# Patient Record
Sex: MALE | Race: BLACK OR AFRICAN AMERICAN | Employment: OTHER | URBAN - METROPOLITAN AREA
[De-identification: names, ages, dates, MRNs, and addresses within clinical notes are randomized per-mention and may not be internally consistent; named-entity substitution may affect disease eponyms.]

---

## 2017-03-15 ENCOUNTER — OFFICE VISIT (OUTPATIENT)
Dept: INTERNAL MEDICINE CLINIC | Age: 72
End: 2017-03-15

## 2017-03-15 VITALS
DIASTOLIC BLOOD PRESSURE: 86 MMHG | SYSTOLIC BLOOD PRESSURE: 128 MMHG | HEART RATE: 82 BPM | OXYGEN SATURATION: 96 % | BODY MASS INDEX: 33.96 KG/M2 | WEIGHT: 250.4 LBS

## 2017-03-15 DIAGNOSIS — I10 ESSENTIAL HYPERTENSION: ICD-10-CM

## 2017-03-15 DIAGNOSIS — F17.208 NICOTINE DEPENDENCE WITH OTHER NICOTINE-INDUCED DISORDER: ICD-10-CM

## 2017-03-15 DIAGNOSIS — E04.1 THYROID NODULE: ICD-10-CM

## 2017-03-15 DIAGNOSIS — J43.8 OTHER EMPHYSEMA (HCC): ICD-10-CM

## 2017-03-15 DIAGNOSIS — E78.00 PURE HYPERCHOLESTEROLEMIA: ICD-10-CM

## 2017-03-15 DIAGNOSIS — E11.8 CONTROLLED TYPE 2 DIABETES MELLITUS WITH COMPLICATION, WITHOUT LONG-TERM CURRENT USE OF INSULIN (HCC): Primary | ICD-10-CM

## 2017-03-15 LAB
GLUCOSE BLD-MCNC: 127 MG/DL
HBA1C MFR BLD: 6.8 %

## 2017-03-15 PROCEDURE — 82962 GLUCOSE BLOOD TEST: CPT | Performed by: INTERNAL MEDICINE

## 2017-03-15 PROCEDURE — 83036 HEMOGLOBIN GLYCOSYLATED A1C: CPT | Performed by: INTERNAL MEDICINE

## 2017-03-15 PROCEDURE — 99214 OFFICE O/P EST MOD 30 MIN: CPT | Performed by: INTERNAL MEDICINE

## 2017-03-15 RX ORDER — FLUTICASONE FUROATE AND VILANTEROL 100; 25 UG/1; UG/1
1 POWDER RESPIRATORY (INHALATION) DAILY
Qty: 1 EACH | Refills: 3 | Status: SHIPPED | OUTPATIENT
Start: 2017-03-15 | End: 2017-07-25 | Stop reason: SDUPTHER

## 2017-03-15 ASSESSMENT — PATIENT HEALTH QUESTIONNAIRE - PHQ9
SUM OF ALL RESPONSES TO PHQ9 QUESTIONS 1 & 2: 0
1. LITTLE INTEREST OR PLEASURE IN DOING THINGS: 0
SUM OF ALL RESPONSES TO PHQ QUESTIONS 1-9: 0
2. FEELING DOWN, DEPRESSED OR HOPELESS: 0

## 2017-03-18 ASSESSMENT — ENCOUNTER SYMPTOMS
EYES NEGATIVE: 1
GASTROINTESTINAL NEGATIVE: 1

## 2017-03-29 ENCOUNTER — OFFICE VISIT (OUTPATIENT)
Dept: ENT CLINIC | Age: 72
End: 2017-03-29

## 2017-03-29 VITALS
DIASTOLIC BLOOD PRESSURE: 64 MMHG | TEMPERATURE: 98.2 F | BODY MASS INDEX: 33.59 KG/M2 | WEIGHT: 248 LBS | SYSTOLIC BLOOD PRESSURE: 108 MMHG | HEIGHT: 72 IN

## 2017-03-29 DIAGNOSIS — D49.7 THYROID NEOPLASM: Primary | ICD-10-CM

## 2017-03-29 PROCEDURE — 99203 OFFICE O/P NEW LOW 30 MIN: CPT | Performed by: OTOLARYNGOLOGY

## 2017-04-04 ENCOUNTER — HOSPITAL ENCOUNTER (OUTPATIENT)
Dept: ULTRASOUND IMAGING | Age: 72
Discharge: HOME | End: 2017-04-04
Attending: OTOLARYNGOLOGY | Admitting: OTOLARYNGOLOGY

## 2017-04-04 DIAGNOSIS — D49.7 THYROID NEOPLASM: ICD-10-CM

## 2017-04-04 DIAGNOSIS — D49.7 NEOPLASM OF UNSPECIFIED BEHAVIOR OF ENDOCRINE GLANDS AND OTHER PARTS OF NERVOUS SYSTEM: ICD-10-CM

## 2017-04-11 ENCOUNTER — TELEPHONE (OUTPATIENT)
Dept: ENT CLINIC | Age: 72
End: 2017-04-11

## 2017-04-27 ENCOUNTER — OFFICE VISIT (OUTPATIENT)
Dept: INTERNAL MEDICINE CLINIC | Age: 72
End: 2017-04-27

## 2017-04-27 VITALS
BODY MASS INDEX: 33.61 KG/M2 | OXYGEN SATURATION: 95 % | HEART RATE: 84 BPM | DIASTOLIC BLOOD PRESSURE: 78 MMHG | SYSTOLIC BLOOD PRESSURE: 130 MMHG | WEIGHT: 247.8 LBS

## 2017-04-27 DIAGNOSIS — E04.1 THYROID NODULE: ICD-10-CM

## 2017-04-27 DIAGNOSIS — E11.8 CONTROLLED TYPE 2 DIABETES MELLITUS WITH COMPLICATION, WITHOUT LONG-TERM CURRENT USE OF INSULIN (HCC): Primary | ICD-10-CM

## 2017-04-27 DIAGNOSIS — I10 ESSENTIAL HYPERTENSION: ICD-10-CM

## 2017-04-27 DIAGNOSIS — J43.8 OTHER EMPHYSEMA (HCC): ICD-10-CM

## 2017-04-27 LAB — GLUCOSE BLD-MCNC: 132 MG/DL

## 2017-04-27 PROCEDURE — 82962 GLUCOSE BLOOD TEST: CPT | Performed by: INTERNAL MEDICINE

## 2017-04-27 PROCEDURE — 99214 OFFICE O/P EST MOD 30 MIN: CPT | Performed by: INTERNAL MEDICINE

## 2017-04-30 ASSESSMENT — ENCOUNTER SYMPTOMS: GASTROINTESTINAL NEGATIVE: 1

## 2017-05-16 ENCOUNTER — OFFICE VISIT (OUTPATIENT)
Dept: INTERNAL MEDICINE CLINIC | Age: 72
End: 2017-05-16

## 2017-05-16 VITALS
DIASTOLIC BLOOD PRESSURE: 80 MMHG | OXYGEN SATURATION: 94 % | HEIGHT: 72 IN | WEIGHT: 247 LBS | HEART RATE: 91 BPM | BODY MASS INDEX: 33.46 KG/M2 | SYSTOLIC BLOOD PRESSURE: 136 MMHG | TEMPERATURE: 97.9 F | RESPIRATION RATE: 16 BRPM

## 2017-05-16 DIAGNOSIS — I10 ESSENTIAL HYPERTENSION: Primary | ICD-10-CM

## 2017-05-16 DIAGNOSIS — Z01.818 PRE-OP EXAM: ICD-10-CM

## 2017-05-16 DIAGNOSIS — E11.9 TYPE 2 DIABETES MELLITUS WITHOUT COMPLICATION, WITHOUT LONG-TERM CURRENT USE OF INSULIN (HCC): ICD-10-CM

## 2017-05-16 DIAGNOSIS — I10 ESSENTIAL HYPERTENSION: ICD-10-CM

## 2017-05-16 LAB — GLUCOSE BLD-MCNC: 125 MG/DL

## 2017-05-16 PROCEDURE — 93000 ELECTROCARDIOGRAM COMPLETE: CPT | Performed by: NURSE PRACTITIONER

## 2017-05-16 PROCEDURE — 82962 GLUCOSE BLOOD TEST: CPT | Performed by: NURSE PRACTITIONER

## 2017-05-16 PROCEDURE — 99214 OFFICE O/P EST MOD 30 MIN: CPT | Performed by: NURSE PRACTITIONER

## 2017-05-16 ASSESSMENT — ENCOUNTER SYMPTOMS
GASTROINTESTINAL NEGATIVE: 1
RESPIRATORY NEGATIVE: 1
ALLERGIC/IMMUNOLOGIC NEGATIVE: 1

## 2017-05-17 LAB
A/G RATIO: 1.4 (ref 1.1–2.2)
ALBUMIN SERPL-MCNC: 4.1 G/DL (ref 3.4–5)
ALP BLD-CCNC: 64 U/L (ref 40–129)
ALT SERPL-CCNC: 11 U/L (ref 10–40)
ANION GAP SERPL CALCULATED.3IONS-SCNC: 13 MMOL/L (ref 3–16)
AST SERPL-CCNC: 19 U/L (ref 15–37)
BILIRUB SERPL-MCNC: 0.3 MG/DL (ref 0–1)
BUN BLDV-MCNC: 12 MG/DL (ref 7–20)
CALCIUM SERPL-MCNC: 9.3 MG/DL (ref 8.3–10.6)
CHLORIDE BLD-SCNC: 104 MMOL/L (ref 99–110)
CO2: 25 MMOL/L (ref 21–32)
CREAT SERPL-MCNC: 1 MG/DL (ref 0.8–1.3)
GFR AFRICAN AMERICAN: >60
GFR NON-AFRICAN AMERICAN: >60
GLOBULIN: 3 G/DL
GLUCOSE BLD-MCNC: 107 MG/DL (ref 70–99)
HCT VFR BLD CALC: 47.3 % (ref 40.5–52.5)
HEMOGLOBIN: 15.4 G/DL (ref 13.5–17.5)
MCH RBC QN AUTO: 31.9 PG (ref 26–34)
MCHC RBC AUTO-ENTMCNC: 32.6 G/DL (ref 31–36)
MCV RBC AUTO: 97.9 FL (ref 80–100)
PDW BLD-RTO: 14.1 % (ref 12.4–15.4)
PLATELET # BLD: 182 K/UL (ref 135–450)
PMV BLD AUTO: 8.6 FL (ref 5–10.5)
POTASSIUM SERPL-SCNC: 4.6 MMOL/L (ref 3.5–5.1)
RBC # BLD: 4.83 M/UL (ref 4.2–5.9)
SODIUM BLD-SCNC: 142 MMOL/L (ref 136–145)
TOTAL PROTEIN: 7.1 G/DL (ref 6.4–8.2)
WBC # BLD: 8.6 K/UL (ref 4–11)

## 2017-07-25 DIAGNOSIS — J43.8 OTHER EMPHYSEMA (HCC): ICD-10-CM

## 2017-07-25 RX ORDER — FLUTICASONE FUROATE AND VILANTEROL 100; 25 UG/1; UG/1
1 POWDER RESPIRATORY (INHALATION) DAILY
Qty: 1 EACH | Refills: 3 | Status: SHIPPED | OUTPATIENT
Start: 2017-07-25 | End: 2018-02-24 | Stop reason: SDUPTHER

## 2017-07-31 RX ORDER — AMMONIUM LACTATE 12 G/100G
LOTION TOPICAL
Qty: 225 ML | Refills: 2 | Status: SHIPPED | OUTPATIENT
Start: 2017-07-31 | End: 2018-01-04 | Stop reason: SDUPTHER

## 2017-07-31 RX ORDER — NAPROXEN 500 MG/1
TABLET ORAL
Qty: 20 TABLET | Refills: 1 | Status: SHIPPED | OUTPATIENT
Start: 2017-07-31 | End: 2022-10-17 | Stop reason: ALTCHOICE

## 2017-08-29 ENCOUNTER — OFFICE VISIT (OUTPATIENT)
Dept: INTERNAL MEDICINE CLINIC | Age: 72
End: 2017-08-29

## 2017-08-29 VITALS
BODY MASS INDEX: 34.08 KG/M2 | WEIGHT: 251.6 LBS | SYSTOLIC BLOOD PRESSURE: 104 MMHG | OXYGEN SATURATION: 97 % | HEART RATE: 96 BPM | HEIGHT: 72 IN | DIASTOLIC BLOOD PRESSURE: 64 MMHG | TEMPERATURE: 97.9 F

## 2017-08-29 DIAGNOSIS — L30.9 ECZEMA, UNSPECIFIED TYPE: ICD-10-CM

## 2017-08-29 DIAGNOSIS — Z23 NEED FOR PNEUMOCOCCAL VACCINE: ICD-10-CM

## 2017-08-29 DIAGNOSIS — I10 ESSENTIAL HYPERTENSION: ICD-10-CM

## 2017-08-29 DIAGNOSIS — F17.219 CIGARETTE NICOTINE DEPENDENCE WITH NICOTINE-INDUCED DISORDER: ICD-10-CM

## 2017-08-29 DIAGNOSIS — E78.2 MIXED HYPERLIPIDEMIA: ICD-10-CM

## 2017-08-29 DIAGNOSIS — E11.8 TYPE 2 DIABETES MELLITUS WITH COMPLICATION, WITHOUT LONG-TERM CURRENT USE OF INSULIN (HCC): Primary | ICD-10-CM

## 2017-08-29 DIAGNOSIS — Z23 FLU VACCINE NEED: ICD-10-CM

## 2017-08-29 LAB
GLUCOSE BLD-MCNC: 118 MG/DL
HBA1C MFR BLD: 6.7 %

## 2017-08-29 PROCEDURE — 99214 OFFICE O/P EST MOD 30 MIN: CPT | Performed by: INTERNAL MEDICINE

## 2017-08-29 PROCEDURE — 83036 HEMOGLOBIN GLYCOSYLATED A1C: CPT | Performed by: INTERNAL MEDICINE

## 2017-08-29 PROCEDURE — G0008 ADMIN INFLUENZA VIRUS VAC: HCPCS | Performed by: INTERNAL MEDICINE

## 2017-08-29 PROCEDURE — 90670 PCV13 VACCINE IM: CPT | Performed by: INTERNAL MEDICINE

## 2017-08-29 PROCEDURE — 90662 IIV NO PRSV INCREASED AG IM: CPT | Performed by: INTERNAL MEDICINE

## 2017-08-29 PROCEDURE — G0009 ADMIN PNEUMOCOCCAL VACCINE: HCPCS | Performed by: INTERNAL MEDICINE

## 2017-08-29 PROCEDURE — 82962 GLUCOSE BLOOD TEST: CPT | Performed by: INTERNAL MEDICINE

## 2017-08-30 ASSESSMENT — ENCOUNTER SYMPTOMS
EYES NEGATIVE: 1
RESPIRATORY NEGATIVE: 1
GASTROINTESTINAL NEGATIVE: 1

## 2017-11-21 ENCOUNTER — OFFICE VISIT (OUTPATIENT)
Dept: INTERNAL MEDICINE CLINIC | Age: 72
End: 2017-11-21

## 2017-11-21 VITALS
TEMPERATURE: 97.9 F | WEIGHT: 248.6 LBS | SYSTOLIC BLOOD PRESSURE: 116 MMHG | BODY MASS INDEX: 35.59 KG/M2 | HEIGHT: 70 IN | HEART RATE: 72 BPM | DIASTOLIC BLOOD PRESSURE: 80 MMHG

## 2017-11-21 DIAGNOSIS — I10 ESSENTIAL HYPERTENSION: ICD-10-CM

## 2017-11-21 DIAGNOSIS — E78.2 MIXED HYPERLIPIDEMIA: ICD-10-CM

## 2017-11-21 DIAGNOSIS — L98.9 SKIN LESION: ICD-10-CM

## 2017-11-21 DIAGNOSIS — F17.219 CIGARETTE NICOTINE DEPENDENCE WITH NICOTINE-INDUCED DISORDER: ICD-10-CM

## 2017-11-21 DIAGNOSIS — E11.8 TYPE 2 DIABETES MELLITUS WITH COMPLICATION, WITHOUT LONG-TERM CURRENT USE OF INSULIN (HCC): Primary | ICD-10-CM

## 2017-11-21 LAB
CHOLESTEROL, TOTAL: 182 MG/DL (ref 0–199)
CREATININE URINE: 288.9 MG/DL (ref 39–259)
GLUCOSE BLD-MCNC: 125 MG/DL
HDLC SERPL-MCNC: 46 MG/DL (ref 40–60)
LDL CHOLESTEROL CALCULATED: 106 MG/DL
MICROALBUMIN UR-MCNC: <1.2 MG/DL
MICROALBUMIN/CREAT UR-RTO: ABNORMAL MG/G (ref 0–30)
TRIGL SERPL-MCNC: 152 MG/DL (ref 0–150)
VLDLC SERPL CALC-MCNC: 30 MG/DL

## 2017-11-21 PROCEDURE — G8427 DOCREV CUR MEDS BY ELIG CLIN: HCPCS | Performed by: INTERNAL MEDICINE

## 2017-11-21 PROCEDURE — 3044F HG A1C LEVEL LT 7.0%: CPT | Performed by: INTERNAL MEDICINE

## 2017-11-21 PROCEDURE — 3017F COLORECTAL CA SCREEN DOC REV: CPT | Performed by: INTERNAL MEDICINE

## 2017-11-21 PROCEDURE — 4004F PT TOBACCO SCREEN RCVD TLK: CPT | Performed by: INTERNAL MEDICINE

## 2017-11-21 PROCEDURE — 4040F PNEUMOC VAC/ADMIN/RCVD: CPT | Performed by: INTERNAL MEDICINE

## 2017-11-21 PROCEDURE — G8484 FLU IMMUNIZE NO ADMIN: HCPCS | Performed by: INTERNAL MEDICINE

## 2017-11-21 PROCEDURE — 99214 OFFICE O/P EST MOD 30 MIN: CPT | Performed by: INTERNAL MEDICINE

## 2017-11-21 PROCEDURE — 1123F ACP DISCUSS/DSCN MKR DOCD: CPT | Performed by: INTERNAL MEDICINE

## 2017-11-21 PROCEDURE — G8417 CALC BMI ABV UP PARAM F/U: HCPCS | Performed by: INTERNAL MEDICINE

## 2017-11-21 PROCEDURE — 82962 GLUCOSE BLOOD TEST: CPT | Performed by: INTERNAL MEDICINE

## 2017-11-21 NOTE — PROGRESS NOTES
Subjective:      Patient ID: Daryn Chacon is a 71 y.o. male. HPI He is here for a check up and f/u on hypertension, hyperlipidemia, and diabetes, type 2. He is taking medication as prescribed, continues to work on a more balanced meal plan and more physical activity recognizing more work needs to be put forth in these areas. He also continues to smoke despite repeated counseling session. Review of Systems   Constitutional: Negative. HENT: Negative. Eyes:        Current with eye exam.  Dr. Codi Byrd. Respiratory: Negative. Cardiovascular:        Hypertension, on stable medication regimen. An ARB and CA blker. Gastrointestinal: Negative. Endocrine:        Diabetes, type 2 on DPP4 with a1c of 6.7. Genitourinary: Negative. Musculoskeletal: Negative. Skin:        Skin lesion. Wants derm evaluation. Neurological: Negative. Psychiatric/Behavioral: Negative. Objective:   Physical Exam   Constitutional: He is oriented to person, place, and time. He appears well-developed and well-nourished. No distress. HENT:   Head: Normocephalic and atraumatic. Right Ear: External ear normal.   Left Ear: External ear normal.   Nose: Nose normal.   Mouth/Throat: Oropharynx is clear and moist.   Eyes: Conjunctivae and EOM are normal. Pupils are equal, round, and reactive to light. No scleral icterus. Neck: Normal range of motion. Neck supple. No thyromegaly present. Cardiovascular: Normal rate, regular rhythm, normal heart sounds and intact distal pulses. Pulmonary/Chest: Effort normal and breath sounds normal.   Abdominal: Soft. Bowel sounds are normal. He exhibits no mass. Musculoskeletal:   Feet, no ulcers and microfilament exam is normal.    Lymphadenopathy:     He has no cervical adenopathy. Neurological: He is alert and oriented to person, place, and time. He has normal reflexes. Skin: Skin is warm and dry.    Right dorsal hand, skin between 2nd and 3rd metacarpals,

## 2017-11-27 RX ORDER — AMLODIPINE BESYLATE 5 MG/1
TABLET ORAL
Qty: 90 TABLET | Refills: 3 | Status: SHIPPED | OUTPATIENT
Start: 2017-11-27 | End: 2018-11-09 | Stop reason: SDUPTHER

## 2017-11-27 RX ORDER — LOSARTAN POTASSIUM 50 MG/1
TABLET ORAL
Qty: 90 TABLET | Refills: 3 | Status: SHIPPED | OUTPATIENT
Start: 2017-11-27 | End: 2018-11-09 | Stop reason: SDUPTHER

## 2017-11-27 RX ORDER — PRAVASTATIN SODIUM 40 MG
TABLET ORAL
Qty: 90 TABLET | Refills: 3 | Status: SHIPPED | OUTPATIENT
Start: 2017-11-27 | End: 2018-11-09 | Stop reason: SDUPTHER

## 2017-11-29 ASSESSMENT — ENCOUNTER SYMPTOMS
GASTROINTESTINAL NEGATIVE: 1
RESPIRATORY NEGATIVE: 1

## 2018-01-04 DIAGNOSIS — L30.9 ECZEMA, UNSPECIFIED TYPE: ICD-10-CM

## 2018-01-04 RX ORDER — AMMONIUM LACTATE 12 G/100G
LOTION TOPICAL
Qty: 225 ML | Refills: 2 | Status: SHIPPED | OUTPATIENT
Start: 2018-01-04 | End: 2018-11-09 | Stop reason: SDUPTHER

## 2018-02-14 ENCOUNTER — OFFICE VISIT (OUTPATIENT)
Dept: DERMATOLOGY | Age: 73
End: 2018-02-14

## 2018-02-14 DIAGNOSIS — L82.1 SK (SEBORRHEIC KERATOSIS): Primary | ICD-10-CM

## 2018-02-14 PROCEDURE — 3017F COLORECTAL CA SCREEN DOC REV: CPT | Performed by: DERMATOLOGY

## 2018-02-14 PROCEDURE — G8427 DOCREV CUR MEDS BY ELIG CLIN: HCPCS | Performed by: DERMATOLOGY

## 2018-02-14 PROCEDURE — G8484 FLU IMMUNIZE NO ADMIN: HCPCS | Performed by: DERMATOLOGY

## 2018-02-14 PROCEDURE — 1123F ACP DISCUSS/DSCN MKR DOCD: CPT | Performed by: DERMATOLOGY

## 2018-02-14 PROCEDURE — 99212 OFFICE O/P EST SF 10 MIN: CPT | Performed by: DERMATOLOGY

## 2018-02-14 PROCEDURE — 4040F PNEUMOC VAC/ADMIN/RCVD: CPT | Performed by: DERMATOLOGY

## 2018-02-14 PROCEDURE — 4004F PT TOBACCO SCREEN RCVD TLK: CPT | Performed by: DERMATOLOGY

## 2018-02-14 PROCEDURE — G8417 CALC BMI ABV UP PARAM F/U: HCPCS | Performed by: DERMATOLOGY

## 2018-02-21 ENCOUNTER — OFFICE VISIT (OUTPATIENT)
Dept: INTERNAL MEDICINE CLINIC | Age: 73
End: 2018-02-21

## 2018-02-21 VITALS
BODY MASS INDEX: 33.64 KG/M2 | WEIGHT: 248.4 LBS | TEMPERATURE: 98 F | DIASTOLIC BLOOD PRESSURE: 66 MMHG | HEART RATE: 85 BPM | HEIGHT: 72 IN | SYSTOLIC BLOOD PRESSURE: 118 MMHG | OXYGEN SATURATION: 95 %

## 2018-02-21 DIAGNOSIS — E11.8 TYPE 2 DIABETES MELLITUS WITH COMPLICATION, WITHOUT LONG-TERM CURRENT USE OF INSULIN (HCC): Primary | ICD-10-CM

## 2018-02-21 DIAGNOSIS — I10 ESSENTIAL HYPERTENSION: ICD-10-CM

## 2018-02-21 DIAGNOSIS — E78.2 MIXED HYPERLIPIDEMIA: ICD-10-CM

## 2018-02-21 DIAGNOSIS — F17.219 NICOTINE DEPENDENCE, CIGARETTES, WITH UNSPECIFIED NICOTINE-INDUCED DISORDERS: ICD-10-CM

## 2018-02-21 LAB
GLUCOSE BLD-MCNC: 108 MG/DL
HBA1C MFR BLD: 6.9 %

## 2018-02-21 PROCEDURE — 3044F HG A1C LEVEL LT 7.0%: CPT | Performed by: INTERNAL MEDICINE

## 2018-02-21 PROCEDURE — G0296 VISIT TO DETERM LDCT ELIG: HCPCS | Performed by: INTERNAL MEDICINE

## 2018-02-21 PROCEDURE — G8427 DOCREV CUR MEDS BY ELIG CLIN: HCPCS | Performed by: INTERNAL MEDICINE

## 2018-02-21 PROCEDURE — 83036 HEMOGLOBIN GLYCOSYLATED A1C: CPT | Performed by: INTERNAL MEDICINE

## 2018-02-21 PROCEDURE — 3017F COLORECTAL CA SCREEN DOC REV: CPT | Performed by: INTERNAL MEDICINE

## 2018-02-21 PROCEDURE — 4004F PT TOBACCO SCREEN RCVD TLK: CPT | Performed by: INTERNAL MEDICINE

## 2018-02-21 PROCEDURE — 82962 GLUCOSE BLOOD TEST: CPT | Performed by: INTERNAL MEDICINE

## 2018-02-21 PROCEDURE — 4040F PNEUMOC VAC/ADMIN/RCVD: CPT | Performed by: INTERNAL MEDICINE

## 2018-02-21 PROCEDURE — G8417 CALC BMI ABV UP PARAM F/U: HCPCS | Performed by: INTERNAL MEDICINE

## 2018-02-21 PROCEDURE — G8484 FLU IMMUNIZE NO ADMIN: HCPCS | Performed by: INTERNAL MEDICINE

## 2018-02-21 PROCEDURE — 1123F ACP DISCUSS/DSCN MKR DOCD: CPT | Performed by: INTERNAL MEDICINE

## 2018-02-21 PROCEDURE — 99214 OFFICE O/P EST MOD 30 MIN: CPT | Performed by: INTERNAL MEDICINE

## 2018-02-21 NOTE — PROGRESS NOTES
Subjective:      Patient ID: Dina Goldstein is a 71 y.o. male. HPIHe is here for a check up and f/u on hypertension, hyperlipidemia, and diabetes, type 2. He is taking medication as prescribed, continues to work on a more balanced meal plan and more physical activity recognizing more effort os needed in these areas. Review of Systems   Constitutional: Negative. HENT: Negative. Eyes: Negative. Respiratory: Negative. Cardiovascular:        Hypertension, on stable medication regimen. Gastrointestinal: Negative. Endocrine:        Diabetes, type 2, with a1c of 6.9. Treated with DPP4. Hyperlipidemia, treated with statin with LDL of 106. Genitourinary: Negative. Musculoskeletal: Negative. Skin:        Skin lesions, SK per derm. Benign. Neurological: Negative. Psychiatric/Behavioral: Negative. Objective:   Physical Exam   Constitutional: He is oriented to person, place, and time. He appears well-developed and well-nourished. No distress. HENT:   Head: Normocephalic and atraumatic. Right Ear: External ear normal.   Left Ear: External ear normal.   Nose: Nose normal.   Mouth/Throat: Oropharynx is clear and moist.   Eyes: Conjunctivae and EOM are normal. Pupils are equal, round, and reactive to light. No scleral icterus. Neck: Normal range of motion. Neck supple. No thyromegaly present. Cardiovascular: Normal rate, regular rhythm, normal heart sounds and intact distal pulses. Pulmonary/Chest: Effort normal and breath sounds normal.   Abdominal: Soft. Bowel sounds are normal. He exhibits no mass. Lymphadenopathy:     He has no cervical adenopathy. Neurological: He is alert and oriented to person, place, and time. He has normal reflexes. Skin: Skin is warm and dry. Psychiatric: He has a normal mood and affect. His behavior is normal. Judgment and thought content normal.       Assessment:       1.  Type 2 diabetes mellitus with complication, without long-term

## 2018-02-22 ENCOUNTER — TELEPHONE (OUTPATIENT)
Dept: INTERNAL MEDICINE CLINIC | Age: 73
End: 2018-02-22

## 2018-02-22 ASSESSMENT — ENCOUNTER SYMPTOMS
GASTROINTESTINAL NEGATIVE: 1
RESPIRATORY NEGATIVE: 1
EYES NEGATIVE: 1

## 2018-02-23 ENCOUNTER — TELEPHONE (OUTPATIENT)
Dept: INTERNAL MEDICINE CLINIC | Age: 73
End: 2018-02-23

## 2018-02-23 DIAGNOSIS — F17.219 CIGARETTE NICOTINE DEPENDENCE WITH NICOTINE-INDUCED DISORDER: Primary | ICD-10-CM

## 2018-02-24 DIAGNOSIS — J43.8 OTHER EMPHYSEMA (HCC): ICD-10-CM

## 2018-02-26 RX ORDER — FLUTICASONE FUROATE AND VILANTEROL TRIFENATATE 100; 25 UG/1; UG/1
1 POWDER RESPIRATORY (INHALATION) DAILY
Qty: 60 EACH | Refills: 3 | Status: SHIPPED | OUTPATIENT
Start: 2018-02-26 | End: 2018-07-02 | Stop reason: SDUPTHER

## 2018-03-15 ENCOUNTER — HOSPITAL ENCOUNTER (OUTPATIENT)
Dept: CT IMAGING | Age: 73
Discharge: HOME | End: 2018-03-15
Attending: INTERNAL MEDICINE | Admitting: INTERNAL MEDICINE

## 2018-03-15 DIAGNOSIS — F17.219 CIGARETTE NICOTINE DEPENDENCE WITH NICOTINE-INDUCED DISORDER: ICD-10-CM

## 2018-04-07 RX ORDER — SITAGLIPTIN 100 MG/1
TABLET, FILM COATED ORAL
Qty: 30 TABLET | Refills: 5 | Status: SHIPPED | OUTPATIENT
Start: 2018-04-07 | End: 2018-10-03 | Stop reason: SDUPTHER

## 2018-04-19 DIAGNOSIS — N39.0 URINARY TRACT INFECTION WITHOUT HEMATURIA, SITE UNSPECIFIED: Primary | ICD-10-CM

## 2018-04-19 LAB
BILIRUBIN URINE: NEGATIVE
BILIRUBIN, POC: NEGATIVE
BLOOD URINE, POC: ABNORMAL
BLOOD, URINE: ABNORMAL
CLARITY, POC: CLEAR
CLARITY: ABNORMAL
COLOR, POC: YELLOW
COLOR: ABNORMAL
EPITHELIAL CELLS, UA: 4 /HPF (ref 0–5)
GLUCOSE URINE, POC: NEGATIVE
GLUCOSE URINE: NEGATIVE MG/DL
HYALINE CASTS: 2 /LPF (ref 0–8)
KETONES, POC: NEGATIVE
KETONES, URINE: NEGATIVE MG/DL
LEUKOCYTE EST, POC: ABNORMAL
LEUKOCYTE ESTERASE, URINE: ABNORMAL
MICROSCOPIC EXAMINATION: YES
NITRITE, POC: NEGATIVE
NITRITE, URINE: NEGATIVE
PH UA: 5.5
PH, POC: 5.5
PROTEIN UA: NEGATIVE MG/DL
PROTEIN, POC: NEGATIVE
RBC UA: 11 /HPF (ref 0–4)
SPECIFIC GRAVITY UA: 1.03
SPECIFIC GRAVITY, POC: 1.03
URINE TYPE: ABNORMAL
UROBILINOGEN, POC: 0.2
UROBILINOGEN, URINE: 0.2 E.U./DL
WBC UA: 19 /HPF (ref 0–5)

## 2018-04-19 PROCEDURE — 81002 URINALYSIS NONAUTO W/O SCOPE: CPT | Performed by: INTERNAL MEDICINE

## 2018-04-20 LAB — URINE CULTURE, ROUTINE: NORMAL

## 2018-04-25 ENCOUNTER — TELEPHONE (OUTPATIENT)
Dept: INTERNAL MEDICINE CLINIC | Age: 73
End: 2018-04-25

## 2018-04-25 DIAGNOSIS — R31.9 HEMATURIA, UNSPECIFIED TYPE: Primary | ICD-10-CM

## 2018-05-21 ENCOUNTER — OFFICE VISIT (OUTPATIENT)
Dept: INTERNAL MEDICINE CLINIC | Age: 73
End: 2018-05-21

## 2018-05-21 VITALS
DIASTOLIC BLOOD PRESSURE: 72 MMHG | HEART RATE: 86 BPM | HEIGHT: 72 IN | OXYGEN SATURATION: 93 % | SYSTOLIC BLOOD PRESSURE: 128 MMHG | WEIGHT: 251 LBS | BODY MASS INDEX: 34 KG/M2

## 2018-05-21 DIAGNOSIS — E11.8 TYPE 2 DIABETES MELLITUS WITH COMPLICATION, WITHOUT LONG-TERM CURRENT USE OF INSULIN (HCC): Primary | ICD-10-CM

## 2018-05-21 DIAGNOSIS — E78.2 MIXED HYPERLIPIDEMIA: ICD-10-CM

## 2018-05-21 DIAGNOSIS — L30.9 ECZEMA, UNSPECIFIED TYPE: ICD-10-CM

## 2018-05-21 DIAGNOSIS — Z13.6 SCREENING FOR AAA (ABDOMINAL AORTIC ANEURYSM): ICD-10-CM

## 2018-05-21 DIAGNOSIS — I10 ESSENTIAL HYPERTENSION: ICD-10-CM

## 2018-05-21 DIAGNOSIS — Z23 NEED FOR PROPHYLACTIC VACCINATION AGAINST DIPHTHERIA-TETANUS-PERTUSSIS (DTP): ICD-10-CM

## 2018-05-21 LAB
ANION GAP SERPL CALCULATED.3IONS-SCNC: 14 MMOL/L (ref 3–16)
BUN BLDV-MCNC: 11 MG/DL (ref 7–20)
CALCIUM SERPL-MCNC: 9.2 MG/DL (ref 8.3–10.6)
CHLORIDE BLD-SCNC: 101 MMOL/L (ref 99–110)
CHOLESTEROL, TOTAL: 196 MG/DL (ref 0–199)
CO2: 27 MMOL/L (ref 21–32)
CREAT SERPL-MCNC: 0.9 MG/DL (ref 0.8–1.3)
GFR AFRICAN AMERICAN: >60
GFR NON-AFRICAN AMERICAN: >60
GLUCOSE BLD-MCNC: 143 MG/DL (ref 70–99)
GLUCOSE BLD-MCNC: 208 MG/DL
HDLC SERPL-MCNC: 44 MG/DL (ref 40–60)
LDL CHOLESTEROL CALCULATED: 120 MG/DL
POTASSIUM SERPL-SCNC: 4.5 MMOL/L (ref 3.5–5.1)
SODIUM BLD-SCNC: 142 MMOL/L (ref 136–145)
TRIGL SERPL-MCNC: 158 MG/DL (ref 0–150)
VLDLC SERPL CALC-MCNC: 32 MG/DL

## 2018-05-21 PROCEDURE — 4004F PT TOBACCO SCREEN RCVD TLK: CPT | Performed by: INTERNAL MEDICINE

## 2018-05-21 PROCEDURE — 1123F ACP DISCUSS/DSCN MKR DOCD: CPT | Performed by: INTERNAL MEDICINE

## 2018-05-21 PROCEDURE — 82962 GLUCOSE BLOOD TEST: CPT | Performed by: INTERNAL MEDICINE

## 2018-05-21 PROCEDURE — G8427 DOCREV CUR MEDS BY ELIG CLIN: HCPCS | Performed by: INTERNAL MEDICINE

## 2018-05-21 PROCEDURE — 83036 HEMOGLOBIN GLYCOSYLATED A1C: CPT | Performed by: INTERNAL MEDICINE

## 2018-05-21 PROCEDURE — 2022F DILAT RTA XM EVC RTNOPTHY: CPT | Performed by: INTERNAL MEDICINE

## 2018-05-21 PROCEDURE — 3044F HG A1C LEVEL LT 7.0%: CPT | Performed by: INTERNAL MEDICINE

## 2018-05-21 PROCEDURE — 99214 OFFICE O/P EST MOD 30 MIN: CPT | Performed by: INTERNAL MEDICINE

## 2018-05-21 PROCEDURE — 4040F PNEUMOC VAC/ADMIN/RCVD: CPT | Performed by: INTERNAL MEDICINE

## 2018-05-21 PROCEDURE — 3017F COLORECTAL CA SCREEN DOC REV: CPT | Performed by: INTERNAL MEDICINE

## 2018-05-21 PROCEDURE — G8417 CALC BMI ABV UP PARAM F/U: HCPCS | Performed by: INTERNAL MEDICINE

## 2018-05-22 ASSESSMENT — ENCOUNTER SYMPTOMS
RESPIRATORY NEGATIVE: 1
EYES NEGATIVE: 1
GASTROINTESTINAL NEGATIVE: 1

## 2018-05-23 ENCOUNTER — TELEPHONE (OUTPATIENT)
Dept: INTERNAL MEDICINE CLINIC | Age: 73
End: 2018-05-23

## 2018-05-23 ENCOUNTER — HOSPITAL ENCOUNTER (OUTPATIENT)
Dept: ULTRASOUND IMAGING | Age: 73
Discharge: HOME | End: 2018-05-23
Attending: INTERNAL MEDICINE | Admitting: INTERNAL MEDICINE

## 2018-05-23 DIAGNOSIS — Z13.6 ENCOUNTER FOR SCREENING FOR CARDIOVASCULAR DISORDERS: ICD-10-CM

## 2018-05-23 DIAGNOSIS — Z13.6 SCREENING FOR AAA (ABDOMINAL AORTIC ANEURYSM): ICD-10-CM

## 2018-05-30 ENCOUNTER — TELEPHONE (OUTPATIENT)
Dept: CASE MANAGEMENT | Age: 73
End: 2018-05-30

## 2018-06-06 ENCOUNTER — OFFICE VISIT (OUTPATIENT)
Dept: INTERNAL MEDICINE CLINIC | Age: 73
End: 2018-06-06

## 2018-06-06 VITALS — HEIGHT: 72 IN | WEIGHT: 249 LBS | OXYGEN SATURATION: 97 % | HEART RATE: 94 BPM | BODY MASS INDEX: 33.72 KG/M2

## 2018-06-06 DIAGNOSIS — E11.8 TYPE 2 DIABETES MELLITUS WITH COMPLICATION, WITHOUT LONG-TERM CURRENT USE OF INSULIN (HCC): ICD-10-CM

## 2018-06-06 DIAGNOSIS — Z01.818 PRE-OP EXAM: Primary | ICD-10-CM

## 2018-06-06 DIAGNOSIS — Z23 NEED FOR PROPHYLACTIC VACCINATION AND INOCULATION AGAINST VARICELLA: ICD-10-CM

## 2018-06-06 DIAGNOSIS — I10 ESSENTIAL HYPERTENSION: ICD-10-CM

## 2018-06-06 DIAGNOSIS — R31.29 HEMATURIA, MICROSCOPIC: ICD-10-CM

## 2018-06-06 DIAGNOSIS — R31.29 MICROSCOPIC HEMATURIA: ICD-10-CM

## 2018-06-06 LAB
GLUCOSE BLD-MCNC: 114 MG/DL
HBA1C MFR BLD: 7 %

## 2018-06-06 PROCEDURE — 93000 ELECTROCARDIOGRAM COMPLETE: CPT | Performed by: INTERNAL MEDICINE

## 2018-06-06 PROCEDURE — 2022F DILAT RTA XM EVC RTNOPTHY: CPT | Performed by: INTERNAL MEDICINE

## 2018-06-06 PROCEDURE — 3045F PR MOST RECENT HEMOGLOBIN A1C LEVEL 7.0-9.0%: CPT | Performed by: INTERNAL MEDICINE

## 2018-06-06 PROCEDURE — 99215 OFFICE O/P EST HI 40 MIN: CPT | Performed by: INTERNAL MEDICINE

## 2018-06-06 PROCEDURE — 1123F ACP DISCUSS/DSCN MKR DOCD: CPT | Performed by: INTERNAL MEDICINE

## 2018-06-06 PROCEDURE — 4040F PNEUMOC VAC/ADMIN/RCVD: CPT | Performed by: INTERNAL MEDICINE

## 2018-06-06 PROCEDURE — G8417 CALC BMI ABV UP PARAM F/U: HCPCS | Performed by: INTERNAL MEDICINE

## 2018-06-06 PROCEDURE — 3017F COLORECTAL CA SCREEN DOC REV: CPT | Performed by: INTERNAL MEDICINE

## 2018-06-06 PROCEDURE — 82962 GLUCOSE BLOOD TEST: CPT | Performed by: INTERNAL MEDICINE

## 2018-06-06 PROCEDURE — G8427 DOCREV CUR MEDS BY ELIG CLIN: HCPCS | Performed by: INTERNAL MEDICINE

## 2018-06-06 PROCEDURE — 4004F PT TOBACCO SCREEN RCVD TLK: CPT | Performed by: INTERNAL MEDICINE

## 2018-07-02 DIAGNOSIS — J43.8 OTHER EMPHYSEMA (HCC): ICD-10-CM

## 2018-07-02 RX ORDER — FLUTICASONE FUROATE AND VILANTEROL TRIFENATATE 100; 25 UG/1; UG/1
1 POWDER RESPIRATORY (INHALATION) DAILY
Qty: 1 EACH | Refills: 3 | Status: SHIPPED | OUTPATIENT
Start: 2018-07-02 | End: 2018-11-03 | Stop reason: SDUPTHER

## 2018-08-08 DIAGNOSIS — L30.9 ECZEMA, UNSPECIFIED TYPE: ICD-10-CM

## 2018-10-01 ENCOUNTER — TELEPHONE (OUTPATIENT)
Dept: CASE MANAGEMENT | Age: 73
End: 2018-10-01

## 2018-10-05 RX ORDER — SITAGLIPTIN 100 MG/1
TABLET, FILM COATED ORAL
Qty: 30 TABLET | Refills: 5 | Status: SHIPPED | OUTPATIENT
Start: 2018-10-05 | End: 2019-04-10 | Stop reason: SDUPTHER

## 2018-10-08 ENCOUNTER — TELEPHONE (OUTPATIENT)
Dept: INTERNAL MEDICINE CLINIC | Age: 73
End: 2018-10-08

## 2018-11-03 DIAGNOSIS — J43.8 OTHER EMPHYSEMA (HCC): ICD-10-CM

## 2018-11-03 RX ORDER — FLUTICASONE FUROATE AND VILANTEROL TRIFENATATE 100; 25 UG/1; UG/1
POWDER RESPIRATORY (INHALATION)
Qty: 1 EACH | Refills: 3 | Status: SHIPPED | OUTPATIENT
Start: 2018-11-03 | End: 2019-03-13 | Stop reason: SDUPTHER

## 2018-11-09 DIAGNOSIS — L30.9 ECZEMA, UNSPECIFIED TYPE: ICD-10-CM

## 2018-11-09 RX ORDER — PRAVASTATIN SODIUM 40 MG
TABLET ORAL
Qty: 90 TABLET | Refills: 3 | Status: SHIPPED | OUTPATIENT
Start: 2018-11-09 | End: 2019-10-10 | Stop reason: SDUPTHER

## 2018-11-09 RX ORDER — AMLODIPINE BESYLATE 5 MG/1
TABLET ORAL
Qty: 90 TABLET | Refills: 3 | Status: SHIPPED | OUTPATIENT
Start: 2018-11-09 | End: 2019-10-10 | Stop reason: SDUPTHER

## 2018-11-09 RX ORDER — LOSARTAN POTASSIUM 50 MG/1
TABLET ORAL
Qty: 90 TABLET | Refills: 3 | Status: SHIPPED | OUTPATIENT
Start: 2018-11-09 | End: 2019-10-10 | Stop reason: SDUPTHER

## 2018-11-09 RX ORDER — AMMONIUM LACTATE 12 G/100G
LOTION TOPICAL
Qty: 225 ML | Refills: 2 | Status: SHIPPED | OUTPATIENT
Start: 2018-11-09 | End: 2019-06-05 | Stop reason: SDUPTHER

## 2018-11-16 ENCOUNTER — OFFICE VISIT (OUTPATIENT)
Dept: INTERNAL MEDICINE CLINIC | Age: 73
End: 2018-11-16
Payer: COMMERCIAL

## 2018-11-16 VITALS
HEART RATE: 91 BPM | DIASTOLIC BLOOD PRESSURE: 82 MMHG | WEIGHT: 252.8 LBS | OXYGEN SATURATION: 92 % | HEIGHT: 72 IN | SYSTOLIC BLOOD PRESSURE: 120 MMHG | BODY MASS INDEX: 34.24 KG/M2

## 2018-11-16 DIAGNOSIS — E11.8 TYPE 2 DIABETES MELLITUS WITH COMPLICATION, WITHOUT LONG-TERM CURRENT USE OF INSULIN (HCC): ICD-10-CM

## 2018-11-16 DIAGNOSIS — E78.00 PURE HYPERCHOLESTEROLEMIA: ICD-10-CM

## 2018-11-16 DIAGNOSIS — J43.8 OTHER EMPHYSEMA (HCC): ICD-10-CM

## 2018-11-16 DIAGNOSIS — I10 ESSENTIAL HYPERTENSION: Primary | ICD-10-CM

## 2018-11-16 DIAGNOSIS — F17.219 CIGARETTE NICOTINE DEPENDENCE WITH NICOTINE-INDUCED DISORDER: ICD-10-CM

## 2018-11-16 DIAGNOSIS — Z23 FLU VACCINE NEED: ICD-10-CM

## 2018-11-16 LAB — GLUCOSE BLD-MCNC: 105 MG/DL

## 2018-11-16 PROCEDURE — G8926 SPIRO NO PERF OR DOC: HCPCS | Performed by: INTERNAL MEDICINE

## 2018-11-16 PROCEDURE — 3044F HG A1C LEVEL LT 7.0%: CPT | Performed by: INTERNAL MEDICINE

## 2018-11-16 PROCEDURE — 1101F PT FALLS ASSESS-DOCD LE1/YR: CPT | Performed by: INTERNAL MEDICINE

## 2018-11-16 PROCEDURE — 82962 GLUCOSE BLOOD TEST: CPT | Performed by: INTERNAL MEDICINE

## 2018-11-16 PROCEDURE — 99214 OFFICE O/P EST MOD 30 MIN: CPT | Performed by: INTERNAL MEDICINE

## 2018-11-16 PROCEDURE — 3017F COLORECTAL CA SCREEN DOC REV: CPT | Performed by: INTERNAL MEDICINE

## 2018-11-16 PROCEDURE — 3023F SPIROM DOC REV: CPT | Performed by: INTERNAL MEDICINE

## 2018-11-16 PROCEDURE — 4040F PNEUMOC VAC/ADMIN/RCVD: CPT | Performed by: INTERNAL MEDICINE

## 2018-11-16 PROCEDURE — 1123F ACP DISCUSS/DSCN MKR DOCD: CPT | Performed by: INTERNAL MEDICINE

## 2018-11-16 PROCEDURE — G0008 ADMIN INFLUENZA VIRUS VAC: HCPCS | Performed by: INTERNAL MEDICINE

## 2018-11-16 PROCEDURE — 2022F DILAT RTA XM EVC RTNOPTHY: CPT | Performed by: INTERNAL MEDICINE

## 2018-11-16 PROCEDURE — G8427 DOCREV CUR MEDS BY ELIG CLIN: HCPCS | Performed by: INTERNAL MEDICINE

## 2018-11-16 PROCEDURE — G8482 FLU IMMUNIZE ORDER/ADMIN: HCPCS | Performed by: INTERNAL MEDICINE

## 2018-11-16 PROCEDURE — 90662 IIV NO PRSV INCREASED AG IM: CPT | Performed by: INTERNAL MEDICINE

## 2018-11-16 PROCEDURE — 4004F PT TOBACCO SCREEN RCVD TLK: CPT | Performed by: INTERNAL MEDICINE

## 2018-11-16 PROCEDURE — G8417 CALC BMI ABV UP PARAM F/U: HCPCS | Performed by: INTERNAL MEDICINE

## 2018-11-17 ASSESSMENT — ENCOUNTER SYMPTOMS
EYES NEGATIVE: 1
GASTROINTESTINAL NEGATIVE: 1

## 2018-11-21 RX ORDER — NICOTINE 10 MG
CARTRIDGE (EA) INHALATION
Qty: 168 EACH | Refills: 2 | Status: SHIPPED | OUTPATIENT
Start: 2018-11-21 | End: 2019-12-16 | Stop reason: SDUPTHER

## 2019-02-04 DIAGNOSIS — L30.9 ECZEMA, UNSPECIFIED TYPE: ICD-10-CM

## 2019-03-12 ENCOUNTER — APPOINTMENT (OUTPATIENT)
Dept: GENERAL RADIOLOGY | Age: 74
End: 2019-03-12
Payer: COMMERCIAL

## 2019-03-12 ENCOUNTER — HOSPITAL ENCOUNTER (EMERGENCY)
Age: 74
Discharge: HOME | End: 2019-03-12
Attending: EMERGENCY MEDICINE
Payer: COMMERCIAL

## 2019-03-12 VITALS
SYSTOLIC BLOOD PRESSURE: 144 MMHG | BODY MASS INDEX: 34.24 KG/M2 | HEIGHT: 72 IN | HEART RATE: 101 BPM | DIASTOLIC BLOOD PRESSURE: 76 MMHG | OXYGEN SATURATION: 93 % | RESPIRATION RATE: 12 BRPM | TEMPERATURE: 98.2 F | WEIGHT: 252.8 LBS

## 2019-03-12 DIAGNOSIS — M79.642 HAND PAIN, LEFT: Primary | ICD-10-CM

## 2019-03-12 DIAGNOSIS — M19.042 ARTHRITIS OF LEFT HAND: ICD-10-CM

## 2019-03-12 PROCEDURE — 99283 EMERGENCY DEPT VISIT LOW MDM: CPT

## 2019-03-12 PROCEDURE — 73130 X-RAY EXAM OF HAND: CPT

## 2019-03-12 PROCEDURE — 6370000000 HC RX 637 (ALT 250 FOR IP): Performed by: EMERGENCY MEDICINE

## 2019-03-12 RX ORDER — ACETAMINOPHEN 500 MG
1000 TABLET ORAL ONCE
Status: COMPLETED | OUTPATIENT
Start: 2019-03-12 | End: 2019-03-12

## 2019-03-12 RX ADMIN — ACETAMINOPHEN 1000 MG: 500 TABLET, FILM COATED ORAL at 11:21

## 2019-03-12 ASSESSMENT — PAIN SCALES - GENERAL
PAINLEVEL_OUTOF10: 8
PAINLEVEL_OUTOF10: 8

## 2019-03-12 ASSESSMENT — PAIN DESCRIPTION - LOCATION: LOCATION: HAND

## 2019-03-12 ASSESSMENT — PAIN DESCRIPTION - ORIENTATION: ORIENTATION: LEFT

## 2019-03-13 DIAGNOSIS — J43.8 OTHER EMPHYSEMA (HCC): ICD-10-CM

## 2019-03-13 RX ORDER — FLUTICASONE FUROATE AND VILANTEROL TRIFENATATE 100; 25 UG/1; UG/1
POWDER RESPIRATORY (INHALATION)
Qty: 60 EACH | Refills: 3 | Status: SHIPPED | OUTPATIENT
Start: 2019-03-13 | End: 2019-07-09 | Stop reason: SDUPTHER

## 2019-03-19 ENCOUNTER — TELEPHONE (OUTPATIENT)
Dept: CASE MANAGEMENT | Age: 74
End: 2019-03-19

## 2019-04-10 RX ORDER — SITAGLIPTIN 100 MG/1
TABLET, FILM COATED ORAL
Qty: 30 TABLET | Refills: 5 | Status: SHIPPED | OUTPATIENT
Start: 2019-04-10 | End: 2019-09-30 | Stop reason: SDUPTHER

## 2019-05-15 ENCOUNTER — OFFICE VISIT (OUTPATIENT)
Dept: INTERNAL MEDICINE CLINIC | Age: 74
End: 2019-05-15
Payer: COMMERCIAL

## 2019-05-15 VITALS
BODY MASS INDEX: 35 KG/M2 | WEIGHT: 250 LBS | DIASTOLIC BLOOD PRESSURE: 82 MMHG | OXYGEN SATURATION: 95 % | HEART RATE: 81 BPM | SYSTOLIC BLOOD PRESSURE: 132 MMHG | HEIGHT: 71 IN

## 2019-05-15 DIAGNOSIS — E11.8 TYPE 2 DIABETES MELLITUS WITH COMPLICATION, WITHOUT LONG-TERM CURRENT USE OF INSULIN (HCC): ICD-10-CM

## 2019-05-15 DIAGNOSIS — I10 ESSENTIAL HYPERTENSION: ICD-10-CM

## 2019-05-15 DIAGNOSIS — M65.332 TRIGGER MIDDLE FINGER OF LEFT HAND: ICD-10-CM

## 2019-05-15 DIAGNOSIS — E11.8 TYPE 2 DIABETES MELLITUS WITH COMPLICATION, WITHOUT LONG-TERM CURRENT USE OF INSULIN (HCC): Primary | ICD-10-CM

## 2019-05-15 DIAGNOSIS — E78.2 MIXED HYPERLIPIDEMIA: ICD-10-CM

## 2019-05-15 LAB
CHP ED QC CHECK: NORMAL
GLUCOSE BLD-MCNC: 107 MG/DL
HCT VFR BLD CALC: 48.9 % (ref 40.5–52.5)
HEMOGLOBIN: 16.3 G/DL (ref 13.5–17.5)
MCH RBC QN AUTO: 33.1 PG (ref 26–34)
MCHC RBC AUTO-ENTMCNC: 33.2 G/DL (ref 31–36)
MCV RBC AUTO: 99.7 FL (ref 80–100)
PDW BLD-RTO: 13.5 % (ref 12.4–15.4)
PLATELET # BLD: 199 K/UL (ref 135–450)
PMV BLD AUTO: 8.4 FL (ref 5–10.5)
RBC # BLD: 4.91 M/UL (ref 4.2–5.9)
WBC # BLD: 9.8 K/UL (ref 4–11)

## 2019-05-15 PROCEDURE — 4004F PT TOBACCO SCREEN RCVD TLK: CPT | Performed by: INTERNAL MEDICINE

## 2019-05-15 PROCEDURE — 99214 OFFICE O/P EST MOD 30 MIN: CPT | Performed by: INTERNAL MEDICINE

## 2019-05-15 PROCEDURE — 2022F DILAT RTA XM EVC RTNOPTHY: CPT | Performed by: INTERNAL MEDICINE

## 2019-05-15 PROCEDURE — 1123F ACP DISCUSS/DSCN MKR DOCD: CPT | Performed by: INTERNAL MEDICINE

## 2019-05-15 PROCEDURE — 3017F COLORECTAL CA SCREEN DOC REV: CPT | Performed by: INTERNAL MEDICINE

## 2019-05-15 PROCEDURE — G8417 CALC BMI ABV UP PARAM F/U: HCPCS | Performed by: INTERNAL MEDICINE

## 2019-05-15 PROCEDURE — G8427 DOCREV CUR MEDS BY ELIG CLIN: HCPCS | Performed by: INTERNAL MEDICINE

## 2019-05-15 PROCEDURE — 82962 GLUCOSE BLOOD TEST: CPT | Performed by: INTERNAL MEDICINE

## 2019-05-15 PROCEDURE — 4040F PNEUMOC VAC/ADMIN/RCVD: CPT | Performed by: INTERNAL MEDICINE

## 2019-05-15 PROCEDURE — 3045F PR MOST RECENT HEMOGLOBIN A1C LEVEL 7.0-9.0%: CPT | Performed by: INTERNAL MEDICINE

## 2019-05-15 RX ORDER — NAPROXEN 500 MG/1
500 TABLET ORAL 2 TIMES DAILY PRN
Qty: 14 TABLET | Refills: 0 | Status: SHIPPED | OUTPATIENT
Start: 2019-05-15 | End: 2019-12-16 | Stop reason: SDUPTHER

## 2019-05-15 ASSESSMENT — PATIENT HEALTH QUESTIONNAIRE - PHQ9
SUM OF ALL RESPONSES TO PHQ QUESTIONS 1-9: 0
2. FEELING DOWN, DEPRESSED OR HOPELESS: 0
SUM OF ALL RESPONSES TO PHQ9 QUESTIONS 1 & 2: 0
SUM OF ALL RESPONSES TO PHQ QUESTIONS 1-9: 0
1. LITTLE INTEREST OR PLEASURE IN DOING THINGS: 0

## 2019-05-15 NOTE — PROGRESS NOTES
Orders   1. Type 2 diabetes mellitus with complication, without long-term current use of insulin (HCC)  Diet, physical activity and weight reduction discussed. May need med adjustment. A1c goal 6.5 to 7. POCT Glucose    Hemoglobin A1C   2. Essential hypertension  Stable. Continue same medication regimen. DASH diet discussed. Basic Metabolic Panel    TSH with Reflex    CBC    MICROALBUMIN / CREATININE URINE RATIO   3. Mixed hyperlipidemia  Heart healthy diet and statin compliance discussed. LIPID PANEL   4. Trigger middle finger of left hand  Exercises given. Delgado Baeza MD, Hand Surgery (Hand, Wrist, Upper Extremity), [facility]    External Referral To Orthopedic Surgery   5. Nicotine addiction: Continue to work on complete smoking cessation. Read the literature, take medication if prescribed, contact me if there are further questions. CT lung screen is due. Plan:    See plans above.         Casandra Hummel MD

## 2019-05-16 LAB
ANION GAP SERPL CALCULATED.3IONS-SCNC: 11 MMOL/L (ref 3–16)
BUN BLDV-MCNC: 13 MG/DL (ref 7–20)
CALCIUM SERPL-MCNC: 9.8 MG/DL (ref 8.3–10.6)
CHLORIDE BLD-SCNC: 102 MMOL/L (ref 99–110)
CHOLESTEROL, TOTAL: 179 MG/DL (ref 0–199)
CO2: 27 MMOL/L (ref 21–32)
CREAT SERPL-MCNC: 1.1 MG/DL (ref 0.8–1.3)
CREATININE URINE: 240.4 MG/DL (ref 39–259)
ESTIMATED AVERAGE GLUCOSE: 182.9 MG/DL
GFR AFRICAN AMERICAN: >60
GFR NON-AFRICAN AMERICAN: >60
GLUCOSE BLD-MCNC: 119 MG/DL (ref 70–99)
HBA1C MFR BLD: 8 %
HDLC SERPL-MCNC: 44 MG/DL (ref 40–60)
LDL CHOLESTEROL CALCULATED: 111 MG/DL
MICROALBUMIN UR-MCNC: <1.2 MG/DL
MICROALBUMIN/CREAT UR-RTO: NORMAL MG/G (ref 0–30)
POTASSIUM SERPL-SCNC: 4.7 MMOL/L (ref 3.5–5.1)
SODIUM BLD-SCNC: 140 MMOL/L (ref 136–145)
TRIGL SERPL-MCNC: 121 MG/DL (ref 0–150)
TSH REFLEX: 1.19 UIU/ML (ref 0.27–4.2)
VLDLC SERPL CALC-MCNC: 24 MG/DL

## 2019-05-20 ASSESSMENT — ENCOUNTER SYMPTOMS
GASTROINTESTINAL NEGATIVE: 1
EYES NEGATIVE: 1
RESPIRATORY NEGATIVE: 1

## 2019-06-05 ENCOUNTER — TELEPHONE (OUTPATIENT)
Dept: INTERNAL MEDICINE CLINIC | Age: 74
End: 2019-06-05

## 2019-06-05 RX ORDER — ALBUTEROL SULFATE 90 UG/1
2 AEROSOL, METERED RESPIRATORY (INHALATION) EVERY 6 HOURS PRN
Qty: 1 INHALER | Refills: 3 | Status: SHIPPED | OUTPATIENT
Start: 2019-06-05

## 2019-07-09 DIAGNOSIS — J43.8 OTHER EMPHYSEMA (HCC): ICD-10-CM

## 2019-07-10 RX ORDER — FLUTICASONE FUROATE AND VILANTEROL TRIFENATATE 100; 25 UG/1; UG/1
POWDER RESPIRATORY (INHALATION)
Qty: 1 EACH | Refills: 3 | Status: SHIPPED | OUTPATIENT
Start: 2019-07-10 | End: 2019-11-03 | Stop reason: SDUPTHER

## 2019-08-15 ENCOUNTER — OFFICE VISIT (OUTPATIENT)
Dept: INTERNAL MEDICINE CLINIC | Age: 74
End: 2019-08-15
Payer: COMMERCIAL

## 2019-08-15 VITALS
HEIGHT: 71 IN | OXYGEN SATURATION: 96 % | DIASTOLIC BLOOD PRESSURE: 80 MMHG | WEIGHT: 250 LBS | HEART RATE: 92 BPM | SYSTOLIC BLOOD PRESSURE: 126 MMHG | BODY MASS INDEX: 35 KG/M2

## 2019-08-15 DIAGNOSIS — I10 ESSENTIAL HYPERTENSION: ICD-10-CM

## 2019-08-15 DIAGNOSIS — E78.00 PURE HYPERCHOLESTEROLEMIA: ICD-10-CM

## 2019-08-15 DIAGNOSIS — Z87.891 PERSONAL HISTORY OF TOBACCO USE: ICD-10-CM

## 2019-08-15 DIAGNOSIS — E11.8 TYPE 2 DIABETES MELLITUS WITH COMPLICATION, WITHOUT LONG-TERM CURRENT USE OF INSULIN (HCC): Primary | ICD-10-CM

## 2019-08-15 DIAGNOSIS — Z00.00 ROUTINE GENERAL MEDICAL EXAMINATION AT A HEALTH CARE FACILITY: ICD-10-CM

## 2019-08-15 DIAGNOSIS — F17.219 CIGARETTE NICOTINE DEPENDENCE WITH NICOTINE-INDUCED DISORDER: ICD-10-CM

## 2019-08-15 LAB
CHP ED QC CHECK: NORMAL
GLUCOSE BLD-MCNC: 181 MG/DL
HBA1C MFR BLD: 7 %

## 2019-08-15 PROCEDURE — 82962 GLUCOSE BLOOD TEST: CPT | Performed by: INTERNAL MEDICINE

## 2019-08-15 PROCEDURE — 83036 HEMOGLOBIN GLYCOSYLATED A1C: CPT | Performed by: INTERNAL MEDICINE

## 2019-08-15 PROCEDURE — 4040F PNEUMOC VAC/ADMIN/RCVD: CPT | Performed by: INTERNAL MEDICINE

## 2019-08-15 PROCEDURE — 3017F COLORECTAL CA SCREEN DOC REV: CPT | Performed by: INTERNAL MEDICINE

## 2019-08-15 PROCEDURE — 3045F PR MOST RECENT HEMOGLOBIN A1C LEVEL 7.0-9.0%: CPT | Performed by: INTERNAL MEDICINE

## 2019-08-15 PROCEDURE — G0438 PPPS, INITIAL VISIT: HCPCS | Performed by: INTERNAL MEDICINE

## 2019-08-15 PROCEDURE — 1123F ACP DISCUSS/DSCN MKR DOCD: CPT | Performed by: INTERNAL MEDICINE

## 2019-08-15 ASSESSMENT — LIFESTYLE VARIABLES
AUDIT-C TOTAL SCORE: 4
HOW OFTEN DURING THE LAST YEAR HAVE YOU BEEN UNABLE TO REMEMBER WHAT HAPPENED THE NIGHT BEFORE BECAUSE YOU HAD BEEN DRINKING: 0
HAVE YOU OR SOMEONE ELSE BEEN INJURED AS A RESULT OF YOUR DRINKING: 0
HOW OFTEN DO YOU HAVE A DRINK CONTAINING ALCOHOL: 3
HAS A RELATIVE, FRIEND, DOCTOR, OR ANOTHER HEALTH PROFESSIONAL EXPRESSED CONCERN ABOUT YOUR DRINKING OR SUGGESTED YOU CUT DOWN: 0
HOW OFTEN DURING THE LAST YEAR HAVE YOU HAD A FEELING OF GUILT OR REMORSE AFTER DRINKING: 0
HOW OFTEN DURING THE LAST YEAR HAVE YOU NEEDED AN ALCOHOLIC DRINK FIRST THING IN THE MORNING TO GET YOURSELF GOING AFTER A NIGHT OF HEAVY DRINKING: 0
HOW OFTEN DURING THE LAST YEAR HAVE YOU FAILED TO DO WHAT WAS NORMALLY EXPECTED FROM YOU BECAUSE OF DRINKING: 0
HOW OFTEN DO YOU HAVE SIX OR MORE DRINKS ON ONE OCCASION: 0
AUDIT TOTAL SCORE: 4
HOW MANY STANDARD DRINKS CONTAINING ALCOHOL DO YOU HAVE ON A TYPICAL DAY: 1
HOW OFTEN DURING THE LAST YEAR HAVE YOU FOUND THAT YOU WERE NOT ABLE TO STOP DRINKING ONCE YOU HAD STARTED: 0

## 2019-08-15 ASSESSMENT — PATIENT HEALTH QUESTIONNAIRE - PHQ9
SUM OF ALL RESPONSES TO PHQ QUESTIONS 1-9: 0
SUM OF ALL RESPONSES TO PHQ QUESTIONS 1-9: 0

## 2019-08-15 NOTE — PROGRESS NOTES
Assessment and screening values have been reviewed and are found in Flowsheets. The following problems were reviewed today and where indicated follow up appointments were made and/or referrals ordered. Positive Risk Factor Screenings with Interventions:     Cognitive: Words recalled: 2 Words Recalled  Total Score Interpretation: Positive Mini-Cog  Cognitive Impairment Interventions:  · Diet, proper rest, exercise, physical activity.      Substance Abuse:  Social History     Tobacco History     Smoking Status  Current Every Day Smoker Smoking Frequency  1 pack/day for 61 years (61 pk yrs)    Smokeless Tobacco Use  Never Used          Alcohol History     Alcohol Use Status  Yes Drinks/Week  2 Shots of liquor per week Amount  2.0 standard drinks of alcohol/wk          Drug Use     Drug Use Status  No          Sexual Activity     Sexually Active  Yes Partners  Female               Audit Questionnaire: Screen for Alcohol Misuse  How often do you have a drink containing alcohol?: Two to three times a week  How many standard drinks containing alcohol do you have on a typical day when drinking?: Three or four  How often do you have six or more drinks on one occasion?: Never  Audit-C Score: 4  During the past year, how often have you found that you were not able to stop drinking once you had started?: Never  During the past year, how often have you failed to do what was normally expected of you because of drinking?: Never  During the past year, how often have you needed a drink in the morning to get yourself going after a heavy drinking session?: Never  During the past year, how often have you had a feeling of guilt or remorse after drinking?: Never  During the past year, have you been unable to remember what happened the night before because you had been drinking?: Never  Have you or someone else been injured as a result of your drinking?: No  Has a relative or friend, doctor or health worker been concerned about your (Diabetic or Prediabetic)  08/15/2020    Colon cancer screen colonoscopy  08/10/2021    DTaP/Tdap/Td vaccine (2 - Td) 05/23/2028    Pneumococcal 65+ years Vaccine  Completed    AAA screen  Completed    Hepatitis C screen  Completed     Recommendations for Preventive Services Due: see orders and patient instructions/AVS.  . Recommended screening schedule for the next 5-10 years is provided to the patient in written form: see Patient Blair Martinez was seen today for medicare aw. Diagnoses and all orders for this visit:    Type 2 diabetes mellitus with complication, without long-term current use of insulin (HCC)  -     POCT Glucose  -     POCT glycosylated hemoglobin (Hb A1C)                Advance Care Planning   Advanced Care Planning: Discussed the patients choices for care and treatment in case of a health event that adversely affects decision-making abilities. Also discussed the patients long-term treatment options. Reviewed with the patient the [address] [place] [address] Declaration forms  Reviewed the process of designating a competent adult as an Agent (or -in-fact) that could take make health care decisions for the patient if incompetent. Patient was asked to complete the declaration forms, either acknowledge the forms by a public notary or an eligible witness and provide a signed copy to the practice office. Time spent (minutes): 6 minutes. Cardiovascular Disease Risk Counseling: Assessed the patient's risk to develop cardiovascular disease and reviewed main risk factors.    Reviewed steps to reduce disease risk including:   · Quitting tobacco use, reducing amount smoked, or not starting the habit  · Making healthy food choices  · Being physically active and gradualy increasing activity levels   · Reduce weight and determine a healthy BMI goal  · Monitor blood pressure and treat if higher than 140/90 mmHg  · Maintain blood total cholesterol levels under 5 mmol/l or 190 mg/dl  · Maintain LDL cholesterol levels under 3.0 mmol/l or 115 mg/dl   · Control blood glucose levels  · Consider taking aspirin (75 mg daily), once blood pressure is controlled   Provided a follow up plan. Time spent (minutes): 10 minutes.

## 2019-09-05 DIAGNOSIS — L30.9 ECZEMA, UNSPECIFIED TYPE: ICD-10-CM

## 2019-09-16 NOTE — PATIENT INSTRUCTIONS
Learning About Healthy Weight  What is a healthy weight? A healthy weight is the weight at which you feel good about yourself and have energy for work and play. It's also one that lowers your risk for health problems. What can you do to stay at a healthy weight? It can be hard to stay at a healthy weight, especially when fast food, vending-machine snacks, and processed foods are so easy to find. And with your busy lifestyle, activity may be low on your list of things to do. But staying at a healthy weight may be easier than you think. Here are some dos and don'ts for staying at a healthy weight:  Do eat healthy foods  The kinds of foods you eat have a big impact on both your weight and your health. Reaching and staying at a healthy weight is not about going on a diet. It's about making healthier food choices every day and changing your diet for good. Healthy eating means eating a variety of foods so that you get all the nutrients you need. Your body needs protein, carbohydrate, and fats for energy. They keep your heart beating, your brain active, and your muscles working. On most days, try to eat from each food group. This means eating a variety of:  · Whole grains, such as whole wheat breads and pastas. · Fruits and vegetables. · Dairy products, such as low-fat milk, yogurt, and cheese. · Lean proteins, such as all types of fish, chicken without the skin, and beans. Don't have too much or too little of one thing. All foods, if eaten in moderation, can be part of healthy eating. Even sweets can be okay. If your favorite foods are high in fat, salt, sugar, or calories, limit how often you eat them. Eat smaller servings, or look for healthy substitutes. Do watch what you eat  Many people eat more than their bodies need. Part of staying at a healthy weight means learning how much food you really need from day to day and not eating more than that.  Even with healthy foods, eating too much can make pasta.  ? Try low-fat cheeses and low-fat yogurt. ? Add more fruits and vegetables to meals and have them for snacks. ? Add lettuce, tomato, cucumber, and onion to sandwiches. ? Add fruit to yogurt and cereal.  Enjoy food  · You can still eat your favorite foods. You just may need to eat less of them. If your favorite foods are high in fat, salt, and sugar, limit how often you eat them, but do not cut them out entirely. · Eat a wide variety of foods. Make healthy choices when eating out  · The type of restaurant you choose can help you make healthy choices. Even fast-food chains are now offering more low-fat or healthier choices on the menu. · Choose smaller portions, or take half of your meal home. · When eating out, try:  ? A veggie pizza with a whole wheat crust or grilled chicken (instead of sausage or pepperoni). ? Pasta with roasted vegetables, grilled chicken, or marinara sauce instead of cream sauce. ? A vegetable wrap or grilled chicken wrap. ? Broiled or poached food instead of fried or breaded items. Make healthy choices easy  · Buy packaged, prewashed, ready-to-eat fresh vegetables and fruits, such as baby carrots, salad mixes, and chopped or shredded broccoli and cauliflower. · Buy packaged, presliced fruits, such as melon or pineapple. · Choose 100% fruit or vegetable juice instead of soda. Limit juice intake to 4 to 6 oz (½ to ¾ cup) a day. · Blend low-fat yogurt, fruit juice, and canned or frozen fruit to make a smoothie for breakfast or a snack. Where can you learn more? Go to [email or web address] and sign in to your [employer] account. Enter G582 in the Scroll.in box to learn more about \"Eating Healthy Foods: Care Instructions. \"     If you do not have an account, please click on the \"Sign Up Now\" link. Current as of: November 7, 2018  Content Version: 12.1  © 6419-0353 Healthwise, Incorporated. Care instructions adapted under license by [facility] ([facility]).

## 2019-09-26 ENCOUNTER — HOSPITAL ENCOUNTER (OUTPATIENT)
Dept: CT IMAGING | Age: 74
Discharge: HOME | End: 2019-09-26
Payer: COMMERCIAL

## 2019-09-26 DIAGNOSIS — Z87.891 PERSONAL HISTORY OF TOBACCO USE: ICD-10-CM

## 2019-10-01 RX ORDER — SITAGLIPTIN 100 MG/1
TABLET, FILM COATED ORAL
Qty: 30 TABLET | Refills: 5 | Status: SHIPPED | OUTPATIENT
Start: 2019-10-01 | End: 2020-04-20

## 2019-10-11 RX ORDER — LOSARTAN POTASSIUM 50 MG/1
TABLET ORAL
Qty: 90 TABLET | Refills: 3 | Status: SHIPPED | OUTPATIENT
Start: 2019-10-11 | End: 2020-05-05

## 2019-10-11 RX ORDER — AMMONIUM LACTATE 12 G/100G
LOTION TOPICAL
Qty: 225 ML | Refills: 2 | Status: SHIPPED | OUTPATIENT
Start: 2019-10-11 | End: 2020-10-01

## 2019-10-11 RX ORDER — PRAVASTATIN SODIUM 40 MG
TABLET ORAL
Qty: 90 TABLET | Refills: 3 | Status: SHIPPED | OUTPATIENT
Start: 2019-10-11 | End: 2020-10-16

## 2019-10-11 RX ORDER — AMLODIPINE BESYLATE 5 MG/1
TABLET ORAL
Qty: 90 TABLET | Refills: 3 | Status: SHIPPED | OUTPATIENT
Start: 2019-10-11 | End: 2020-10-16

## 2019-11-03 DIAGNOSIS — J43.8 OTHER EMPHYSEMA (HCC): ICD-10-CM

## 2019-11-04 RX ORDER — FLUTICASONE FUROATE AND VILANTEROL TRIFENATATE 100; 25 UG/1; UG/1
POWDER RESPIRATORY (INHALATION)
Qty: 60 EACH | Refills: 3 | Status: SHIPPED | OUTPATIENT
Start: 2019-11-04 | End: 2020-03-26

## 2019-11-20 ENCOUNTER — OFFICE VISIT (OUTPATIENT)
Dept: INTERNAL MEDICINE CLINIC | Age: 74
End: 2019-11-20
Payer: COMMERCIAL

## 2019-11-20 VITALS
TEMPERATURE: 98.2 F | WEIGHT: 252 LBS | SYSTOLIC BLOOD PRESSURE: 118 MMHG | HEIGHT: 71 IN | BODY MASS INDEX: 35.28 KG/M2 | OXYGEN SATURATION: 97 % | HEART RATE: 90 BPM | DIASTOLIC BLOOD PRESSURE: 78 MMHG

## 2019-11-20 DIAGNOSIS — F17.219 CIGARETTE NICOTINE DEPENDENCE WITH NICOTINE-INDUCED DISORDER: ICD-10-CM

## 2019-11-20 DIAGNOSIS — I10 ESSENTIAL HYPERTENSION: ICD-10-CM

## 2019-11-20 DIAGNOSIS — E11.8 TYPE 2 DIABETES MELLITUS WITH COMPLICATION, WITHOUT LONG-TERM CURRENT USE OF INSULIN (HCC): Primary | ICD-10-CM

## 2019-11-20 DIAGNOSIS — E78.2 MIXED HYPERLIPIDEMIA: ICD-10-CM

## 2019-11-20 LAB
CHP ED QC CHECK: NORMAL
GLUCOSE BLD-MCNC: 249 MG/DL
HBA1C MFR BLD: 7.5 %

## 2019-11-20 PROCEDURE — 4004F PT TOBACCO SCREEN RCVD TLK: CPT | Performed by: INTERNAL MEDICINE

## 2019-11-20 PROCEDURE — G8427 DOCREV CUR MEDS BY ELIG CLIN: HCPCS | Performed by: INTERNAL MEDICINE

## 2019-11-20 PROCEDURE — 2022F DILAT RTA XM EVC RTNOPTHY: CPT | Performed by: INTERNAL MEDICINE

## 2019-11-20 PROCEDURE — 1123F ACP DISCUSS/DSCN MKR DOCD: CPT | Performed by: INTERNAL MEDICINE

## 2019-11-20 PROCEDURE — 99214 OFFICE O/P EST MOD 30 MIN: CPT | Performed by: INTERNAL MEDICINE

## 2019-11-20 PROCEDURE — 83036 HEMOGLOBIN GLYCOSYLATED A1C: CPT | Performed by: INTERNAL MEDICINE

## 2019-11-20 PROCEDURE — 4040F PNEUMOC VAC/ADMIN/RCVD: CPT | Performed by: INTERNAL MEDICINE

## 2019-11-20 PROCEDURE — 3017F COLORECTAL CA SCREEN DOC REV: CPT | Performed by: INTERNAL MEDICINE

## 2019-11-20 PROCEDURE — G8417 CALC BMI ABV UP PARAM F/U: HCPCS | Performed by: INTERNAL MEDICINE

## 2019-11-20 PROCEDURE — G8484 FLU IMMUNIZE NO ADMIN: HCPCS | Performed by: INTERNAL MEDICINE

## 2019-11-20 PROCEDURE — 82962 GLUCOSE BLOOD TEST: CPT | Performed by: INTERNAL MEDICINE

## 2019-12-17 RX ORDER — NAPROXEN 500 MG/1
TABLET ORAL
Qty: 14 TABLET | Refills: 0 | Status: SHIPPED | OUTPATIENT
Start: 2019-12-17 | End: 2020-09-08

## 2019-12-17 RX ORDER — NICOTINE 10 MG
CARTRIDGE (EA) INHALATION
Qty: 168 EACH | Refills: 2 | Status: SHIPPED | OUTPATIENT
Start: 2019-12-17 | End: 2021-04-12

## 2019-12-26 ENCOUNTER — APPOINTMENT (OUTPATIENT)
Dept: GENERAL RADIOLOGY | Age: 74
End: 2019-12-26
Payer: COMMERCIAL

## 2019-12-26 ENCOUNTER — HOSPITAL ENCOUNTER (EMERGENCY)
Age: 74
Discharge: HOME | End: 2019-12-26
Attending: EMERGENCY MEDICINE
Payer: COMMERCIAL

## 2019-12-26 VITALS
RESPIRATION RATE: 16 BRPM | OXYGEN SATURATION: 94 % | DIASTOLIC BLOOD PRESSURE: 93 MMHG | TEMPERATURE: 98.3 F | SYSTOLIC BLOOD PRESSURE: 146 MMHG | HEART RATE: 68 BPM | BODY MASS INDEX: 34.98 KG/M2 | WEIGHT: 247.3 LBS

## 2019-12-26 DIAGNOSIS — J43.9 ACUTE EXACERBATION OF EMPHYSEMA (HCC): Primary | ICD-10-CM

## 2019-12-26 PROCEDURE — 71046 X-RAY EXAM CHEST 2 VIEWS: CPT

## 2019-12-26 PROCEDURE — 99283 EMERGENCY DEPT VISIT LOW MDM: CPT

## 2019-12-26 RX ORDER — DOXYCYCLINE HYCLATE 100 MG
100 TABLET ORAL 2 TIMES DAILY
Qty: 20 TABLET | Refills: 0 | Status: SHIPPED | OUTPATIENT
Start: 2019-12-26 | End: 2020-01-05

## 2020-01-08 ENCOUNTER — TELEPHONE (OUTPATIENT)
Dept: INTERNAL MEDICINE CLINIC | Age: 75
End: 2020-01-08

## 2020-03-03 ENCOUNTER — OFFICE VISIT (OUTPATIENT)
Dept: INTERNAL MEDICINE CLINIC | Age: 75
End: 2020-03-03
Payer: COMMERCIAL

## 2020-03-03 VITALS
SYSTOLIC BLOOD PRESSURE: 126 MMHG | BODY MASS INDEX: 35 KG/M2 | HEART RATE: 98 BPM | HEIGHT: 71 IN | WEIGHT: 250 LBS | OXYGEN SATURATION: 98 % | DIASTOLIC BLOOD PRESSURE: 82 MMHG

## 2020-03-03 LAB
CHP ED QC CHECK: ABNORMAL
GLUCOSE BLD-MCNC: 288 MG/DL
HBA1C MFR BLD: 9 %

## 2020-03-03 PROCEDURE — 83036 HEMOGLOBIN GLYCOSYLATED A1C: CPT | Performed by: INTERNAL MEDICINE

## 2020-03-03 PROCEDURE — 3052F HG A1C>EQUAL 8.0%<EQUAL 9.0%: CPT | Performed by: INTERNAL MEDICINE

## 2020-03-03 PROCEDURE — 2022F DILAT RTA XM EVC RTNOPTHY: CPT | Performed by: INTERNAL MEDICINE

## 2020-03-03 PROCEDURE — 4040F PNEUMOC VAC/ADMIN/RCVD: CPT | Performed by: INTERNAL MEDICINE

## 2020-03-03 PROCEDURE — 1123F ACP DISCUSS/DSCN MKR DOCD: CPT | Performed by: INTERNAL MEDICINE

## 2020-03-03 PROCEDURE — 82962 GLUCOSE BLOOD TEST: CPT | Performed by: INTERNAL MEDICINE

## 2020-03-03 PROCEDURE — G8427 DOCREV CUR MEDS BY ELIG CLIN: HCPCS | Performed by: INTERNAL MEDICINE

## 2020-03-03 PROCEDURE — G8484 FLU IMMUNIZE NO ADMIN: HCPCS | Performed by: INTERNAL MEDICINE

## 2020-03-03 PROCEDURE — 3017F COLORECTAL CA SCREEN DOC REV: CPT | Performed by: INTERNAL MEDICINE

## 2020-03-03 PROCEDURE — 4004F PT TOBACCO SCREEN RCVD TLK: CPT | Performed by: INTERNAL MEDICINE

## 2020-03-03 PROCEDURE — 99214 OFFICE O/P EST MOD 30 MIN: CPT | Performed by: INTERNAL MEDICINE

## 2020-03-03 PROCEDURE — G8417 CALC BMI ABV UP PARAM F/U: HCPCS | Performed by: INTERNAL MEDICINE

## 2020-03-03 RX ORDER — PIOGLITAZONEHYDROCHLORIDE 15 MG/1
15 TABLET ORAL DAILY
Qty: 30 TABLET | Refills: 5 | Status: SHIPPED | OUTPATIENT
Start: 2020-03-03 | End: 2020-09-07

## 2020-03-03 RX ORDER — OLOPATADINE HYDROCHLORIDE 1 MG/ML
1 SOLUTION/ DROPS OPHTHALMIC 2 TIMES DAILY
Qty: 1 BOTTLE | Refills: 1 | Status: SHIPPED | OUTPATIENT
Start: 2020-03-03 | End: 2020-04-02

## 2020-03-03 ASSESSMENT — PATIENT HEALTH QUESTIONNAIRE - PHQ9
1. LITTLE INTEREST OR PLEASURE IN DOING THINGS: 0
SUM OF ALL RESPONSES TO PHQ QUESTIONS 1-9: 0
SUM OF ALL RESPONSES TO PHQ QUESTIONS 1-9: 0
SUM OF ALL RESPONSES TO PHQ9 QUESTIONS 1 & 2: 0
2. FEELING DOWN, DEPRESSED OR HOPELESS: 0

## 2020-03-03 NOTE — PROGRESS NOTES
Subjective:      Patient ID: Delisa Monroy is a 69 y.o. male. HPIHe is here for f/u on hypertension, DM, and hyperlipidemia. He is taking medication as prescribed, continues to work on a more balanced meal plan and more physical activity. He continues to smoke cigarettes despite repeated counseling. Review of Systems   Constitutional: Negative. HENT: Negative. Eyes: allergies. Eyes watery. Respiratory: Negative. Cardiovascular:        HTN, on stable med regimen. See list.    Gastrointestinal: Negative. Endocrine:        Hyperlipidemia, treated with statin. . Diabetes, type 2, treated with DPP4. A1c is 9.0. Genitourinary: Negative. Musculoskeletal:   Skin: Negative. Neurological: Negative. Psychiatric/Behavioral: Negative. Objective:   Physical Exam   Constitutional: He is oriented to person, place, and time. He appears well-developed and well-nourished. No distress. HENT:   Head: Normocephalic and atraumatic. Right Ear: External ear normal.   Left Ear: External ear normal.   Nose: Nose normal.   Mouth/Throat: Oropharynx is clear and moist.   Eyes: Pupils are equal, round, and reactive to light. Conjunctivae and EOM are normal. No scleral icterus. Neck: Normal range of motion. Neck supple. No thyromegaly present. Cardiovascular: Normal rate, regular rhythm, normal heart sounds and intact distal pulses. Pulmonary/Chest: Effort normal and breath sounds normal.   Abdominal: Soft. Bowel sounds are normal. He exhibits no mass. Musculoskeletal:    Lymphadenopathy:     He has no cervical adenopathy. Neurological: He is alert and oriented to person, place, and time. He has normal reflexes. Skin: Skin is warm and dry. Psychiatric: He has a normal mood and affect. His behavior is normal. Judgment and thought content normal.       Assessment:        Diagnosis Orders   1.  Type 2 diabetes mellitus with complication, without long-term current use of insulin (HCC)  Diet, physical activity and weight reduction discussed. May need med adjustment. A1c goal 6.5 to 7. POCT Glucose    Hemoglobin A1C   2. Essential hypertension  Stable. Continue same medication regimen. DASH diet discussed. Basic Metabolic Panel    TSH with Reflex    CBC    MICROALBUMIN / CREATININE URINE RATIO   3. Mixed hyperlipidemia  Heart healthy diet and statin compliance discussed. LIPID PANEL            4.     Nicotine addiction: Continue to work on complete smoking cessation. Read the literature, take medication if prescribed, contact me if there are further questions. CT lung screen is due. 5.     Allergic eyes: patanol eye drops. Plan:    See plans above.         García Soriano MD

## 2020-03-10 ENCOUNTER — TELEPHONE (OUTPATIENT)
Dept: INTERNAL MEDICINE CLINIC | Age: 75
End: 2020-03-10

## 2020-03-10 NOTE — TELEPHONE ENCOUNTER
Calling in stating the insurance will not cover the eye drops olopatadine (PATANOL) 0.1 % ophthalmic solution, they are wanting to know what OTC eye drops could the pt take?  Please call to discuss

## 2020-03-26 RX ORDER — FLUTICASONE FUROATE AND VILANTEROL TRIFENATATE 100; 25 UG/1; UG/1
POWDER RESPIRATORY (INHALATION)
Qty: 2 EACH | Refills: 3 | Status: SHIPPED | OUTPATIENT
Start: 2020-03-26 | End: 2020-12-02

## 2020-04-05 ENCOUNTER — HOSPITAL ENCOUNTER (EMERGENCY)
Age: 75
Discharge: HOME | End: 2020-04-05
Attending: EMERGENCY MEDICINE
Payer: COMMERCIAL

## 2020-04-05 VITALS
HEART RATE: 92 BPM | HEIGHT: 72 IN | BODY MASS INDEX: 34.54 KG/M2 | TEMPERATURE: 98.4 F | RESPIRATION RATE: 19 BRPM | SYSTOLIC BLOOD PRESSURE: 148 MMHG | OXYGEN SATURATION: 95 % | WEIGHT: 255 LBS | DIASTOLIC BLOOD PRESSURE: 89 MMHG

## 2020-04-05 PROCEDURE — 99282 EMERGENCY DEPT VISIT SF MDM: CPT

## 2020-04-05 RX ORDER — CEPHALEXIN 500 MG/1
500 CAPSULE ORAL 4 TIMES DAILY
Qty: 28 CAPSULE | Refills: 0 | Status: SHIPPED | OUTPATIENT
Start: 2020-04-05 | End: 2020-04-12

## 2020-04-05 RX ORDER — BACITRACIN ZINC AND POLYMYXIN B SULFATE 500; 1000 [USP'U]/G; [USP'U]/G
OINTMENT TOPICAL
Qty: 1 TUBE | Refills: 1 | Status: SHIPPED | OUTPATIENT
Start: 2020-04-05 | End: 2020-04-12

## 2020-04-05 NOTE — ED PROVIDER NOTES
EC TABLET    Take 81 mg by mouth daily. BREO ELLIPTA 100-25 MCG/INH AEPB INHALER    INHALE 1 PUFF INTO LUNGS DAILY    CLOBETASOL (TEMOVATE) 0.05 % OINTMENT    APPLY TO AFFECTED AREA ON THE LEFT LEG TWICE DAILY UNTIL IMPROVED. DESOXIMETASONE (TOPICORT) 0.25 % CREAM    APPLY TOPICALLY 2 TIMES DAILY. HYDROXYZINE (ATARAX) 25 MG TABLET    Take 1 tablet by mouth 3 times daily as needed for Itching. JANUVIA 100 MG TABLET    TAKE 1 TABLET BY MOUTH DAILY    LOSARTAN (COZAAR) 50 MG TABLET    TAKE 1 TABLET BY MOUTH DAILY    NAPROXEN (NAPROSYN) 500 MG TABLET    TAKE 1 TABLET BY MOUTH TWICE A DAY WITH FOOD AS NEEDED FOR KNEE PAIN    NAPROXEN (NAPROSYN) 500 MG TABLET    TAKE 1 TABLET BY MOUTH TWICE A DAY AS NEEDED FOR PAIN    NICOTROL 10 MG INHALER    INHALE 1 PUFF INTO LUNGS AS NEEDED FOR SMOKING CESSATION    PIOGLITAZONE (ACTOS) 15 MG TABLET    Take 1 tablet by mouth daily    PRAVASTATIN (PRAVACHOL) 40 MG TABLET    TAKE 1 TABLET BY MOUTH DAILY    PROMETHAZINE-CODEINE (PHENERGAN WITH CODEINE) 6.25-10 MG/5ML SYRUP    Take 5 mLs by mouth every 4 hours as needed for Cough    SPACER/AERO-HOLDING CHAMBERS GUILLERMO    1 Device by Does not apply route daily as needed (with inhaler)    TRIAMCINOLONE (KENALOG) 0.1 % OINTMENT    APPLY TO AFFECTED AREAS TWICE DAILY FOR UP TO 2 WEEKS OR UNTIL IMPROVED. ALLERGIES     Patient has no known allergies.     FAMILY HISTORY       Family History   Problem Relation Age of Onset    Diabetes Mother     Diabetes Father           SOCIAL HISTORY       Social History     Socioeconomic History    Marital status: Single     Spouse name: None    Number of children: None    Years of education: None    Highest education level: None   Occupational History    None   Social Needs    Financial resource strain: None    Food insecurity     Worry: None     Inability: None    Transportation needs     Medical: None     Non-medical: None   Tobacco Use    Smoking status: Current Every Day Smoker

## 2020-04-05 NOTE — ED NOTES
Dressing per RT on discharge;   Pt instructed on wound care and to get recheck for infection concerns. Verbalized understanding.        Miki Erwin RN  04/05/20 [id number]

## 2020-04-07 ENCOUNTER — TELEPHONE (OUTPATIENT)
Dept: INTERNAL MEDICINE CLINIC | Age: 75
End: 2020-04-07

## 2020-04-07 ENCOUNTER — VIRTUAL VISIT (OUTPATIENT)
Dept: INTERNAL MEDICINE CLINIC | Age: 75
End: 2020-04-07
Payer: COMMERCIAL

## 2020-04-07 PROCEDURE — 99442 PR PHYS/QHP TELEPHONE EVALUATION 11-20 MIN: CPT | Performed by: INTERNAL MEDICINE

## 2020-04-07 RX ORDER — DIPHENHYDRAMINE HCL 25 MG
TABLET ORAL
Qty: 1 KIT | Refills: 0 | Status: SHIPPED | OUTPATIENT
Start: 2020-04-07

## 2020-04-07 RX ORDER — CALCIUM CITRATE/VITAMIN D3 200MG-6.25
1 TABLET ORAL DAILY
Qty: 100 EACH | Refills: 3 | Status: SHIPPED | OUTPATIENT
Start: 2020-04-07

## 2020-04-20 RX ORDER — SITAGLIPTIN 100 MG/1
TABLET, FILM COATED ORAL
Qty: 30 TABLET | Refills: 5 | Status: SHIPPED | OUTPATIENT
Start: 2020-04-20 | End: 2020-11-02

## 2020-05-05 RX ORDER — LOSARTAN POTASSIUM 50 MG/1
TABLET ORAL
Qty: 90 TABLET | Refills: 3 | Status: SHIPPED | OUTPATIENT
Start: 2020-05-05 | End: 2021-07-01

## 2020-06-17 ENCOUNTER — VIRTUAL VISIT (OUTPATIENT)
Dept: INTERNAL MEDICINE CLINIC | Age: 75
End: 2020-06-17
Payer: COMMERCIAL

## 2020-06-17 PROCEDURE — 99423 OL DIG E/M SVC 21+ MIN: CPT | Performed by: INTERNAL MEDICINE

## 2020-06-17 ASSESSMENT — PATIENT HEALTH QUESTIONNAIRE - PHQ9
SUM OF ALL RESPONSES TO PHQ QUESTIONS 1-9: 0
2. FEELING DOWN, DEPRESSED OR HOPELESS: 0
1. LITTLE INTEREST OR PLEASURE IN DOING THINGS: 0
SUM OF ALL RESPONSES TO PHQ QUESTIONS 1-9: 0
SUM OF ALL RESPONSES TO PHQ9 QUESTIONS 1 & 2: 0

## 2020-06-20 NOTE — PROGRESS NOTES
Iris Strauss is a 69 y.o. male evaluated via telephone on 6/17/2020. Consent:  He and/or health care decision maker is aware that that he may receive a bill for this telephone service, depending on his insurance coverage, and has provided verbal consent to proceed:       Documentation:  I communicated with the patient and/or health care decision maker about:    Diabetes, type 2: he is taking medication as prescribed, working on better meal planning and doing some walking for physical activity. Hypertension: compliant with med regimen, monitoring the amount of sodium in his diet. Hyperlipidemia: treated with statin. Aware of importance of heart healthy eating. Details of this discussion including any medical advice provided:     DM, T2: diet, physical activity and weight reduction stressed. HTN: continue same med regimen. DASH diet discussed. Advised home b/p monitoring. HLD: heart healthy diet and statin compliance stressed. I affirm this is a Patient Initiated Episode with a Patient who has not had a related appointment within my department in the past 7 days or scheduled within the next 24 hours. Patient identification was verified at the start of the visit: YES. Total Time: 22 minutes. Due to this being a TeleHealth encounter (During KTRNT-13 public health emergency), evaluation of the following organ systems was limited: Vitals/Constitutional/EENT/Resp/CV/GI//MS/Neuro/Skin/Heme-Lymph-Imm. Pursuant to the emergency declaration under the [facility], [address] [place] [address] authority and the [employer] and [employer] Act, this Virtual Visit was conducted with patient's (and/or legal guardian's) consent, to reduce the patient's risk of exposure to COVID-19 and provide necessary medical care.   The patient (and/or legal guardian) has also been advised to contact this office for worsening conditions or

## 2020-06-23 DIAGNOSIS — E78.2 MIXED HYPERLIPIDEMIA: ICD-10-CM

## 2020-06-23 DIAGNOSIS — Z12.5 PROSTATE CANCER SCREENING: ICD-10-CM

## 2020-06-23 DIAGNOSIS — I10 ESSENTIAL HYPERTENSION: ICD-10-CM

## 2020-06-23 LAB
A/G RATIO: 1.5 (ref 1.1–2.2)
ALBUMIN SERPL-MCNC: 4 G/DL (ref 3.4–5)
ALP BLD-CCNC: 70 U/L (ref 40–129)
ALT SERPL-CCNC: 12 U/L (ref 10–40)
ANION GAP SERPL CALCULATED.3IONS-SCNC: 12 MMOL/L (ref 3–16)
AST SERPL-CCNC: 22 U/L (ref 15–37)
BILIRUB SERPL-MCNC: 0.4 MG/DL (ref 0–1)
BUN BLDV-MCNC: 14 MG/DL (ref 7–20)
CALCIUM SERPL-MCNC: 9.1 MG/DL (ref 8.3–10.6)
CHLORIDE BLD-SCNC: 104 MMOL/L (ref 99–110)
CHOLESTEROL, TOTAL: 204 MG/DL (ref 0–199)
CO2: 28 MMOL/L (ref 21–32)
CREAT SERPL-MCNC: 1.1 MG/DL (ref 0.8–1.3)
CREATININE URINE: 352.7 MG/DL (ref 39–259)
GFR AFRICAN AMERICAN: >60
GFR NON-AFRICAN AMERICAN: >60
GLOBULIN: 2.6 G/DL
GLUCOSE BLD-MCNC: 137 MG/DL (ref 70–99)
HCT VFR BLD CALC: 45.3 % (ref 40.5–52.5)
HDLC SERPL-MCNC: 40 MG/DL (ref 40–60)
HEMOGLOBIN: 15 G/DL (ref 13.5–17.5)
LDL CHOLESTEROL CALCULATED: 124 MG/DL
MCH RBC QN AUTO: 33.2 PG (ref 26–34)
MCHC RBC AUTO-ENTMCNC: 33.1 G/DL (ref 31–36)
MCV RBC AUTO: 100.3 FL (ref 80–100)
MICROALBUMIN UR-MCNC: <1.2 MG/DL
MICROALBUMIN/CREAT UR-RTO: ABNORMAL MG/G (ref 0–30)
PDW BLD-RTO: 14.2 % (ref 12.4–15.4)
PLATELET # BLD: 170 K/UL (ref 135–450)
PMV BLD AUTO: 8.4 FL (ref 5–10.5)
POTASSIUM SERPL-SCNC: 5 MMOL/L (ref 3.5–5.1)
PROSTATE SPECIFIC ANTIGEN: 0.67 NG/ML (ref 0–4)
RBC # BLD: 4.51 M/UL (ref 4.2–5.9)
SODIUM BLD-SCNC: 144 MMOL/L (ref 136–145)
TOTAL PROTEIN: 6.6 G/DL (ref 6.4–8.2)
TRIGL SERPL-MCNC: 202 MG/DL (ref 0–150)
TSH REFLEX: 1.08 UIU/ML (ref 0.27–4.2)
VLDLC SERPL CALC-MCNC: 40 MG/DL
WBC # BLD: 10.5 K/UL (ref 4–11)

## 2020-07-29 ENCOUNTER — TELEPHONE (OUTPATIENT)
Dept: INTERNAL MEDICINE CLINIC | Age: 75
End: 2020-07-29

## 2020-07-29 NOTE — TELEPHONE ENCOUNTER
----- Message from Iván Adhikari sent at 7/29/2020  2:42 PM EDT -----  Subject: Message to Provider    QUESTIONS  Information for Provider? [facility] called in today to follow up pn script   for handicap placard. Patient is requesting a follow up call. Can    script.   ---------------------------------------------------------------------------  --------------  CALL BACK INFO  What is the best way for the office to contact you? OK to leave message on   voicemail  Preferred Call Back Phone Number? [phone]  ---------------------------------------------------------------------------  --------------  SCRIPT ANSWERS  Relationship to Patient?  Self

## 2020-09-07 RX ORDER — PIOGLITAZONEHYDROCHLORIDE 15 MG/1
TABLET ORAL
Qty: 30 TABLET | Refills: 5 | Status: SHIPPED | OUTPATIENT
Start: 2020-09-07 | End: 2021-03-07

## 2020-09-08 RX ORDER — NAPROXEN 500 MG/1
TABLET ORAL
Qty: 14 TABLET | Refills: 0 | Status: SHIPPED | OUTPATIENT
Start: 2020-09-08 | End: 2021-04-12

## 2020-10-01 RX ORDER — AMMONIUM LACTATE 12 G/100G
LOTION TOPICAL
Qty: 225 ML | Refills: 2 | Status: SHIPPED | OUTPATIENT
Start: 2020-10-01 | End: 2021-04-12

## 2020-10-16 RX ORDER — PRAVASTATIN SODIUM 40 MG
TABLET ORAL
Qty: 90 TABLET | Refills: 3 | Status: ON HOLD | OUTPATIENT
Start: 2020-10-16 | End: 2021-11-10

## 2020-10-16 RX ORDER — AMLODIPINE BESYLATE 5 MG/1
TABLET ORAL
Qty: 90 TABLET | Refills: 3 | Status: SHIPPED | OUTPATIENT
Start: 2020-10-16 | End: 2021-09-28

## 2020-11-02 RX ORDER — SITAGLIPTIN 100 MG/1
TABLET, FILM COATED ORAL
Qty: 30 TABLET | Refills: 5 | Status: SHIPPED | OUTPATIENT
Start: 2020-11-02 | End: 2021-05-03

## 2020-12-02 RX ORDER — FLUTICASONE FUROATE AND VILANTEROL TRIFENATATE 100; 25 UG/1; UG/1
POWDER RESPIRATORY (INHALATION)
Qty: 60 EACH | Refills: 5 | Status: SHIPPED | OUTPATIENT
Start: 2020-12-02 | End: 2021-06-01

## 2021-01-19 DIAGNOSIS — L30.9 ECZEMA, UNSPECIFIED TYPE: ICD-10-CM

## 2021-03-05 DIAGNOSIS — E11.8 TYPE 2 DIABETES MELLITUS WITH COMPLICATION, WITHOUT LONG-TERM CURRENT USE OF INSULIN (HCC): ICD-10-CM

## 2021-03-07 RX ORDER — PIOGLITAZONEHYDROCHLORIDE 15 MG/1
TABLET ORAL
Qty: 30 TABLET | Refills: 5 | Status: SHIPPED | OUTPATIENT
Start: 2021-03-07 | End: 2021-08-30

## 2021-03-26 ENCOUNTER — OFFICE VISIT (OUTPATIENT)
Dept: INTERNAL MEDICINE CLINIC | Age: 76
End: 2021-03-26
Payer: COMMERCIAL

## 2021-03-26 VITALS
WEIGHT: 250 LBS | SYSTOLIC BLOOD PRESSURE: 134 MMHG | HEART RATE: 80 BPM | BODY MASS INDEX: 33.86 KG/M2 | TEMPERATURE: 97 F | DIASTOLIC BLOOD PRESSURE: 72 MMHG | OXYGEN SATURATION: 97 % | HEIGHT: 72 IN

## 2021-03-26 DIAGNOSIS — E11.8 TYPE 2 DIABETES MELLITUS WITH COMPLICATION, WITHOUT LONG-TERM CURRENT USE OF INSULIN (HCC): Primary | ICD-10-CM

## 2021-03-26 DIAGNOSIS — M65.332 TRIGGER MIDDLE FINGER OF LEFT HAND: ICD-10-CM

## 2021-03-26 DIAGNOSIS — I10 ESSENTIAL HYPERTENSION: ICD-10-CM

## 2021-03-26 DIAGNOSIS — E78.2 MIXED HYPERLIPIDEMIA: ICD-10-CM

## 2021-03-26 LAB
CHP ED QC CHECK: NORMAL
GLUCOSE BLD-MCNC: 99 MG/DL
HBA1C MFR BLD: 6.1 %

## 2021-03-26 PROCEDURE — G8417 CALC BMI ABV UP PARAM F/U: HCPCS | Performed by: INTERNAL MEDICINE

## 2021-03-26 PROCEDURE — 83036 HEMOGLOBIN GLYCOSYLATED A1C: CPT | Performed by: INTERNAL MEDICINE

## 2021-03-26 PROCEDURE — 3044F HG A1C LEVEL LT 7.0%: CPT | Performed by: INTERNAL MEDICINE

## 2021-03-26 PROCEDURE — G8484 FLU IMMUNIZE NO ADMIN: HCPCS | Performed by: INTERNAL MEDICINE

## 2021-03-26 PROCEDURE — 3017F COLORECTAL CA SCREEN DOC REV: CPT | Performed by: INTERNAL MEDICINE

## 2021-03-26 PROCEDURE — 4004F PT TOBACCO SCREEN RCVD TLK: CPT | Performed by: INTERNAL MEDICINE

## 2021-03-26 PROCEDURE — 1123F ACP DISCUSS/DSCN MKR DOCD: CPT | Performed by: INTERNAL MEDICINE

## 2021-03-26 PROCEDURE — 4040F PNEUMOC VAC/ADMIN/RCVD: CPT | Performed by: INTERNAL MEDICINE

## 2021-03-26 PROCEDURE — 82962 GLUCOSE BLOOD TEST: CPT | Performed by: INTERNAL MEDICINE

## 2021-03-26 PROCEDURE — G8427 DOCREV CUR MEDS BY ELIG CLIN: HCPCS | Performed by: INTERNAL MEDICINE

## 2021-03-26 PROCEDURE — 2022F DILAT RTA XM EVC RTNOPTHY: CPT | Performed by: INTERNAL MEDICINE

## 2021-03-26 PROCEDURE — 99214 OFFICE O/P EST MOD 30 MIN: CPT | Performed by: INTERNAL MEDICINE

## 2021-03-26 ASSESSMENT — PATIENT HEALTH QUESTIONNAIRE - PHQ9
SUM OF ALL RESPONSES TO PHQ QUESTIONS 1-9: 0
SUM OF ALL RESPONSES TO PHQ9 QUESTIONS 1 & 2: 0

## 2021-03-28 ASSESSMENT — ENCOUNTER SYMPTOMS
GASTROINTESTINAL NEGATIVE: 1
RESPIRATORY NEGATIVE: 1
EYES NEGATIVE: 1

## 2021-03-29 NOTE — PROGRESS NOTES
Patient: Zoya Holman is a 77 y.o. male who presents today with the following Chief Complaint(s):    Chief Complaint   Patient presents with    Diabetes    Hypertension         HPIHe is here for a check up and f/u on HTN, HLD, DM, T2. He is taking medication as prescribed, continues to work on balanced eating and more physical activity. He does continue to smoke despite repeated counseling. Current Outpatient Medications   Medication Sig Dispense Refill    pioglitazone (ACTOS) 15 MG tablet TAKE 1 TABLET BY MOUTH EVERY DAY 30 tablet 5    triamcinolone (KENALOG) 0.1 % ointment APPLY TO AFFECTED AREAS TWICE DAILY FOR UP TO 2 WEEKS OR UNTIL IMPROVED. 80 g 1    BREO ELLIPTA 100-25 MCG/INH AEPB inhaler INHALE 1 PUFF INTO LUNGS DAILY 60 each 5    JANUVIA 100 MG tablet TAKE 1 TABLET BY MOUTH EVERY DAY 30 tablet 5    amLODIPine (NORVASC) 5 MG tablet TAKE 1 TABLET EVERY DAY 90 tablet 3    pravastatin (PRAVACHOL) 40 MG tablet TAKE 1 TABLET BY MOUTH DAILY 90 tablet 3    ammonium lactate (LAC-HYDRIN) 12 % lotion APPLY TO AFFECTED AREA EVERY  mL 2    naproxen (NAPROSYN) 500 MG tablet TAKE 1 TABLET BY MOUTH TWICE A DAY AS NEEDED FOR PAIN 14 tablet 0    losartan (COZAAR) 50 MG tablet TAKE 1 TABLET BY MOUTH DAILY 90 tablet 3    Blood Glucose Monitoring Suppl (TRUE METRIX AIR GLUCOSE METER) w/Device KIT Test 3 times daily 1 kit 0    blood glucose test strips (TRUE METRIX BLOOD GLUCOSE TEST) strip 1 each by In Vitro route daily As needed.  100 each 3    Spacer/Aero-Holding Chambers GUILLERMO 1 Device by Does not apply route daily as needed (with inhaler) 1 Device 0    NICOTROL 10 MG inhaler INHALE 1 PUFF INTO LUNGS AS NEEDED FOR SMOKING CESSATION 168 each 2    albuterol sulfate HFA (PROVENTIL HFA) 108 (90 Base) MCG/ACT inhaler Inhale 2 puffs into the lungs every 6 hours as needed for Wheezing 1 Inhaler 3    naproxen (NAPROSYN) 500 MG tablet TAKE 1 TABLET BY MOUTH TWICE A DAY WITH FOOD AS NEEDED FOR KNEE PAIN 20 tablet 1    promethazine-codeine (PHENERGAN WITH CODEINE) 6.25-10 MG/5ML syrup Take 5 mLs by mouth every 4 hours as needed for Cough 120 mL 0    clobetasol (TEMOVATE) 0.05 % ointment APPLY TO AFFECTED AREA ON THE LEFT LEG TWICE DAILY UNTIL IMPROVED. 60 g 0    desoximetasone (TOPICORT) 0.25 % cream APPLY TOPICALLY 2 TIMES DAILY. 90 g 1    hydrOXYzine (ATARAX) 25 MG tablet Take 1 tablet by mouth 3 times daily as needed for Itching. 60 tablet 1    aspirin 81 MG EC tablet Take 81 mg by mouth daily. No current facility-administered medications for this visit. Patient's past medical history, surgical history, family history, medications,and allergies  were all reviewed and updated as appropriate today. Review of Systems   Constitutional: Negative. HENT: Negative. Eyes: Negative. Respiratory: Negative. Cardiovascular:        HTN, treated with amlodipine and ARB. Gastrointestinal: Negative. Endocrine:        Diabetes, treated with pioglitazone and DPP4. A1c 6.1. Hyperlipidemia, treated with statin. . Genitourinary: Negative. Musculoskeletal:        Left 3rd trigger finger at PIP joint. Skin: Negative. Neurological: Negative. Psychiatric/Behavioral: Negative. Physical Exam  Constitutional:       General: He is not in acute distress. Appearance: He is well-developed. HENT:      Head: Normocephalic and atraumatic. Right Ear: External ear normal.      Left Ear: External ear normal.      Nose: Nose normal.   Eyes:      General: No scleral icterus. Conjunctiva/sclera: Conjunctivae normal.      Pupils: Pupils are equal, round, and reactive to light. Neck:      Musculoskeletal: Normal range of motion and neck supple. Thyroid: No thyromegaly. Cardiovascular:      Rate and Rhythm: Normal rate and regular rhythm. Heart sounds: Normal heart sounds.    Pulmonary:      Effort: Pulmonary effort is normal.      Breath sounds: Normal breath sounds. Abdominal:      General: Bowel sounds are normal.      Palpations: Abdomen is soft. There is no mass. Musculoskeletal:      Comments: Left 3rd trigger finger as noted. Lymphadenopathy:      Cervical: No cervical adenopathy. Skin:     General: Skin is warm and dry. Neurological:      Mental Status: He is alert and oriented to person, place, and time. Deep Tendon Reflexes: Reflexes are normal and symmetric. Psychiatric:         Behavior: Behavior normal.         Thought Content: Thought content normal.         Judgment: Judgment normal.         Vitals:    03/26/21 1150   BP: 134/72   Pulse: 80   Temp: 97 °F (36.1 °C)   SpO2: 97%       Assessment:    Plan:   Diagnosis Orders   1. Type 2 diabetes mellitus with complication, without long-term current use of insulin (HCC)  Diet, physical activity, weight reduction and med compliance discussed. POCT Glucose    POCT glycosylated hemoglobin (Hb A1C)  Continue actos and DPP4.   2. Trigger middle finger of left hand  External Referral To Orthopedic Surgery  Exercises given. 3. Essential hypertension  Continue ARB and Ca blker. DASH and low sodium diet discussed. 4. Mixed hyperlipidemia  Heart healthy diet and statin compliance discussed.

## 2021-04-12 DIAGNOSIS — L30.9 ECZEMA, UNSPECIFIED TYPE: ICD-10-CM

## 2021-04-12 RX ORDER — NICOTINE 10 MG
CARTRIDGE (EA) INHALATION
Qty: 1 INHALER | Refills: 2 | Status: SHIPPED | OUTPATIENT
Start: 2021-04-12

## 2021-04-12 RX ORDER — AMMONIUM LACTATE 12 G/100G
LOTION TOPICAL
Qty: 225 ML | Refills: 2 | Status: SHIPPED | OUTPATIENT
Start: 2021-04-12 | End: 2021-09-28

## 2021-04-12 RX ORDER — NAPROXEN 500 MG/1
TABLET ORAL
Qty: 14 TABLET | Refills: 0 | Status: SHIPPED | OUTPATIENT
Start: 2021-04-12 | End: 2022-10-17 | Stop reason: ALTCHOICE

## 2021-04-19 ENCOUNTER — TELEPHONE (OUTPATIENT)
Dept: INTERNAL MEDICINE CLINIC | Age: 76
End: 2021-04-19

## 2021-05-03 RX ORDER — SITAGLIPTIN 100 MG/1
TABLET, FILM COATED ORAL
Qty: 30 TABLET | Refills: 5 | Status: SHIPPED | OUTPATIENT
Start: 2021-05-03 | End: 2021-10-25

## 2021-07-01 RX ORDER — LOSARTAN POTASSIUM 25 MG/1
TABLET ORAL
Qty: 180 TABLET | Refills: 3 | Status: SHIPPED | OUTPATIENT
Start: 2021-07-01 | End: 2022-06-29

## 2021-07-09 ENCOUNTER — OFFICE VISIT (OUTPATIENT)
Dept: INTERNAL MEDICINE CLINIC | Age: 76
End: 2021-07-09
Payer: COMMERCIAL

## 2021-07-09 VITALS
SYSTOLIC BLOOD PRESSURE: 128 MMHG | HEIGHT: 72 IN | BODY MASS INDEX: 35.11 KG/M2 | OXYGEN SATURATION: 95 % | DIASTOLIC BLOOD PRESSURE: 70 MMHG | HEART RATE: 99 BPM | WEIGHT: 259.2 LBS

## 2021-07-09 DIAGNOSIS — E11.8 TYPE 2 DIABETES MELLITUS WITH COMPLICATION, WITHOUT LONG-TERM CURRENT USE OF INSULIN (HCC): Primary | ICD-10-CM

## 2021-07-09 DIAGNOSIS — Z87.891 PERSONAL HISTORY OF TOBACCO USE: ICD-10-CM

## 2021-07-09 DIAGNOSIS — F17.218 CIGARETTE NICOTINE DEPENDENCE WITH OTHER NICOTINE-INDUCED DISORDER: ICD-10-CM

## 2021-07-09 DIAGNOSIS — E78.2 MIXED HYPERLIPIDEMIA: ICD-10-CM

## 2021-07-09 DIAGNOSIS — Z00.00 ROUTINE GENERAL MEDICAL EXAMINATION AT A HEALTH CARE FACILITY: ICD-10-CM

## 2021-07-09 LAB
CHP ED QC CHECK: NORMAL
GLUCOSE BLD-MCNC: 190 MG/DL
HBA1C MFR BLD: 6.4 %

## 2021-07-09 PROCEDURE — 3044F HG A1C LEVEL LT 7.0%: CPT | Performed by: INTERNAL MEDICINE

## 2021-07-09 PROCEDURE — 1123F ACP DISCUSS/DSCN MKR DOCD: CPT | Performed by: INTERNAL MEDICINE

## 2021-07-09 PROCEDURE — 3017F COLORECTAL CA SCREEN DOC REV: CPT | Performed by: INTERNAL MEDICINE

## 2021-07-09 PROCEDURE — 83036 HEMOGLOBIN GLYCOSYLATED A1C: CPT | Performed by: INTERNAL MEDICINE

## 2021-07-09 PROCEDURE — 4040F PNEUMOC VAC/ADMIN/RCVD: CPT | Performed by: INTERNAL MEDICINE

## 2021-07-09 PROCEDURE — G0296 VISIT TO DETERM LDCT ELIG: HCPCS | Performed by: INTERNAL MEDICINE

## 2021-07-09 PROCEDURE — 82962 GLUCOSE BLOOD TEST: CPT | Performed by: INTERNAL MEDICINE

## 2021-07-09 PROCEDURE — G0439 PPPS, SUBSEQ VISIT: HCPCS | Performed by: INTERNAL MEDICINE

## 2021-07-09 RX ORDER — PRAVASTATIN SODIUM 80 MG/1
80 TABLET ORAL DAILY
Qty: 30 TABLET | Refills: 5 | Status: SHIPPED | OUTPATIENT
Start: 2021-07-09 | End: 2022-04-06

## 2021-07-09 SDOH — ECONOMIC STABILITY: FOOD INSECURITY: WITHIN THE PAST 12 MONTHS, THE FOOD YOU BOUGHT JUST DIDN'T LAST AND YOU DIDN'T HAVE MONEY TO GET MORE.: NEVER TRUE

## 2021-07-09 SDOH — ECONOMIC STABILITY: INCOME INSECURITY: HOW HARD IS IT FOR YOU TO PAY FOR THE VERY BASICS LIKE FOOD, HOUSING, MEDICAL CARE, AND HEATING?: NOT HARD AT ALL

## 2021-07-09 SDOH — EDUCATIONAL SECURITY: EDUCATION ATTAINMENT: WHAT IS THE HIGHEST LEVEL OF SCHOOL YOU HAVE COMPLETED OR THE HIGHEST DEGREE YOU HAVE RECEIVED?: PATIENT REFUSED

## 2021-07-09 SDOH — ECONOMIC STABILITY: FOOD INSECURITY: WITHIN THE PAST 12 MONTHS, YOU WORRIED THAT YOUR FOOD WOULD RUN OUT BEFORE YOU GOT MONEY TO BUY MORE.: NEVER TRUE

## 2021-07-09 ASSESSMENT — PATIENT HEALTH QUESTIONNAIRE - PHQ9
1. LITTLE INTEREST OR PLEASURE IN DOING THINGS: 0
SUM OF ALL RESPONSES TO PHQ QUESTIONS 1-9: 0
2. FEELING DOWN, DEPRESSED OR HOPELESS: 0
SUM OF ALL RESPONSES TO PHQ9 QUESTIONS 1 & 2: 0
SUM OF ALL RESPONSES TO PHQ QUESTIONS 1-9: 0
SUM OF ALL RESPONSES TO PHQ QUESTIONS 1-9: 0

## 2021-07-09 ASSESSMENT — SOCIAL DETERMINANTS OF HEALTH (SDOH): HOW HARD IS IT FOR YOU TO PAY FOR THE VERY BASICS LIKE FOOD, HOUSING, MEDICAL CARE, AND HEATING?: NOT HARD AT ALL

## 2021-07-09 NOTE — PATIENT INSTRUCTIONS
Take 2 of the pravastatin at 40 mg until gone, then start the new prescription, 1 daily. Advance Directives: Care Instructions  Overview  An advance directive is a legal way to state your wishes at the end of your life. It tells your family and your doctor what to do if you can't say what you want. There are two main types of advance directives. You can change them any time your wishes change. Living will. This form tells your family and your doctor your wishes about life support and other treatment. The form is also called a declaration. Medical power of . This form lets you name a person to make treatment decisions for you when you can't speak for yourself. This person is called a health care agent (health care proxy, health care surrogate). The form is also called a durable power of  for health care. If you do not have an advance directive, decisions about your medical care may be made by a family member, or by a doctor or a  who doesn't know you. It may help to think of an advance directive as a gift to the people who care for you. If you have one, they won't have to make tough decisions by themselves. Follow-up care is a key part of your treatment and safety. Be sure to make and go to all appointments, and call your doctor if you are having problems. It's also a good idea to know your test results and keep a list of the medicines you take. What should you include in an advance directive? Many states have a unique advance directive form. (It may ask you to address specific issues.) Or you might use a universal form that's approved by many states. If your form doesn't tell you what to address, it may be hard to know what to include in your advance directive. Use the questions below to help you get started. · Who do you want to make decisions about your medical care if you are not able to?   · What life-support measures do you want if you have a serious illness that gets worse over time or can't be cured? · What are you most afraid of that might happen? (Maybe you're afraid of having pain, losing your independence, or being kept alive by machines.)  · Where would you prefer to die? (Your home? A hospital? A nursing home?)  · Do you want to donate your organs when you die? · Do you want certain Muslim practices performed before you die? When should you call for help? Be sure to contact your doctor if you have any questions. Where can you learn more? Go to [email or web address]://[email or web address] org and sign in to your [employer] account. Enter R264 in the [employer] box to learn more about \"Advance Directives: Care Instructions. \"     If you do not have an account, please click on the \"Sign Up Now\" link. Current as of: March 17, 2021               Content Version: 12.9  © 7750-7624 [employer]. Care instructions adapted under license by [facility] ([facility]). If you have questions about a medical condition or this instruction, always ask your healthcare professional. Yuri Carlos any warranty or liability for your use of this information. Learning About Medical Power of   What is a medical power of ? A medical power of , also called a durable power of  for health care, is one type of the legal forms called advance directives. It lets you name the person you want to make treatment decisions for you if you can't speak or decide for yourself. The person you choose is called your health care agent. This person is also called a health care proxy or health care surrogate. A medical power of  may be called something else in your state. How do you choose a health care agent? Choose your health care agent carefully. This person may or may not be a family member. Talk to the person before you make your final decision. Make sure he or she is comfortable with this responsibility.   It's a good idea to choose someone who:  · Is at least 25years old. · Knows you well and understands what makes life meaningful for you. · Understands your Mandaeism and moral values. · Will do what you want, not what he or she wants. · Will be able to make difficult choices at a stressful time. · Will be able to refuse or stop treatment, if that is what you would want, even if you could die. · Will be firm and confident with health professionals if needed. · Will ask questions to get needed information. · Lives near you or agrees to travel to you if needed. Your family may help you make medical decisions while you can still be part of that process. But it's important to choose one person to be your health care agent in case you aren't able to make decisions for yourself. If you don't fill out the legal form and name a health care agent, the decisions your family can make may be limited. A health care agent may be called something else in your state. Who will make decisions for you if you don't have a health care agent? If you don't have a health care agent or a living will, you may not get the care you want. Decisions may be made by family members who disagree about your medical care. Or decisions may be made by a medical professional who doesn't know you well. In some cases, a  makes the decisions. When you name a health care agent, it is very clear who has the power to make health decisions for you. How do you name a health care agent? You name your health care agent on a legal form. This form is usually called a medical power of . Ask your hospital, state bar association, or office on aging where to find these forms. You must sign the form to make it legal. Some states require you to get the form notarized. This means that a person called a  watches you sign the form and then he or she signs the form. Some states also require that two or more witnesses sign the form.   Be sure to tell your family members and doctors who your health care agent is. Where can you learn more? Go to https://[email or web address] org and sign in to your [employer] account. Enter [id number] in the [facility] box to learn more about \"Learning About [address]. \"     If you do not have an account, please click on the \"Sign Up Now\" link. Current as of: March 17, 2021               Content Version: 12.9  © 5082-4071 [employer]. Care instructions adapted under license by [address] If you have questions about a medical condition or this instruction, always ask your healthcare professional. [address] any warranty or liability for your use of this information. Learning About Living Perroy  What is a living will? A living will, also called a declaration, is a legal form. It tells your family and your doctor your wishes when you can't speak for yourself. It's used by the health professionals who will treat you as you near the end of your life or if you get seriously hurt or ill. If you put your wishes in writing, your loved ones and others will know what kind of care you want. They won't need to guess. This can ease your mind and be helpful to others. And you can change or cancel your living will at any time. A living will is not the same as an estate or property will. An estate will explains what you want to happen with your money and property after you die. How do you use it? A living will is used to describe the kinds of treatment or life support you want as you near the end of your life or if you get seriously hurt or ill. Keep these facts in mind about living augustin. · Your living will is used only if you can't speak or make decisions for yourself. Most often, one or more doctors must certify that you can't speak or decide for yourself before your living will takes effect.   · If you get better and can speak for yourself again, you be used? If not, talk to your doctor so you know what might be done if you can't breathe on your own, your heart stops, or you can't swallow. · What things would you still want to be able to do after you receive life-support methods? Would you want to be able to walk? To speak? To eat on your own? To live without the help of machines? · Do you want certain Yarsani practices performed if you become very ill? · If you have a choice, where do you want to be cared for? In your home? At a hospital or nursing home? · If you have a choice at the end of your life, where would you prefer to die? At home? In a hospital or nursing home? Somewhere else? · Would you prefer to be buried or cremated? · Do you want your organs to be donated after you die? What should you do with your living will? · Make sure that your family members and your health care agent have copies of your living will (also called a declaration). · Give your doctor a copy of your living will. Ask him or her to keep it as part of your medical record. If you have more than one doctor, make sure that each one has a copy. · Put a copy of your living will where it can be easily found. For example, some people may put a copy on their refrigerator door. If you are using a digital copy, be sure your doctor, family members, and health care agent know how to find and access it. Where can you learn more? Go to https://YuMinglepebaileyewdulgas.[email or web address]. org and sign in to your [employer] account. Enter O615 in the [facility] box to learn more about \"Learning About Living Perroamanda. \"     If you do not have an account, please click on the \"Sign Up Now\" link. Current as of: March 17, 2021               Content Version: 12.9  © 7851-3760 [employer]. Care instructions adapted under license by [facility] ([facility]).  If you have questions about a medical condition or this instruction, always ask your healthcare professional. Lea Pappas disclaims any warranty or liability for your use of this information. Personalized Preventive Plan for Janice Lindquist - 7/9/2021  Medicare offers a range of preventive health benefits. Some of the tests and screenings are paid in full while other may be subject to a deductible, co-insurance, and/or copay. Some of these benefits include a comprehensive review of your medical history including lifestyle, illnesses that may run in your family, and various assessments and screenings as appropriate. After reviewing your medical record and screening and assessments performed today your provider may have ordered immunizations, labs, imaging, and/or referrals for you. A list of these orders (if applicable) as well as your Preventive Care list are included within your After Visit Summary for your review. Other Preventive Recommendations:    · A preventive eye exam performed by an eye specialist is recommended every 1-2 years to screen for glaucoma; cataracts, macular degeneration, and other eye disorders. · A preventive dental visit is recommended every 6 months. · Try to get at least 150 minutes of exercise per week or 10,000 steps per day on a pedometer . · Order or download the FREE \"Exercise & Physical Activity: Your Everyday Guide\" from [employer] on "[employer]. Call 4-570.234.5511 or search [employer] on Aging online. · You need 0226-7045 mg of calcium and 7944-7795 IU of vitamin D per day. It is possible to meet your calcium requirement with diet alone, but a vitamin D supplement is usually necessary to meet this goal.  · When exposed to the sun, use a sunscreen that protects against both UVA and UVB radiation with an SPF of 30 or greater. Reapply every 2 to 3 hours or after sweating, drying off with a towel, or swimming. · Always wear a seat belt when traveling in a car. Always wear a helmet when riding a bicycle or motorcycle. What is lung cancer screening?   Lung cancer screening is a way in which doctors check the lungs for early signs of cancer in people who have no symptoms of lung cancer. A low-dose CT scan uses much less radiation than a normal CT scan and shows a more detailed image of the lungs than a standard X-ray. The goal of lung cancer screening is to find cancer early, before it has a chance to grow, spread, or cause problems. One large study found that smokers who were screened with low-dose CT scans were less likely to die of lung cancer than those who were screened with standard X-ray. Below is a summary of the things you need to know regarding screening for lung cancer with low-dose computed tomography (LDCT). This is a screening program that involves routine annual screening with LDCT studies of the lung. The LDCTs are done using low-dose radiation that is not thought to increase your cancer risk. If you have other serious medical conditions (other cancers, congestive heart failure) that limit your life expectancy to less than 10 years, you should not undergo lung cancer screening with LDCT. The chance is 20%-60% that the LDCT result will show abnormalities. This would require additional testing which could include repeat imaging or even invasive procedures. Most (about 95%) of \"abnormal\" LDCT results are false in the sense that no lung cancer is ultimately found. Additionally, some (about 10%) of the cancers found would not affect your life expectancy, even if undetected and untreated. If you are still smoking, the single most important thing that you can do to reduce your risk of dying of lung cancer is to quit. For this screening to be covered by Medicare and most other insurers, strict criteria must be met. If you do not meet these criteria, but still wish to undergo LDCT testing, you will be required to sign a waiver indicating your willingness to pay for the scan.

## 2021-07-09 NOTE — PROGRESS NOTES
Medicare Annual Wellness Visit  Name: Mary Levin Date: 2021   MRN: <[id number]> Sex: Male   Age: 77 y.o. Ethnicity: Non-/Non    : 1945 Race: Tammie Griffin is here for Medicare AWV and Diabetes    Screenings for behavioral, psychosocial and functional/safety risks, and cognitive dysfunction are all negative except as indicated below. These results, as well as other patient data from the [address] [facility] [address] form, are documented in Flowsheets linked to this Encounter. Still smoking. 1 pack every 2 days. Makes him nervous when stops. Advised weaning slowly. Eats good. Walks steps for exercise. Stands from sitting position without problem. No Known Allergies    Prior to Visit Medications    Medication Sig Taking? Authorizing Provider   losartan (COZAAR) 25 MG tablet TAKE 2 TABLETS BY MOUTH EVERY DAY Yes Meliza David MD   BREO ELLIPTA 100-25 MCG/INH AEPB inhaler INHALE 1 PUFF INTO LUNGS DAILY Yes Meliza David MD   JANUVIA 100 MG tablet TAKE 1 TABLET BY MOUTH EVERY DAY Yes Meliza David MD   triamcinolone (KENALOG) 0.1 % ointment APPLY TO AFFECTED AREAS TWICE DAILY FOR UP TO 2 WEEKS OR UNTIL IMPROVED.  Yes Meliza David MD   naproxen (NAPROSYN) 500 MG tablet TAKE 1 TABLET BY MOUTH TWICE A DAY AS NEEDED FOR PAIN Yes Meliza David MD   ammonium lactate (LAC-HYDRIN) 12 % lotion APPLY TO AFFECTED AREA EVERY DAY Yes Meliza David MD   NICOTROL 10 MG inhaler INHALE 1 PUFF INTO LUNGS AS NEEDED FOR SMOKING CESSATION Yes Meliza David MD   pioglitazone (ACTOS) 15 MG tablet TAKE 1 TABLET BY MOUTH EVERY DAY Yes Meliza David MD   amLODIPine (NORVASC) 5 MG tablet TAKE 1 TABLET EVERY DAY Yes Meliza David MD   pravastatin (PRAVACHOL) 40 MG tablet TAKE 1 TABLET BY MOUTH DAILY Yes Meliza David MD   Blood Glucose Monitoring Suppl (TRUE METRIX AIR GLUCOSE METER) w/Device KIT Test 3 times daily Yes Meliza David MD   blood glucose test strips (TRUE METRIX BLOOD GLUCOSE TEST) strip 1 each by In Vitro route daily As needed. Yes Lolis Ferrer MD   Spacer/Aero-Holding Torres Padilla GUILLERMO 1 Device by Does not apply route daily as needed (with inhaler) Yes DENA Singletary DO   albuterol sulfate HFA (PROVENTIL HFA) 108 (90 Base) MCG/ACT inhaler Inhale 2 puffs into the lungs every 6 hours as needed for Wheezing Yes Lolis Ferrer MD   naproxen (NAPROSYN) 500 MG tablet TAKE 1 TABLET BY MOUTH TWICE A DAY WITH FOOD AS NEEDED FOR KNEE PAIN Yes Lolis Ferrer MD   clobetasol (TEMOVATE) 0.05 % ointment APPLY TO AFFECTED AREA ON THE LEFT LEG TWICE DAILY UNTIL IMPROVED. Yes Naty Warren MD   desoximetasone (TOPICORT) 0.25 % cream APPLY TOPICALLY 2 TIMES DAILY. Yes Lolis Ferrer MD   aspirin 81 MG EC tablet Take 81 mg by mouth daily. Yes Historical Provider, MD   promethazine-codeine (PHENERGAN WITH CODEINE) 6.25-10 MG/5ML syrup Take 5 mLs by mouth every 4 hours as needed for Cough  Patient not taking: Reported on 7/9/2021  Lolis Ferrer MD   hydrOXYzine (ATARAX) 25 MG tablet Take 1 tablet by mouth 3 times daily as needed for Itching. Patient not taking: Reported on 7/9/2021  Lolis Ferrer MD       Past Medical History:   Diagnosis Date    Eczema     Hyperlipidemia     Hypertension     Type 2 diabetes mellitus without complication ([facility] [address]        No past surgical history on file.     Family History   Problem Relation Age of Onset    Diabetes Mother     Diabetes Father        CareTeam (Including outside providers/suppliers regularly involved in providing care):   Patient Care Team:  Lolis Ferrer MD as PCP - General (Internal Medicine)  Lolis Ferrer MD [facility] PCP - [facility]  Kiana Smith MD as Consulting Physician (Otolaryngology)  Avis Garcia, RN as Registered Nurse  Dewayne Sahni RN as Registered Nurse    Wt Readings from Last 3 Encounters:   07/09/21 259 lb 3.2 oz (117.6 kg)   03/26/21 250 lb (113.4 kg)   04/05/20 255 lb (115.7 kg)     Vitals: 07/09/21 1114   BP: 128/70   Site: Left Upper Arm   Position: Sitting   Cuff Size: Medium Adult   Pulse: 99   SpO2: 95%   Weight: 259 lb 3.2 oz (117.6 kg)   Height: 6' (1.829 m)     Body mass index is 35.15 kg/m². Based upon direct observation of the patient, evaluation of cognition reveals normal memory and cognition. Patient's complete Health Risk Assessment and screening values have been reviewed and are found in Flowsheets. The following problems were reviewed today and where indicated follow up appointments were made and/or referrals ordered. Positive Risk Factor Screenings with Interventions:         Substance History:  Social History     Tobacco History     Smoking Status  Current Every Day Smoker Smoking Frequency  0.5 packs/day for 59 years (29.5 pk yrs) Smoking Tobacco Type  Cigarettes    Smokeless Tobacco Use  Never Used          Alcohol History     Alcohol Use Status  Yes Drinks/Week  2 Shots of liquor per week Amount  2.0 standard drinks of alcohol/wk Comment  occass          Drug Use     Drug Use Status  No          Sexual Activity     Sexually Active  Yes Partners  Female               Alcohol Screening:       A score of 8 or more is associated with harmful or hazardous drinking. A score of 13 or more in women, and 15 or more in men, is likely to indicate alcohol dependence. Substance Abuse Interventions:  · Counseled. · Literature provided. Health Habits/Nutrition:  Health Habits/Nutrition  Do you exercise for at least 20 minutes 2-3 times per week?: Yes  Have you lost any weight without trying in the past 3 months?: (!) Yes  Do you eat only one meal per day?: No  Have you seen the dentist within the past year?: (!) No  Body mass index: (!) 35.15  Health Habits/Nutrition Interventions:  · Health and wellness literature provided. · Questions answered.     Hearing/Vision:  No exam data present  Hearing/Vision  Do you or your family notice any trouble with your hearing that hasn't been managed with hearing aids?: (!) Yes  Do you have difficulty driving, watching TV, or doing any of your daily activities because of your eyesight?: No  Have you had an eye exam within the past year?: (!) No  Hearing/Vision Interventions:  · Referral provided. Safety:  Safety  Do you have working smoke detectors?: Yes  Have all throw rugs been removed or fastened?: (!) No  Do you have non-slip mats or surfaces in all bathtubs/showers?: (!) No  Do all of your stairways have a railing or banister?: (!) No  Are your doorways, halls and stairs free of clutter?: (!) No  Do you always fasten your seatbelt when you are in a car?: (!) No  Safety Interventions:  · Safety literature provided. · Questions answered.       Personalized Preventive Plan   Current Health Maintenance Status  Immunization History   Administered Date(s) Administered    Influenza 09/19/2011, 09/12/2012    Influenza, High Dose (Fluzone 65 yrs and older) 11/19/2013, 11/20/2014, 11/24/2015, 09/15/2016, 08/29/2017, 11/16/2018    Pneumococcal Conjugate 13-valent ([id number]) 08/29/2017    Pneumococcal Polysaccharide ([id number]) 11/24/2015        Health Maintenance   Topic Date Due    Shingles Vaccine (1 of 2) Never done    Low dose CT lung screening  03/15/2019    Annual Wellness Visit (AWV)  Never done    Diabetic foot exam  11/20/2020    Diabetic microalbuminuria test  06/23/2021    Potassium monitoring  06/23/2021    Creatinine monitoring  06/23/2021    Lipid screen  06/23/2021    Colon cancer screen colonoscopy  08/10/2021    Flu vaccine (1) 09/01/2021    Diabetic retinal exam  05/22/2022    A1C test (Diabetic or Prediabetic)  07/09/2022    DTaP/Tdap/Td vaccine (2 - Td or Tdap) 05/23/2028    Pneumococcal 65+ years Vaccine  Completed    COVID-19 Vaccine  Completed    AAA screen  Completed    Hepatitis C screen  Completed    Hepatitis A vaccine  Aged Out    Hib vaccine  Aged Out    Meningococcal (ACWY) vaccine  Aged Out Recommendations for Preventive Services Due: see orders and patient instructions/AVS.  . Recommended screening schedule for the next 5-10 years is provided to the patient in written form: see Patient Instructions/AVS.             Advance Care Planning   Advanced Care Planning: Discussed the patients choices for care and treatment in case of a health event that adversely affects decision-making abilities. Also discussed the patients long-term treatment options. Reviewed with the patient the [address] [place] [address] of [address] [place] [address] Declaration forms  Reviewed the process of designating a competent adult as an Agent (or -in-fact) that could take make health care decisions for the patient if incompetent. Patient was asked to complete the declaration forms, either acknowledge the forms by a public notary or an eligible witness and provide a signed copy to the practice office. Time spent (minutes): 6 minutes. Obesity Counseling: Assessed behavioral health risks and factors affecting choice of behavior. Suggested weight control approaches, including dietary changes behavioral modification and follow up plan. Provided educational and support documentation. Time spent (minutes): 6 minutes. Cardiovascular Disease Risk Counseling: Assessed the patient's risk to develop cardiovascular disease and reviewed main risk factors.    Reviewed steps to reduce disease risk including:   · Quitting tobacco use, reducing amount smoked, or not starting the habit  · Making healthy food choices  · Being physically active and gradualy increasing activity levels   · Reduce weight and determine a healthy BMI goal  · Monitor blood pressure and treat if higher than 140/90 mmHg  · Maintain blood total cholesterol levels under 5 mmol/l or 190 mg/dl  · Maintain LDL cholesterol levels under 3.0 mmol/l or 115 mg/dl   · Control blood glucose levels  · Consider taking aspirin (75 mg daily), once blood pressure is controlled   Provided a follow up plan. Time spent (minutes): 6 minutes. Low Dose CT (LDCT) Lung Screening criteria met   Age 50-69   Pack year smoking >30   Still smoking or less than 15 year since quit   No sign or symptoms of lung cancer   > 11 months since last LDCT     Risks and benefits of lung cancer screening with LDCT scans discussed:    Significance of positive screen - False-positive LDCT results often occur. 95% of all positive results do not lead to a diagnosis of cancer. Usually further imaging can resolve most false-positive results; however, some patients may require invasive procedures. Over diagnosis risk - 10% to 12% of screen-detected lung cancer cases are over diagnosedthat is, the cancer would not have been detected in the patient's lifetime without the screening. Need for follow up screens annually to continue lung cancer screening effectiveness     Risks associated with radiation from annual LDCT- Radiation exposure is about the same as for a mammogram, which is about 1/3 of the annual background radiation exposure from everyday life. Starting screening at age 54 is not likely to increase cancer risk from radiation exposure. Patients with comorbidities resulting in life expectancy of < 10 years, or that would preclude treatment of an abnormality identified on CT, should not be screened due to lack of benefit.     To obtain maximal benefit from this screening, smoking cessation and long-term abstinence from smoking is critical

## 2021-07-20 ENCOUNTER — HOSPITAL ENCOUNTER (OUTPATIENT)
Dept: CT IMAGING | Age: 76
Discharge: HOME | End: 2021-07-20
Payer: COMMERCIAL

## 2021-07-20 DIAGNOSIS — Z87.891 PERSONAL HISTORY OF TOBACCO USE: ICD-10-CM

## 2021-07-20 DIAGNOSIS — F17.218 CIGARETTE NICOTINE DEPENDENCE WITH OTHER NICOTINE-INDUCED DISORDER: ICD-10-CM

## 2021-07-20 PROCEDURE — 71271 CT THORAX LUNG CANCER SCR C-: CPT

## 2021-07-21 ENCOUNTER — TELEPHONE (OUTPATIENT)
Dept: CT IMAGING | Age: 76
End: 2021-07-21

## 2021-08-28 DIAGNOSIS — E11.8 TYPE 2 DIABETES MELLITUS WITH COMPLICATION, WITHOUT LONG-TERM CURRENT USE OF INSULIN (HCC): ICD-10-CM

## 2021-08-30 RX ORDER — PIOGLITAZONEHYDROCHLORIDE 15 MG/1
TABLET ORAL
Qty: 30 TABLET | Refills: 5 | Status: SHIPPED | OUTPATIENT
Start: 2021-08-30 | End: 2022-03-10

## 2021-09-27 DIAGNOSIS — L30.9 ECZEMA, UNSPECIFIED TYPE: ICD-10-CM

## 2021-09-28 RX ORDER — AMLODIPINE BESYLATE 5 MG/1
TABLET ORAL
Qty: 90 TABLET | Refills: 3 | Status: SHIPPED | OUTPATIENT
Start: 2021-09-28 | End: 2022-10-04

## 2021-09-28 RX ORDER — AMMONIUM LACTATE 12 G/100G
LOTION TOPICAL
Qty: 225 ML | Refills: 2 | Status: SHIPPED | OUTPATIENT
Start: 2021-09-28 | End: 2022-05-09

## 2021-10-12 ENCOUNTER — OFFICE VISIT (OUTPATIENT)
Dept: INTERNAL MEDICINE CLINIC | Age: 76
End: 2021-10-12
Payer: COMMERCIAL

## 2021-10-12 VITALS
HEART RATE: 95 BPM | SYSTOLIC BLOOD PRESSURE: 130 MMHG | HEIGHT: 71 IN | WEIGHT: 262.8 LBS | DIASTOLIC BLOOD PRESSURE: 70 MMHG | TEMPERATURE: 98.1 F | OXYGEN SATURATION: 93 % | BODY MASS INDEX: 36.79 KG/M2

## 2021-10-12 DIAGNOSIS — E11.8 TYPE 2 DIABETES MELLITUS WITH COMPLICATION, WITHOUT LONG-TERM CURRENT USE OF INSULIN (HCC): Primary | ICD-10-CM

## 2021-10-12 DIAGNOSIS — I10 PRIMARY HYPERTENSION: ICD-10-CM

## 2021-10-12 DIAGNOSIS — E78.2 MIXED HYPERLIPIDEMIA: ICD-10-CM

## 2021-10-12 DIAGNOSIS — Z12.5 PROSTATE CANCER SCREENING: ICD-10-CM

## 2021-10-12 DIAGNOSIS — F17.219 CIGARETTE NICOTINE DEPENDENCE WITH NICOTINE-INDUCED DISORDER: ICD-10-CM

## 2021-10-12 DIAGNOSIS — Z12.11 COLON CANCER SCREENING: ICD-10-CM

## 2021-10-12 LAB
CHP ED QC CHECK: NORMAL
GLUCOSE BLD-MCNC: 106 MG/DL
HBA1C MFR BLD: 6.4 %

## 2021-10-12 PROCEDURE — G8417 CALC BMI ABV UP PARAM F/U: HCPCS | Performed by: INTERNAL MEDICINE

## 2021-10-12 PROCEDURE — G8482 FLU IMMUNIZE ORDER/ADMIN: HCPCS | Performed by: INTERNAL MEDICINE

## 2021-10-12 PROCEDURE — 82962 GLUCOSE BLOOD TEST: CPT | Performed by: INTERNAL MEDICINE

## 2021-10-12 PROCEDURE — 3044F HG A1C LEVEL LT 7.0%: CPT | Performed by: INTERNAL MEDICINE

## 2021-10-12 PROCEDURE — G8427 DOCREV CUR MEDS BY ELIG CLIN: HCPCS | Performed by: INTERNAL MEDICINE

## 2021-10-12 PROCEDURE — 4040F PNEUMOC VAC/ADMIN/RCVD: CPT | Performed by: INTERNAL MEDICINE

## 2021-10-12 PROCEDURE — 99214 OFFICE O/P EST MOD 30 MIN: CPT | Performed by: INTERNAL MEDICINE

## 2021-10-12 PROCEDURE — 3017F COLORECTAL CA SCREEN DOC REV: CPT | Performed by: INTERNAL MEDICINE

## 2021-10-12 PROCEDURE — 2022F DILAT RTA XM EVC RTNOPTHY: CPT | Performed by: INTERNAL MEDICINE

## 2021-10-12 PROCEDURE — 83036 HEMOGLOBIN GLYCOSYLATED A1C: CPT | Performed by: INTERNAL MEDICINE

## 2021-10-12 PROCEDURE — 1123F ACP DISCUSS/DSCN MKR DOCD: CPT | Performed by: INTERNAL MEDICINE

## 2021-10-12 PROCEDURE — 4004F PT TOBACCO SCREEN RCVD TLK: CPT | Performed by: INTERNAL MEDICINE

## 2021-10-12 RX ORDER — ROSUVASTATIN CALCIUM 40 MG/1
40 TABLET, COATED ORAL DAILY
Qty: 90 TABLET | Refills: 1 | Status: SHIPPED | OUTPATIENT
Start: 2021-10-12 | End: 2022-03-28

## 2021-10-12 SDOH — ECONOMIC STABILITY: INCOME INSECURITY: HOW HARD IS IT FOR YOU TO PAY FOR THE VERY BASICS LIKE FOOD, HOUSING, MEDICAL CARE, AND HEATING?: NOT HARD AT ALL

## 2021-10-12 SDOH — ECONOMIC STABILITY: FOOD INSECURITY: WITHIN THE PAST 12 MONTHS, THE FOOD YOU BOUGHT JUST DIDN'T LAST AND YOU DIDN'T HAVE MONEY TO GET MORE.: NEVER TRUE

## 2021-10-12 SDOH — EDUCATIONAL SECURITY: EDUCATION ATTAINMENT: WHAT IS THE HIGHEST LEVEL OF SCHOOL YOU HAVE COMPLETED OR THE HIGHEST DEGREE YOU HAVE RECEIVED?: PATIENT REFUSED

## 2021-10-12 NOTE — PROGRESS NOTES
Patient: Montez Padilla is a 77 y.o. male who presents today with the following Chief Complaint(s):    Chief Complaint   Patient presents with    Follow-up    Diabetes         HPIHe is here for a check up and f/u on hypertension, hyperlipidemia, diabetes, type 2. He is taking medication as prescribed, and continues to work on healthy eating and does steps at home for physical activity. He has received flu and covid vaccines. Current Outpatient Medications   Medication Sig Dispense Refill    amLODIPine (NORVASC) 5 MG tablet TAKE 1 TABLET EVERY DAY 90 tablet 3    ammonium lactate (LAC-HYDRIN) 12 % lotion APPLY TO AFFECTED AREA EVERY  mL 2    triamcinolone (KENALOG) 0.1 % ointment APPLY TO AFFECTED AREAS TWICE DAILY FOR UP TO 2 WEEKS OR UNTIL IMPROVED. 80 g 1    pioglitazone (ACTOS) 15 MG tablet TAKE 1 TABLET BY MOUTH EVERY DAY 30 tablet 5    losartan (COZAAR) 25 MG tablet TAKE 2 TABLETS BY MOUTH EVERY  tablet 3    BREO ELLIPTA 100-25 MCG/INH AEPB inhaler INHALE 1 PUFF INTO LUNGS DAILY 1 each 5    JANUVIA 100 MG tablet TAKE 1 TABLET BY MOUTH EVERY DAY 30 tablet 5    naproxen (NAPROSYN) 500 MG tablet TAKE 1 TABLET BY MOUTH TWICE A DAY AS NEEDED FOR PAIN 14 tablet 0    NICOTROL 10 MG inhaler INHALE 1 PUFF INTO LUNGS AS NEEDED FOR SMOKING CESSATION 1 Inhaler 2    pravastatin (PRAVACHOL) 40 MG tablet TAKE 1 TABLET BY MOUTH DAILY 90 tablet 3    Blood Glucose Monitoring Suppl (TRUE METRIX AIR GLUCOSE METER) w/Device KIT Test 3 times daily 1 kit 0    blood glucose test strips (TRUE METRIX BLOOD GLUCOSE TEST) strip 1 each by In Vitro route daily As needed.  100 each 3    Spacer/Aero-Holding Chambers GUILLERMO 1 Device by Does not apply route daily as needed (with inhaler) 1 Device 0    albuterol sulfate HFA (PROVENTIL HFA) 108 (90 Base) MCG/ACT inhaler Inhale 2 puffs into the lungs every 6 hours as needed for Wheezing 1 Inhaler 3    naproxen (NAPROSYN) 500 MG tablet TAKE 1 TABLET BY MOUTH TWICE A DAY WITH FOOD AS NEEDED FOR KNEE PAIN 20 tablet 1    promethazine-codeine (PHENERGAN WITH CODEINE) 6.25-10 MG/5ML syrup Take 5 mLs by mouth every 4 hours as needed for Cough 120 mL 0    clobetasol (TEMOVATE) 0.05 % ointment APPLY TO AFFECTED AREA ON THE LEFT LEG TWICE DAILY UNTIL IMPROVED. 60 g 0    desoximetasone (TOPICORT) 0.25 % cream APPLY TOPICALLY 2 TIMES DAILY. 90 g 1    aspirin 81 MG EC tablet Take 81 mg by mouth daily.  pravastatin (PRAVACHOL) 80 MG tablet Take 1 tablet by mouth daily 30 tablet 5    hydrOXYzine (ATARAX) 25 MG tablet Take 1 tablet by mouth 3 times daily as needed for Itching. (Patient not taking: Reported on 10/12/2021) 60 tablet 1     No current facility-administered medications for this visit. Patient's past medical history, surgical history, family history, medications,and allergies  were all reviewed and updated as appropriate today. Review of Systems   Constitutional: Negative. HENT: Negative. Eyes: Negative. Respiratory: Negative. Cardiovascular:        Hypertension, treated with ARB, and Ca blker. Gastrointestinal: Negative. Endocrine:        Diabetes, type 2, treated with pioglitazone. A1c 6.4. Hyperlipidemia, treated with statin. . Genitourinary: Negative. Musculoskeletal: Negative. Skin: Negative. Neurological: Negative. Psychiatric/Behavioral: Negative. Physical Exam  Constitutional:       General: He is not in acute distress. Appearance: He is well-developed. HENT:      Head: Normocephalic and atraumatic. Right Ear: External ear normal.      Left Ear: External ear normal.      Nose: Nose normal.   Eyes:      General: No scleral icterus. Conjunctiva/sclera: Conjunctivae normal.      Pupils: Pupils are equal, round, and reactive to light. Neck:      Thyroid: No thyromegaly. Cardiovascular:      Rate and Rhythm: Normal rate and regular rhythm.       Heart sounds: Normal heart

## 2021-10-23 ASSESSMENT — ENCOUNTER SYMPTOMS
GASTROINTESTINAL NEGATIVE: 1
EYES NEGATIVE: 1
RESPIRATORY NEGATIVE: 1

## 2021-10-25 RX ORDER — SITAGLIPTIN 100 MG/1
TABLET, FILM COATED ORAL
Qty: 30 TABLET | Refills: 5 | Status: SHIPPED | OUTPATIENT
Start: 2021-10-25 | End: 2022-05-09

## 2021-11-09 ENCOUNTER — ANESTHESIA EVENT (OUTPATIENT)
Dept: ENDOSCOPY | Age: 76
End: 2021-11-09
Payer: COMMERCIAL

## 2021-11-10 ENCOUNTER — ANESTHESIA (OUTPATIENT)
Dept: ENDOSCOPY | Age: 76
End: 2021-11-10
Payer: COMMERCIAL

## 2021-11-10 ENCOUNTER — HOSPITAL ENCOUNTER (OUTPATIENT)
Age: 76
Setting detail: OUTPATIENT SURGERY
Discharge: HOME | End: 2021-11-10
Attending: INTERNAL MEDICINE | Admitting: INTERNAL MEDICINE
Payer: COMMERCIAL

## 2021-11-10 VITALS
OXYGEN SATURATION: 95 % | DIASTOLIC BLOOD PRESSURE: 87 MMHG | RESPIRATION RATE: 8 BRPM | SYSTOLIC BLOOD PRESSURE: 154 MMHG

## 2021-11-10 VITALS
HEART RATE: 89 BPM | BODY MASS INDEX: 33.86 KG/M2 | HEIGHT: 72 IN | TEMPERATURE: 97.8 F | RESPIRATION RATE: 16 BRPM | SYSTOLIC BLOOD PRESSURE: 134 MMHG | DIASTOLIC BLOOD PRESSURE: 83 MMHG | OXYGEN SATURATION: 94 % | WEIGHT: 250 LBS

## 2021-11-10 DIAGNOSIS — Z12.11 SCREENING FOR COLON CANCER: ICD-10-CM

## 2021-11-10 LAB
GLUCOSE BLD-MCNC: 117 MG/DL (ref 70–99)
PERFORMED ON: ABNORMAL

## 2021-11-10 PROCEDURE — 2500000003 HC RX 250 WO HCPCS: Performed by: NURSE ANESTHETIST, CERTIFIED REGISTERED

## 2021-11-10 PROCEDURE — 6360000002 HC RX W HCPCS: Performed by: NURSE ANESTHETIST, CERTIFIED REGISTERED

## 2021-11-10 PROCEDURE — 7100000011 HC PHASE II RECOVERY - ADDTL 15 MIN: Performed by: INTERNAL MEDICINE

## 2021-11-10 PROCEDURE — 2709999900 HC NON-CHARGEABLE SUPPLY: Performed by: INTERNAL MEDICINE

## 2021-11-10 PROCEDURE — 88305 TISSUE EXAM BY PATHOLOGIST: CPT

## 2021-11-10 PROCEDURE — 3609010600 HC COLONOSCOPY POLYPECTOMY SNARE/COLD BIOPSY: Performed by: INTERNAL MEDICINE

## 2021-11-10 PROCEDURE — 2500000003 HC RX 250 WO HCPCS: Performed by: INTERNAL MEDICINE

## 2021-11-10 PROCEDURE — 2580000003 HC RX 258: Performed by: ANESTHESIOLOGY

## 2021-11-10 PROCEDURE — 3700000001 HC ADD 15 MINUTES (ANESTHESIA): Performed by: INTERNAL MEDICINE

## 2021-11-10 PROCEDURE — 7100000010 HC PHASE II RECOVERY - FIRST 15 MIN: Performed by: INTERNAL MEDICINE

## 2021-11-10 PROCEDURE — 2580000003 HC RX 258: Performed by: NURSE ANESTHETIST, CERTIFIED REGISTERED

## 2021-11-10 PROCEDURE — 3700000000 HC ANESTHESIA ATTENDED CARE: Performed by: INTERNAL MEDICINE

## 2021-11-10 RX ORDER — SODIUM CHLORIDE, SODIUM LACTATE, POTASSIUM CHLORIDE, CALCIUM CHLORIDE 600; 310; 30; 20 MG/100ML; MG/100ML; MG/100ML; MG/100ML
INJECTION, SOLUTION INTRAVENOUS CONTINUOUS PRN
Status: DISCONTINUED | OUTPATIENT
Start: 2021-11-10 | End: 2021-11-10 | Stop reason: SDUPTHER

## 2021-11-10 RX ORDER — SODIUM CHLORIDE, SODIUM LACTATE, POTASSIUM CHLORIDE, CALCIUM CHLORIDE 600; 310; 30; 20 MG/100ML; MG/100ML; MG/100ML; MG/100ML
INJECTION, SOLUTION INTRAVENOUS CONTINUOUS
Status: DISCONTINUED | OUTPATIENT
Start: 2021-11-10 | End: 2021-11-10 | Stop reason: HOSPADM

## 2021-11-10 RX ORDER — PROPOFOL 10 MG/ML
INJECTION, EMULSION INTRAVENOUS CONTINUOUS PRN
Status: DISCONTINUED | OUTPATIENT
Start: 2021-11-10 | End: 2021-11-10 | Stop reason: SDUPTHER

## 2021-11-10 RX ORDER — LIDOCAINE HYDROCHLORIDE 20 MG/ML
INJECTION, SOLUTION EPIDURAL; INFILTRATION; INTRACAUDAL; PERINEURAL PRN
Status: DISCONTINUED | OUTPATIENT
Start: 2021-11-10 | End: 2021-11-10 | Stop reason: SDUPTHER

## 2021-11-10 RX ADMIN — LIDOCAINE HYDROCHLORIDE 100 MG: 20 INJECTION, SOLUTION EPIDURAL; INFILTRATION; INTRACAUDAL; PERINEURAL at 07:54

## 2021-11-10 RX ADMIN — PROPOFOL 125 MCG/KG/MIN: 10 INJECTION, EMULSION INTRAVENOUS at 07:54

## 2021-11-10 RX ADMIN — SODIUM CHLORIDE, POTASSIUM CHLORIDE, SODIUM LACTATE AND CALCIUM CHLORIDE: 600; 310; 30; 20 INJECTION, SOLUTION INTRAVENOUS at 07:05

## 2021-11-10 RX ADMIN — SODIUM CHLORIDE, SODIUM LACTATE, POTASSIUM CHLORIDE, AND CALCIUM CHLORIDE: .6; .31; .03; .02 INJECTION, SOLUTION INTRAVENOUS at 07:47

## 2021-11-10 ASSESSMENT — PULMONARY FUNCTION TESTS
PIF_VALUE: 0
PIF_VALUE: 1
PIF_VALUE: 0
PIF_VALUE: 1

## 2021-11-10 ASSESSMENT — LIFESTYLE VARIABLES: SMOKING_STATUS: 1

## 2021-11-10 ASSESSMENT — PAIN - FUNCTIONAL ASSESSMENT
PAIN_FUNCTIONAL_ASSESSMENT: 0-10

## 2021-11-10 NOTE — PROGRESS NOTES
Ambulatory Surgery/Procedure Discharge Note    Vitals:    11/10/21 0842   BP: 134/83   Pulse: 89   Resp: 16   Temp:    SpO2: 94%       In: 720 [P.O.:120; I.V.:600]  Out: -     Restroom use offered before discharge. Yes    Pain assessment:  none  Pain Level: 0        Patient discharged to home/self care.  Patient discharged via wheel chair by transporter to waiting family/S.O.       11/10/2021 8:53 AM

## 2021-11-10 NOTE — OP NOTE
[address]                [address]                                OPERATIVE REPORT    PATIENT NAME: Krista Jeffries                   :        1945  MED REC NO:   [id number]                          ROOM:  ACCOUNT NO:   [de-identified]                           ADMIT DATE: 11/10/2021  PROVIDER:     Jelly Chapa MD    DATE OF PROCEDURE:  11/10/2021    SURGEON:  Jelly Chapa MD    INDICATION FOR PROCEDURE:  Colon cancer screening. DESCRIPTION OF PROCEDURE:  With the patient in the left lateral position  and after IV Diprivan, the Olympus video colonoscope was inserted into  the rectum and carefully advanced to cecum. There was 1.5-cm polyp  noted in the transverse colon. We removed it with the polypectomy snare  technique. Another polyp was noted in ascending colon, measured about 1  cm. We removed it completely with biopsy forceps. Two additional  polyps were noted in the sigmoid. We removed it with biopsy forceps. Careful inspection revealed no other abnormality. The procedure was  terminated without complications. IMPRESSION:  1. Ascending colon polyp. 2.  Transverse colon polyp. 3.  Two sigmoid polyps. ESTIMATED BLOOD LOSS:  None. Thank you Dr. Samia Sharpe. Shayla Lawler MD    D: 11/10/2021 8:28:01       T: 11/10/2021 9:11:25     [email or web address]  Job#: [id number]     Doc#: [id number]    CC:   MD Uri Gaxiola MD

## 2021-11-10 NOTE — ANESTHESIA PRE PROCEDURE
Department of Anesthesiology  Preprocedure Note       Name:  Ashu Castaneda   Age:  77 y.o.  :  1945                                          MRN:  [id number]         Date:  11/10/2021      Surgeon: Pooja Calero):  Ruben lBevins MD    Procedure: Procedure(s):  COLONOSCOPY    Medications prior to admission:   Prior to Admission medications    Medication Sig Start Date End Date Taking? Authorizing Provider   ammonium lactate (LAC-HYDRIN) 12 % lotion APPLY TO AFFECTED AREA EVERY DAY 21  Yes Felix Caldwell MD   triamcinolone (KENALOG) 0.1 % ointment APPLY TO AFFECTED AREAS TWICE DAILY FOR UP TO 2 WEEKS OR UNTIL IMPROVED. 21  Yes Felix Caldwell MD   clobetasol (TEMOVATE) 0.05 % ointment APPLY TO AFFECTED AREA ON THE LEFT LEG TWICE DAILY UNTIL IMPROVED. 16  Yes Cindy Watts MD   desoximetasone (TOPICORT) 0.25 % cream APPLY TOPICALLY 2 TIMES DAILY. 2/26/15  Yes Felix Caldwell MD   metFORMIN (GLUCOPHAGE) 500 MG tablet Take by mouth    Historical Provider, MD   JANUVIA 100 MG tablet TAKE 1 TABLET BY MOUTH EVERY DAY 10/25/21   Felix Caldwell MD   rosuvastatin (CRESTOR) 40 MG tablet Take 1 tablet by mouth daily for high cholesterol.  10/12/21   Felix Caldwell MD   amLODIPine (NORVASC) 5 MG tablet TAKE 1 TABLET EVERY DAY 21   Felix Caldwell MD   pioglitazone (ACTOS) 15 MG tablet TAKE 1 TABLET BY MOUTH EVERY DAY 21   Felix Caldwell MD   pravastatin (PRAVACHOL) 80 MG tablet Take 1 tablet by mouth daily  Patient taking differently: Take 40 mg by mouth daily  21  Felix Caldwell MD   losartan (COZAAR) 25 MG tablet TAKE 2 TABLETS BY MOUTH EVERY DAY 21   Felix Caldwell MD   BREO ELLIPTA 100-25 MCG/INH AEPB inhaler INHALE 1 PUFF INTO LUNGS DAILY 21   Felix Caldwell MD   naproxen (NAPROSYN) 500 MG tablet TAKE 1 TABLET BY MOUTH TWICE A DAY AS NEEDED FOR PAIN 21   Felix Caldwell MD   NICOTROL 10 MG inhaler INHALE 1 PUFF INTO LUNGS AS NEEDED FOR SMOKING CESSATION 21   [facility] Alison Coelho MD   Blood Glucose Monitoring Suppl (TRUE METRIX AIR GLUCOSE METER) w/Device KIT Test 3 times daily 4/7/20   Bc Sparrow MD   blood glucose test strips (TRUE METRIX BLOOD GLUCOSE TEST) strip 1 each by In Vitro route daily As needed. 4/7/20   Bc Sparrow MD   Spacer/Aero-Holding [employer] Bio GUILLERMO 1 Device by Does not apply route daily as needed (with inhaler) 12/26/19   DENA Ham DO   albuterol sulfate HFA (PROVENTIL HFA) 108 (90 Base) MCG/ACT inhaler Inhale 2 puffs into the lungs every 6 hours as needed for Wheezing 6/5/19   Bc Sparrow MD   naproxen (NAPROSYN) 500 MG tablet TAKE 1 TABLET BY MOUTH TWICE A DAY WITH FOOD AS NEEDED FOR KNEE PAIN 7/31/17   Bc Sparrow MD   [facility]) 6.25-10 MG/5ML syrup Take 5 mLs by mouth every 4 hours as needed for Cough 11/2/16   Bc Sparrow MD   hydrOXYzine (ATARAX) 25 MG tablet Take 1 tablet by mouth 3 times daily as needed for Itching. Patient not taking: Reported on 10/12/2021 2/24/15   Bc Sparrow MD   aspirin 81 MG EC tablet Take 81 mg by mouth daily.       Historical Provider, MD       Current medications:    Current Facility-Administered Medications   Medication Dose Route Frequency Provider Last Rate Last Admin    lactated ringers infusion   IntraVENous Continuous O'Brien Curd,  mL/hr at 11/10/21 0705 New Bag at 11/10/21 0705       Allergies:  No Known Allergies    Problem List:    Patient Active Problem List   Diagnosis Code    Hypertension I10    Hyperlipidemia E78.5    Eczema L30.9    Nicotine dependence F17.200    Diabetes mellitus ([facility] [address] E11.9       Past Medical History:        Diagnosis Date    Eczema     Hyperlipidemia     Hypertension     Type 2 diabetes mellitus without complication ([facility] [address]        Past Surgical History:        Procedure Laterality Date    COLONOSCOPY      SKIN GRAFT         Social History:    Social History     Tobacco Use    Smoking status: Current Every Day Smoker     Packs/day: 0.50     Years: 59.00     Pack years: 29.50     Types: Cigarettes    Smokeless tobacco: Never Used   Substance Use Topics    Alcohol use: Yes     Alcohol/week: 2.0 standard drinks     Types: 2 Shots of liquor per week     Comment: occass                                Ready to quit: Not Answered  Counseling given: Not Answered      Vital Signs (Current):   Vitals:    11/10/21 0638   BP: 131/85   Pulse: 90   Resp: 18   Temp: 98 °F (36.7 °C)   TempSrc: Temporal   SpO2: 96%   Weight: 250 lb (113.4 kg)   Height: 6' (1.829 m)                                              BP Readings from Last 3 Encounters:   11/10/21 131/85   10/12/21 130/70   07/09/21 128/70       NPO Status: Time of last liquid consumption: 0330 (Coffee )                        Time of last solid consumption: 1900                        Date of last liquid consumption: 11/10/21                        Date of last solid food consumption: 11/08/21    BMI:   Wt Readings from Last 3 Encounters:   11/10/21 250 lb (113.4 kg)   10/12/21 262 lb 12.8 oz (119.2 kg)   07/09/21 259 lb 3.2 oz (117.6 kg)     Body mass index is 33.91 kg/m². CBC:   Lab Results   Component Value Date    WBC 8.2 10/12/2021    RBC 4.61 10/12/2021    HGB 15.0 10/12/2021    HCT 46.8 10/12/2021    .7 10/12/2021    RDW 14.2 10/12/2021     10/12/2021       CMP:   Lab Results   Component Value Date     10/12/2021    K 4.6 10/12/2021     10/12/2021    CO2 23 10/12/2021    BUN 12 10/12/2021    CREATININE 1.0 10/12/2021    GFRAA >60 10/12/2021    GFRAA >60 11/14/2012    AGRATIO 1.2 10/12/2021    LABGLOM >60 10/12/2021    GLUCOSE 99 10/12/2021    PROT 7.6 10/12/2021    PROT 7.4 11/14/2012    CALCIUM 9.6 10/12/2021    BILITOT 0.4 10/12/2021    ALKPHOS 55 10/12/2021    AST 30 10/12/2021    ALT 12 10/12/2021       POC Tests: No results for input(s): POCGLU, POCNA, POCK, POCCL, POCBUN, POCHEMO, POCHCT in the last 72 hours.     Coags: No results found for: PROTIME, INR, APTT    HCG (If Applicable): No results found for: PREGTESTUR, PREGSERUM, HCG, HCGQUANT     ABGs: No results found for: PHART, PO2ART, PFO5JWJ, RBA0NUP, BEART, O4NAAJCG     Type & Screen (If Applicable):  No results found for: LABABO, LABRH    Drug/Infectious Status (If Applicable):  No results found for: HIV, HEPCAB    COVID-19 Screening (If Applicable): No results found for: COVID19        Anesthesia Evaluation  Patient summary reviewed and Nursing notes reviewed no history of anesthetic complications:   Airway: Mallampati: II  TM distance: >3 FB   Neck ROM: full  Mouth opening: > = 3 FB Dental:    (+) partials and upper dentures      Pulmonary: breath sounds clear to auscultation  (+) current smoker (1 PPD since age 10 !)          Patient smoked on day of surgery. Cardiovascular:  Exercise tolerance: good (>4 METS),   (+) hypertension: moderate,     (-) past MI    NYHA Classification: II    Rhythm: regular  Rate: normal           Beta Blocker:  Not on Beta Blocker         Neuro/Psych:   Negative Neuro/Psych ROS              GI/Hepatic/Renal:   (+) bowel prep,           Endo/Other:    (+) Diabetes (  )Type II DM, well controlled, , .                 Abdominal:             Vascular: negative vascular ROS. Other Findings:             Anesthesia Plan      MAC     ASA 3       Induction: intravenous. MIPS: Prophylactic antiemetics administered. Anesthetic plan and risks discussed with patient. Plan discussed with CRNA.     Attending anesthesiologist reviewed and agrees with Preprocedure content              Zoë Dias DO   11/10/2021

## 2021-11-10 NOTE — ANESTHESIA POSTPROCEDURE EVALUATION
Department of Anesthesiology  Postprocedure Note    Patient: Hollie Mcgovern  MRN: [id number]  YOB: 1945  Date of evaluation: 11/10/2021  Time:  9:26 AM     Procedure Summary     Date: 11/10/21 Room / Location: [address]    Anesthesia Start: 5637 Anesthesia Stop: 8131    Procedure: COLONOSCOPY POLYPECTOMY SNARE/COLD BIOPSY (N/A ) Diagnosis:       Screening for colon cancer      (Screening for colon cancer [Z12.11])    Surgeons: Alexandria Holly MD Responsible Provider: Sally Pozo DO    Anesthesia Type: MAC ASA Status: 3          Anesthesia Type: MAC    Lissette Phase I: Lissette Score: 10    Lissette Phase II: Lissette Score: 10    Last vitals: Reviewed and per EMR flowsheets.        Anesthesia Post Evaluation    Patient location during evaluation: PACU  Patient participation: complete - patient participated  Level of consciousness: awake and alert  Pain score: 0  Airway patency: patent  Nausea & Vomiting: no nausea and no vomiting  Complications: no  Cardiovascular status: hemodynamically stable  Respiratory status: acceptable  Hydration status: stable

## 2021-11-10 NOTE — H&P
History and Physical / Pre-Sedation Assessment    Patient:  Queta Darby   :   1945     Intended Procedure:  colonoscopy    HPI: colon cancer screening    Past Medical History:   has a past medical history of Eczema, Hyperlipidemia, Hypertension, and Type 2 diabetes mellitus without complication ([facility] [address] Past Surgical History:   has a past surgical history that includes Skin graft and Colonoscopy. Medications:  Prior to Admission medications    Medication Sig Start Date End Date Taking? Authorizing Provider   ammonium lactate (LAC-HYDRIN) 12 % lotion APPLY TO AFFECTED AREA EVERY DAY 21  Yes Ray Amezquita MD   triamcinolone (KENALOG) 0.1 % ointment APPLY TO AFFECTED AREAS TWICE DAILY FOR UP TO 2 WEEKS OR UNTIL IMPROVED. 21  Yes Ray Amezquita MD   clobetasol (TEMOVATE) 0.05 % ointment APPLY TO AFFECTED AREA ON THE LEFT LEG TWICE DAILY UNTIL IMPROVED. 16  Yes Alfonso Moreno MD   desoximetasone (TOPICORT) 0.25 % cream APPLY TOPICALLY 2 TIMES DAILY. 2/26/15  Yes Ray Amezquita MD   metFORMIN (GLUCOPHAGE) 500 MG tablet Take by mouth    Historical Provider, MD   JANUVIA 100 MG tablet TAKE 1 TABLET BY MOUTH EVERY DAY 10/25/21   Ray Amezquita MD   rosuvastatin (CRESTOR) 40 MG tablet Take 1 tablet by mouth daily for high cholesterol.  10/12/21   Ray Amezquita MD   amLODIPine (NORVASC) 5 MG tablet TAKE 1 TABLET EVERY DAY 21   Ray Amezquita MD   pioglitazone (ACTOS) 15 MG tablet TAKE 1 TABLET BY MOUTH EVERY DAY 21   Ray Amezquita MD   pravastatin (PRAVACHOL) 80 MG tablet Take 1 tablet by mouth daily  Patient taking differently: Take 40 mg by mouth daily  21  Ray Amezquita MD   losartan (COZAAR) 25 MG tablet TAKE 2 TABLETS BY MOUTH EVERY DAY 21   Ray Amezquita MD   BREO ELLIPTA 100-25 MCG/INH AEPB inhaler INHALE 1 PUFF INTO LUNGS DAILY 21   Ray Ameqzuita MD   naproxen (NAPROSYN) 500 MG tablet TAKE 1 TABLET BY MOUTH TWICE A DAY AS NEEDED FOR PAIN 21 Uri Durant MD   NICOTROL 10 MG inhaler INHALE 1 PUFF INTO LUNGS AS NEEDED FOR SMOKING CESSATION 4/12/21   Uri Durant MD   Blood Glucose Monitoring Suppl (TRUE METRIX AIR GLUCOSE METER) w/Device KIT Test 3 times daily 4/7/20   Uri Durant MD   blood glucose test strips (TRUE METRIX BLOOD GLUCOSE TEST) strip 1 each by In Vitro route daily As needed. 4/7/20   Uri Durant MD   Spacer/Aero-Holding Rafael Jin GUILLERMO 1 Device by Does not apply route daily as needed (with inhaler) 12/26/19   J Annette Severe,    albuterol sulfate HFA (PROVENTIL HFA) 108 (90 Base) MCG/ACT inhaler Inhale 2 puffs into the lungs every 6 hours as needed for Wheezing 6/5/19   Uri Durant MD   naproxen (NAPROSYN) 500 MG tablet TAKE 1 TABLET BY MOUTH TWICE A DAY WITH FOOD AS NEEDED FOR KNEE PAIN 7/31/17   Uri Durant MD   promethazine[facility] WITH CODEINE) 6.25-10 MG/5ML syrup Take 5 mLs by mouth every 4 hours as needed for Cough 11/2/16   Uri Durant MD   hydrOXYzine (ATARAX) 25 MG tablet Take 1 tablet by mouth 3 times daily as needed for Itching. Patient not taking: Reported on 10/12/2021 2/24/15   Uri Durant MD   aspirin 81 MG EC tablet Take 81 mg by mouth daily. Historical Provider, MD       Family History:  family history includes Diabetes in his father and mother. Social History:   reports that he has been smoking cigarettes. He has a 29.50 pack-year smoking history. He has never used smokeless tobacco. He reports current alcohol use of about 2.0 standard drinks of alcohol per week. He reports that he does not use drugs. Allergies:  Patient has no known allergies. ROS:  twelve point system review was unremarkable except for above noted history. Nurses notes reviewed and agreed.   Medications reviewed    Physical Exam:  Vital Signs: /85   Pulse 90   Temp 98 °F (36.7 °C) (Temporal)   Resp 18   Ht 6' (1.829 m)   Wt 250 lb (113.4 kg)   SpO2 96%   BMI 33.91 kg/m²    Skin: normal  HEENT: normal  Neck: supple. No adenopathy. No thyromegaly. No JVD. Pulmonary:Normal  Cardiac:Normal  Abdomen:Normal  MS: normal  Neuro: normal  Ext: no edema. Pulses normal    Pre-Procedure Assessment / Plan:  ASA 2 - Patient with mild systemic disease with no functional limitations  Mallampati Airway Assessment:  Mallampati Class II - (soft palate, fauces & uvula are visible)  Level of Sedation Plan: Moderate sedation  Post Procedure plan: Return to same level of care    I assessed the patient and find that the patient is in satisfactory condition to proceed with the planned procedure and sedation plan. I have explained the risk, benefits, and alternatives to the procedure. The patient understands and agrees to proceed.   Yes    Wali Toure MD  8:21 AM 11/10/2021

## 2021-11-26 DIAGNOSIS — J43.8 OTHER EMPHYSEMA (HCC): ICD-10-CM

## 2021-11-26 RX ORDER — FLUTICASONE FUROATE AND VILANTEROL TRIFENATATE 100; 25 UG/1; UG/1
POWDER RESPIRATORY (INHALATION)
Qty: 60 EACH | Refills: 5 | Status: SHIPPED | OUTPATIENT
Start: 2021-11-26 | End: 2022-06-07

## 2022-03-10 DIAGNOSIS — E11.8 TYPE 2 DIABETES MELLITUS WITH COMPLICATION, WITHOUT LONG-TERM CURRENT USE OF INSULIN (HCC): ICD-10-CM

## 2022-03-10 RX ORDER — PIOGLITAZONEHYDROCHLORIDE 15 MG/1
TABLET ORAL
Qty: 30 TABLET | Refills: 5 | Status: SHIPPED | OUTPATIENT
Start: 2022-03-10 | End: 2022-09-06

## 2022-03-27 DIAGNOSIS — E78.2 MIXED HYPERLIPIDEMIA: ICD-10-CM

## 2022-03-28 RX ORDER — ROSUVASTATIN CALCIUM 40 MG/1
40 TABLET, COATED ORAL DAILY
Qty: 90 TABLET | Refills: 3 | Status: SHIPPED | OUTPATIENT
Start: 2022-03-28

## 2022-04-06 ENCOUNTER — OFFICE VISIT (OUTPATIENT)
Dept: INTERNAL MEDICINE CLINIC | Age: 77
End: 2022-04-06
Payer: COMMERCIAL

## 2022-04-06 VITALS
BODY MASS INDEX: 35.4 KG/M2 | WEIGHT: 261 LBS | SYSTOLIC BLOOD PRESSURE: 130 MMHG | HEART RATE: 95 BPM | DIASTOLIC BLOOD PRESSURE: 70 MMHG | OXYGEN SATURATION: 96 %

## 2022-04-06 DIAGNOSIS — E11.8 TYPE 2 DIABETES MELLITUS WITH COMPLICATION, WITHOUT LONG-TERM CURRENT USE OF INSULIN (HCC): Primary | ICD-10-CM

## 2022-04-06 DIAGNOSIS — E78.2 MIXED HYPERLIPIDEMIA: ICD-10-CM

## 2022-04-06 DIAGNOSIS — I10 PRIMARY HYPERTENSION: ICD-10-CM

## 2022-04-06 DIAGNOSIS — F17.219 CIGARETTE NICOTINE DEPENDENCE WITH NICOTINE-INDUCED DISORDER: ICD-10-CM

## 2022-04-06 LAB
CHP ED QC CHECK: NORMAL
GLUCOSE BLD-MCNC: 130 MG/DL
HBA1C MFR BLD: 6.8 %

## 2022-04-06 PROCEDURE — 82962 GLUCOSE BLOOD TEST: CPT | Performed by: INTERNAL MEDICINE

## 2022-04-06 PROCEDURE — 2022F DILAT RTA XM EVC RTNOPTHY: CPT | Performed by: INTERNAL MEDICINE

## 2022-04-06 PROCEDURE — 4004F PT TOBACCO SCREEN RCVD TLK: CPT | Performed by: INTERNAL MEDICINE

## 2022-04-06 PROCEDURE — 3017F COLORECTAL CA SCREEN DOC REV: CPT | Performed by: INTERNAL MEDICINE

## 2022-04-06 PROCEDURE — G8427 DOCREV CUR MEDS BY ELIG CLIN: HCPCS | Performed by: INTERNAL MEDICINE

## 2022-04-06 PROCEDURE — G8417 CALC BMI ABV UP PARAM F/U: HCPCS | Performed by: INTERNAL MEDICINE

## 2022-04-06 PROCEDURE — 99214 OFFICE O/P EST MOD 30 MIN: CPT | Performed by: INTERNAL MEDICINE

## 2022-04-06 PROCEDURE — 83036 HEMOGLOBIN GLYCOSYLATED A1C: CPT | Performed by: INTERNAL MEDICINE

## 2022-04-06 PROCEDURE — 3044F HG A1C LEVEL LT 7.0%: CPT | Performed by: INTERNAL MEDICINE

## 2022-04-06 PROCEDURE — 1123F ACP DISCUSS/DSCN MKR DOCD: CPT | Performed by: INTERNAL MEDICINE

## 2022-04-06 PROCEDURE — 4040F PNEUMOC VAC/ADMIN/RCVD: CPT | Performed by: INTERNAL MEDICINE

## 2022-04-09 ASSESSMENT — ENCOUNTER SYMPTOMS
RESPIRATORY NEGATIVE: 1
EYES NEGATIVE: 1
GASTROINTESTINAL NEGATIVE: 1

## 2022-04-09 NOTE — PROGRESS NOTES
Patient: Jada Rose is a 77 y.o. male who presents today with the following Chief Complaint(s):    Chief Complaint   Patient presents with    Follow-up    Diabetes         HPIHe is here for a check up and f/u on hypertension, hyperlipidemia, diabetes, type 2. He is taking medication as prescribed, and continues to work on healthy eating and does steps at home for physical activity. He does continue to smoke despite repeated counseling. CT lung screen negative for suspicious pulmonary nodules. Current Outpatient Medications   Medication Sig Dispense Refill    rosuvastatin (CRESTOR) 40 MG tablet TAKE 1 TABLET BY MOUTH DAILY FOR HIGH CHOLESTEROL 90 tablet 3    pioglitazone (ACTOS) 15 MG tablet TAKE 1 TABLET BY MOUTH EVERY DAY 30 tablet 5    BREO ELLIPTA 100-25 MCG/INH AEPB inhaler INHALE 1 PUFF INTO LUNGS DAILY 60 each 5    metFORMIN (GLUCOPHAGE) 500 MG tablet Take by mouth      JANUVIA 100 MG tablet TAKE 1 TABLET BY MOUTH EVERY DAY 30 tablet 5    amLODIPine (NORVASC) 5 MG tablet TAKE 1 TABLET EVERY DAY 90 tablet 3    ammonium lactate (LAC-HYDRIN) 12 % lotion APPLY TO AFFECTED AREA EVERY  mL 2    triamcinolone (KENALOG) 0.1 % ointment APPLY TO AFFECTED AREAS TWICE DAILY FOR UP TO 2 WEEKS OR UNTIL IMPROVED. 80 g 1    losartan (COZAAR) 25 MG tablet TAKE 2 TABLETS BY MOUTH EVERY  tablet 3    naproxen (NAPROSYN) 500 MG tablet TAKE 1 TABLET BY MOUTH TWICE A DAY AS NEEDED FOR PAIN 14 tablet 0    NICOTROL 10 MG inhaler INHALE 1 PUFF INTO LUNGS AS NEEDED FOR SMOKING CESSATION 1 Inhaler 2    Blood Glucose Monitoring Suppl (TRUE METRIX AIR GLUCOSE METER) w/Device KIT Test 3 times daily 1 kit 0    blood glucose test strips (TRUE METRIX BLOOD GLUCOSE TEST) strip 1 each by In Vitro route daily As needed.  100 each 3    Spacer/Aero-Holding Chambers GUILLERMO 1 Device by Does not apply route daily as needed (with inhaler) 1 Device 0    albuterol sulfate HFA (PROVENTIL HFA) 108 (90 Base) MCG/ACT inhaler Inhale 2 puffs into the lungs every 6 hours as needed for Wheezing 1 Inhaler 3    naproxen (NAPROSYN) 500 MG tablet TAKE 1 TABLET BY MOUTH TWICE A DAY WITH FOOD AS NEEDED FOR KNEE PAIN 20 tablet 1    promethazine-codeine (PHENERGAN WITH CODEINE) 6.25-10 MG/5ML syrup Take 5 mLs by mouth every 4 hours as needed for Cough 120 mL 0    clobetasol (TEMOVATE) 0.05 % ointment APPLY TO AFFECTED AREA ON THE LEFT LEG TWICE DAILY UNTIL IMPROVED. 60 g 0    desoximetasone (TOPICORT) 0.25 % cream APPLY TOPICALLY 2 TIMES DAILY. 90 g 1    hydrOXYzine (ATARAX) 25 MG tablet Take 1 tablet by mouth 3 times daily as needed for Itching. 60 tablet 1    aspirin 81 MG EC tablet Take 81 mg by mouth daily. No current facility-administered medications for this visit. Patient's past medical history, surgical history, family history, medications,and allergies  were all reviewed and updated as appropriate today. Review of Systems   Constitutional: Negative. HENT: Negative. Eyes: Negative. Respiratory: Negative. Cardiovascular:        Hypertension, treated with ARB, and Ca blker. Gastrointestinal: Negative. Endocrine:        Diabetes, type 2, treated with pioglitazone. A1c 6.4. Hyperlipidemia, treated with statin. . Genitourinary: Negative. Musculoskeletal: Negative. Skin: Negative. Neurological: Negative. Psychiatric/Behavioral: Negative. Physical Exam  Constitutional:       General: He is not in acute distress. Appearance: He is well-developed. HENT:      Head: Normocephalic and atraumatic. Right Ear: External ear normal.      Left Ear: External ear normal.      Nose: Nose normal.   Eyes:      General: No scleral icterus. Conjunctiva/sclera: Conjunctivae normal.      Pupils: Pupils are equal, round, and reactive to light. Neck:      Thyroid: No thyromegaly. Cardiovascular:      Rate and Rhythm: Normal rate and regular rhythm. Heart sounds: Normal heart sounds. Pulmonary:      Effort: Pulmonary effort is normal.      Breath sounds: Normal breath sounds. Abdominal:      General: Bowel sounds are normal.      Palpations: Abdomen is soft. There is no mass. Musculoskeletal:      Cervical back: Normal range of motion and neck supple. Lymphadenopathy:      Cervical: No cervical adenopathy. Skin:     General: Skin is warm and dry. Neurological:      Mental Status: He is alert and oriented to person, place, and time. Deep Tendon Reflexes: Reflexes are normal and symmetric. Psychiatric:         Behavior: Behavior normal.         Thought Content: Thought content normal.         Judgment: Judgment normal.         Vitals:    04/06/22 1443   BP: 130/70   Pulse: 95   SpO2: 96%       Assessment:   Diagnosis Orders   1. Type 2 diabetes mellitus with complication, without long-term current use of insulin ([facility])  Diet, physical activity and med compliance discussed.  DIABETES FOOT EXAM    POCT Glucose    POCT glycosylated hemoglobin (Hb A1C)   2. Primary hypertension  DASH and low sodium diet discussed. COMPREHENSIVE METABOLIC PANEL    CBC WITH AUTO DIFFERENTIAL    TSH with Reflex    MICROALBUMIN / CREATININE URINE RATIO   3. Mixed hyperlipidemia  Heart healthy diet and statin compliance discussed. Change statin to reach LDL goal of < 70. Lipid Panel    rosuvastatin (CRESTOR) 40 MG tablet        4. Cigarette nicotine dependence with nicotine-induced disorder  Continue to work on complete smoking cessation. Read the literature, take medication if prescribed, contact me if there are further questions. Plan:  See plans above.

## 2022-05-09 RX ORDER — AMMONIUM LACTATE 12 G/100G
LOTION TOPICAL
Qty: 400 ML | Refills: 1 | Status: SHIPPED | OUTPATIENT
Start: 2022-05-09

## 2022-05-09 RX ORDER — SITAGLIPTIN 100 MG/1
TABLET, FILM COATED ORAL
Qty: 30 TABLET | Refills: 5 | Status: SHIPPED | OUTPATIENT
Start: 2022-05-09 | End: 2022-11-01

## 2022-06-07 DIAGNOSIS — J43.8 OTHER EMPHYSEMA (HCC): ICD-10-CM

## 2022-06-07 RX ORDER — FLUTICASONE FUROATE AND VILANTEROL TRIFENATATE 100; 25 UG/1; UG/1
POWDER RESPIRATORY (INHALATION)
Qty: 60 EACH | Refills: 5 | Status: SHIPPED | OUTPATIENT
Start: 2022-06-07

## 2022-06-28 DIAGNOSIS — L30.9 ECZEMA, UNSPECIFIED TYPE: ICD-10-CM

## 2022-06-29 RX ORDER — LOSARTAN POTASSIUM 25 MG/1
TABLET ORAL
Qty: 180 TABLET | Refills: 3 | Status: SHIPPED | OUTPATIENT
Start: 2022-06-29

## 2022-07-06 ENCOUNTER — TELEPHONE (OUTPATIENT)
Dept: CASE MANAGEMENT | Age: 77
End: 2022-07-06

## 2022-07-06 NOTE — TELEPHONE ENCOUNTER
Patient due for annual CT Lung Screening. Reminder letter mailed.         Cosme VITALEN, RN   Lung Nodule Navigator  [facility]  [phone]

## 2022-07-13 ENCOUNTER — OFFICE VISIT (OUTPATIENT)
Dept: INTERNAL MEDICINE CLINIC | Age: 77
End: 2022-07-13
Payer: COMMERCIAL

## 2022-07-13 VITALS
SYSTOLIC BLOOD PRESSURE: 132 MMHG | OXYGEN SATURATION: 93 % | DIASTOLIC BLOOD PRESSURE: 68 MMHG | HEIGHT: 72 IN | BODY MASS INDEX: 35.11 KG/M2 | HEART RATE: 95 BPM | WEIGHT: 259.2 LBS

## 2022-07-13 DIAGNOSIS — I10 PRIMARY HYPERTENSION: ICD-10-CM

## 2022-07-13 DIAGNOSIS — E78.2 MIXED HYPERLIPIDEMIA: ICD-10-CM

## 2022-07-13 DIAGNOSIS — F17.219 CIGARETTE NICOTINE DEPENDENCE WITH NICOTINE-INDUCED DISORDER: ICD-10-CM

## 2022-07-13 DIAGNOSIS — E11.8 TYPE 2 DIABETES MELLITUS WITH COMPLICATION, WITHOUT LONG-TERM CURRENT USE OF INSULIN (HCC): Primary | ICD-10-CM

## 2022-07-13 DIAGNOSIS — J43.8 OTHER EMPHYSEMA (HCC): ICD-10-CM

## 2022-07-13 LAB
CHP ED QC CHECK: NORMAL
GLUCOSE BLD-MCNC: 129 MG/DL
HBA1C MFR BLD: 6.6 %

## 2022-07-13 PROCEDURE — 3044F HG A1C LEVEL LT 7.0%: CPT | Performed by: INTERNAL MEDICINE

## 2022-07-13 PROCEDURE — 1123F ACP DISCUSS/DSCN MKR DOCD: CPT | Performed by: INTERNAL MEDICINE

## 2022-07-13 PROCEDURE — 83036 HEMOGLOBIN GLYCOSYLATED A1C: CPT | Performed by: INTERNAL MEDICINE

## 2022-07-13 PROCEDURE — 3017F COLORECTAL CA SCREEN DOC REV: CPT | Performed by: INTERNAL MEDICINE

## 2022-07-13 PROCEDURE — G8417 CALC BMI ABV UP PARAM F/U: HCPCS | Performed by: INTERNAL MEDICINE

## 2022-07-13 PROCEDURE — 4004F PT TOBACCO SCREEN RCVD TLK: CPT | Performed by: INTERNAL MEDICINE

## 2022-07-13 PROCEDURE — 82962 GLUCOSE BLOOD TEST: CPT | Performed by: INTERNAL MEDICINE

## 2022-07-13 PROCEDURE — G8427 DOCREV CUR MEDS BY ELIG CLIN: HCPCS | Performed by: INTERNAL MEDICINE

## 2022-07-13 PROCEDURE — 99214 OFFICE O/P EST MOD 30 MIN: CPT | Performed by: INTERNAL MEDICINE

## 2022-07-13 PROCEDURE — 3023F SPIROM DOC REV: CPT | Performed by: INTERNAL MEDICINE

## 2022-07-13 PROCEDURE — 2022F DILAT RTA XM EVC RTNOPTHY: CPT | Performed by: INTERNAL MEDICINE

## 2022-07-13 SDOH — ECONOMIC STABILITY: FOOD INSECURITY: WITHIN THE PAST 12 MONTHS, YOU WORRIED THAT YOUR FOOD WOULD RUN OUT BEFORE YOU GOT MONEY TO BUY MORE.: NEVER TRUE

## 2022-07-13 SDOH — ECONOMIC STABILITY: FOOD INSECURITY: WITHIN THE PAST 12 MONTHS, THE FOOD YOU BOUGHT JUST DIDN'T LAST AND YOU DIDN'T HAVE MONEY TO GET MORE.: NEVER TRUE

## 2022-07-13 ASSESSMENT — PATIENT HEALTH QUESTIONNAIRE - PHQ9
1. LITTLE INTEREST OR PLEASURE IN DOING THINGS: 0
2. FEELING DOWN, DEPRESSED OR HOPELESS: 0
SUM OF ALL RESPONSES TO PHQ QUESTIONS 1-9: 0
SUM OF ALL RESPONSES TO PHQ9 QUESTIONS 1 & 2: 0
SUM OF ALL RESPONSES TO PHQ QUESTIONS 1-9: 0

## 2022-07-13 ASSESSMENT — SOCIAL DETERMINANTS OF HEALTH (SDOH): HOW HARD IS IT FOR YOU TO PAY FOR THE VERY BASICS LIKE FOOD, HOUSING, MEDICAL CARE, AND HEATING?: NOT HARD AT ALL

## 2022-07-13 NOTE — PATIENT INSTRUCTIONS
[facility] Laboratory Locations - No appointment necessary. @ indicates the location is open Saturdays in addition to Monday through Friday. Call your preferred location for test preparation, business hours and other information you need. [employer] accepts [employer]. [facility]    @ [place] [facility] [address] [facility] [id number] [place] [address] Road. Cyn, [address]   Ph: [phone] [facility] Lab Svcs. [address]   Ph: [phone]  @ [facility] Svcs. [address] [facility]   Ph: [phone]    [facility] Svcs. [address]   Ph: [phone] @ [place] [facility] [address]  Ph: [phone] @ [facility] Lab Svcs. [address] [facility] [address]   Ph: [phone]     Leo Bender cs. [address]  Ph: [phone]    [place]   @ [place] [facility]. [address] [facility] [address] [facility] [address]   Ph: [phone] [facility] Med. Office Bldg. [address] [facility], [address] [facility]  Ph: [phone] [facility], [address]   [facility] [address] Lab Svcs[address] [facility]   Ph: [phone] [facility]. Lab Svcs.   [address] [facility] [facility]   Ph: [phone]

## 2022-07-19 ENCOUNTER — TELEPHONE (OUTPATIENT)
Dept: CASE MANAGEMENT | Age: 77
End: 2022-07-19

## 2022-07-19 DIAGNOSIS — F17.219 CIGARETTE NICOTINE DEPENDENCE WITH NICOTINE-INDUCED DISORDER: Primary | ICD-10-CM

## 2022-07-19 NOTE — TELEPHONE ENCOUNTER
Dr. Laura Elkins,    Patient due for annual CT Lung screening. If you would like patient to have screening, please place order for CT Lung screening ([id number]). I will contact patient to help schedule after order entered.     Thanks,  Nicholas VITALEN, RN   Lung Nodule Navigator  [facility]  [phone]

## 2022-07-22 ENCOUNTER — TELEPHONE (OUTPATIENT)
Dept: CASE MANAGEMENT | Age: 77
End: 2022-07-22

## 2022-07-22 NOTE — TELEPHONE ENCOUNTER
Pt due for annual Lung Screening CT. Order in place. Called pt to assist with scheduling. No answer- left vm.     Alban VITALEN, RN   Lung Nodule Navigator  [facility]  [phone]

## 2022-07-29 PROBLEM — J44.9 COPD (CHRONIC OBSTRUCTIVE PULMONARY DISEASE) (HCC): Status: ACTIVE | Noted: 2022-07-29

## 2022-07-29 ASSESSMENT — ENCOUNTER SYMPTOMS
GASTROINTESTINAL NEGATIVE: 1
EYES NEGATIVE: 1

## 2022-07-29 NOTE — PROGRESS NOTES
Patient: Courtney Monet is a 77 y.o. male who presents today with the following Chief Complaint(s):    Chief Complaint   Patient presents with    Follow-up     3 month follow up         Diabetes  He is here for a check up and f/u on hypertension, hyperlipidemia, diabetes, type 2. He is taking medication as prescribed, and continues to work on healthy eating and does steps at home for physical activity. He does continue to smoke despite repeated counseling. CT lung screen negative for suspicious pulmonary nodules. Current Outpatient Medications   Medication Sig Dispense Refill    losartan (COZAAR) 25 MG tablet TAKE 2 TABLETS BY MOUTH EVERY  tablet 3    triamcinolone (KENALOG) 0.1 % ointment APPLY TO AFFECTED AREAS TWICE DAILY FOR UP TO 2 WEEKS OR UNTIL IMPROVED. 80 g 1    BREO ELLIPTA 100-25 MCG/INH AEPB inhaler INHALE 1 PUFF INTO LUNGS DAILY 60 each 5    ammonium lactate (LAC-HYDRIN) 12 % lotion APPLY TO AFFECTED AREA EVERY  mL 1    JANUVIA 100 MG tablet TAKE 1 TABLET BY MOUTH EVERY DAY 30 tablet 5    rosuvastatin (CRESTOR) 40 MG tablet TAKE 1 TABLET BY MOUTH DAILY FOR HIGH CHOLESTEROL 90 tablet 3    pioglitazone (ACTOS) 15 MG tablet TAKE 1 TABLET BY MOUTH EVERY DAY 30 tablet 5    metFORMIN (GLUCOPHAGE) 500 MG tablet Take by mouth      amLODIPine (NORVASC) 5 MG tablet TAKE 1 TABLET EVERY DAY 90 tablet 3    naproxen (NAPROSYN) 500 MG tablet TAKE 1 TABLET BY MOUTH TWICE A DAY AS NEEDED FOR PAIN 14 tablet 0    NICOTROL 10 MG inhaler INHALE 1 PUFF INTO LUNGS AS NEEDED FOR SMOKING CESSATION 1 Inhaler 2    Blood Glucose Monitoring Suppl (TRUE METRIX AIR GLUCOSE METER) w/Device KIT Test 3 times daily 1 kit 0    blood glucose test strips (TRUE METRIX BLOOD GLUCOSE TEST) strip 1 each by In Vitro route daily As needed.  100 each 3    Spacer/Aero-Holding Chambers GUILLERMO 1 Device by Does not apply route daily as needed (with inhaler) 1 Device 0    albuterol sulfate HFA (PROVENTIL HFA) 108 (90 Base) MCG/ACT inhaler Inhale 2 puffs into the lungs every 6 hours as needed for Wheezing 1 Inhaler 3    naproxen (NAPROSYN) 500 MG tablet TAKE 1 TABLET BY MOUTH TWICE A DAY WITH FOOD AS NEEDED FOR KNEE PAIN 20 tablet 1    promethazine-codeine (PHENERGAN WITH CODEINE) 6.25-10 MG/5ML syrup Take 5 mLs by mouth every 4 hours as needed for Cough 120 mL 0    clobetasol (TEMOVATE) 0.05 % ointment APPLY TO AFFECTED AREA ON THE LEFT LEG TWICE DAILY UNTIL IMPROVED. 60 g 0    desoximetasone (TOPICORT) 0.25 % cream APPLY TOPICALLY 2 TIMES DAILY. 90 g 1    hydrOXYzine (ATARAX) 25 MG tablet Take 1 tablet by mouth 3 times daily as needed for Itching. 60 tablet 1    aspirin 81 MG EC tablet Take 81 mg by mouth daily. No current facility-administered medications for this visit. Patient's past medical history, surgical history, family history, medications,and allergies  were all reviewed and updated as appropriate today. Review of Systems   Constitutional: Negative. HENT: Negative. Eyes: Negative. Respiratory:          COPD, treated with Breo. Stable. Cardiovascular:         Hypertension, treated with ARB, and Ca blker. Gastrointestinal: Negative. Endocrine:        Diabetes, type 2, treated with pioglitazone, januvia. A1c 6.6. Hyperlipidemia, treated with statin. . Genitourinary: Negative. Musculoskeletal: Negative. Skin: Negative. Neurological: Negative. Psychiatric/Behavioral: Negative. Physical Exam  Constitutional:       General: He is not in acute distress. Appearance: He is well-developed. HENT:      Head: Normocephalic and atraumatic. Right Ear: External ear normal.      Left Ear: External ear normal.      Nose: Nose normal.   Eyes:      General: No scleral icterus. Conjunctiva/sclera: Conjunctivae normal.      Pupils: Pupils are equal, round, and reactive to light. Neck:      Thyroid: No thyromegaly.    Cardiovascular:      Rate and Rhythm:

## 2022-08-09 ENCOUNTER — TELEPHONE (OUTPATIENT)
Dept: CASE MANAGEMENT | Age: 77
End: 2022-08-09

## 2022-08-24 ENCOUNTER — TELEPHONE (OUTPATIENT)
Dept: CASE MANAGEMENT | Age: 77
End: 2022-08-24

## 2022-09-03 DIAGNOSIS — E11.8 TYPE 2 DIABETES MELLITUS WITH COMPLICATION, WITHOUT LONG-TERM CURRENT USE OF INSULIN (HCC): ICD-10-CM

## 2022-09-06 RX ORDER — PIOGLITAZONEHYDROCHLORIDE 15 MG/1
TABLET ORAL
Qty: 90 TABLET | Refills: 1 | Status: SHIPPED | OUTPATIENT
Start: 2022-09-06

## 2022-09-06 NOTE — TELEPHONE ENCOUNTER
Medication:   Requested Prescriptions     Pending Prescriptions Disp Refills    pioglitazone (ACTOS) 15 MG tablet [Pharmacy Med Name: PIOGLITAZONE HCL 15 MG TABLET] 90 tablet 1     Sig: TAKE 1 TABLET BY MOUTH EVERY DAY        Last Filled: 03/10/22     Patient Phone Number: [phone] (home)     Last appt: 7/13/2022   Next appt: 10/13/2022

## 2022-09-19 ENCOUNTER — TELEPHONE (OUTPATIENT)
Dept: CASE MANAGEMENT | Age: 77
End: 2022-09-19

## 2022-09-19 NOTE — TELEPHONE ENCOUNTER
Patient due for annual CT Lung Screening. Final reminder letter mailed.       Polo VITALEN, RN   Lung Nodule Navigator  [facility]  [phone]

## 2022-10-04 RX ORDER — AMLODIPINE BESYLATE 5 MG/1
TABLET ORAL
Qty: 90 TABLET | Refills: 3 | Status: SHIPPED | OUTPATIENT
Start: 2022-10-04

## 2022-10-13 ENCOUNTER — OFFICE VISIT (OUTPATIENT)
Dept: INTERNAL MEDICINE CLINIC | Age: 77
End: 2022-10-13
Payer: COMMERCIAL

## 2022-10-13 VITALS
WEIGHT: 261 LBS | SYSTOLIC BLOOD PRESSURE: 114 MMHG | HEIGHT: 72 IN | HEART RATE: 94 BPM | DIASTOLIC BLOOD PRESSURE: 68 MMHG | OXYGEN SATURATION: 94 % | BODY MASS INDEX: 35.35 KG/M2

## 2022-10-13 DIAGNOSIS — E78.2 MIXED HYPERLIPIDEMIA: ICD-10-CM

## 2022-10-13 DIAGNOSIS — E11.8 TYPE 2 DIABETES MELLITUS WITH COMPLICATION, WITHOUT LONG-TERM CURRENT USE OF INSULIN (HCC): ICD-10-CM

## 2022-10-13 DIAGNOSIS — H61.23 BILATERAL IMPACTED CERUMEN: ICD-10-CM

## 2022-10-13 DIAGNOSIS — Z23 FLU VACCINE NEED: ICD-10-CM

## 2022-10-13 DIAGNOSIS — I10 PRIMARY HYPERTENSION: ICD-10-CM

## 2022-10-13 DIAGNOSIS — Z00.00 MEDICARE ANNUAL WELLNESS VISIT, SUBSEQUENT: Primary | ICD-10-CM

## 2022-10-13 DIAGNOSIS — H91.93 DECREASED HEARING OF BOTH EARS: ICD-10-CM

## 2022-10-13 LAB
CHP ED QC CHECK: NORMAL
GLUCOSE BLD-MCNC: 158 MG/DL
HBA1C MFR BLD: 6.5 %

## 2022-10-13 PROCEDURE — 83036 HEMOGLOBIN GLYCOSYLATED A1C: CPT | Performed by: INTERNAL MEDICINE

## 2022-10-13 PROCEDURE — G8484 FLU IMMUNIZE NO ADMIN: HCPCS | Performed by: INTERNAL MEDICINE

## 2022-10-13 PROCEDURE — G0439 PPPS, SUBSEQ VISIT: HCPCS | Performed by: INTERNAL MEDICINE

## 2022-10-13 PROCEDURE — 1123F ACP DISCUSS/DSCN MKR DOCD: CPT | Performed by: INTERNAL MEDICINE

## 2022-10-13 PROCEDURE — G0008 ADMIN INFLUENZA VIRUS VAC: HCPCS | Performed by: INTERNAL MEDICINE

## 2022-10-13 PROCEDURE — 82962 GLUCOSE BLOOD TEST: CPT | Performed by: INTERNAL MEDICINE

## 2022-10-13 PROCEDURE — 3044F HG A1C LEVEL LT 7.0%: CPT | Performed by: INTERNAL MEDICINE

## 2022-10-13 PROCEDURE — 90694 VACC AIIV4 NO PRSRV 0.5ML IM: CPT | Performed by: INTERNAL MEDICINE

## 2022-10-13 RX ORDER — EZETIMIBE 10 MG/1
10 TABLET ORAL DAILY
Qty: 90 TABLET | Refills: 1 | Status: SHIPPED | OUTPATIENT
Start: 2022-10-13

## 2022-10-13 ASSESSMENT — PATIENT HEALTH QUESTIONNAIRE - PHQ9
SUM OF ALL RESPONSES TO PHQ9 QUESTIONS 1 & 2: 0
1. LITTLE INTEREST OR PLEASURE IN DOING THINGS: 0
SUM OF ALL RESPONSES TO PHQ QUESTIONS 1-9: 0
2. FEELING DOWN, DEPRESSED OR HOPELESS: 0
SUM OF ALL RESPONSES TO PHQ QUESTIONS 1-9: 0

## 2022-10-13 ASSESSMENT — LIFESTYLE VARIABLES: HOW OFTEN DO YOU HAVE A DRINK CONTAINING ALCOHOL: 2-3 TIMES A WEEK

## 2022-10-13 NOTE — PROGRESS NOTES
Medicare Annual Wellness Visit    Radha Cornell is here for Medicare AWV    Assessment & Plan    Diagnosis Orders   1. Medicare annual wellness visit, subsequent  See content. 2. Type 2 diabetes mellitus with complication, without long-term current use of insulin (HCC)  POCT Glucose  Diet, physical activity and weight reduction discussed. POCT glycosylated hemoglobin (Hb A1C)      3. Mixed hyperlipidemia  ezetimibe (ZETIA) 10 MG tablet  Add ezetimibe to statin to reach LDL goal.   Heart healthy diet discussed with literature provided. 4. Flu vaccine need  Influenza, FLUAD, (age 72 y+), IM, PF, 0.5 mL      5. Decreased hearing of both Ny Landers MD, Otolaryngology, [facility]      6. Bilateral impacted cerumen  Alvaro Iqbal MD, Otolaryngology, [facility]      7. Primary hypertension  DASH and low sodium diet discussed. Recommendations for Preventive Services Due: see orders and patient instructions/AVS.  Recommended screening schedule for the next 5-10 years is provided to the patient in written form: see Patient Instructions/AVS.    Insurance apparently did not cover CT lung will reorder and f/u. Wife and son notes decrease hearing. TV is up loud. TMs cerumen impaction. Return for Medicare Annual Wellness Visit in 1 year. Subjective       Patient's complete Health Risk Assessment and screening values have been reviewed and are found in Flowsheets. The following problems were reviewed today and where indicated follow up appointments were made and/or referrals ordered.     Positive Risk Factor Screenings with Interventions:       Tobacco Use:  Tobacco Use: High Risk    Smoking Tobacco Use: Every Day    Smokeless Tobacco Use: Never     E-cigarette/Vaping     Questions Responses    E-cigarette/Vaping Use Never User    Start Date     Passive Exposure     Quit Date     Counseling Given     Comments         Substance Use - Tobacco Interventions:  Continue to work on complete smoking cessation. Read the literature, take medication if prescribed, contact me if there are further questions. Alcohol Screening:  Alcohol Use: Heavy Drinker    Frequency of Alcohol Consumption: 2-3 times a week    Average Number of Drinks: Not on file    Frequency of Binge Drinking: Weekly           An AUDIT-C score of 3-7 indicates potential alcohol risk. An AUDIT Total score of 8 or more is associated with harmful or hazardous drinking. A score of 13 or more in women, and 15 or more in men, is likely to indicate alcohol dependence. General Health and ACP:  General  In general, how would you say your health is?: Very Good  In the past 7 days, have you experienced any of the following: New or Increased Pain, New or Increased Fatigue, Loneliness, Social Isolation, Stress or Anger?: No  Do you get the social and emotional support that you need?: Yes  Do you have a Living Will?: (!) No    Advance Directives     Power of  Living Will ACP-Advance Directive ACP-Power of     Not on File Filed on 10/25/16 Filed Not on File        General Health Risk Interventions:  Discussion. Suggestions. Questions answered. Literature provided. Health Habits/Nutrition:  Physical Activity: Inactive    Days of Exercise per Week: 0 days    Minutes of Exercise per Session: 10 min     Have you lost any weight without trying in the past 3 months?: No  Body mass index: (!) 35.39  Have you seen the dentist within the past year?: (!) No  Health Habits/Nutrition Interventions:  Health and wellness advice given. Literature provided. Questions answered.      Hearing/Vision:  Do you or your family notice any trouble with your hearing that hasn't been managed with hearing aids?: (!) Yes  Do you have difficulty driving, watching TV, or doing any of your daily activities because of your eyesight?: No  Have you had an eye exam within the past year?: Yes  No results found.  Hearing/Vision Interventions:  Referral provided. Safety:  Do you have working smoke detectors?: (!) No  Do you have any tripping hazards - loose or unsecured carpets or rugs?: No  Do you have any tripping hazards - clutter in doorways, halls, or stairs?: No  Do you have either shower bars, grab bars, non-slip mats or non-slip surfaces in your shower or bathtub?: Yes  Do all of your stairways have a railing or banister?: Yes  Do you always fasten your seatbelt when you are in a car?: Yes  Safety Interventions:  Discussion. Suggestions. Literature provided. Objective   Vitals:    10/13/22 0903   BP: 114/68   Site: Left Upper Arm   Position: Sitting   Cuff Size: Medium Adult   Pulse: 94   SpO2: 94%   Weight: 261 lb (118.4 kg)   Height: 6' (1.829 m)      Body mass index is 35.4 kg/m². No Known Allergies  Prior to Visit Medications    Medication Sig Taking? Authorizing Provider   ezetimibe (ZETIA) 10 MG tablet Take 1 tablet by mouth daily for high cholesterol Yes Qiana Scott MD   amLODIPine (NORVASC) 5 MG tablet TAKE 1 TABLET EVERY DAY Yes Qiana Scott MD   pioglitazone (ACTOS) 15 MG tablet TAKE 1 TABLET BY MOUTH EVERY DAY Yes Qiana Scott MD   losartan (COZAAR) 25 MG tablet TAKE 2 TABLETS BY MOUTH EVERY DAY Yes Qiana Scott MD   triamcinolone (KENALOG) 0.1 % ointment APPLY TO AFFECTED AREAS TWICE DAILY FOR UP TO 2 WEEKS OR UNTIL IMPROVED.  Yes Qiana Scott MD   BREO ELLIPTA 100-25 MCG/INH AEPB inhaler INHALE 1 PUFF INTO LUNGS DAILY Yes Qiana Scott MD   ammonium lactate (LAC-HYDRIN) 12 % lotion APPLY TO AFFECTED AREA EVERY DAY Yes Qiana Scott MD   JANUVIA 100 MG tablet TAKE 1 TABLET BY MOUTH EVERY DAY Yes Qiana Scott MD   rosuvastatin (CRESTOR) 40 MG tablet TAKE 1 TABLET BY MOUTH DAILY FOR HIGH CHOLESTEROL Yes Qiana Scott MD   metFORMIN (GLUCOPHAGE) 500 MG tablet Take by mouth Yes Historical Provider, MD   naproxen (NAPROSYN) 500 MG tablet TAKE 1 TABLET BY Evertt Padgett PAIN Yes Qiana Scott MD   NICOTROL 10 MG inhaler INHALE 1 PUFF INTO LUNGS AS NEEDED FOR SMOKING CESSATION Yes Qiana Scott MD   Blood Glucose Monitoring Suppl (TRUE METRIX AIR GLUCOSE METER) w/Device KIT Test 3 times daily Yes Qiana Scott MD   blood glucose test strips (TRUE METRIX BLOOD GLUCOSE TEST) strip 1 each by In Vitro route daily As needed. Yes Qiana Scott MD   Spacer/Aero-Holding Hector Cheek GUILLERMO 1 Device by Does not apply route daily as needed (with inhaler) Yes DENA Beatty DO   albuterol sulfate HFA (PROVENTIL HFA) 108 (90 Base) MCG/ACT inhaler Inhale 2 puffs into the lungs every 6 hours as needed for Wheezing Yes Qiana Scott MD   naproxen (NAPROSYN) 500 MG tablet TAKE 1 TABLET BY MOUTH TWICE A DAY WITH FOOD AS NEEDED FOR KNEE PAIN Yes Qiana Scott MD   clobetasol (TEMOVATE) 0.05 % ointment APPLY TO AFFECTED AREA ON THE LEFT LEG TWICE DAILY UNTIL IMPROVED. Yes Ginette Wiley MD   desoximetasone (TOPICORT) 0.25 % cream APPLY TOPICALLY 2 TIMES DAILY. Yes Qiana Scott MD   hydrOXYzine (ATARAX) 25 MG tablet Take 1 tablet by mouth 3 times daily as needed for Itching. Yes Qiana Scott MD   aspirin 81 MG EC tablet Take 81 mg by mouth daily.    Yes Historical Provider, MD   promethazine-codeine (PHENERGAN WITH CODEINE) 6.25-10 MG/5ML syrup Take 5 mLs by mouth every 4 hours as needed for Cough  Patient not taking: Reported on 10/13/2022  Qiana Scott MD       [facility] (Including outside providers/suppliers regularly involved in providing care):   Patient Care Team:  Qiana Scott MD as PCP - General (Internal Medicine)  Qiana Scott MD as PCP - [facility] Provider  Soniya Mancera MD as Consulting Physician (Otolaryngology)  Rashel Dos Santos RN as Registered Nurse  Pastor Shayy RN as Registered Nurse     Reviewed and updated this visit:  Tobacco  Allergies  Meds  Med Hx  Surg Hx  Soc Hx  Fam Hx             Advance Care Planning Advanced Care Planning: Discussed the patients choices for care and treatment in case of a health event that adversely affects decision-making abilities. Also discussed the patients long-term treatment options. Reviewed with the patient the appropriate state-specific advance directive documents. Reviewed the process of designating a competent adult as an Agent (or -in-fact) that could take make health care decisions for the patient if incompetent. Patient was asked to complete the declaration forms, either acknowledge the forms by a public notary or an eligible witness and provide a signed copy to the practice office. Time spent (minutes): 4 minutes. Cardiovascular Disease Risk Counseling: Assessed the patient's risk to develop cardiovascular disease and reviewed main risk factors. Reviewed steps to reduce disease risk including:   Quitting tobacco use, reducing amount smoked, or not starting the habit  Making healthy food choices  Being physically active and gradualy increasing activity levels   Reduce weight and determine a healthy BMI goal  Monitor blood pressure and treat if higher than 140/90 mmHg  Maintain blood total cholesterol levels under 5 mmol/l or 190 mg/dl  Maintain LDL cholesterol levels under 3.0 mmol/l or 115 mg/dl   Control blood glucose levels  Consider taking aspirin (75 mg daily), once blood pressure is controlled   Provided a follow up plan. Time spent (minutes): 4 minutes. Obesity Counseling: Assessed behavioral health risks and factors affecting choice of behavior. Suggested weight control approaches, including dietary changes behavioral modification and follow up plan. Provided educational and support documentation. Time spent (minutes): 3 minutes. Tobacco Cessation Counseling: Patient advised about behavior change, including information about personal health harms, usage of appropriate cessation measures and benefits of cessation. Time spent (minutes): 5 minutes. Medicare Annual Wellness Visit    Jhony Davila is here for Medicare AWV    Assessment & Plan   Medicare annual wellness visit, subsequent  Type 2 diabetes mellitus with complication, without long-term current use of insulin (HCC)  -     POCT Glucose  -     POCT glycosylated hemoglobin (Hb A1C)  Mixed hyperlipidemia  -     ezetimibe (ZETIA) 10 MG tablet; Take 1 tablet by mouth daily for high cholesterol, Disp-90 tablet, R-1Normal  Flu vaccine need  -     Influenza, FLUAD, (age 72 y+), IM, PF, 0.5 mL  Decreased hearing of both Ricky Severin, MD, Otolaryngology, [facility]  Bilateral impacted cerumen  -     Betty Lindquist MD, Otolaryngology, [facility]  Primary hypertension    Recommendations for Preventive Services Due: see orders and patient instructions/AVS.  Recommended screening schedule for the next 5-10 years is provided to the patient in written form: see Patient Instructions/AVS.     Return for Medicare Annual Wellness Visit in 1 year. Subjective       Patient's complete Health Risk Assessment and screening values have been reviewed and are found in Flowsheets. The following problems were reviewed today and where indicated follow up appointments were made and/or referrals ordered. Positive Risk Factor Screenings with Interventions:       Tobacco Use:  Tobacco Use: High Risk    Smoking Tobacco Use: Every Day    Smokeless Tobacco Use: Never     E-cigarette/Vaping     Questions Responses    E-cigarette/Vaping Use Never User    Start Date     Passive Exposure     Quit Date     Counseling Given     Comments         Substance Use - Tobacco Interventions:      Alcohol Screening:  Alcohol Use: Heavy Drinker    Frequency of Alcohol Consumption: 2-3 times a week    Average Number of Drinks: Not on file    Frequency of Binge Drinking: Weekly           An AUDIT-C score of 3-7 indicates potential alcohol risk.    An AUDIT Total score of 8 or more is associated with harmful or hazardous drinking. A score of 13 or more in women, and 15 or more in men, is likely to indicate alcohol dependence. Substance Use - Alcohol Interventions:          General Health and ACP:  General  In general, how would you say your health is?: Very Good  In the past 7 days, have you experienced any of the following: New or Increased Pain, New or Increased Fatigue, Loneliness, Social Isolation, Stress or Anger?: No  Do you get the social and emotional support that you need?: Yes  Do you have a Living Will?: (!) No    Advance Directives     Power of  Living Will ACP-Advance Directive ACP-Power of     Not on File Filed on 10/25/16 Filed Not on File        General Health Risk Interventions:      Health Habits/Nutrition:  Physical Activity: Inactive    Days of Exercise per Week: 0 days    Minutes of Exercise per Session: 10 min     Have you lost any weight without trying in the past 3 months?: No  Body mass index: (!) 35.39  Have you seen the dentist within the past year?: (!) No  Health Habits/Nutrition Interventions:      Hearing/Vision:  Do you or your family notice any trouble with your hearing that hasn't been managed with hearing aids?: (!) Yes  Do you have difficulty driving, watching TV, or doing any of your daily activities because of your eyesight?: No  Have you had an eye exam within the past year?: Yes  No results found.   Hearing/Vision Interventions:      Safety:  Do you have working smoke detectors?: (!) No  Do you have any tripping hazards - loose or unsecured carpets or rugs?: No  Do you have any tripping hazards - clutter in doorways, halls, or stairs?: No  Do you have either shower bars, grab bars, non-slip mats or non-slip surfaces in your shower or bathtub?: Yes  Do all of your stairways have a railing or banister?: Yes  Do you always fasten your seatbelt when you are in a car?: Yes  Safety Interventions:             Objective   Vitals:    10/13/22 0903   BP: 114/68   Site: Left Upper Arm   Position: Sitting   Cuff Size: Medium Adult   Pulse: 94   SpO2: 94%   Weight: 261 lb (118.4 kg)   Height: 6' (1.829 m)      Body mass index is 35.4 kg/m². No Known Allergies  Prior to Visit Medications    Medication Sig Taking? Authorizing Provider   ezetimibe (ZETIA) 10 MG tablet Take 1 tablet by mouth daily for high cholesterol Yes Charla Carrillo MD   amLODIPine (NORVASC) 5 MG tablet TAKE 1 TABLET EVERY DAY Yes Charla Carrillo MD   pioglitazone (ACTOS) 15 MG tablet TAKE 1 TABLET BY MOUTH EVERY DAY Yes Charla Carrillo MD   losartan (COZAAR) 25 MG tablet TAKE 2 TABLETS BY MOUTH EVERY DAY Yes Charla Carrillo MD   triamcinolone (KENALOG) 0.1 % ointment APPLY TO AFFECTED AREAS TWICE DAILY FOR UP TO 2 WEEKS OR UNTIL IMPROVED. Yes Charla Carrillo MD   BREO ELLIPTA 100-25 MCG/INH AEPB inhaler INHALE 1 PUFF INTO LUNGS DAILY Yes Charla Carrillo MD   ammonium lactate (LAC-HYDRIN) 12 % lotion APPLY TO AFFECTED AREA EVERY DAY Yes Charla Carrillo MD   JANUVIA 100 MG tablet TAKE 1 TABLET BY MOUTH EVERY DAY Yes Charla Carrillo MD   rosuvastatin (CRESTOR) 40 MG tablet TAKE 1 TABLET BY MOUTH DAILY FOR HIGH CHOLESTEROL Yes Charla Carrillo MD   metFORMIN (GLUCOPHAGE) 500 MG tablet Take by mouth Yes Historical Provider, MD   naproxen (NAPROSYN) 500 MG tablet TAKE 1 TABLET BY MOUTH TWICE A DAY AS NEEDED FOR PAIN Yes Charla Carrillo MD   NICOTROL 10 MG inhaler INHALE 1 PUFF INTO LUNGS AS NEEDED FOR SMOKING CESSATION Yes Charla Carrillo MD   Blood Glucose Monitoring Suppl (TRUE METRIX AIR GLUCOSE METER) w/Device KIT Test 3 times daily Yes Charla Carrillo MD   blood glucose test strips (TRUE METRIX BLOOD GLUCOSE TEST) strip 1 each by In Vitro route daily As needed.  Yes Charla Carrillo MD   Spacer/Aero-Holding Chambers GUILLERMO 1 Device by Does not apply route daily as needed (with inhaler) Yes DENA Cortez, DO   albuterol sulfate HFA (PROVENTIL HFA) 108 (90 Base) MCG/ACT inhaler Inhale 2 puffs into the lungs every 6 hours as needed for Wheezing Yes Gabi Toledo MD   naproxen (NAPROSYN) 500 MG tablet TAKE 1 TABLET BY MOUTH TWICE A DAY WITH FOOD AS NEEDED FOR KNEE PAIN Yes Gabi Toledo MD   clobetasol (TEMOVATE) 0.05 % ointment APPLY TO AFFECTED AREA ON THE LEFT LEG TWICE DAILY UNTIL IMPROVED. Yes Candido Cisneros MD   desoximetasone (TOPICORT) 0.25 % cream APPLY TOPICALLY 2 TIMES DAILY. Yes Gabi Toledo MD   hydrOXYzine (ATARAX) 25 MG tablet Take 1 tablet by mouth 3 times daily as needed for Itching. Yes Gabi Toledo MD   aspirin 81 MG EC tablet Take 81 mg by mouth daily.    Yes Historical Provider, MD   promethazine-codeine (PHENERGAN WITH CODEINE) 6.25-10 MG/5ML syrup Take 5 mLs by mouth every 4 hours as needed for Cough  Patient not taking: Reported on 10/13/2022  Gabi Toledo MD       [facility] (Including outside providers/suppliers regularly involved in providing care):   Patient Care Team:  Gabi Toledo MD as PCP - General (Internal Medicine)  Gabi Toledo MD as [facility]CP - [facility] Empaneled Provider  Tobi Telles MD as Consulting Physician (Otolaryngology)  Edwin Jhonson RN as Registered Nurse  Francisca Hernandez RN as Registered Nurse     Reviewed and updated this visit:  Tobacco  Allergies  Meds  Med Hx  Surg Hx  Soc Hx  Fam Hx

## 2022-10-13 NOTE — PATIENT INSTRUCTIONS
[facility] Laboratory Locations - No appointment necessary. @ indicates the location is open Saturdays in addition to Monday through Friday. Call your preferred location for test preparation, business hours and other information you need. [employer] accepts [employer]. [facility]    @ [place] [facility] Svcs. [address]. 989 [facility], [address]   Ph: [phone] [facility] Lab Svcs. [address]   Ph: [phone]  @ [facility] Svcs. [address] Jimmie Goldmann, [facility]   Ph: [phone]    [facility] Svcs. [address]   Ph: [phone] @ [place] [facility] Svcs. [address]  Ph: [phone] @ [facility] Svcs. [address] [facility]. [address] [place] [address] [facility] 429   Ph: [phone]     [facility]ir Svcs. [address], Perla Funes 19  Ph: [phone]    [place]   @ [place] [facility] Svcs. [address] [place], [place] [address]   Ph: [phone] [address] [facility] [place] [address], [address]  Ph: [phone]:  [address] Lab Svcs. 54 [facility]   Ph: [phone] [facility]. Lab Svcs. 211 [facility], [address] [facility]   Ph: [phone]    Bivalent covid vaccine. [phone]. For lung scan. Cooking for for heart and soul. [facility]. Go to web site to order. Advance Directives: Care Instructions  Overview  An advance directive is a legal way to state your wishes at the end of your life. It tells your family and your doctor what to do if you can't say what you want. There are two main types of advance directives. You can change them any time your wishes change. Living will. This form tells your family and your doctor your wishes about life support and other treatment.  The form is also called a declaration. Medical power of . This form lets you name a person to make treatment decisions for you when you can't speak for yourself. This person is called a health care agent (health care proxy, health care surrogate). The form is also called a durable power of  for health care. If you do not have an advance directive, decisions about your medical care may be made by a family member, or by a doctor or a  who doesn't know you. It may help to think of an advance directive as a gift to the people who care for you. If you have one, they won't have to make tough decisions by themselves. Follow-up care is a key part of your treatment and safety. Be sure to make and go to all appointments, and call your doctor if you are having problems. It's also a good idea to know your test results and keep a list of the medicines you take. What should you include in an advance directive? Many states have a unique advance directive form. (It may ask you to address specific issues.) Or you might use a universal form that's approved by many states. If your form doesn't tell you what to address, it may be hard to know what to include in your advance directive. Use the questions below to help you get started. Who do you want to make decisions about your medical care if you are not able to? What life-support measures do you want if you have a serious illness that gets worse over time or can't be cured? What are you most afraid of that might happen? (Maybe you're afraid of having pain, losing your independence, or being kept alive by machines.)  Where would you prefer to die? (Your home? A hospital? A nursing home?)  Do you want to donate your organs when you die? Do you want certain Shinto practices performed before you die? When should you call for help? Be sure to contact your doctor if you have any questions. Where can you learn more? Go to https://michael.healthFonix. org and sign in to your PriceArea account. Enter R264 in the [facility] box to learn more about \"Advance Directives: Care Instructions. \"     If you do not have an account, please click on the \"Sign Up Now\" link. Current as of: June 16, 2022               Content Version: 13.4  © 2006-2022 VIOlife. Care instructions adapted under license by [facility] ([facility]). If you have questions about a medical condition or this instruction, always ask your healthcare professional. [place] [address] any warranty or liability for your use of this information. Learning About Medical Power of   What is a medical power of ? A medical power of , also called a durable power of  for health care, is one type of the legal forms called advance directives. It lets you name the person you want to make treatment decisions for you if you can't speak or decide for yourself. The person you choose is called your health care agent. This person is also called a health care proxy or health care surrogate. A medical power of  may be called something else in your state. How do you choose a health care agent? Choose your health care agent carefully. This person may or may not be a family member. Talk to the person before you make your final decision. Make sure he or she is comfortable with this responsibility. It's a good idea to choose someone who:  Is at least 25years old. Knows you well and understands what makes life meaningful for you. Understands your Evangelical and moral values. Will do what you want, not what he or she wants. Will be able to make difficult choices at a stressful time. Will be able to refuse or stop treatment, if that is what you would want, even if you could die. Will be firm and confident with health professionals if needed. Will ask questions to get needed information. Lives near you or agrees to travel to you if needed.   Your family may help you make medical decisions while you can still be part of that process. But it's important to choose one person to be your health care agent in case you aren't able to make decisions for yourself. If you don't fill out the legal form and name a health care agent, the decisions your family can make may be limited. A health care agent may be called something else in your state. Who will make decisions for you if you don't have a health care agent? If you don't have a health care agent or a living will, you may not get the care you want. Decisions may be made by family members who disagree about your medical care. Or decisions may be made by a medical professional who doesn't know you well. In some cases, a  makes the decisions. When you name a health care agent, it is very clear who has the power to make health decisions for you. How do you name a health care agent? You name your health care agent on a legal form. This form is usually called a medical power of . Ask your hospital, state bar association, or office on aging where to find these forms. You must sign the form to make it legal. Some states require you to get the form notarized. This means that a person called a  watches you sign the form and then he or she signs the form. Some states also require that two or more witnesses sign the form. Be sure to tell your family members and doctors who your health care agent is. Where can you learn more? Go to https://chpebaileyewduglas.Renavance Pharma. org and sign in to your [facility] account. Enter [id number] in the [facility] box to learn more about \"Learning About [address]. \"     If you do not have an account, please click on the \"Sign Up Now\" link. Current as of: October 6, 2021               Content Version: 13.4  © 1551-9154 Healthwise, Incorporated. Care instructions adapted under license by [facility] ([facility]).  If you have questions about a medical condition or this instruction, always ask your healthcare professional. [address] any warranty or liability for your use of this information. Learning About Inocencio Gonzalez  What is a living will? A living will, also called a declaration, is a legal form. It tells your family and your doctor your wishes when you can't speak for yourself. It's used by the health professionals who will treat you as you near the end of your life or if you get seriously hurt or ill. If you put your wishes in writing, your loved ones and others will know what kind of care you want. They won't need to guess. This can ease your mind and be helpful to others. And you can change or cancel your living will at any time. A living will is not the same as an estate or property will. An estate will explains what you want to happen with your money and property after you die. How do you use it? Keep these facts in mind about how a living will is used. Your living will is used only if you can't speak or make decisions for yourself. Most often, one or more doctors must certify that you can't speak or decide for yourself before your living will takes effect. If you get better and can speak for yourself again, you can accept or refuse any treatment. It doesn't matter what you said in your living will. Some states may limit your right to refuse treatment in certain cases. For example, you may need to clearly state in your living will that you don't want artificial hydration and nutrition, such as being fed through a tube. Is a living will a legal document? A living will is a legal document. Each state has its own laws about living augustin. And a living will may be called something else in your state. Here are some things to know about living augustin. You don't need an  to complete a living will. But legal advice can be helpful if your state's laws are unclear.  It can also help if your health history is complicated or your family can't agree on what should be in your living will. You can change your living will at any time. Some people find that their wishes about end-of-life care change as their health changes. If you make big changes to your living will, complete a new form. If you move to another state, make sure that your living will is legal in the state where you now live. In most cases, doctors will respect your wishes even if you have a form from a different state. You might use a universal form that has been approved by many states. This kind of form can sometimes be filled out and stored online. Your digital copy will then be available wherever you have a connection to the internet. The doctors and nurses who need to treat you can find it right away. Your state may offer an online registry. This is another place where you can store your living will online. It's a good idea to get your living will notarized. This means using a person called a [employer] to watch two people sign, or witness, your living will. What should you know when you create a living will? Here are some questions to ask yourself as you make your living will. Do you know enough about life support methods that might be used? If not, talk to your doctor so you know what might be done if you can't breathe on your own, your heart stops, or you can't swallow. What things would you still want to be able to do after you receive life-support methods? Would you want to be able to walk? To speak? To eat on your own? To live without the help of machines? Do you want certain Taoist practices performed if you become very ill? If you have a choice, where do you want to be cared for? In your home? At a hospital or nursing home? If you have a choice at the end of your life, where would you prefer to die? At home? In a hospital or nursing home? Somewhere else? Would you prefer to be buried or cremated?   Do you want your organs to be donated after you die? What should you do with your living will? Make sure that your family members and your health care agent have copies of your living will (also called a declaration). Give your doctor a copy of your living will. Ask to have it kept as part of your medical record. If you have more than one doctor, make sure that each one has a copy. Put a copy of your living will where it can be easily found. For example, some people may put a copy on their refrigerator door. If you are using a digital copy, be sure your doctor, family members, and health care agent know how to find and access it. Where can you learn more? Go to https://PaletteApppepiceweb.Remotemedical. org and sign in to your Dang Le account. Enter S215 in the [employer] box to learn more about \"Learning About Living [place]. \"     If you do not have an account, please click on the \"Sign Up Now\" link. Current as of: June 16, 2022               Content Version: 13.4  © 2006-2022 Healthwise, Avison Young. Care instructions adapted under license by [facility] ([facility]). If you have questions about a medical condition or this instruction, always ask your healthcare professional. [place] [address] any warranty or liability for your use of this information. Learning About Healthy Weight  What is a healthy weight? A healthy weight is the weight at which you feel good about yourself and have energy for work and play. It's also one that lowers your risk for health problems. What can you do to stay at a healthy weight? It can be hard to stay at a healthy weight, especially when fast food, vending-machine snacks, and processed foods are so easy to find. And with your busy lifestyle, activity may be low on your list of things to do. But staying at a healthy weight may be easier than you think. Here are some dos and don'ts for staying at a healthy weight.   Do eat healthy foods  The kinds of foods you eat have a big impact on both your weight and your health. Reaching and staying at a healthy weight is not about going on a diet. It's about making healthier food choices every day and changing your diet for good. Healthy eating means eating a variety of foods so that you get all the nutrients you need. Your body needs protein, carbohydrate, and fats for energy. They keep your heart beating, your brain active, and your muscles working. On most days, try to eat from each food group. This means eating a variety of: Whole grains, such as whole wheat breads and pastas. Fruits and vegetables. Dairy products, such as low-fat milk, yogurt, and cheese. Lean proteins, such as all types of fish, chicken without the skin, and beans. Don't have too much or too little of one thing. All foods, if eaten in moderation, can be part of healthy eating. Even sweets can be okay. If your favorite foods are high in fat, salt, sugar, or calories, limit how often you eat them. Eat smaller servings, or look for healthy substitutes. Do watch what you eat  Many people eat more than their bodies need. Part of staying at a healthy weight means learning how much food you really need from day to day and not eating more than that. Even with healthy foods, eating too much can make you gain weight. Having a well-balanced diet means that you eat enough, but not too much, and that your food gives you the nutrients you need to stay healthy. So listen to your body. Eat when you're hungry. Stop when you feel satisfied. It's a good idea to have healthy snacks ready for when you get hungry. Keep healthy snacks with you at work, in your car, and at home. If you have a healthy snack easily available, you'll be less likely to pick a candy bar or bag of chips from a vending machine instead.   Some healthy snacks you might want to keep on hand are fruit, low-fat yogurt, string cheese, low-fat microwave popcorn, raisins and other dried fruit, nuts, whole wheat crackers, pretzels, carrots, celery sticks, and broccoli. Do some physical activity  A big part of reaching and staying at a healthy weight is being active. When you're active, you burn calories. This makes it easier to reach and stay at a healthy weight. When you're active on a regular basis, your body burns more calories, even when you're at rest. Being active helps you lose fat and build lean muscle. Try to be active for at least 1 hour every day. This may sound like a lot, but it's okay to be active in smaller blocks of time that add up to 1 hour a day. Any activity that makes your heart beat faster and keeps it there for a while counts. A brisk walk, run, or swim will get your heart beating faster. So will climbing stairs, shooting baskets, or cycling. Even some household chores like vacuuming and mowing the lawn will get your heart rate up. Pick activities that you enjoy--ones that make your heart beat faster, your muscles stronger, and your muscles and joints more flexible. If you find more than one thing you like doing, do them all. You don't have to do the same thing every day. Don't diet  Diets don't work. Diets are temporary. Because you give up so much when you diet, you may be hungry and think about food all the time. And after you stop dieting, you also may overeat to make up for what you missed. Most people who diet end up gaining back the pounds they lost--and more. Remember that healthy bodies come in lots of shapes and sizes. Everyone can get healthier by eating better and being more active. Where can you learn more? Go to https://Endoseebroderick.NetSpark. org and sign in to your Ufora account. Enter 105 [id number] in the [employer] box to learn more about \"Learning About Healthy Weight. \"     If you do not have an account, please click on the \"Sign Up Now\" link. Current as of: December 27, 2021               Content Version: 13.4  © 9057-6155 Healthwise, [facility].    Care instructions adapted under license by [facility] ([facility]). If you have questions about a medical condition or this instruction, always ask your healthcare professional. [address] any warranty or liability for your use of this information. Eating Healthy Foods: Care Instructions  Your Care Instructions     Eating healthy foods can help lower your risk for disease. Healthy food gives you energy and keeps your heart strong, your brain active, your muscles working, and your bones strong. A healthy diet includes a variety of foods from the basic food groups: grains, vegetables, fruits, milk and milk products, and meat and beans. Some people may eat more of their favorite foods from only one food group and, as a result, miss getting the nutrients they need. So, it is important to pay attention not only to what you eat but also to what you are missing from your diet. You can eat a healthy, balanced diet by making a few small changes. Follow-up care is a key part of your treatment and safety. Be sure to make and go to all appointments, and call your doctor if you are having problems. It's also a good idea to know your test results and keep a list of the medicines you take. How can you care for yourself at home? Look at what you eat  Keep a food diary for a week or two and record everything you eat or drink. Track the number of servings you eat from each food group. For a balanced diet every day, eat a variety of:  6 or more ounce-equivalents of grains, such as cereals, breads, crackers, rice, or pasta, every day. An ounce-equivalent is 1 slice of bread, 1 cup of ready-to-eat cereal, or ½ cup of cooked rice, cooked pasta, or cooked cereal.  2½ cups of vegetables, especially:  Dark-green vegetables such as broccoli and spinach. Orange vegetables such as carrots and sweet potatoes. Dry beans (such as mabry and kidney beans) and peas (such as lentils).   2 cups of fresh, frozen, or canned fruit. A small apple or 1 banana or orange equals 1 cup.  3 cups of nonfat or low-fat milk, yogurt, or other milk products. 5½ ounces of meat and beans, such as chicken, fish, lean meat, beans, nuts, and seeds. One egg, 1 tablespoon of peanut butter, ½ ounce nuts or seeds, or ¼ cup of cooked beans equals 1 ounce of meat. Learn how to read food labels for serving sizes and ingredients. Fast-food and convenience-food meals often contain few or no fruits or vegetables. Make sure you eat some fruits and vegetables to make the meal more nutritious. Look at your food diary. For each food group, add up what you have eaten and then divide the total by the number of days. This will give you an idea of how much you are eating from each food group. See if you can find some ways to change your diet to make it more healthy. Start small  Do not try to make dramatic changes to your diet all at once. You might feel that you are missing out on your favorite foods and then be more likely to fail. Start slowly, and gradually change your habits. Try some of the following:  Use whole wheat bread instead of white bread. Use nonfat or low-fat milk instead of whole milk. Eat brown rice instead of white rice, and eat whole wheat pasta instead of white-flour pasta. Try low-fat cheeses and low-fat yogurt. Add more fruits and vegetables to meals and have them for snacks. Add lettuce, tomato, cucumber, and onion to sandwiches. Add fruit to yogurt and cereal.  Enjoy food  You can still eat your favorite foods. You just may need to eat less of them. If your favorite foods are high in fat, salt, and sugar, limit how often you eat them, but do not cut them out entirely. Eat a wide variety of foods. Make healthy choices when eating out  The type of restaurant you choose can help you make healthy choices. Even fast-food chains are now offering more low-fat or healthier choices on the menu.   Choose smaller portions, or take half of your meal home. When eating out, try:  A veggie pizza with a whole wheat crust or grilled chicken (instead of sausage or pepperoni). Pasta with roasted vegetables, grilled chicken, or marinara sauce instead of cream sauce. A vegetable wrap or grilled chicken wrap. Broiled or poached food instead of fried or breaded items. Make healthy choices easy  Buy packaged, prewashed, ready-to-eat fresh vegetables and fruits, such as baby carrots, salad mixes, and chopped or shredded broccoli and cauliflower. Buy packaged, presliced fruits, such as melon or pineapple. Choose 100% fruit or vegetable juice instead of soda. Limit juice intake to 4 to 6 oz (½ to ¾ cup) a day. Blend low-fat yogurt, fruit juice, and canned or frozen fruit to make a smoothie for breakfast or a snack. Where can you learn more? Go to https://Async Technologies.LawBite. org and sign in to your Bee Cave Games account. Enter K618 in the [facility] box to learn more about \"Eating Healthy Foods: Care Instructions. \"     If you do not have an account, please click on the \"Sign Up Now\" link. Current as of: May 9, 2022               Content Version: 13.4  © 3898-2729 Healthwise, Incorporated. Care instructions adapted under license by [facility] ([facility]). If you have questions about a medical condition or this instruction, always ask your healthcare professional. [place] [address] any warranty or liability for your use of this information. Quitting Tobacco: Care Instructions  Quitting tobacco is much harder than simply changing a habit. Nicotine cravings make it hard to quit, but you can do it. Your doctor will help you set up the plan that best meets your needs. You will miss the nicotine at first. You may feel short-tempered and grumpy. You may have trouble sleeping or thinking clearly. The urge to use tobacco may continue for a time. Combining tools can raise your chances of success.  You can use medicine along with counseling. And you can join a quit-tobacco program, such as the American Lung Association's Freedom from Smoking program.    Get support. Reach out to family and friends, and find a counselor to help you quit. Join a support group, such as Nicotine Anonymous. Go to www.smokefree. gov to sign up for text messaging support. Talk to your doctor or pharmacist about medicines that can help you quit. Medicines can help with cravings and withdrawal symptoms. There are several over-the-counter choices, such as nicotine patches, gum, and lozenges. After you quit, do not use tobacco again, not even once. Get rid of all tobacco products and anything that reminds you of tobacco, such as ashtrays. Avoid things that make you reach for tobacco.  Change your daily routine. Take a different route to work, or eat a meal in a different place. Try to cut down on stress. Find ways to calm yourself, such as taking a hot bath or doing deep breathing exercises. Eat a healthy diet, and get regular exercise. Having healthy habits may help you quit using tobacco.     Don't give up on quitting if you use tobacco again. Most people quit and restart a few times before they quit for good. Follow-up care is a key part of your treatment and safety. Be sure to make and go to all appointments, and call your doctor if you are having problems. It's also a good idea to know your test results and keep a list of the medicines you take. Where can you learn more? Go to [email or web address] org and sign in to your [email or web address] account. Enter V706 in the [facility] box to learn more about \"Quitting Tobacco: Care Instructions. \"     If you do not have an account, please click on the \"Sign Up Now\" link. Current as of: November 8, 2021               Content Version: 13.4  © 9465-9513 Healthwise, Incorporated. Care instructions adapted under license by [facility] ([facility]).  If you have questions about a medical condition or this instruction, always ask your healthcare professional. [address] any warranty or liability for your use of this information. Learning About Benefits From Quitting Smoking  Why is it important to quit smoking? If you're thinking about quitting smoking, you may have a few reasons to be smoke-free. Your health may be one of them. When you quit smoking, you lower your risk for many serious health problems, such as cancer, lung disease, heart attack, stroke, blood vessel disease, and blindness from macular degeneration. When you're smoke-free, you get sick less often, and you heal faster. You are less likely to get colds, flu, bronchitis, and pneumonia. As a nonsmoker, you may find that your mood is better and you are less stressed. When and how will you feel healthier? Quitting has real health benefits that start from day 1 of being smoke-free. And the longer you stay smoke-free, the healthier you get and the better you feel. The first hours  After just 20 minutes, your blood pressure and heart rate go down. That means there's less stress on your heart and blood vessels. Within 12 hours, the level of carbon monoxide in your blood drops back to normal. That makes room for more oxygen. With more oxygen in your body, you may notice that you have more energy than when you smoked. After 2 weeks  Your lungs start to work better. Your risk of heart attack starts to drop. After 1 month  When your lungs are clear, you cough less and breathe deeper, so it's easier to be active. Your sense of taste and smell return. That means you can enjoy food more than you have since you started smoking. Over the years  Over the years, your risks of heart disease, heart attack, and stroke are lower. After 10 years, your risk of dying from lung cancer is cut by about half. And your risk for many other types of cancer is lower too.   How would quitting help others in your life?  When you quit smoking, you improve the health of everyone who now breathes in your smoke. Their heart, lung, and cancer risks drop, much like yours. They are sick less. For babies and small children, living smoke-free means they're less likely to have ear infections, pneumonia, and bronchitis. If you're a woman who is or will be pregnant someday, quitting smoking means a healthier . Children who are close to you are less likely to become adult smokers. Where can you learn more? Go to https://TauliapebaileyewEgoscue.[employer]. org and sign in to your [employer] account. Enter [id number] in the [facility] box to learn more about \"Learning About Benefits From Quitting Smoking. \"     If you do not have an account, please click on the \"Sign Up Now\" link. Current as of: 2021               Content Version: 13.4  © 9830-9264 [employer]. Care instructions adapted under license by [facility] ([facility]). If you have questions about a medical condition or this instruction, always ask your healthcare professional. [place] [address] any warranty or liability for your use of this information. Personalized Preventive Plan for Kelli [place]swick - 10/13/2022  Medicare offers a range of preventive health benefits. Some of the tests and screenings are paid in full while other may be subject to a deductible, co-insurance, and/or copay. Some of these benefits include a comprehensive review of your medical history including lifestyle, illnesses that may run in your family, and various assessments and screenings as appropriate. After reviewing your medical record and screening and assessments performed today your provider may have ordered immunizations, labs, imaging, and/or referrals for you. A list of these orders (if applicable) as well as your Preventive Care list are included within your After Visit Summary for your review.     Other Preventive Recommendations:    A preventive eye exam performed by an eye specialist is recommended every 1-2 years to screen for glaucoma; cataracts, macular degeneration, and other eye disorders. A preventive dental visit is recommended every 6 months. Try to get at least 150 minutes of exercise per week or 10,000 steps per day on a pedometer . Order or download the FREE \"Exercise & Physical Activity: Your Everyday Guide\" from [employer]. Call 3-120.184.9562 or search The [employer] online. You need 0298-2162 mg of calcium and 9764-1288 IU of vitamin D per day. It is possible to meet your calcium requirement with diet alone, but a vitamin D supplement is usually necessary to meet this goal.  When exposed to the sun, use a sunscreen that protects against both UVA and UVB radiation with an SPF of 30 or greater. Reapply every 2 to 3 hours or after sweating, drying off with a towel, or swimming. Always wear a seat belt when traveling in a car. Always wear a helmet when riding a bicycle or motorcycle. Personalized Preventive Plan for [facility] - 10/13/2022  Medicare offers a range of preventive health benefits. Some of the tests and screenings are paid in full while other may be subject to a deductible, co-insurance, and/or copay. Some of these benefits include a comprehensive review of your medical history including lifestyle, illnesses that may run in your family, and various assessments and screenings as appropriate. After reviewing your medical record and screening and assessments performed today your provider may have ordered immunizations, labs, imaging, and/or referrals for you. A list of these orders (if applicable) as well as your Preventive Care list are included within your After Visit Summary for your review.     Other Preventive Recommendations:    A preventive eye exam performed by an eye specialist is recommended every 1-2 years to screen for glaucoma; cataracts, macular degeneration, and other eye disorders. A preventive dental visit is recommended every 6 months. Try to get at least 150 minutes of exercise per week or 10,000 steps per day on a pedometer . Order or download the FREE \"Exercise & Physical Activity: Your Everyday Guide\" from [employer]. Call 9-204.279.3008 or search [employer] online. You need 4273-3053 mg of calcium and 7878-1486 IU of vitamin D per day. It is possible to meet your calcium requirement with diet alone, but a vitamin D supplement is usually necessary to meet this goal.  When exposed to the sun, use a sunscreen that protects against both UVA and UVB radiation with an SPF of 30 or greater. Reapply every 2 to 3 hours or after sweating, drying off with a towel, or swimming. Always wear a seat belt when traveling in a car. Always wear a helmet when riding a bicycle or motorcycle. Personalized Preventive Plan for Nicol Geronimo - 10/13/2022  Medicare offers a range of preventive health benefits. Some of the tests and screenings are paid in full while other may be subject to a deductible, co-insurance, and/or copay. Some of these benefits include a comprehensive review of your medical history including lifestyle, illnesses that may run in your family, and various assessments and screenings as appropriate. After reviewing your medical record and screening and assessments performed today your provider may have ordered immunizations, labs, imaging, and/or referrals for you. A list of these orders (if applicable) as well as your Preventive Care list are included within your After Visit Summary for your review. Other Preventive Recommendations:    A preventive eye exam performed by an eye specialist is recommended every 1-2 years to screen for glaucoma; cataracts, macular degeneration, and other eye disorders. A preventive dental visit is recommended every 6 months.   Try to get at least 150 minutes of exercise per week or 10,000 steps per day on a pedometer . Order or download the FREE \"Exercise & Physical Activity: Your Everyday Guide\" from The [employer]. Call 6-503.464.1846 or search The [employer] online. You need 4666-5593 mg of calcium and 5930-2170 IU of vitamin D per day. It is possible to meet your calcium requirement with diet alone, but a vitamin D supplement is usually necessary to meet this goal.  When exposed to the sun, use a sunscreen that protects against both UVA and UVB radiation with an SPF of 30 or greater. Reapply every 2 to 3 hours or after sweating, drying off with a towel, or swimming. Always wear a seat belt when traveling in a car. Always wear a helmet when riding a bicycle or motorcycle.

## 2022-10-17 ENCOUNTER — PROCEDURE VISIT (OUTPATIENT)
Dept: AUDIOLOGY | Age: 77
End: 2022-10-17
Payer: COMMERCIAL

## 2022-10-17 ENCOUNTER — OFFICE VISIT (OUTPATIENT)
Dept: ENT CLINIC | Age: 77
End: 2022-10-17
Payer: COMMERCIAL

## 2022-10-17 VITALS
SYSTOLIC BLOOD PRESSURE: 120 MMHG | BODY MASS INDEX: 35.54 KG/M2 | WEIGHT: 262.4 LBS | DIASTOLIC BLOOD PRESSURE: 69 MMHG | TEMPERATURE: 97.6 F | HEART RATE: 94 BPM | HEIGHT: 72 IN

## 2022-10-17 DIAGNOSIS — H90.3 SENSORINEURAL HEARING LOSS (SNHL) OF BOTH EARS: Primary | ICD-10-CM

## 2022-10-17 DIAGNOSIS — H90.3 SENSORINEURAL HEARING LOSS, BILATERAL: Primary | ICD-10-CM

## 2022-10-17 PROCEDURE — 99203 OFFICE O/P NEW LOW 30 MIN: CPT | Performed by: OTOLARYNGOLOGY

## 2022-10-17 PROCEDURE — G8484 FLU IMMUNIZE NO ADMIN: HCPCS | Performed by: OTOLARYNGOLOGY

## 2022-10-17 PROCEDURE — 92557 COMPREHENSIVE HEARING TEST: CPT | Performed by: AUDIOLOGIST

## 2022-10-17 PROCEDURE — G8417 CALC BMI ABV UP PARAM F/U: HCPCS | Performed by: OTOLARYNGOLOGY

## 2022-10-17 PROCEDURE — G8427 DOCREV CUR MEDS BY ELIG CLIN: HCPCS | Performed by: OTOLARYNGOLOGY

## 2022-10-17 PROCEDURE — 92567 TYMPANOMETRY: CPT | Performed by: AUDIOLOGIST

## 2022-10-17 NOTE — Clinical Note
Dr. Warden Espinoza,  Thank you for your referral for audiometric testing on this patient. Please see the scanned audiogram (under \"Media\" tab) and encounter note for details. If you have any questions, or if there is anything else you need, please let me know.    Sam Amezquita Audiologist --- [address] [place] [address] ENT - Audiology

## 2022-10-17 NOTE — PATIENT INSTRUCTIONS
Good Communication Strategies    Communication can be challenging for anyone, but can be especially difficult for those with some degree of hearing loss. While we may not be able to control every factor that may lead to difficulty with communication, there are Good Communication Strategies that we can all use in our day-to-day lives. Communication takes both parties working together for it to be successful. Tips as a Listener:   Control your environment. It is important to limit the amount of background noise in the room when possible. You should also consider having a good light source in the room to best see the other person. Ask for clarification. Instead of saying \"What?\", you can use parts of what you heard to make a new question. For example, if you heard the word \"Thursday\" but not the rest of the week, you may ask \"What was that about Thursday? \" or \"What did you want to do Thursday? \". This shows the person talking that you are listening and will help them better explain what they are saying. Be an advocate for yourself. If you are hearing but not understanding, tell the other person \"I can hear you, but I need you to slow down when you speak. \"  Or if someone is facing the other direction, say \"I cannot hear you when you are not looking at me when we talk. \"       Tips as a Talker:   - Sit or stand 3 to 6 feet away to maximize audibility         -- It is unrealistic to believe someone else will fully hear your message if you are speaking from across the room or in a different room in the house   - Stay at eye level to help with visual cues   - Make sure you have the persons attention before speaking   - Use facial expressions and gestures to accentuate your message   - Raise your voice slightly (do not scream)   - Speak slowly and distinctly   - Use short, simple sentences   - Rephrase your words if the person is having a hard time understanding you    - To avoid distortion, dont speak directly into a persons ear      Some additional items that may be helpful:   - Remain patient - this is important for both parties   - Write down items that still cannot be heard/understood. You may write with pen/paper or consider typing/texting on a cell phone or smart device. - If background noise is unavoidable, try to keep yourself in a good position in the room. By sitting at a solis on the side of the restaurant (preferably a corner), it will be easier to communicate than if you were sitting at a table in the middle with background noise surrounding you. Keep yourself positioned away from music speakers or heavy foot traffic. Hearing Loss: Care Instructions  Your Care Instructions      Hearing loss is a sudden or slow decrease in how well you hear. It can range from mild to profound. Permanent hearing loss can occur with aging, and it can happen when you are exposed long-term to loud noise. Examples include listening to loud music, riding motorcycles, or being around other loud machines. Hearing loss can affect your work and home life. It can make you feel lonely or depressed. You may feel that you have lost your independence. But hearing aids and other devices can help you hear better and feel connected to others. Follow-up care is a key part of your treatment and safety. Be sure to make and go to all appointments, and call your doctor if you are having problems. It's also a good idea to know your test results and keep a list of the medicines you take. How can you care for yourself at home? Avoid loud noises whenever possible. This helps keep your hearing from getting worse. Always wear hearing protection around loud noises. If appropriate, wear hearing aid(s) as directed. It is recommended that hearing aids are worn during all waking hours to keep your brain active and give it access to the sounds it is missing.       If you are beginning your process with hearing aid(s), schedule a \"Hearing Aid Evaluation\" with an audiologist to discuss your lifestyle, features of hearing aid technology, and styles of hearing aids available. It is recommended that you contact your insurance company to determine if you have a hearing aid benefit, as this may dictate who you can see for these services. Have hearing tests as your doctor suggests. They can show whether your hearing has changed. Your hearing aid may need to be adjusted. Use other assistive devices as needed. These may include:  Telephone amplifiers and hearing aids that can connect to a television, stereo, radio, or microphone. Devices that use lights or vibrations. These alert you to the doorbell, a ringing telephone, or a baby monitor. Television closed-captioning. This shows the words at the bottom of the screen. Most new TVs can do this. TTY (text telephone). This lets you type messages back and forth on the telephone instead of talking or listening. These devices are also called TDD. When messages are typed on the keyboard, they are sent over the phone line to a receiving TTY. The message is shown on a monitor. Use pagers, fax machines, text, and email if it is hard for you to communicate by telephone. Try to learn a listening technique called speech-reading. It is not lip-reading. You pay attention to people's gestures, expressions, posture, and tone of voice. These clues can help you understand what a person is saying. Face the person you are talking to, and have him or her face you. Make sure the lighting is good. You need to see the other person's face clearly. Think about counseling if you need help to adjust to your hearing loss. When should you call for help? Watch closely for changes in your health, and be sure to contact your doctor if:    You think your hearing is getting worse. You have new symptoms, such as dizziness or nausea.

## 2022-10-17 NOTE — PROGRESS NOTES
Abhi Han   1945, 69 y.o. male   [id number]       Referring Provider: Conrad Huber MD  Referral Type: In an order in [address] [place] [address]    Reason for Visit: Evaluation of suspected change in hearing    ADULT Ken Tian is a 69 y.o. male seen today, 10/17/2022 , for an initial audiologic evaluation. Patient was seen by Conrad Huber MD for cerumen removal prior to today's evaluation. AUDIOLOGIC AND OTHER PERTINENT MEDICAL HISTORY:      Abhi Han noted possible gradual decline in hearing, bilaterally. No additional significant otologic or medical history was reported. Abhi Han denied otalgia, aural fullness, otorrhea, tinnitus, dizziness, imbalance, history of falls, history of occupational/recreational noise exposure, history of head trauma, history of ear surgery, and family history of hearing loss. Date: 10/17/2022     IMPRESSIONS:      Today's results revealed an asymmetric sensorineural hearing loss with excellent word recognition, bilaterally. Asymmetries > 10 dBHL noted at [facility] and from 6-8kHz with right ear worse than left. Hearing loss significant enough to create hearing difficulty in at least some listening situations. Discussed benefits of amplification. Recommended patient contact insurance to determine possible hearing aid benefit. Follow medical recommendations of Conrad Huber MD.     ASSESSMENT AND FINDINGS:     Otoscopy revealed: Clear ear canals bilaterally    RIGHT EAR:  Hearing Sensitivity: Mild sloping to moderate sensorineural hearing loss. Speech Recognition Threshold: 40 dB HL  Word Recognition: Excellent 96%, based on NU-6 25-word list at 85 dBHL masked using recorded speech stimuli. Tympanometry: Normal peak pressure with high compliance, Type Ad tympanogram, consistent with hypermobile tympanic membrane.   Acoustic Reflexes: Ipsilateral: Could not maintain seal. Contralateral: Could not maintain seal.      LEFT EAR:  Hearing Sensitivity: Normal limits at 250Hz sloping to a mild to moderate sensorineural hearing loss. Speech Recognition Threshold: 40 dB HL  Word Recognition: Excellent 96%, based on NU-6 25-word list at 80 dBHL using recorded speech stimuli. Tympanometry: Normal peak pressure with high compliance, Type Ad tympanogram, consistent with hypermobile tympanic membrane. Acoustic Reflexes: Ipsilateral: Did not test. Contralateral: Did not test.      Reliability: Good   Transducer: Inserts (checked with headphones)     **NOTE: Asymmetries > 10 dBHL noted at [facility] and from 6-8kHz with right ear worse than left. See scanned audiogram dated 10/17/2022  for results. PATIENT EDUCATION:       The following items were discussed with the patient:   - Good Communication Strategies  - Hearing Loss and Hearing Aids    Educational information was shared in the After Visit Summary. RECOMMENDATIONS:                                                                                                                                                                                                                                                            The following items are recommended based on patient report and results from today's appointment:   - Continue medical follow-up with Kristy Khalil MD.   - Retest hearing as medically indicated and/or sooner if a change in hearing is noted. - If desired, schedule a Hearing Aid Evaluation (HAE) appointment to discuss hearing aid options. - Utilize \"Good Communication Strategies\" as discussed to assist in speech understanding with communication partners. Sam Hawthorne  Audiologist    Chart CC'd to:  Kristy Khalil MD      Degree of   Hearing Sensitivity dB Range   Within Normal Limits (WNL) 0 - 20   Mild 20 - 40   Moderate 40 - 55   Moderately-Severe 55 - 70   Severe 70 - 90   Profound 90 +

## 2022-10-17 NOTE — PROGRESS NOTES
CHIEF COMPLAINT: Hearing loss    HISTORY OF PRESENT ILLNESS:  69 y.o. male referred by Dr. Mariela Salgado who presents with a family concern of hearing loss. Symmetrical.  No tinnitus. No ear fullness. Family history negative. No history noise exposure.     PAST MEDICAL HISTORY:   Social History     Tobacco Use   Smoking Status Every Day    Packs/day: 1.00    Years: 59.00    Pack years: 59.00    Types: Cigarettes   Smokeless Tobacco Never                                                    Social History     Substance and Sexual Activity   Alcohol Use Yes    Alcohol/week: 2.0 standard drinks    Types: 2 Shots of liquor per week    Comment: occass                                                    Current Outpatient Medications:     ezetimibe (ZETIA) 10 MG tablet, Take 1 tablet by mouth daily for high cholesterol, Disp: 90 tablet, Rfl: 1    amLODIPine (NORVASC) 5 MG tablet, TAKE 1 TABLET EVERY DAY, Disp: 90 tablet, Rfl: 3    pioglitazone (ACTOS) 15 MG tablet, TAKE 1 TABLET BY MOUTH EVERY DAY, Disp: 90 tablet, Rfl: 1    losartan (COZAAR) 25 MG tablet, TAKE 2 TABLETS BY MOUTH EVERY DAY, Disp: 180 tablet, Rfl: 3    triamcinolone (KENALOG) 0.1 % ointment, APPLY TO AFFECTED AREAS TWICE DAILY FOR UP TO 2 WEEKS OR UNTIL IMPROVED., Disp: 80 g, Rfl: 1    BREO ELLIPTA 100-25 MCG/INH AEPB inhaler, INHALE 1 PUFF INTO LUNGS DAILY, Disp: 60 each, Rfl: 5    ammonium lactate (LAC-HYDRIN) 12 % lotion, APPLY TO AFFECTED AREA EVERY DAY, Disp: 400 mL, Rfl: 1    JANUVIA 100 MG tablet, TAKE 1 TABLET BY MOUTH EVERY DAY, Disp: 30 tablet, Rfl: 5    rosuvastatin (CRESTOR) 40 MG tablet, TAKE 1 TABLET BY MOUTH DAILY FOR HIGH CHOLESTEROL, Disp: 90 tablet, Rfl: 3    NICOTROL 10 MG inhaler, INHALE 1 PUFF INTO LUNGS AS NEEDED FOR SMOKING CESSATION, Disp: 1 Inhaler, Rfl: 2    Blood Glucose Monitoring Suppl (TRUE METRIX AIR GLUCOSE METER) w/Device KIT, Test 3 times daily, Disp: 1 kit, Rfl: 0    blood glucose test strips (TRUE METRIX BLOOD GLUCOSE TEST) strip, 1 each by In Vitro route daily As needed. , Disp: 100 each, Rfl: 3    Spacer/Aero-Holding Chambers GUILLERMO, 1 Device by Does not apply route daily as needed (with inhaler), Disp: 1 Device, Rfl: 0    albuterol sulfate HFA (PROVENTIL HFA) 108 (90 Base) MCG/ACT inhaler, Inhale 2 puffs into the lungs every 6 hours as needed for Wheezing, Disp: 1 Inhaler, Rfl: 3    aspirin 81 MG EC tablet, Take 81 mg by mouth daily. , Disp: , Rfl:                                                  Past Medical History:   Diagnosis Date    Allergic rhinitis     Eczema     Hearing loss     Hyperlipidemia     Hypertension     Type 2 diabetes mellitus without complication [facility])                                                     Past Surgical History:   Procedure Laterality Date    COLONOSCOPY      COLONOSCOPY N/A 11/10/2021    COLONOSCOPY POLYPECTOMY SNARE/COLD BIOPSY performed by Tiffany Florence MD at [facility] 35: Family history reviewed. Except as noted in history of present illness, there is no pertinent family history      REVIEW OF SYSTEMS:  All pertinent positive and negative review of systems included in HPI. Otherwise, all systems are reviewed and negative. PHYSICAL EXAMINATION:   GENERAL: wdwn- no acute distress  RESPIRATORY:  No stridor or respiratory distress  COMMUNICATION :  Normal voice  MENTAL STATUS:  Mood and affect normal, oriented X 3  HEAD AND FACE:  No abnormalities of the skin of face or head  EXTERNAL EARS AND NOSE:  Normal pinnae bilateral  FACIAL MUSCLES:  All branches of facial nerve intact  EXTRAOCULAR MUSCLES: Intact with full range of motion  FACE PALPATION:  No tenderness over sinuses. Zygomatic arches and orbital rims intact  OTOSCOPY:  Normal external auditory canals, tympanic membranes, and middle ear spaces  TUNING FORKS: Rinne ++ Frederick midline at 512 Hz  INTRANASAL:  Septum midline, turbinates normal, meati clear.   LIPS, TEETH, GINGIVA:  Normal mucosa  PHARYNX: Normal  NECK:  No masses. LYMPHATIC:  No cervical adenopathy  SALIVARY GLANDS:  No swelling or masses in the parotid or submandibular salivary glands  THYROID:  No goiter or thyroid masses. AUDIOGRAM & TYMPANOGRAM ORDERED AND REVIEWED: Sensorineural hearing loss, moderate. There is a slight asymmetry in the pure tones with the right being worse than the left but the speech reception threshold is symmetrical and auditory discrimination is excellent bilateral.  IMPRESSION: Sensorineural hearing loss. PLAN: I have raised the issue with the patient about the possibility of imaging the internal auditory canals were acoustic neuroma, though the likelihood of finding something is very low. I have counseled the patient about the benefits of amplification. FOLLOW-UP: As needed.

## 2022-10-21 ENCOUNTER — HOSPITAL ENCOUNTER (OUTPATIENT)
Dept: CT IMAGING | Age: 77
Discharge: HOME | End: 2022-10-21
Payer: COMMERCIAL

## 2022-10-21 DIAGNOSIS — F17.219 CIGARETTE NICOTINE DEPENDENCE WITH NICOTINE-INDUCED DISORDER: ICD-10-CM

## 2022-10-21 PROCEDURE — 71271 CT THORAX LUNG CANCER SCR C-: CPT

## 2022-10-25 ENCOUNTER — TELEPHONE (OUTPATIENT)
Dept: CASE MANAGEMENT | Age: 77
End: 2022-10-25

## 2022-10-25 NOTE — TELEPHONE ENCOUNTER
Annual lung screen on 10/21/22. LRAD1. Recommended screen in one year. Results letter mailed.        Joanne VITALEN, RN   Lung Nodule Navigator  [facility]  [phone]

## 2022-10-31 NOTE — TELEPHONE ENCOUNTER
Medication:   Requested Prescriptions     Pending Prescriptions Disp Refills    JANUVIA 100 MG tablet [Pharmacy Med Name: Andrea Sepulveda 100 MG TABLET] 90 tablet 1     Sig: TAKE 1 TABLET BY MOUTH EVERY DAY        Last Filled: 05/09/22    Patient Phone Number: [phone] (home)     Last appt: 10/13/2022   Next appt: 1/13/2023

## 2022-11-01 RX ORDER — SITAGLIPTIN 100 MG/1
TABLET, FILM COATED ORAL
Qty: 90 TABLET | Refills: 1 | Status: SHIPPED | OUTPATIENT
Start: 2022-11-01

## 2022-11-07 NOTE — PROGRESS NOTES
How Severe Is Your Skin Discoloration?: mild Patient: En Carrasquillo is a 77 y.o. male who presents today with the following Chief Complaint(s):    Chief Complaint   Patient presents with    Follow-up    Diabetes         HPI     Current Outpatient Medications   Medication Sig Dispense Refill    rosuvastatin (CRESTOR) 40 MG tablet TAKE 1 TABLET BY MOUTH DAILY FOR HIGH CHOLESTEROL 90 tablet 3    pioglitazone (ACTOS) 15 MG tablet TAKE 1 TABLET BY MOUTH EVERY DAY 30 tablet 5    BREO ELLIPTA 100-25 MCG/INH AEPB inhaler INHALE 1 PUFF INTO LUNGS DAILY 60 each 5    metFORMIN (GLUCOPHAGE) 500 MG tablet Take by mouth      JANUVIA 100 MG tablet TAKE 1 TABLET BY MOUTH EVERY DAY 30 tablet 5    amLODIPine (NORVASC) 5 MG tablet TAKE 1 TABLET EVERY DAY 90 tablet 3    ammonium lactate (LAC-HYDRIN) 12 % lotion APPLY TO AFFECTED AREA EVERY  mL 2    triamcinolone (KENALOG) 0.1 % ointment APPLY TO AFFECTED AREAS TWICE DAILY FOR UP TO 2 WEEKS OR UNTIL IMPROVED. 80 g 1    losartan (COZAAR) 25 MG tablet TAKE 2 TABLETS BY MOUTH EVERY  tablet 3    naproxen (NAPROSYN) 500 MG tablet TAKE 1 TABLET BY MOUTH TWICE A DAY AS NEEDED FOR PAIN 14 tablet 0    NICOTROL 10 MG inhaler INHALE 1 PUFF INTO LUNGS AS NEEDED FOR SMOKING CESSATION 1 Inhaler 2    Blood Glucose Monitoring Suppl (TRUE METRIX AIR GLUCOSE METER) w/Device KIT Test 3 times daily 1 kit 0    blood glucose test strips (TRUE METRIX BLOOD GLUCOSE TEST) strip 1 each by In Vitro route daily As needed.  100 each 3    Spacer/Aero-Holding Chambers GUILLERMO 1 Device by Does not apply route daily as needed (with inhaler) 1 Device 0    albuterol sulfate HFA (PROVENTIL HFA) 108 (90 Base) MCG/ACT inhaler Inhale 2 puffs into the lungs every 6 hours as needed for Wheezing 1 Inhaler 3    naproxen (NAPROSYN) 500 MG tablet TAKE 1 TABLET BY MOUTH TWICE A DAY WITH FOOD AS NEEDED FOR KNEE PAIN 20 tablet 1    promethazine-codeine (PHENERGAN WITH CODEINE) 6.25-10 MG/5ML syrup Take 5 mLs by mouth every 4 hours as needed for Cough 120 mL 0    clobetasol (TEMOVATE) 0.05 % ointment APPLY TO AFFECTED AREA ON THE LEFT LEG TWICE DAILY UNTIL IMPROVED. 60 g 0    desoximetasone (TOPICORT) 0.25 % cream APPLY TOPICALLY 2 TIMES DAILY. 90 g 1    hydrOXYzine (ATARAX) 25 MG tablet Take 1 tablet by mouth 3 times daily as needed for Itching. 60 tablet 1    aspirin 81 MG EC tablet Take 81 mg by mouth daily.  pravastatin (PRAVACHOL) 80 MG tablet Take 1 tablet by mouth daily (Patient taking differently: Take 40 mg by mouth daily ) 30 tablet 5     No current facility-administered medications for this visit. Patient's past medical history, surgical history, family history, medications,and allergies  were all reviewed and updated as appropriate today. Review of Systems      Physical Exam    Vitals:    04/06/22 1443   BP: 130/70   Pulse: 95   SpO2: 96%       Assessment:  Encounter Diagnosis   Name Primary?  Type 2 diabetes mellitus with complication, without long-term current use of insulin ([facility] Yes       Plan:  1.  Type 2 diabetes mellitus with complication, without long-term current use of insulin ([facility])  ***  - POCT glycosylated hemoglobin (Hb A1C)  - POCT Glucose

## 2022-11-29 DIAGNOSIS — J43.8 OTHER EMPHYSEMA (HCC): ICD-10-CM

## 2022-11-29 NOTE — TELEPHONE ENCOUNTER
Medication:   Requested Prescriptions     Pending Prescriptions Disp Refills    Fluticasone Furoate-Vilanterol (BREO ELLIPTA) 100-25 MCG/ACT AEPB [Pharmacy Med Name: Jinx Prudent 100-25 MCG INH] 60 each 5     Sig: INHALE 1 PUFF INTO LUNGS DAILY        Last Filled: 06/07/22    Patient Phone Number: [phone] (home)     Last appt: 10/13/2022   Next appt: 1/13/2023

## 2022-12-28 DIAGNOSIS — L30.9 ECZEMA, UNSPECIFIED TYPE: ICD-10-CM

## 2022-12-28 NOTE — TELEPHONE ENCOUNTER
Medication:   Requested Prescriptions     Pending Prescriptions Disp Refills    triamcinolone (KENALOG) 0.1 % ointment [Pharmacy Med Name: TRIAMCINOLONE 0.1% OINTMENT] 80 g 1     Sig: APPLY TO AFFECTED AREAS TWICE DAILY FOR UP TO 2 WEEKS OR UNTIL IMPROVED. Last Filled:  8/6/2022    Patient Phone Number: [phone] (home)     Last appt: 10/13/2022   Next appt: 1/13/2023    Last OARRS: No flowsheet data found.

## 2023-01-13 ENCOUNTER — OFFICE VISIT (OUTPATIENT)
Dept: INTERNAL MEDICINE CLINIC | Age: 78
End: 2023-01-13
Payer: COMMERCIAL

## 2023-01-13 VITALS
BODY MASS INDEX: 35.35 KG/M2 | DIASTOLIC BLOOD PRESSURE: 78 MMHG | WEIGHT: 261 LBS | SYSTOLIC BLOOD PRESSURE: 120 MMHG | HEIGHT: 72 IN | OXYGEN SATURATION: 94 % | HEART RATE: 75 BPM

## 2023-01-13 DIAGNOSIS — L30.9 ECZEMA, UNSPECIFIED TYPE: ICD-10-CM

## 2023-01-13 DIAGNOSIS — I10 PRIMARY HYPERTENSION: ICD-10-CM

## 2023-01-13 DIAGNOSIS — E78.2 MIXED HYPERLIPIDEMIA: ICD-10-CM

## 2023-01-13 DIAGNOSIS — F17.218 CIGARETTE NICOTINE DEPENDENCE WITH OTHER NICOTINE-INDUCED DISORDER: ICD-10-CM

## 2023-01-13 DIAGNOSIS — Z12.5 PROSTATE CANCER SCREENING: ICD-10-CM

## 2023-01-13 DIAGNOSIS — E11.8 TYPE 2 DIABETES MELLITUS WITH COMPLICATION, WITHOUT LONG-TERM CURRENT USE OF INSULIN (HCC): Primary | ICD-10-CM

## 2023-01-13 DIAGNOSIS — J43.9 PULMONARY EMPHYSEMA, UNSPECIFIED EMPHYSEMA TYPE (HCC): ICD-10-CM

## 2023-01-13 DIAGNOSIS — E66.01 SEVERE OBESITY (BMI 35.0-39.9) WITH COMORBIDITY (HCC): ICD-10-CM

## 2023-01-13 LAB
CHP ED QC CHECK: NORMAL
GLUCOSE BLD-MCNC: 92 MG/DL
HBA1C MFR BLD: 6.6 %

## 2023-01-13 PROCEDURE — 3074F SYST BP LT 130 MM HG: CPT | Performed by: INTERNAL MEDICINE

## 2023-01-13 PROCEDURE — 1123F ACP DISCUSS/DSCN MKR DOCD: CPT | Performed by: INTERNAL MEDICINE

## 2023-01-13 PROCEDURE — 83036 HEMOGLOBIN GLYCOSYLATED A1C: CPT | Performed by: INTERNAL MEDICINE

## 2023-01-13 PROCEDURE — 4004F PT TOBACCO SCREEN RCVD TLK: CPT | Performed by: INTERNAL MEDICINE

## 2023-01-13 PROCEDURE — 82962 GLUCOSE BLOOD TEST: CPT | Performed by: INTERNAL MEDICINE

## 2023-01-13 PROCEDURE — G8427 DOCREV CUR MEDS BY ELIG CLIN: HCPCS | Performed by: INTERNAL MEDICINE

## 2023-01-13 PROCEDURE — G8484 FLU IMMUNIZE NO ADMIN: HCPCS | Performed by: INTERNAL MEDICINE

## 2023-01-13 PROCEDURE — 3023F SPIROM DOC REV: CPT | Performed by: INTERNAL MEDICINE

## 2023-01-13 PROCEDURE — 99215 OFFICE O/P EST HI 40 MIN: CPT | Performed by: INTERNAL MEDICINE

## 2023-01-13 PROCEDURE — 3078F DIAST BP <80 MM HG: CPT | Performed by: INTERNAL MEDICINE

## 2023-01-13 PROCEDURE — 3044F HG A1C LEVEL LT 7.0%: CPT | Performed by: INTERNAL MEDICINE

## 2023-01-13 PROCEDURE — G8417 CALC BMI ABV UP PARAM F/U: HCPCS | Performed by: INTERNAL MEDICINE

## 2023-01-13 RX ORDER — IBUPROFEN 800 MG/1
TABLET ORAL
COMMUNITY
Start: 2022-11-07

## 2023-01-13 ASSESSMENT — PATIENT HEALTH QUESTIONNAIRE - PHQ9
2. FEELING DOWN, DEPRESSED OR HOPELESS: 0
SUM OF ALL RESPONSES TO PHQ QUESTIONS 1-9: 0
SUM OF ALL RESPONSES TO PHQ9 QUESTIONS 1 & 2: 0
1. LITTLE INTEREST OR PLEASURE IN DOING THINGS: 0

## 2023-01-13 NOTE — PROGRESS NOTES
Patient: Radha Cornell is a 69 y.o. male who presents today with the following Chief Complaint(s):    Chief Complaint   Patient presents with    Follow-up    Diabetes         HPIwinter roughest for skin. Uses cream.     Current Outpatient Medications   Medication Sig Dispense Refill    ibuprofen (ADVIL;MOTRIN) 800 MG tablet       triamcinolone (KENALOG) 0.1 % ointment APPLY TO AFFECTED AREAS TWICE DAILY FOR UP TO 2 WEEKS OR UNTIL IMPROVED. 80 g 1    BREO ELLIPTA 100-25 MCG/INH AEPB INHALE 1 PUFF INTO LUNGS DAILY 60 each 5    JANUVIA 100 MG tablet TAKE 1 TABLET BY MOUTH EVERY DAY 90 tablet 1    ezetimibe (ZETIA) 10 MG tablet Take 1 tablet by mouth daily for high cholesterol 90 tablet 1    amLODIPine (NORVASC) 5 MG tablet TAKE 1 TABLET EVERY DAY 90 tablet 3    pioglitazone (ACTOS) 15 MG tablet TAKE 1 TABLET BY MOUTH EVERY DAY 90 tablet 1    losartan (COZAAR) 25 MG tablet TAKE 2 TABLETS BY MOUTH EVERY  tablet 3    ammonium lactate (LAC-HYDRIN) 12 % lotion APPLY TO AFFECTED AREA EVERY  mL 1    rosuvastatin (CRESTOR) 40 MG tablet TAKE 1 TABLET BY MOUTH DAILY FOR HIGH CHOLESTEROL 90 tablet 3    NICOTROL 10 MG inhaler INHALE 1 PUFF INTO LUNGS AS NEEDED FOR SMOKING CESSATION 1 Inhaler 2    Blood Glucose Monitoring Suppl (TRUE METRIX AIR GLUCOSE METER) w/Device KIT Test 3 times daily 1 kit 0    blood glucose test strips (TRUE METRIX BLOOD GLUCOSE TEST) strip 1 each by In Vitro route daily As needed. 100 each 3    Spacer/Aero-Holding Chambers GUILLERMO 1 Device by Does not apply route daily as needed (with inhaler) 1 Device 0    albuterol sulfate HFA (PROVENTIL HFA) 108 (90 Base) MCG/ACT inhaler Inhale 2 puffs into the lungs every 6 hours as needed for Wheezing 1 Inhaler 3    aspirin 81 MG EC tablet Take 81 mg by mouth daily. No current facility-administered medications for this visit.        Patient's past medical history, surgical history, family history, medications,and allergies  were all reviewed and updated as appropriate today. Review of Systems      Physical Exam    Vitals:    01/13/23 0842   BP: 120/78   Pulse: 75   SpO2: 94%       Assessment:  Encounter Diagnosis   Name Primary? Type 2 diabetes mellitus with complication, without long-term current use of insulin ([facility].) Yes       Plan:  1.  Type 2 diabetes mellitus with complication, without long-term current use of insulin ([facility])  ***  - POCT Glucose  - POCT glycosylated hemoglobin (Hb A1C)

## 2023-01-13 NOTE — PATIENT INSTRUCTIONS
[facility] Laboratory Locations - No appointment necessary. @ indicates the location is open Saturdays in addition to Monday through Friday. Call your preferred location for test preparation, business hours and other information you need. [employer] accepts [employer]. [facility]    @ [place] [facility] 3 [facility] [address] [place] [address]. Cyn, [address]   Ph: [phone] [facility] Lab Svcs. [address] [place] [address]   Ph: [phone]  @ [facility] Sv[facility]. [address] [facility]   Ph: [phone]    [facility] Sv[facility]. [address] Deborah Max [address]   Ph: [phone] @ [place] [facility]. [address]  Ph: [phone] @ [place] [facility] Svcs. [address] Micah Addison 429   Ph: [phone]     [facility] Svcs. [place] Perla Addison 19  Ph: [phone]    [place]   @ [place] Lab Svcs. [address] [place], [place] [address]   Ph: [phone] [facility] Med. Office Bldg. 32[address] [facility], [address]  Ph: [address] [place] [address]   [address] Lab Svcs[address] 54 [facility]   Ph: [phone] [facility]. Lab Svcs.   [address] [facility] [address]   Ph: [phone]

## 2023-01-14 ASSESSMENT — ENCOUNTER SYMPTOMS
GASTROINTESTINAL NEGATIVE: 1
EYES NEGATIVE: 1

## 2023-01-14 NOTE — PROGRESS NOTES
Patient: Nate Madsen is a 69 y.o. male who presents today with the following Chief Complaint(s):    Chief Complaint   Patient presents with    Follow-up    Diabetes         Diabetes  He is here for a check up and f/u on hypertension, hyperlipidemia, diabetes, type 2. He is taking medication as prescribed, and continues to work on healthy eating and does steps at home for physical activity. He does continue to smoke despite repeated counseling. CT lung screen negative for suspicious pulmonary nodules. Current Outpatient Medications   Medication Sig Dispense Refill    ibuprofen (ADVIL;MOTRIN) 800 MG tablet       triamcinolone (KENALOG) 0.1 % ointment APPLY TO AFFECTED AREAS TWICE DAILY FOR UP TO 2 WEEKS OR UNTIL IMPROVED. 80 g 1    BREO ELLIPTA 100-25 MCG/INH AEPB INHALE 1 PUFF INTO LUNGS DAILY 60 each 5    JANUVIA 100 MG tablet TAKE 1 TABLET BY MOUTH EVERY DAY 90 tablet 1    ezetimibe (ZETIA) 10 MG tablet Take 1 tablet by mouth daily for high cholesterol 90 tablet 1    amLODIPine (NORVASC) 5 MG tablet TAKE 1 TABLET EVERY DAY 90 tablet 3    pioglitazone (ACTOS) 15 MG tablet TAKE 1 TABLET BY MOUTH EVERY DAY 90 tablet 1    losartan (COZAAR) 25 MG tablet TAKE 2 TABLETS BY MOUTH EVERY  tablet 3    ammonium lactate (LAC-HYDRIN) 12 % lotion APPLY TO AFFECTED AREA EVERY  mL 1    rosuvastatin (CRESTOR) 40 MG tablet TAKE 1 TABLET BY MOUTH DAILY FOR HIGH CHOLESTEROL 90 tablet 3    NICOTROL 10 MG inhaler INHALE 1 PUFF INTO LUNGS AS NEEDED FOR SMOKING CESSATION 1 Inhaler 2    Blood Glucose Monitoring Suppl (TRUE METRIX AIR GLUCOSE METER) w/Device KIT Test 3 times daily 1 kit 0    blood glucose test strips (TRUE METRIX BLOOD GLUCOSE TEST) strip 1 each by In Vitro route daily As needed.  100 each 3    Spacer/Aero-Holding Chambers GUILLERMO 1 Device by Does not apply route daily as needed (with inhaler) 1 Device 0    albuterol sulfate HFA (PROVENTIL HFA) 108 (90 Base) MCG/ACT inhaler Inhale 2 puffs into the lungs every 6 hours as needed for Wheezing 1 Inhaler 3    aspirin 81 MG EC tablet Take 81 mg by mouth daily. No current facility-administered medications for this visit. Patient's past medical history, surgical history, family history, medications,and allergies  were all reviewed and updated as appropriate today. Review of Systems   Constitutional: Negative. HENT: Negative. Eyes: Negative. Respiratory:          COPD, treated with Breo. Stable. See HPI. Cardiovascular:         Hypertension, treated with ARB, and Ca blker. Gastrointestinal: Negative. Endocrine:        Diabetes, type 2, treated with pioglitazone, januvia. A1c 6.6. Hyperlipidemia, treated with statin. Prior . Current pending. Genitourinary: Negative. Musculoskeletal: Negative. Skin:         Eczema history. Winter is particularly difficult with diffuse dry skin. Topical steroid cream is helpful relieving dryness and itching. Neurological: Negative. Psychiatric/Behavioral: Negative. Physical Exam  Constitutional:       General: He is not in acute distress. Appearance: He is well-developed. HENT:      Head: Normocephalic and atraumatic. Right Ear: External ear normal.      Left Ear: External ear normal.      Nose: Nose normal.   Eyes:      General: No scleral icterus. Conjunctiva/sclera: Conjunctivae normal.      Pupils: Pupils are equal, round, and reactive to light. Neck:      Thyroid: No thyromegaly. Cardiovascular:      Rate and Rhythm: Normal rate and regular rhythm. Heart sounds: Normal heart sounds. Pulmonary:      Effort: Pulmonary effort is normal.      Breath sounds: Normal breath sounds. Abdominal:      General: Bowel sounds are normal.      Palpations: Abdomen is soft. There is no mass. Musculoskeletal:      Cervical back: Normal range of motion and neck supple. Lymphadenopathy:      Cervical: No cervical adenopathy.    Skin:     General: Skin is warm and dry. Comments: Patches of dry scaly skin noted. Neurological:      Mental Status: He is alert and oriented to person, place, and time. Deep Tendon Reflexes: Reflexes are normal and symmetric. Psychiatric:         Behavior: Behavior normal.         Thought Content: Thought content normal.         Judgment: Judgment normal.       Vitals:    01/13/23 0842   BP: 120/78   Pulse: 75   SpO2: 94%       Assessment:   Diagnosis Orders   1. Type 2 diabetes mellitus with complication, without long-term current use of insulin ([facility])  Diet, physical activity and med compliance discussed.  DIABETES FOOT EXAM    POCT Glucose    POCT glycosylated hemoglobin (Hb A1C)   2. Primary hypertension  DASH and low sodium diet discussed. COMPREHENSIVE METABOLIC PANEL    CBC WITH AUTO DIFFERENTIAL    TSH with Reflex    MICROALBUMIN / CREATININE URINE RATIO   3. Mixed hyperlipidemia  Heart healthy diet and statin compliance discussed. Change statin to reach LDL goal of < 70. Lipid Panel    rosuvastatin (CRESTOR) 40 MG tablet        4. Cigarette nicotine dependence with nicotine-induced disorder  Continue to work on complete smoking cessation. Read the literature, take medication if prescribed, contact me if there are further questions. 5 COPD Continue to work on complete smoking cessation. Read the literature, take medication if prescribed, contact me if there are further questions. Continue maintenance inhaler. 6.     Eczema: Water hydration, steroid cream, diet discussed. 7.    Severe obesity: diet, meal planning, physical activity discussed. I spent greater than 40 minutes with patient including history, physical, assessment, plan, discussion. Plan:  See plans above.

## 2023-01-16 DIAGNOSIS — I10 PRIMARY HYPERTENSION: ICD-10-CM

## 2023-01-16 DIAGNOSIS — Z12.5 PROSTATE CANCER SCREENING: ICD-10-CM

## 2023-01-16 DIAGNOSIS — E78.2 MIXED HYPERLIPIDEMIA: ICD-10-CM

## 2023-01-16 LAB
A/G RATIO: 1.5 (ref 1.1–2.2)
ALBUMIN SERPL-MCNC: 4.1 G/DL (ref 3.4–5)
ALP BLD-CCNC: 71 U/L (ref 40–129)
ALT SERPL-CCNC: 15 U/L (ref 10–40)
ANION GAP SERPL CALCULATED.3IONS-SCNC: 10 MMOL/L (ref 3–16)
AST SERPL-CCNC: 29 U/L (ref 15–37)
BANDED NEUTROPHILS RELATIVE PERCENT: 5 % (ref 0–7)
BASOPHILS ABSOLUTE: 0.2 K/UL (ref 0–0.2)
BASOPHILS RELATIVE PERCENT: 2 %
BILIRUB SERPL-MCNC: 0.4 MG/DL (ref 0–1)
BUN BLDV-MCNC: 11 MG/DL (ref 7–20)
CALCIUM SERPL-MCNC: 9.4 MG/DL (ref 8.3–10.6)
CHLORIDE BLD-SCNC: 105 MMOL/L (ref 99–110)
CHOLESTEROL, TOTAL: 130 MG/DL (ref 0–199)
CO2: 29 MMOL/L (ref 21–32)
CREAT SERPL-MCNC: 1 MG/DL (ref 0.8–1.3)
CREATININE URINE: 230.8 MG/DL (ref 39–259)
EOSINOPHILS ABSOLUTE: 0.4 K/UL (ref 0–0.6)
EOSINOPHILS RELATIVE PERCENT: 4 %
GFR SERPL CREATININE-BSD FRML MDRD: >60 ML/MIN/{1.73_M2}
GLUCOSE BLD-MCNC: 83 MG/DL (ref 70–99)
HCT VFR BLD CALC: 44.6 % (ref 40.5–52.5)
HDLC SERPL-MCNC: 51 MG/DL (ref 40–60)
HEMOGLOBIN: 14.7 G/DL (ref 13.5–17.5)
LDL CHOLESTEROL CALCULATED: 63 MG/DL
LYMPHOCYTES ABSOLUTE: 2.4 K/UL (ref 1–5.1)
LYMPHOCYTES RELATIVE PERCENT: 24 %
MCH RBC QN AUTO: 31.8 PG (ref 26–34)
MCHC RBC AUTO-ENTMCNC: 32.9 G/DL (ref 31–36)
MCV RBC AUTO: 96.8 FL (ref 80–100)
MICROALBUMIN UR-MCNC: 3.1 MG/DL
MICROALBUMIN/CREAT UR-RTO: 13.4 MG/G (ref 0–30)
MONOCYTES ABSOLUTE: 1.5 K/UL (ref 0–1.3)
MONOCYTES RELATIVE PERCENT: 15 %
NEUTROPHILS ABSOLUTE: 5.4 K/UL (ref 1.7–7.7)
NEUTROPHILS RELATIVE PERCENT: 50 %
PDW BLD-RTO: 14 % (ref 12.4–15.4)
PLATELET # BLD: 178 K/UL (ref 135–450)
PMV BLD AUTO: 8.6 FL (ref 5–10.5)
POTASSIUM SERPL-SCNC: 4.6 MMOL/L (ref 3.5–5.1)
PROSTATE SPECIFIC ANTIGEN: 0.66 NG/ML (ref 0–4)
RBC # BLD: 4.61 M/UL (ref 4.2–5.9)
RBC # BLD: NORMAL 10*6/UL
SODIUM BLD-SCNC: 144 MMOL/L (ref 136–145)
TOTAL PROTEIN: 6.8 G/DL (ref 6.4–8.2)
TRIGL SERPL-MCNC: 78 MG/DL (ref 0–150)
TSH REFLEX: 1.25 UIU/ML (ref 0.27–4.2)
VLDLC SERPL CALC-MCNC: 16 MG/DL
WBC # BLD: 9.9 K/UL (ref 4–11)

## 2023-03-13 DIAGNOSIS — E78.2 MIXED HYPERLIPIDEMIA: ICD-10-CM

## 2023-03-13 DIAGNOSIS — E11.8 TYPE 2 DIABETES MELLITUS WITH COMPLICATION, WITHOUT LONG-TERM CURRENT USE OF INSULIN (HCC): ICD-10-CM

## 2023-03-13 RX ORDER — ROSUVASTATIN CALCIUM 40 MG/1
40 TABLET, COATED ORAL DAILY
Qty: 90 TABLET | Refills: 3 | Status: SHIPPED | OUTPATIENT
Start: 2023-03-13

## 2023-03-13 RX ORDER — EZETIMIBE 10 MG/1
10 TABLET ORAL DAILY
Qty: 90 TABLET | Refills: 3 | Status: SHIPPED | OUTPATIENT
Start: 2023-03-13

## 2023-03-13 RX ORDER — PIOGLITAZONEHYDROCHLORIDE 15 MG/1
TABLET ORAL
Qty: 90 TABLET | Refills: 3 | Status: SHIPPED | OUTPATIENT
Start: 2023-03-13

## 2023-05-10 ENCOUNTER — OFFICE VISIT (OUTPATIENT)
Dept: INTERNAL MEDICINE CLINIC | Age: 78
End: 2023-05-10
Payer: COMMERCIAL

## 2023-05-10 VITALS
TEMPERATURE: 97.7 F | OXYGEN SATURATION: 95 % | HEIGHT: 72 IN | BODY MASS INDEX: 34.73 KG/M2 | WEIGHT: 256.4 LBS | DIASTOLIC BLOOD PRESSURE: 76 MMHG | SYSTOLIC BLOOD PRESSURE: 120 MMHG | HEART RATE: 87 BPM

## 2023-05-10 DIAGNOSIS — L30.9 ECZEMA, UNSPECIFIED TYPE: Primary | ICD-10-CM

## 2023-05-10 PROCEDURE — 99214 OFFICE O/P EST MOD 30 MIN: CPT | Performed by: NURSE PRACTITIONER

## 2023-05-10 PROCEDURE — 1123F ACP DISCUSS/DSCN MKR DOCD: CPT | Performed by: NURSE PRACTITIONER

## 2023-05-10 PROCEDURE — 4004F PT TOBACCO SCREEN RCVD TLK: CPT | Performed by: NURSE PRACTITIONER

## 2023-05-10 PROCEDURE — 3074F SYST BP LT 130 MM HG: CPT | Performed by: NURSE PRACTITIONER

## 2023-05-10 PROCEDURE — G8427 DOCREV CUR MEDS BY ELIG CLIN: HCPCS | Performed by: NURSE PRACTITIONER

## 2023-05-10 PROCEDURE — G8417 CALC BMI ABV UP PARAM F/U: HCPCS | Performed by: NURSE PRACTITIONER

## 2023-05-10 PROCEDURE — 3078F DIAST BP <80 MM HG: CPT | Performed by: NURSE PRACTITIONER

## 2023-05-10 SDOH — ECONOMIC STABILITY: FOOD INSECURITY: WITHIN THE PAST 12 MONTHS, YOU WORRIED THAT YOUR FOOD WOULD RUN OUT BEFORE YOU GOT MONEY TO BUY MORE.: NEVER TRUE

## 2023-05-10 SDOH — ECONOMIC STABILITY: FOOD INSECURITY: WITHIN THE PAST 12 MONTHS, THE FOOD YOU BOUGHT JUST DIDN'T LAST AND YOU DIDN'T HAVE MONEY TO GET MORE.: NEVER TRUE

## 2023-05-10 SDOH — ECONOMIC STABILITY: HOUSING INSECURITY
IN THE LAST 12 MONTHS, WAS THERE A TIME WHEN YOU DID NOT HAVE A STEADY PLACE TO SLEEP OR SLEPT IN A SHELTER (INCLUDING NOW)?: NO

## 2023-05-10 SDOH — ECONOMIC STABILITY: INCOME INSECURITY: HOW HARD IS IT FOR YOU TO PAY FOR THE VERY BASICS LIKE FOOD, HOUSING, MEDICAL CARE, AND HEATING?: NOT VERY HARD

## 2023-05-10 ASSESSMENT — ENCOUNTER SYMPTOMS
RESPIRATORY NEGATIVE: 1
GASTROINTESTINAL NEGATIVE: 1

## 2023-05-10 NOTE — PROGRESS NOTES
Patient: Shantell Edwards is a 69 y.o. male who presents today with the following Chief Complaint(s):  Chief Complaint   Patient presents with    Follow-up     Pt has questions about interactions with eczema medication and current medications he's taking. HPI  Presents to the office complaining of dry itchy skin. History of eczema. Says he scratches so hard that it tears his skin and causes him to bleed at times. Is interested in starting Rinvoq. Has not seen a dermatologist in the past.    Skin Problem  This is a chronic problem. The current episode started more than 1 year ago. The problem is unchanged. Location: mainly on his back. The rash is characterized by dryness, itchiness and burning. He was exposed to nothing. Past treatments include topical steroids and moisturizer. The treatment provided mild relief. His past medical history is significant for eczema. Current Outpatient Medications   Medication Sig Dispense Refill    pioglitazone (ACTOS) 15 MG tablet TAKE 1 TABLET BY MOUTH EVERY DAY 90 tablet 3    ezetimibe (ZETIA) 10 MG tablet TAKE 1 TABLET BY MOUTH DAILY FOR HIGH CHOLESTEROL 90 tablet 3    rosuvastatin (CRESTOR) 40 MG tablet TAKE 1 TABLET BY MOUTH DAILY FOR HIGH CHOLESTEROL 90 tablet 3    ibuprofen (ADVIL;MOTRIN) 800 MG tablet       triamcinolone (KENALOG) 0.1 % ointment APPLY TO AFFECTED AREAS TWICE DAILY FOR UP TO 2 WEEKS OR UNTIL IMPROVED.  80 g 1    BREO ELLIPTA 100-25 MCG/INH AEPB INHALE 1 PUFF INTO LUNGS DAILY 60 each 5    JANUVIA 100 MG tablet TAKE 1 TABLET BY MOUTH EVERY DAY 90 tablet 1    amLODIPine (NORVASC) 5 MG tablet TAKE 1 TABLET EVERY DAY 90 tablet 3    losartan (COZAAR) 25 MG tablet TAKE 2 TABLETS BY MOUTH EVERY  tablet 3    ammonium lactate (LAC-HYDRIN) 12 % lotion APPLY TO AFFECTED AREA EVERY  mL 1    NICOTROL 10 MG inhaler INHALE 1 PUFF INTO LUNGS AS NEEDED FOR SMOKING CESSATION 1 Inhaler 2    Blood Glucose Monitoring Suppl (TRUE METRIX AIR GLUCOSE

## 2023-05-10 NOTE — PATIENT INSTRUCTIONS
[facility] Laboratory Locations - No appointment necessary. @ indicates the location is open Saturdays in addition to Monday through Friday. Call your preferred location for test preparation, business hours and other information you need. [employer] accepts [employer]. [facility]     @ [address] [place] [address], [address] [place] [address] Ave    Ph: 28 [facility], [address]    Ph: [phone]   @ [address] [place] [address].,    [facility]    Ph: [address]    Ph: [phone]  @ [address] [place] [address]   Ph: [phone]  @ [address] [place] [address]. Micah Addison [facility] 429    Ph: [address] [place], [place] [address]Perla Sudarshan 19   Ph: [phone]    [place]    @ [facility]. [place], [place] [address]    Ph: [phone]  [address], [address]   Ph: [place] [address] [facility]. [address] 30: 311 [facility] Kavon Lew    Ph: [phone]   [facility]   [address] [facility]. Kavon Tuttle   Ph: 501 [facility]  [address] [place], [place] [address]    Ph: [phone]

## 2023-05-12 DIAGNOSIS — J43.8 OTHER EMPHYSEMA (HCC): ICD-10-CM

## 2023-05-12 RX ORDER — FLUTICASONE FUROATE AND VILANTEROL TRIFENATATE 100; 25 UG/1; UG/1
POWDER RESPIRATORY (INHALATION)
Qty: 180 EACH | Refills: 3 | Status: SHIPPED | OUTPATIENT
Start: 2023-05-12

## 2023-06-23 DIAGNOSIS — L30.9 ECZEMA, UNSPECIFIED TYPE: ICD-10-CM

## 2023-06-27 RX ORDER — SITAGLIPTIN 100 MG/1
TABLET, FILM COATED ORAL
Qty: 90 TABLET | Refills: 3 | Status: SHIPPED | OUTPATIENT
Start: 2023-06-27

## 2023-06-27 RX ORDER — LOSARTAN POTASSIUM 25 MG/1
TABLET ORAL
Qty: 180 TABLET | Refills: 3 | Status: SHIPPED | OUTPATIENT
Start: 2023-06-27

## 2023-07-18 ENCOUNTER — OFFICE VISIT (OUTPATIENT)
Dept: INTERNAL MEDICINE CLINIC | Age: 78
End: 2023-07-18
Payer: COMMERCIAL

## 2023-07-18 VITALS
SYSTOLIC BLOOD PRESSURE: 138 MMHG | BODY MASS INDEX: 34.13 KG/M2 | OXYGEN SATURATION: 90 % | WEIGHT: 252 LBS | HEIGHT: 72 IN | HEART RATE: 100 BPM | DIASTOLIC BLOOD PRESSURE: 68 MMHG

## 2023-07-18 DIAGNOSIS — J43.9 PULMONARY EMPHYSEMA, UNSPECIFIED EMPHYSEMA TYPE (HCC): ICD-10-CM

## 2023-07-18 DIAGNOSIS — L30.9 ECZEMA, UNSPECIFIED TYPE: ICD-10-CM

## 2023-07-18 DIAGNOSIS — I10 PRIMARY HYPERTENSION: ICD-10-CM

## 2023-07-18 DIAGNOSIS — E66.9 CLASS 1 OBESITY: ICD-10-CM

## 2023-07-18 DIAGNOSIS — E78.2 MIXED HYPERLIPIDEMIA: ICD-10-CM

## 2023-07-18 DIAGNOSIS — E11.8 TYPE 2 DIABETES MELLITUS WITH COMPLICATION, WITHOUT LONG-TERM CURRENT USE OF INSULIN (HCC): Primary | ICD-10-CM

## 2023-07-18 DIAGNOSIS — F17.218 CIGARETTE NICOTINE DEPENDENCE WITH OTHER NICOTINE-INDUCED DISORDER: ICD-10-CM

## 2023-07-18 LAB
CHP ED QC CHECK: NORMAL
GLUCOSE BLD-MCNC: 114 MG/DL
HBA1C MFR BLD: 6 %

## 2023-07-18 PROCEDURE — 3078F DIAST BP <80 MM HG: CPT | Performed by: INTERNAL MEDICINE

## 2023-07-18 PROCEDURE — 1123F ACP DISCUSS/DSCN MKR DOCD: CPT | Performed by: INTERNAL MEDICINE

## 2023-07-18 PROCEDURE — 83037 HB GLYCOSYLATED A1C HOME DEV: CPT | Performed by: INTERNAL MEDICINE

## 2023-07-18 PROCEDURE — 82962 GLUCOSE BLOOD TEST: CPT | Performed by: INTERNAL MEDICINE

## 2023-07-18 PROCEDURE — 3023F SPIROM DOC REV: CPT | Performed by: INTERNAL MEDICINE

## 2023-07-18 PROCEDURE — 4004F PT TOBACCO SCREEN RCVD TLK: CPT | Performed by: INTERNAL MEDICINE

## 2023-07-18 PROCEDURE — G8417 CALC BMI ABV UP PARAM F/U: HCPCS | Performed by: INTERNAL MEDICINE

## 2023-07-18 PROCEDURE — 3075F SYST BP GE 130 - 139MM HG: CPT | Performed by: INTERNAL MEDICINE

## 2023-07-18 PROCEDURE — 99214 OFFICE O/P EST MOD 30 MIN: CPT | Performed by: INTERNAL MEDICINE

## 2023-07-18 PROCEDURE — G8427 DOCREV CUR MEDS BY ELIG CLIN: HCPCS | Performed by: INTERNAL MEDICINE

## 2023-07-18 PROCEDURE — 3044F HG A1C LEVEL LT 7.0%: CPT | Performed by: INTERNAL MEDICINE

## 2023-07-18 ASSESSMENT — ENCOUNTER SYMPTOMS
EYES NEGATIVE: 1
GASTROINTESTINAL NEGATIVE: 1

## 2023-07-18 NOTE — PROGRESS NOTES
Patient: Simeon Trevizo is a 69 y.o. male who presents today with the following Chief Complaint(s):    Chief Complaint   Patient presents with    Follow-up    Diabetes         Diabetes  He is here for a check up and f/u on hypertension, hyperlipidemia, diabetes, type 2. He is taking medication as prescribed, and continues to work on healthy eating and does steps at home for physical activity. He does continue to smoke despite repeated counseling. CT lung screen negative for suspicious pulmonary nodules. Current Outpatient Medications   Medication Sig Dispense Refill    JANUVIA 100 MG tablet TAKE 1 TABLET BY MOUTH EVERY DAY 90 tablet 3    losartan (COZAAR) 25 MG tablet TAKE 2 TABLETS BY MOUTH EVERY  tablet 3    triamcinolone (KENALOG) 0.1 % ointment APPLY TO AFFECTED AREAS TWICE DAILY FOR UP TO 2 WEEKS OR UNTIL IMPROVED. 80 g 1    BREO ELLIPTA 100-25 MCG/ACT inhaler INHALE 1 PUFF INTO LUNGS DAILY 180 each 3    pioglitazone (ACTOS) 15 MG tablet TAKE 1 TABLET BY MOUTH EVERY DAY 90 tablet 3    ezetimibe (ZETIA) 10 MG tablet TAKE 1 TABLET BY MOUTH DAILY FOR HIGH CHOLESTEROL 90 tablet 3    rosuvastatin (CRESTOR) 40 MG tablet TAKE 1 TABLET BY MOUTH DAILY FOR HIGH CHOLESTEROL 90 tablet 3    ibuprofen (ADVIL;MOTRIN) 800 MG tablet       amLODIPine (NORVASC) 5 MG tablet TAKE 1 TABLET EVERY DAY 90 tablet 3    ammonium lactate (LAC-HYDRIN) 12 % lotion APPLY TO AFFECTED AREA EVERY  mL 1    NICOTROL 10 MG inhaler INHALE 1 PUFF INTO LUNGS AS NEEDED FOR SMOKING CESSATION 1 Inhaler 2    Blood Glucose Monitoring Suppl (TRUE METRIX AIR GLUCOSE METER) w/Device KIT Test 3 times daily 1 kit 0    blood glucose test strips (TRUE METRIX BLOOD GLUCOSE TEST) strip 1 each by In Vitro route daily As needed.  100 each 3    Spacer/Aero-Holding Chambers GUILLERMO 1 Device by Does not apply route daily as needed (with inhaler) 1 Device 0    albuterol sulfate HFA (PROVENTIL HFA) 108 (90 Base) MCG/ACT inhaler Inhale 2 puffs into the

## 2023-10-10 ENCOUNTER — TELEPHONE (OUTPATIENT)
Dept: CASE MANAGEMENT | Age: 78
End: 2023-10-10

## 2023-10-10 NOTE — TELEPHONE ENCOUNTER
Patient due for annual CT Lung Screening. Reminder letter mailed.       Saravanan VITALEN, RN   Lung Nodule Navigator  [facility]  [phone]

## 2023-10-14 ENCOUNTER — HOSPITAL ENCOUNTER (EMERGENCY)
Age: 78
Discharge: HOME | End: 2023-10-15
Attending: EMERGENCY MEDICINE
Payer: COMMERCIAL

## 2023-10-14 VITALS
OXYGEN SATURATION: 96 % | DIASTOLIC BLOOD PRESSURE: 102 MMHG | SYSTOLIC BLOOD PRESSURE: 152 MMHG | HEART RATE: 85 BPM | BODY MASS INDEX: 32.91 KG/M2 | RESPIRATION RATE: 18 BRPM | WEIGHT: 243 LBS | TEMPERATURE: 97.7 F | HEIGHT: 72 IN

## 2023-10-14 DIAGNOSIS — I49.1 PREMATURE ATRIAL CONTRACTIONS: ICD-10-CM

## 2023-10-14 DIAGNOSIS — K76.9 LESION OF RIGHT LOBE OF LIVER: ICD-10-CM

## 2023-10-14 DIAGNOSIS — K57.90 DIVERTICULOSIS: ICD-10-CM

## 2023-10-14 DIAGNOSIS — N20.1 RIGHT URETERAL STONE: Primary | ICD-10-CM

## 2023-10-14 PROCEDURE — 81001 URINALYSIS AUTO W/SCOPE: CPT

## 2023-10-14 PROCEDURE — 83735 ASSAY OF MAGNESIUM: CPT

## 2023-10-14 PROCEDURE — 93005 ELECTROCARDIOGRAM TRACING: CPT | Performed by: EMERGENCY MEDICINE

## 2023-10-14 PROCEDURE — 85025 COMPLETE CBC W/AUTO DIFF WBC: CPT

## 2023-10-14 PROCEDURE — 6360000002 HC RX W HCPCS: Performed by: EMERGENCY MEDICINE

## 2023-10-14 PROCEDURE — 83605 ASSAY OF LACTIC ACID: CPT

## 2023-10-14 PROCEDURE — 96375 TX/PRO/DX INJ NEW DRUG ADDON: CPT

## 2023-10-14 PROCEDURE — 2580000003 HC RX 258: Performed by: EMERGENCY MEDICINE

## 2023-10-14 PROCEDURE — 80053 COMPREHEN METABOLIC PANEL: CPT

## 2023-10-14 PROCEDURE — 99285 EMERGENCY DEPT VISIT HI MDM: CPT

## 2023-10-14 PROCEDURE — 83690 ASSAY OF LIPASE: CPT

## 2023-10-14 PROCEDURE — 96374 THER/PROPH/DIAG INJ IV PUSH: CPT

## 2023-10-14 RX ORDER — 0.9 % SODIUM CHLORIDE 0.9 %
1000 INTRAVENOUS SOLUTION INTRAVENOUS ONCE
Status: COMPLETED | OUTPATIENT
Start: 2023-10-14 | End: 2023-10-15

## 2023-10-14 RX ORDER — MORPHINE SULFATE 4 MG/ML
4 INJECTION INTRAVENOUS ONCE
Status: COMPLETED | OUTPATIENT
Start: 2023-10-14 | End: 2023-10-14

## 2023-10-14 RX ORDER — ONDANSETRON 2 MG/ML
4 INJECTION INTRAMUSCULAR; INTRAVENOUS ONCE
Status: COMPLETED | OUTPATIENT
Start: 2023-10-14 | End: 2023-10-14

## 2023-10-14 RX ADMIN — MORPHINE SULFATE 4 MG: 4 INJECTION, SOLUTION INTRAMUSCULAR; INTRAVENOUS at 23:47

## 2023-10-14 RX ADMIN — ONDANSETRON 4 MG: 2 INJECTION INTRAMUSCULAR; INTRAVENOUS at 23:47

## 2023-10-14 RX ADMIN — SODIUM CHLORIDE 1000 ML: 9 INJECTION, SOLUTION INTRAVENOUS at 23:45

## 2023-10-15 ENCOUNTER — APPOINTMENT (OUTPATIENT)
Dept: CT IMAGING | Age: 78
End: 2023-10-15
Payer: COMMERCIAL

## 2023-10-15 LAB
ALBUMIN SERPL-MCNC: 4.1 G/DL (ref 3.4–5)
ALBUMIN/GLOB SERPL: 1.1 {RATIO} (ref 1.1–2.2)
ALP SERPL-CCNC: 60 U/L (ref 40–129)
ALT SERPL-CCNC: 11 U/L (ref 10–40)
ANION GAP SERPL CALCULATED.3IONS-SCNC: 11 MMOL/L (ref 3–16)
AST SERPL-CCNC: 32 U/L (ref 15–37)
BACTERIA URNS QL MICRO: ABNORMAL /HPF
BASOPHILS # BLD: 0.1 K/UL (ref 0–0.2)
BASOPHILS NFR BLD: 0.6 %
BILIRUB SERPL-MCNC: 0.5 MG/DL (ref 0–1)
BILIRUB UR QL STRIP.AUTO: NEGATIVE
BUN SERPL-MCNC: 16 MG/DL (ref 7–20)
CALCIUM SERPL-MCNC: 9.4 MG/DL (ref 8.3–10.6)
CHLORIDE SERPL-SCNC: 103 MMOL/L (ref 99–110)
CLARITY UR: ABNORMAL
CO2 SERPL-SCNC: 26 MMOL/L (ref 21–32)
COLOR UR: YELLOW
CREAT SERPL-MCNC: 1.3 MG/DL (ref 0.8–1.3)
DEPRECATED RDW RBC AUTO: 14 % (ref 12.4–15.4)
EKG ATRIAL RATE: 82 BPM
EKG DIAGNOSIS: NORMAL
EKG P AXIS: 85 DEGREES
EKG P-R INTERVAL: 174 MS
EKG Q-T INTERVAL: 396 MS
EKG QRS DURATION: 96 MS
EKG QTC CALCULATION (BAZETT): 462 MS
EKG R AXIS: -31 DEGREES
EKG T AXIS: 64 DEGREES
EKG VENTRICULAR RATE: 82 BPM
EOSINOPHIL # BLD: 0 K/UL (ref 0–0.6)
EOSINOPHIL NFR BLD: 0.2 %
EPI CELLS #/AREA URNS HPF: ABNORMAL /HPF (ref 0–5)
GFR SERPLBLD CREATININE-BSD FMLA CKD-EPI: 57 ML/MIN/{1.73_M2}
GLUCOSE SERPL-MCNC: 169 MG/DL (ref 70–99)
GLUCOSE UR STRIP.AUTO-MCNC: NEGATIVE MG/DL
HCT VFR BLD AUTO: 42.6 % (ref 40.5–52.5)
HGB BLD-MCNC: 14.2 G/DL (ref 13.5–17.5)
HGB UR QL STRIP.AUTO: ABNORMAL
KETONES UR STRIP.AUTO-MCNC: ABNORMAL MG/DL
LACTATE BLDV-SCNC: 1.8 MMOL/L (ref 0.4–2)
LEUKOCYTE ESTERASE UR QL STRIP.AUTO: NEGATIVE
LIPASE SERPL-CCNC: 30 U/L (ref 13–60)
LYMPHOCYTES # BLD: 0.9 K/UL (ref 1–5.1)
LYMPHOCYTES NFR BLD: 9 %
MAGNESIUM SERPL-MCNC: 2.1 MG/DL (ref 1.8–2.4)
MCH RBC QN AUTO: 31.7 PG (ref 26–34)
MCHC RBC AUTO-ENTMCNC: 33.3 G/DL (ref 31–36)
MCV RBC AUTO: 95.2 FL (ref 80–100)
MONOCYTES # BLD: 0.5 K/UL (ref 0–1.3)
MONOCYTES NFR BLD: 5 %
MUCOUS THREADS #/AREA URNS LPF: ABNORMAL /LPF
NEUTROPHILS # BLD: 8.3 K/UL (ref 1.7–7.7)
NEUTROPHILS NFR BLD: 85.2 %
NITRITE UR QL STRIP.AUTO: NEGATIVE
PH UR STRIP.AUTO: 5.5 [PH] (ref 5–8)
PLATELET # BLD AUTO: 187 K/UL (ref 135–450)
PMV BLD AUTO: 8.3 FL (ref 5–10.5)
POTASSIUM SERPL-SCNC: 4.6 MMOL/L (ref 3.5–5.1)
PROT SERPL-MCNC: 7.9 G/DL (ref 6.4–8.2)
PROT UR STRIP.AUTO-MCNC: 100 MG/DL
RBC # BLD AUTO: 4.48 M/UL (ref 4.2–5.9)
RBC #/AREA URNS HPF: >100 /HPF (ref 0–4)
SODIUM SERPL-SCNC: 140 MMOL/L (ref 136–145)
SP GR UR STRIP.AUTO: >=1.03 (ref 1–1.03)
UA COMPLETE W REFLEX CULTURE PNL UR: ABNORMAL
UA DIPSTICK W REFLEX MICRO PNL UR: YES
URN SPEC COLLECT METH UR: ABNORMAL
UROBILINOGEN UR STRIP-ACNC: 1 E.U./DL
WBC # BLD AUTO: 9.7 K/UL (ref 4–11)
WBC #/AREA URNS HPF: ABNORMAL /HPF (ref 0–5)

## 2023-10-15 PROCEDURE — 74177 CT ABD & PELVIS W/CONTRAST: CPT

## 2023-10-15 PROCEDURE — 6360000004 HC RX CONTRAST MEDICATION: Performed by: EMERGENCY MEDICINE

## 2023-10-15 PROCEDURE — 93010 ELECTROCARDIOGRAM REPORT: CPT | Performed by: INTERNAL MEDICINE

## 2023-10-15 RX ORDER — ONDANSETRON 4 MG/1
4 TABLET, FILM COATED ORAL EVERY 8 HOURS PRN
Qty: 15 TABLET | Refills: 0 | Status: SHIPPED | OUTPATIENT
Start: 2023-10-15 | End: 2023-10-25

## 2023-10-15 RX ORDER — HYDROCODONE BITARTRATE AND ACETAMINOPHEN 5; 325 MG/1; MG/1
1 TABLET ORAL EVERY 6 HOURS PRN
Qty: 12 TABLET | Refills: 0 | Status: SHIPPED | OUTPATIENT
Start: 2023-10-15 | End: 2023-10-18

## 2023-10-15 RX ORDER — TAMSULOSIN HYDROCHLORIDE 0.4 MG/1
0.4 CAPSULE ORAL DAILY
Qty: 10 CAPSULE | Refills: 0 | Status: SHIPPED | OUTPATIENT
Start: 2023-10-15

## 2023-10-15 RX ADMIN — IOHEXOL 100 ML: 350 INJECTION, SOLUTION INTRAVENOUS at 01:04

## 2023-10-15 ASSESSMENT — PAIN - FUNCTIONAL ASSESSMENT: PAIN_FUNCTIONAL_ASSESSMENT: NONE - DENIES PAIN

## 2023-10-15 NOTE — ED PROVIDER NOTES
contrast shows a 3 mm distal ureteral stone on the right with hydronephrosis. This is likely the etiology of his pain. There is no signs of appendicitis. He had diverticuli but no diverticulitis. No evidence of cholecystitis. He did have an indeterminate lesion in his liver which will need to be follow-up as an outpatient and the patient was informed of this. His pain is manageable and patient would like to be discharge. No ongoing vomiting so should be able to take meds for pain and flomax at home. Stone sized suggests he will be able to pass this without surgical intervention. I estimate there is LOW risk for ACUTE APPENDICITIS, BOWEL OBSTRUCTION, CHOLECYSTITIS, COMPLICATED DIVERTICULITIS, INCARCERATED HERNIA, PANCREATITIS,  PERFORATED BOWEL or ULCER, ISCHEMIC BOWEL, ABDOMINAL AORTIC ANEURYSM, AORTIC DISSECTION, PYELONEPHRITIS, thus I consider the discharge disposition reasonable. Also, there is no evidence or peritonitis, sepsis, or toxicity. Tony Delgado and I have discussed the diagnosis and risks, and we agree with discharging home to follow-up with their primary doctor. We also discussed returning to the Emergency Department immediately if new or worsening symptoms occur. Clinical Impression:  1. Right ureteral stone    2. Lesion of right lobe of liver    3. Premature atrial contractions    4. Diverticulosis        Dispo:  Patient will be discharged  at this time. Patient was informed of this decision and agrees with plan. I have discussed lab and xray findings with patient and they understand. Questions were answered to the best of my ability.       Followup plan:  Samantha Gamble MD  [address] Ajith Hernandez [phone] [address]    Schedule an appointment as soon as possible for a visit   for further evaluation of liver lesion    [facility] [address] [facility] [address] [phone]  Schedule an appointment as soon as possible for a visit

## 2023-10-15 NOTE — DISCHARGE INSTRUCTIONS
Drink plenty of fluids. Strain urine to see when you pass the 3mm stone you have. Return for fever, intolerable pain, persistent vomiting, inability to urinate.  You have a lesion in your liver that needs additional evaluation with MRI as outpatient

## 2023-10-15 NOTE — ED NOTES
Pt arrived complaining of abdominal pain that began this afternoon. He is alert and oriented, respirations appear even and unlabored. No distress noted. He reports making a sandwich, and after eating it, he feels like he needs to \"go to the bathroom and can't\". Call light is within reach and pt verbalizes understanding on usage.       Indira Larios RN  10/14/23 5497

## 2023-10-15 NOTE — ED NOTES
Pt discharged to home. PIV removed as indicated. Discharge instructions discussed along with 3 prescriptions and medication education completed. Pt verbalized understanding, and will follow up as indicated. Pt denies pain and is satisfied with treatment. Pt ambulated to lobby without assistance. Pt is alert and oriented, respirations are even and unlabored.         Yue Taylor RN  10/15/23 6668

## 2023-10-17 ENCOUNTER — OFFICE VISIT (OUTPATIENT)
Dept: INTERNAL MEDICINE CLINIC | Age: 78
End: 2023-10-17
Payer: COMMERCIAL

## 2023-10-17 VITALS
HEIGHT: 72 IN | BODY MASS INDEX: 32.23 KG/M2 | OXYGEN SATURATION: 97 % | WEIGHT: 238 LBS | HEART RATE: 100 BPM | SYSTOLIC BLOOD PRESSURE: 118 MMHG | DIASTOLIC BLOOD PRESSURE: 60 MMHG

## 2023-10-17 DIAGNOSIS — Z87.891 PERSONAL HISTORY OF TOBACCO USE: ICD-10-CM

## 2023-10-17 DIAGNOSIS — H91.93 HEARING DEFICIT, BILATERAL: ICD-10-CM

## 2023-10-17 DIAGNOSIS — E78.2 MIXED HYPERLIPIDEMIA: ICD-10-CM

## 2023-10-17 DIAGNOSIS — H53.9 CHANGE IN VISION: ICD-10-CM

## 2023-10-17 DIAGNOSIS — E11.8 TYPE 2 DIABETES MELLITUS WITH COMPLICATION, WITHOUT LONG-TERM CURRENT USE OF INSULIN (HCC): ICD-10-CM

## 2023-10-17 DIAGNOSIS — I10 PRIMARY HYPERTENSION: ICD-10-CM

## 2023-10-17 DIAGNOSIS — F43.21 GRIEF: ICD-10-CM

## 2023-10-17 DIAGNOSIS — Z00.00 MEDICARE ANNUAL WELLNESS VISIT, SUBSEQUENT: Primary | ICD-10-CM

## 2023-10-17 LAB
CHP ED QC CHECK: NORMAL
GLUCOSE BLD-MCNC: 125 MG/DL
HBA1C MFR BLD: 6.7 %

## 2023-10-17 PROCEDURE — 1123F ACP DISCUSS/DSCN MKR DOCD: CPT | Performed by: INTERNAL MEDICINE

## 2023-10-17 PROCEDURE — 3078F DIAST BP <80 MM HG: CPT | Performed by: INTERNAL MEDICINE

## 2023-10-17 PROCEDURE — G0439 PPPS, SUBSEQ VISIT: HCPCS | Performed by: INTERNAL MEDICINE

## 2023-10-17 PROCEDURE — G0296 VISIT TO DETERM LDCT ELIG: HCPCS | Performed by: INTERNAL MEDICINE

## 2023-10-17 PROCEDURE — 3074F SYST BP LT 130 MM HG: CPT | Performed by: INTERNAL MEDICINE

## 2023-10-17 PROCEDURE — 3044F HG A1C LEVEL LT 7.0%: CPT | Performed by: INTERNAL MEDICINE

## 2023-10-17 PROCEDURE — G8484 FLU IMMUNIZE NO ADMIN: HCPCS | Performed by: INTERNAL MEDICINE

## 2023-10-17 PROCEDURE — 83036 HEMOGLOBIN GLYCOSYLATED A1C: CPT | Performed by: INTERNAL MEDICINE

## 2023-10-17 PROCEDURE — 82962 GLUCOSE BLOOD TEST: CPT | Performed by: INTERNAL MEDICINE

## 2023-10-17 ASSESSMENT — PATIENT HEALTH QUESTIONNAIRE - PHQ9
2. FEELING DOWN, DEPRESSED OR HOPELESS: 1
SUM OF ALL RESPONSES TO PHQ QUESTIONS 1-9: 2
1. LITTLE INTEREST OR PLEASURE IN DOING THINGS: 1
SUM OF ALL RESPONSES TO PHQ QUESTIONS 1-9: 2
SUM OF ALL RESPONSES TO PHQ9 QUESTIONS 1 & 2: 2
SUM OF ALL RESPONSES TO PHQ QUESTIONS 1-9: 2
SUM OF ALL RESPONSES TO PHQ QUESTIONS 1-9: 2

## 2023-10-17 ASSESSMENT — LIFESTYLE VARIABLES
HOW MANY STANDARD DRINKS CONTAINING ALCOHOL DO YOU HAVE ON A TYPICAL DAY: 3 OR 4
HOW OFTEN DO YOU HAVE A DRINK CONTAINING ALCOHOL: NEVER

## 2023-10-17 NOTE — PROGRESS NOTES
Medicare Annual Wellness Visit    Carlitos Cotton is here for Medicare AWV, Follow-up, and Diabetes    Assessment & Plan    Diagnosis Orders   1. Medicare annual wellness visit, subsequent  Health and wellness advice given. 2. Type 2 diabetes mellitus with complication, without long-term current use of insulin ([address])  Diet discussed. POCT Glucose    POCT glycosylated hemoglobin (Hb A1C)    External Referral To Ophthalmology      3. Hearing deficit, bilateral  External Referral To ENT      4. Personal history of tobacco use  AR VISIT TO DISCUSS LUNG CA SCREEN W LDCT    CT Lung Screen (Initial/Annual/Baseline)  Former smoker. 5. Primary hypertension  Ortega Walker same medication regimen. DASH and low sodium diet discussed. 6. Change in vision  Ophthalmology referral.       7. Mixed hyperlipidemia  Heart healthy diet and statin compliance stressed. 8. Grief  Counseled and discussed the grieving process. Recommendations for Preventive Services Due: see orders and patient instructions/AVS.  Recommended screening schedule for the next 5-10 years is provided to the patient in written form: see Patient Instructions/AVS.     No follow-ups on file. Subjective       Wife passed in August. Grief has been been difficult but he is working thru it as best he can.   8/13/23, stopped smoking and drinking. His son and daughter are supportive in his time of bereavement. Lost 20 pounds. Hearing deficit. ENT f/u planned. To go to dentist evaluate partials. [employer]. No falls. Does steps at home for exercise. About to go to the gym. Patient's complete Health Risk Assessment and screening values have been reviewed and are found in Flowsheets. The following problems were reviewed today and where indicated follow up appointments were made and/or referrals ordered.     Positive Risk Factor Screenings with Interventions:                Opioid Risk: (Low risk score <55) Opioid risk

## 2023-11-20 DIAGNOSIS — L30.9 ECZEMA, UNSPECIFIED TYPE: ICD-10-CM

## 2023-11-21 RX ORDER — TRIAMCINOLONE ACETONIDE 1 MG/G
OINTMENT TOPICAL
Qty: 80 G | Refills: 1 | Status: SHIPPED | OUTPATIENT
Start: 2023-11-21

## 2023-12-07 ENCOUNTER — HOSPITAL ENCOUNTER (OUTPATIENT)
Dept: CT IMAGING | Age: 78
Discharge: HOME | End: 2023-12-07
Payer: COMMERCIAL

## 2023-12-07 DIAGNOSIS — Z87.891 PERSONAL HISTORY OF TOBACCO USE: ICD-10-CM

## 2023-12-07 PROCEDURE — 71271 CT THORAX LUNG CANCER SCR C-: CPT

## 2023-12-12 ENCOUNTER — TELEPHONE (OUTPATIENT)
Dept: CASE MANAGEMENT | Age: 78
End: 2023-12-12

## 2023-12-12 NOTE — TELEPHONE ENCOUNTER
Annual lung screen on 12/7/23. LRAD2. Recommended screen in one year. Results letter mailed. Will cont to monitor follow-up recommendations.      Sandy VITALEN, RN   Lung Nodule Navigator  [facility]  [phone]

## 2024-01-17 DIAGNOSIS — L30.9 ECZEMA, UNSPECIFIED TYPE: ICD-10-CM

## 2024-01-20 RX ORDER — TRIAMCINOLONE ACETONIDE 1 MG/G
OINTMENT TOPICAL
Qty: 80 G | Refills: 1 | Status: SHIPPED | OUTPATIENT
Start: 2024-01-20

## 2024-01-20 RX ORDER — AMLODIPINE BESYLATE 5 MG/1
5 TABLET ORAL DAILY
Qty: 90 TABLET | Refills: 3 | Status: SHIPPED | OUTPATIENT
Start: 2024-01-20

## 2024-02-09 ENCOUNTER — OFFICE VISIT (OUTPATIENT)
Dept: INTERNAL MEDICINE CLINIC | Age: 79
End: 2024-02-09
Payer: MEDICARE

## 2024-02-09 VITALS
OXYGEN SATURATION: 94 % | SYSTOLIC BLOOD PRESSURE: 138 MMHG | DIASTOLIC BLOOD PRESSURE: 88 MMHG | BODY MASS INDEX: 31.19 KG/M2 | HEART RATE: 55 BPM | WEIGHT: 230 LBS

## 2024-02-09 DIAGNOSIS — Z12.5 PROSTATE CANCER SCREENING: ICD-10-CM

## 2024-02-09 DIAGNOSIS — L30.9 ECZEMA, UNSPECIFIED TYPE: ICD-10-CM

## 2024-02-09 DIAGNOSIS — F43.21 GRIEF: ICD-10-CM

## 2024-02-09 DIAGNOSIS — F17.218 CIGARETTE NICOTINE DEPENDENCE WITH OTHER NICOTINE-INDUCED DISORDER: ICD-10-CM

## 2024-02-09 DIAGNOSIS — J43.9 PULMONARY EMPHYSEMA, UNSPECIFIED EMPHYSEMA TYPE (HCC): ICD-10-CM

## 2024-02-09 DIAGNOSIS — E11.8 TYPE 2 DIABETES MELLITUS WITH COMPLICATION, WITHOUT LONG-TERM CURRENT USE OF INSULIN (HCC): Primary | ICD-10-CM

## 2024-02-09 DIAGNOSIS — I10 PRIMARY HYPERTENSION: ICD-10-CM

## 2024-02-09 DIAGNOSIS — H91.93 DECREASED HEARING OF BOTH EARS: ICD-10-CM

## 2024-02-09 DIAGNOSIS — L98.9 DERMATOSIS: ICD-10-CM

## 2024-02-09 DIAGNOSIS — E78.2 MIXED HYPERLIPIDEMIA: ICD-10-CM

## 2024-02-09 LAB
CHP ED QC CHECK: NORMAL
GLUCOSE BLD-MCNC: 115 MG/DL
HBA1C MFR BLD: 6.2 %

## 2024-02-09 PROCEDURE — 3079F DIAST BP 80-89 MM HG: CPT | Performed by: INTERNAL MEDICINE

## 2024-02-09 PROCEDURE — 82962 GLUCOSE BLOOD TEST: CPT | Performed by: INTERNAL MEDICINE

## 2024-02-09 PROCEDURE — G8427 DOCREV CUR MEDS BY ELIG CLIN: HCPCS | Performed by: INTERNAL MEDICINE

## 2024-02-09 PROCEDURE — 83036 HEMOGLOBIN GLYCOSYLATED A1C: CPT | Performed by: INTERNAL MEDICINE

## 2024-02-09 PROCEDURE — 1123F ACP DISCUSS/DSCN MKR DOCD: CPT | Performed by: INTERNAL MEDICINE

## 2024-02-09 PROCEDURE — 3044F HG A1C LEVEL LT 7.0%: CPT | Performed by: INTERNAL MEDICINE

## 2024-02-09 PROCEDURE — 4004F PT TOBACCO SCREEN RCVD TLK: CPT | Performed by: INTERNAL MEDICINE

## 2024-02-09 PROCEDURE — 3023F SPIROM DOC REV: CPT | Performed by: INTERNAL MEDICINE

## 2024-02-09 PROCEDURE — 99215 OFFICE O/P EST HI 40 MIN: CPT | Performed by: INTERNAL MEDICINE

## 2024-02-09 PROCEDURE — G8484 FLU IMMUNIZE NO ADMIN: HCPCS | Performed by: INTERNAL MEDICINE

## 2024-02-09 PROCEDURE — 3075F SYST BP GE 130 - 139MM HG: CPT | Performed by: INTERNAL MEDICINE

## 2024-02-09 PROCEDURE — G8417 CALC BMI ABV UP PARAM F/U: HCPCS | Performed by: INTERNAL MEDICINE

## 2024-02-09 RX ORDER — METHYLPREDNISOLONE 4 MG/1
TABLET ORAL
Qty: 21 TABLET | Refills: 0 | Status: SHIPPED | OUTPATIENT
Start: 2024-02-09 | End: 2024-02-15

## 2024-02-09 ASSESSMENT — PATIENT HEALTH QUESTIONNAIRE - PHQ9
SUM OF ALL RESPONSES TO PHQ QUESTIONS 1-9: 0
SUM OF ALL RESPONSES TO PHQ QUESTIONS 1-9: 0
1. LITTLE INTEREST OR PLEASURE IN DOING THINGS: 0
SUM OF ALL RESPONSES TO PHQ QUESTIONS 1-9: 0
SUM OF ALL RESPONSES TO PHQ QUESTIONS 1-9: 0
2. FEELING DOWN, DEPRESSED OR HOPELESS: 0
SUM OF ALL RESPONSES TO PHQ9 QUESTIONS 1 & 2: 0

## 2024-02-10 ASSESSMENT — ENCOUNTER SYMPTOMS
GASTROINTESTINAL NEGATIVE: 1
EYES NEGATIVE: 1

## 2024-02-10 NOTE — PROGRESS NOTES
every 6 hours as needed for Wheezing 1 Inhaler 3    aspirin 81 MG EC tablet Take 1 tablet by mouth daily       No current facility-administered medications for this visit.       Patient's past medical history, surgical history, family history, medications,and allergies  were all reviewed and updated as appropriate today.      Review of Systems      Physical Exam    Vitals:    02/09/24 1208   BP: 138/88   Pulse: 55   SpO2: 94%       Assessment:  Encounter Diagnosis   Name Primary?    Type 2 diabetes mellitus with complication, without long-term current use of insulin ([facility]) Yes       Plan:  1. Type 2 diabetes mellitus with complication, without long-term current use of insulin ([facility])  ***  - POCT Glucose  - POCT glycosylated hemoglobin (Hb A1C)    
rosuvastatin (CRESTOR) 40 MG tablet        4. Cigarette nicotine dependence with nicotine-induced disorder  Continue to work on complete smoking cessation.  Read the literature, take medication if prescribed, contact me if there are further questions.   5 COPD Continue to work on complete smoking cessation.  Read the literature, take medication if prescribed, contact me if there are further questions.  Continue maintenance inhaler.    6.     Eczema: Exacerbation. Advised continue topical steroid.   Add vaseline at night.   Prescribe medrol dose pack as current topical steroid not effective in stabilizing inflammation.   Water hydration, steroid cream, diet discussed.    7.    Class 1 obesity: diet, meal planning, physical activity discussed.   8.    Grief: counseled and discussed the grieving process.   9.    Decrease hearing: ENT referral and assessment.         Plan:  See plans above.

## 2024-02-13 DIAGNOSIS — H90.3 SENSORINEURAL HEARING LOSS (SNHL) OF BOTH EARS: Primary | ICD-10-CM

## 2024-02-15 ENCOUNTER — PROCEDURE VISIT (OUTPATIENT)
Dept: AUDIOLOGY | Age: 79
End: 2024-02-15

## 2024-02-15 ENCOUNTER — OFFICE VISIT (OUTPATIENT)
Dept: ENT CLINIC | Age: 79
End: 2024-02-15

## 2024-02-15 VITALS
BODY MASS INDEX: 30.77 KG/M2 | WEIGHT: 227.2 LBS | DIASTOLIC BLOOD PRESSURE: 52 MMHG | HEART RATE: 79 BPM | HEIGHT: 72 IN | SYSTOLIC BLOOD PRESSURE: 102 MMHG | TEMPERATURE: 97.7 F

## 2024-02-15 DIAGNOSIS — I10 PRIMARY HYPERTENSION: ICD-10-CM

## 2024-02-15 DIAGNOSIS — L98.9 DERMATOSIS: ICD-10-CM

## 2024-02-15 DIAGNOSIS — Z12.5 PROSTATE CANCER SCREENING: ICD-10-CM

## 2024-02-15 DIAGNOSIS — E78.2 MIXED HYPERLIPIDEMIA: ICD-10-CM

## 2024-02-15 DIAGNOSIS — H90.3 SENSORINEURAL HEARING LOSS, BILATERAL: Primary | ICD-10-CM

## 2024-02-15 DIAGNOSIS — H90.3 SENSORINEURAL HEARING LOSS (SNHL) OF BOTH EARS: Primary | ICD-10-CM

## 2024-02-15 NOTE — PROGRESS NOTES
Delano Daigle   1945, 78 y.o. male   [id number]       Referring Provider: Jim Garcia MD  Referral Type: In an order in Epic    Reason for Visit: Evaluation of suspected change in hearing, tinnitus, or balance.    ADULT AUDIOLOGIC EVALUATION      Delano Daigle is a 78 y.o. male seen today, 2/15/2024 , for a recheck audiologic evaluation.  Patient was seen by Jim Garcia MD following today's evaluation.    AUDIOLOGIC AND OTHER PERTINENT MEDICAL HISTORY:      Delano Daigle reports his son has noticed decrease in hearing within the last three years. Most recent testing was done on 10/17/22 revealing asymmetric sensorineural hearing loss, right ear worse than the left. Some occupational noise exposure noted.Denies all other otologic symptoms at this time.      He denied otalgia, aural fullness, otorrhea, tinnitus, dizziness, imbalance, history of head trauma, history of ear surgery, and family history of hearing loss    Date: 2/15/2024     IMPRESSIONS:      Today's results revealed asymmetric sensorineural hearing loss, right ear worse than the left. Thresholds have slightly decreased in the right ear in comparison to audiogram in 2022. Excellent speech understanding when in quiet. Tympanometry indicates  hypermobile tympanic membrane/eardrums bilaterally. Discussed test results and implications with patient. Hearing aids recommended at this time. Gave patient contact information for [facility] to acquire hearing aids.  Follow medical recommendations of Jim Garcia MD.     ASSESSMENT AND FINDINGS:     Otoscopy unremarkable.    RIGHT EAR:  Hearing Sensitivity: Normal sloping to moderate sensorineural hearing loss with asymmetries at 2kHz and from 6-8kHz   Speech Recognition Threshold: 50 dB HL  Word Recognition: Excellent 100%, based on NU-6 25-word list at 85 dBHL using recorded speech stimuli.    Tympanometry: Normal peak pressure with high compliance, Type Ad

## 2024-02-15 NOTE — PROGRESS NOTES
FOLLOW UP VISIT:    CHIEF COMPLAINT: Hearing loss.    INTERIM HISTORY: Sensorineural hearing loss bilateral, first noted by me on an audiogram in 2022.  The patient's hearing has been stable however he has found resources for purchasing amplification and is interested in pursuing this.      PAST MEDICAL HISTORY:   Social History     Tobacco Use   Smoking Status Every Day    Current packs/day: 1.00    Average packs/day: 1 pack/day for 59.0 years (59.0 ttl pk-yrs)    Types: Cigarettes   Smokeless Tobacco Never                                                    Social History     Substance and Sexual Activity   Alcohol Use Yes    Alcohol/week: 2.0 standard drinks of alcohol    Types: 2 Shots of liquor per week    Comment: occass                                                    Current Outpatient Medications:     amLODIPine (NORVASC) 5 MG tablet, TAKE 1 TABLET BY MOUTH EVERY DAY, Disp: 90 tablet, Rfl: 3    triamcinolone (KENALOG) 0.1 % ointment, APPLY TO AFFECTED AREAS TWICE DAILY FOR UP TO 2 WEEKS OR UNTIL IMPROVED., Disp: 80 g, Rfl: 1    tamsulosin (FLOMAX) 0.4 MG capsule, Take 1 capsule by mouth daily, Disp: 10 capsule, Rfl: 0    JANUVIA 100 MG tablet, TAKE 1 TABLET BY MOUTH EVERY DAY, Disp: 90 tablet, Rfl: 3    losartan (COZAAR) 25 MG tablet, TAKE 2 TABLETS BY MOUTH EVERY DAY, Disp: 180 tablet, Rfl: 3    BREO ELLIPTA 100-25 MCG/ACT inhaler, INHALE 1 PUFF INTO LUNGS DAILY, Disp: 180 each, Rfl: 3    pioglitazone (ACTOS) 15 MG tablet, TAKE 1 TABLET BY MOUTH EVERY DAY, Disp: 90 tablet, Rfl: 3    ezetimibe (ZETIA) 10 MG tablet, TAKE 1 TABLET BY MOUTH DAILY FOR HIGH CHOLESTEROL, Disp: 90 tablet, Rfl: 3    rosuvastatin (CRESTOR) 40 MG tablet, TAKE 1 TABLET BY MOUTH DAILY FOR HIGH CHOLESTEROL, Disp: 90 tablet, Rfl: 3    ibuprofen (ADVIL;MOTRIN) 800 MG tablet, , Disp: , Rfl:     NICOTROL 10 MG inhaler, INHALE 1 PUFF INTO LUNGS AS NEEDED FOR SMOKING CESSATION, Disp: 1 Inhaler, Rfl: 2    Blood Glucose Monitoring Suppl (TRUE

## 2024-02-16 LAB
ALBUMIN SERPL-MCNC: 3.6 G/DL (ref 3.4–5)
ALBUMIN/GLOB SERPL: 1.5 {RATIO} (ref 1.1–2.2)
ALP SERPL-CCNC: 71 U/L (ref 40–129)
ALT SERPL-CCNC: 52 U/L (ref 10–40)
ANION GAP SERPL CALCULATED.3IONS-SCNC: 7 MMOL/L (ref 3–16)
AST SERPL-CCNC: 61 U/L (ref 15–37)
BASOPHILS # BLD: 0.1 K/UL (ref 0–0.2)
BASOPHILS NFR BLD: 1.1 %
BILIRUB SERPL-MCNC: <0.2 MG/DL (ref 0–1)
BUN SERPL-MCNC: 15 MG/DL (ref 7–20)
CALCIUM SERPL-MCNC: 8.5 MG/DL (ref 8.3–10.6)
CHLORIDE SERPL-SCNC: 107 MMOL/L (ref 99–110)
CHOLEST SERPL-MCNC: 150 MG/DL (ref 0–199)
CO2 SERPL-SCNC: 29 MMOL/L (ref 21–32)
CREAT SERPL-MCNC: 0.9 MG/DL (ref 0.8–1.3)
CRP SERPL-MCNC: <3 MG/L (ref 0–5.1)
DEPRECATED RDW RBC AUTO: 15 % (ref 12.4–15.4)
EOSINOPHIL # BLD: 0.3 K/UL (ref 0–0.6)
EOSINOPHIL NFR BLD: 2.9 %
GFR SERPLBLD CREATININE-BSD FMLA CKD-EPI: >60 ML/MIN/{1.73_M2}
GLUCOSE SERPL-MCNC: 114 MG/DL (ref 70–99)
HCT VFR BLD AUTO: 41.2 % (ref 40.5–52.5)
HDLC SERPL-MCNC: 72 MG/DL (ref 40–60)
HGB BLD-MCNC: 13.5 G/DL (ref 13.5–17.5)
LDLC SERPL CALC-MCNC: 63 MG/DL
LYMPHOCYTES # BLD: 2.1 K/UL (ref 1–5.1)
LYMPHOCYTES NFR BLD: 22 %
MCH RBC QN AUTO: 31.9 PG (ref 26–34)
MCHC RBC AUTO-ENTMCNC: 32.7 G/DL (ref 31–36)
MCV RBC AUTO: 97.6 FL (ref 80–100)
MONOCYTES # BLD: 1 K/UL (ref 0–1.3)
MONOCYTES NFR BLD: 10.2 %
NEUTROPHILS # BLD: 6 K/UL (ref 1.7–7.7)
NEUTROPHILS NFR BLD: 63.8 %
PLATELET # BLD AUTO: 185 K/UL (ref 135–450)
PMV BLD AUTO: 8.8 FL (ref 5–10.5)
POTASSIUM SERPL-SCNC: 4.3 MMOL/L (ref 3.5–5.1)
PROT SERPL-MCNC: 6 G/DL (ref 6.4–8.2)
PSA SERPL DL<=0.01 NG/ML-MCNC: 0.64 NG/ML (ref 0–4)
RBC # BLD AUTO: 4.22 M/UL (ref 4.2–5.9)
SODIUM SERPL-SCNC: 143 MMOL/L (ref 136–145)
TRIGL SERPL-MCNC: 73 MG/DL (ref 0–150)
TSH SERPL DL<=0.005 MIU/L-ACNC: 1.35 UIU/ML (ref 0.27–4.2)
VLDLC SERPL CALC-MCNC: 15 MG/DL
WBC # BLD AUTO: 9.4 K/UL (ref 4–11)

## 2024-03-04 DIAGNOSIS — E78.2 MIXED HYPERLIPIDEMIA: ICD-10-CM

## 2024-03-04 DIAGNOSIS — L30.9 ECZEMA, UNSPECIFIED TYPE: ICD-10-CM

## 2024-03-04 DIAGNOSIS — E11.8 TYPE 2 DIABETES MELLITUS WITH COMPLICATION, WITHOUT LONG-TERM CURRENT USE OF INSULIN (HCC): ICD-10-CM

## 2024-03-04 NOTE — TELEPHONE ENCOUNTER
Medication:   Requested Prescriptions     Pending Prescriptions Disp Refills    rosuvastatin (CRESTOR) 40 MG tablet [Pharmacy Med Name: ROSUVASTATIN CALCIUM 40 MG TAB] 90 tablet 3     Sig: TAKE 1 TABLET BY MOUTH DAILY FOR HIGH CHOLESTEROL    pioglitazone (ACTOS) 15 MG tablet [Pharmacy Med Name: PIOGLITAZONE HCL 15 MG TABLET] 90 tablet 3     Sig: TAKE 1 TABLET BY MOUTH EVERY DAY    ezetimibe (ZETIA) 10 MG tablet [Pharmacy Med Name: EZETIMIBE 10 MG TABLET] 90 tablet 3     Sig: TAKE 1 TABLET BY MOUTH DAILY FOR HIGH CHOLESTEROL    triamcinolone (KENALOG) 0.1 % ointment [Pharmacy Med Name: TRIAMCINOLONE 0.1% OINTMENT] 80 g 1     Sig: APPLY TO AFFECTED AREAS TWICE DAILY FOR UP TO 2 WEEKS OR UNTIL IMPROVED.        Last Filled:      Last appt: 2/9/2024   Next appt: 5/9/2024    Last OARRS:        No data to display

## 2024-03-05 RX ORDER — EZETIMIBE 10 MG/1
10 TABLET ORAL DAILY
Qty: 90 TABLET | Refills: 3 | Status: SHIPPED | OUTPATIENT
Start: 2024-03-05

## 2024-03-05 RX ORDER — ROSUVASTATIN CALCIUM 40 MG/1
40 TABLET, COATED ORAL DAILY
Qty: 90 TABLET | Refills: 3 | Status: SHIPPED | OUTPATIENT
Start: 2024-03-05

## 2024-03-05 RX ORDER — PIOGLITAZONEHYDROCHLORIDE 15 MG/1
TABLET ORAL
Qty: 90 TABLET | Refills: 3 | Status: SHIPPED | OUTPATIENT
Start: 2024-03-05

## 2024-03-05 RX ORDER — TRIAMCINOLONE ACETONIDE 1 MG/G
OINTMENT TOPICAL
Qty: 80 G | Refills: 1 | Status: SHIPPED | OUTPATIENT
Start: 2024-03-05

## 2024-03-22 ENCOUNTER — HOSPITAL ENCOUNTER (EMERGENCY)
Age: 79
Discharge: HOME | End: 2024-03-22
Attending: EMERGENCY MEDICINE
Payer: MEDICARE

## 2024-03-22 VITALS
SYSTOLIC BLOOD PRESSURE: 127 MMHG | OXYGEN SATURATION: 98 % | RESPIRATION RATE: 14 BRPM | HEART RATE: 56 BPM | HEIGHT: 72 IN | BODY MASS INDEX: 30.37 KG/M2 | TEMPERATURE: 98.3 F | WEIGHT: 224.2 LBS | DIASTOLIC BLOOD PRESSURE: 86 MMHG

## 2024-03-22 DIAGNOSIS — L30.9 ECZEMA, UNSPECIFIED TYPE: Primary | ICD-10-CM

## 2024-03-22 PROCEDURE — 99283 EMERGENCY DEPT VISIT LOW MDM: CPT

## 2024-03-22 RX ORDER — BENZOCAINE/MENTHOL 6 MG-10 MG
LOZENGE MUCOUS MEMBRANE
Qty: 120 G | Refills: 1 | Status: SHIPPED | OUTPATIENT
Start: 2024-03-22 | End: 2024-03-29

## 2024-03-22 RX ORDER — HYDROXYZINE HYDROCHLORIDE 25 MG/1
25 TABLET, FILM COATED ORAL EVERY 8 HOURS PRN
Qty: 30 TABLET | Refills: 0 | Status: SHIPPED | OUTPATIENT
Start: 2024-03-22 | End: 2024-04-01

## 2024-03-22 ASSESSMENT — PAIN - FUNCTIONAL ASSESSMENT
PAIN_FUNCTIONAL_ASSESSMENT: NONE - DENIES PAIN
PAIN_FUNCTIONAL_ASSESSMENT: NONE - DENIES PAIN

## 2024-03-22 NOTE — DISCHARGE INSTRUCTIONS
We provided you a cream here today and a prescription for topical steroid cream as well as Atarax to help with itching.  Please call your doctor today for further evaluation and treatment.  If your symptoms worsen despite treatment please come back to the ER for repeat evaluation

## 2024-03-22 NOTE — ED PROVIDER NOTES
(Please note that portions of this note were completed with a voice recognition program.  Efforts were made to edit the dictations but occasionally words are mis-transcribed.)    Ryder Mann MD (electronically signed)  Attending Emergency Physician            Ryder Mann MD  03/23/24 0801

## 2024-03-22 NOTE — DISCHARGE INSTR - COC
the Time of Hospital Discharge:   Respiratory Treatments: ***  Oxygen Therapy:  {Therapy; copd oxygen:82914}  Ventilator:    {[facility] Vent List:415420305}    Rehab Therapies: {THERAPEUTIC INTERVENTION:1301458555}  Weight Bearing Status/Restrictions: {[facility] Weight Bearin}  Other Medical Equipment (for information only, NOT a DME order):  {EQUIPMENT:570624530}  Other Treatments: ***    Patient's personal belongings (please select all that are sent with patient):  {[facility] DME Belongings:474722746}    RN SIGNATURE:  {Esignature:819815424}    CASE MANAGEMENT/SOCIAL WORK SECTION    Inpatient Status Date: ***    Readmission Risk Assessment Score:  Readmission Risk              Risk of Unplanned Readmission:  0           Discharging to Facility/ Agency   Name:   Address:  Phone:  Fax:    Dialysis Facility (if applicable)   Name:  Address:  Dialysis Schedule:  Phone:  Fax:    / signature: {Esignature:511020531}    PHYSICIAN SECTION    Prognosis: {Prognosis:1667961346}    Condition at Discharge: { Patient Condition:951444989}    Rehab Potential (if transferring to Rehab): {Prognosis:5215863306}    Recommended Labs or Other Treatments After Discharge: ***    Physician Certification: I certify the above information and transfer of Delano Daigle  is necessary for the continuing treatment of the diagnosis listed and that he requires {Admit to Appropriate Level of Care:44550} for {GREATER/LESS:208893589} 30 days.     Update Admission H&P: {[facility] DME Changes in HandP:086279631}    PHYSICIAN SIGNATURE:  {Esignature:080880815}

## 2024-05-09 ENCOUNTER — OFFICE VISIT (OUTPATIENT)
Dept: INTERNAL MEDICINE CLINIC | Age: 79
End: 2024-05-09
Payer: MEDICARE

## 2024-05-09 VITALS
WEIGHT: 227 LBS | BODY MASS INDEX: 30.79 KG/M2 | DIASTOLIC BLOOD PRESSURE: 62 MMHG | HEART RATE: 96 BPM | SYSTOLIC BLOOD PRESSURE: 130 MMHG | OXYGEN SATURATION: 98 %

## 2024-05-09 DIAGNOSIS — E66.9 CLASS 1 OBESITY: ICD-10-CM

## 2024-05-09 DIAGNOSIS — Z87.891 FORMER CIGARETTE SMOKER: ICD-10-CM

## 2024-05-09 DIAGNOSIS — I10 PRIMARY HYPERTENSION: ICD-10-CM

## 2024-05-09 DIAGNOSIS — E11.8 TYPE 2 DIABETES MELLITUS WITH COMPLICATION, WITHOUT LONG-TERM CURRENT USE OF INSULIN (HCC): ICD-10-CM

## 2024-05-09 DIAGNOSIS — J43.9 PULMONARY EMPHYSEMA, UNSPECIFIED EMPHYSEMA TYPE (HCC): ICD-10-CM

## 2024-05-09 DIAGNOSIS — L30.9 ECZEMA, UNSPECIFIED TYPE: Primary | ICD-10-CM

## 2024-05-09 DIAGNOSIS — E78.2 MIXED HYPERLIPIDEMIA: ICD-10-CM

## 2024-05-09 LAB
CHP ED QC CHECK: NORMAL
GLUCOSE BLD-MCNC: 111 MG/DL
HBA1C MFR BLD: 5.6 %

## 2024-05-09 PROCEDURE — G2211 COMPLEX E/M VISIT ADD ON: HCPCS | Performed by: INTERNAL MEDICINE

## 2024-05-09 PROCEDURE — 4004F PT TOBACCO SCREEN RCVD TLK: CPT | Performed by: INTERNAL MEDICINE

## 2024-05-09 PROCEDURE — 1123F ACP DISCUSS/DSCN MKR DOCD: CPT | Performed by: INTERNAL MEDICINE

## 2024-05-09 PROCEDURE — 99215 OFFICE O/P EST HI 40 MIN: CPT | Performed by: INTERNAL MEDICINE

## 2024-05-09 PROCEDURE — 82962 GLUCOSE BLOOD TEST: CPT | Performed by: INTERNAL MEDICINE

## 2024-05-09 PROCEDURE — 3023F SPIROM DOC REV: CPT | Performed by: INTERNAL MEDICINE

## 2024-05-09 PROCEDURE — 3075F SYST BP GE 130 - 139MM HG: CPT | Performed by: INTERNAL MEDICINE

## 2024-05-09 PROCEDURE — G8417 CALC BMI ABV UP PARAM F/U: HCPCS | Performed by: INTERNAL MEDICINE

## 2024-05-09 PROCEDURE — 3044F HG A1C LEVEL LT 7.0%: CPT | Performed by: INTERNAL MEDICINE

## 2024-05-09 PROCEDURE — 83036 HEMOGLOBIN GLYCOSYLATED A1C: CPT | Performed by: INTERNAL MEDICINE

## 2024-05-09 PROCEDURE — G8427 DOCREV CUR MEDS BY ELIG CLIN: HCPCS | Performed by: INTERNAL MEDICINE

## 2024-05-09 PROCEDURE — 3078F DIAST BP <80 MM HG: CPT | Performed by: INTERNAL MEDICINE

## 2024-05-09 RX ORDER — METHYLPREDNISOLONE 4 MG/1
TABLET ORAL
Qty: 21 TABLET | Refills: 0 | Status: SHIPPED | OUTPATIENT
Start: 2024-05-09 | End: 2024-05-15

## 2024-05-09 RX ORDER — CETIRIZINE HYDROCHLORIDE 10 MG/1
10 TABLET ORAL DAILY
Qty: 30 TABLET | Refills: 1 | Status: SHIPPED | OUTPATIENT
Start: 2024-05-09

## 2024-05-09 RX ORDER — DESOXIMETASONE 2.5 MG/G
CREAM TOPICAL
Qty: 100 G | Refills: 0 | Status: SHIPPED | OUTPATIENT
Start: 2024-05-09

## 2024-05-13 ASSESSMENT — ENCOUNTER SYMPTOMS
GASTROINTESTINAL NEGATIVE: 1
EYES NEGATIVE: 1

## 2024-05-13 NOTE — PROGRESS NOTES
Patient: Delano Daigle is a 78 y.o. male who presents today with the following Chief Complaint(s):    Chief Complaint   Patient presents with    Diabetes    Rash    Pain         HPIdry skin itching eczema, topical not helping. Skin thinkened, scaly, open skin areas   Multple areas of superfical abrasions open wounds from scratching. Leaking skin also.   Current Outpatient Medications   Medication Sig Dispense Refill    rosuvastatin (CRESTOR) 40 MG tablet TAKE 1 TABLET BY MOUTH DAILY FOR HIGH CHOLESTEROL 90 tablet 3    pioglitazone (ACTOS) 15 MG tablet TAKE 1 TABLET BY MOUTH EVERY DAY 90 tablet 3    ezetimibe (ZETIA) 10 MG tablet TAKE 1 TABLET BY MOUTH DAILY FOR HIGH CHOLESTEROL 90 tablet 3    triamcinolone (KENALOG) 0.1 % ointment APPLY TO AFFECTED AREAS TWICE DAILY FOR UP TO 2 WEEKS OR UNTIL IMPROVED. 80 g 1    amLODIPine (NORVASC) 5 MG tablet TAKE 1 TABLET BY MOUTH EVERY DAY 90 tablet 3    tamsulosin (FLOMAX) 0.4 MG capsule Take 1 capsule by mouth daily 10 capsule 0    JANUVIA 100 MG tablet TAKE 1 TABLET BY MOUTH EVERY DAY 90 tablet 3    losartan (COZAAR) 25 MG tablet TAKE 2 TABLETS BY MOUTH EVERY  tablet 3    BREO ELLIPTA 100-25 MCG/ACT inhaler INHALE 1 PUFF INTO LUNGS DAILY 180 each 3    ibuprofen (ADVIL;MOTRIN) 800 MG tablet       ammonium lactate (LAC-HYDRIN) 12 % lotion APPLY TO AFFECTED AREA EVERY  mL 1    NICOTROL 10 MG inhaler INHALE 1 PUFF INTO LUNGS AS NEEDED FOR SMOKING CESSATION 1 Inhaler 2    Blood Glucose Monitoring Suppl (TRUE METRIX AIR GLUCOSE METER) w/Device KIT Test 3 times daily 1 kit 0    blood glucose test strips (TRUE METRIX BLOOD GLUCOSE TEST) strip 1 each by In Vitro route daily As needed. 100 each 3    Spacer/Aero-Holding Chambers GUILLERMO 1 Device by Does not apply route daily as needed (with inhaler) 1 Device 0    albuterol sulfate HFA (PROVENTIL HFA) 108 (90 Base) MCG/ACT inhaler Inhale 2 puffs into the lungs every 6 hours as needed for Wheezing 1 Inhaler 3    aspirin 81 
inflammation.   Change steroid cream to topicort.   Water hydration, steroid cream, diet discussed.   Dermatology f/u.    7.    Class 1 obesity: diet, meal planning, physical activity discussed.           Plan:  See plans above.

## 2024-05-17 DIAGNOSIS — J43.8 OTHER EMPHYSEMA (HCC): ICD-10-CM

## 2024-05-17 NOTE — TELEPHONE ENCOUNTER
Medication:   Requested Prescriptions     Pending Prescriptions Disp Refills    fluticasone furoate-vilanterol (BREO ELLIPTA) 100-25 MCG/ACT inhaler [Pharmacy Med Name: FLUTICASONE-VILANTEROL 100-25] 180 each 3     Sig: INHALE 1 PUFF INTO THE LUNGS DAILY        Last Filled:  5/12/23    Last appt: 5/9/2024   Next appt: 8/9/2024    Last OARRS:        No data to display

## 2024-05-18 RX ORDER — FLUTICASONE FUROATE AND VILANTEROL 100; 25 UG/1; UG/1
1 POWDER RESPIRATORY (INHALATION) DAILY
Qty: 180 EACH | Refills: 3 | Status: SHIPPED | OUTPATIENT
Start: 2024-05-18

## 2024-05-31 DIAGNOSIS — L30.9 ECZEMA, UNSPECIFIED TYPE: ICD-10-CM

## 2024-06-03 NOTE — TELEPHONE ENCOUNTER
Medication:   Requested Prescriptions     Pending Prescriptions Disp Refills    cetirizine (ZYRTEC) 10 MG tablet [Pharmacy Med Name: CETIRIZINE HCL 10 MG TABLET] 90 tablet 1     Sig: TAKE 1 TABLET BY MOUTH DAILY FOR ITCHING.        Last Filled:  5/9/24    Last appt: 5/9/2024   Next appt: 8/9/2024    Last OARRS:        No data to display

## 2024-06-04 RX ORDER — CETIRIZINE HYDROCHLORIDE 10 MG/1
10 TABLET ORAL DAILY
Qty: 90 TABLET | Refills: 1 | Status: SHIPPED | OUTPATIENT
Start: 2024-06-04

## 2024-06-04 NOTE — TELEPHONE ENCOUNTER
Spoke with pt he states it cleared up week after now its back. Pt states he need some more medication

## 2024-06-05 DIAGNOSIS — L30.9 ECZEMA, UNSPECIFIED TYPE: ICD-10-CM

## 2024-06-05 RX ORDER — DESOXIMETASONE 2.5 MG/G
CREAM TOPICAL
Qty: 100 G | Refills: 0 | Status: SHIPPED | OUTPATIENT
Start: 2024-06-05

## 2024-06-12 ENCOUNTER — TELEPHONE (OUTPATIENT)
Dept: INTERNAL MEDICINE CLINIC | Age: 79
End: 2024-06-12

## 2024-06-12 DIAGNOSIS — L30.9 ECZEMA, UNSPECIFIED TYPE: Primary | ICD-10-CM

## 2024-06-12 NOTE — TELEPHONE ENCOUNTER
Pt came into office to check medication   Pt is requesting for a zpack   Pt is asking for medication  to be sent to pharmacy -[id number] [place] [place] [facility] - P [phone] - F [phone]

## 2024-06-13 RX ORDER — METHYLPREDNISOLONE 4 MG/1
TABLET ORAL
Qty: 21 TABLET | Refills: 0 | Status: SHIPPED | OUTPATIENT
Start: 2024-06-13 | End: 2024-06-19

## 2024-06-13 NOTE — TELEPHONE ENCOUNTER
Call patient and give the name and number for the dermatology group for him to make an appointment see.

## 2024-06-14 NOTE — TELEPHONE ENCOUNTER
Called pt to give number for dermatology [phone] but no answer    Detail Level: Simple Additional Notes: Patient consent was obtained to proceed with the visit and recommended plan of care after discussion of all risks and benefits, including the risks of COVID-19 exposure.

## 2024-07-25 RX ORDER — LOSARTAN POTASSIUM 25 MG/1
TABLET ORAL
Qty: 180 TABLET | Refills: 3 | Status: SHIPPED | OUTPATIENT
Start: 2024-07-25

## 2024-08-02 ENCOUNTER — APPOINTMENT (OUTPATIENT)
Dept: CT IMAGING | Age: 79
End: 2024-08-02
Payer: MEDICARE

## 2024-08-02 ENCOUNTER — APPOINTMENT (OUTPATIENT)
Dept: GENERAL RADIOLOGY | Age: 79
End: 2024-08-02
Payer: MEDICARE

## 2024-08-02 ENCOUNTER — HOSPITAL ENCOUNTER (INPATIENT)
Age: 79
LOS: 5 days | Discharge: HOME | End: 2024-08-08
Attending: EMERGENCY MEDICINE | Admitting: HOSPITALIST
Payer: MEDICARE

## 2024-08-02 DIAGNOSIS — I50.9 ACUTE CONGESTIVE HEART FAILURE, UNSPECIFIED HEART FAILURE TYPE (HCC): ICD-10-CM

## 2024-08-02 DIAGNOSIS — L03.114 CELLULITIS OF LEFT ARM: Primary | ICD-10-CM

## 2024-08-02 DIAGNOSIS — R79.89 ELEVATED TROPONIN: ICD-10-CM

## 2024-08-02 LAB
ANION GAP SERPL CALCULATED.3IONS-SCNC: 9 MMOL/L (ref 3–16)
BASOPHILS # BLD: 0.1 K/UL (ref 0–0.2)
BASOPHILS NFR BLD: 0.7 %
BUN SERPL-MCNC: 10 MG/DL (ref 7–20)
CALCIUM SERPL-MCNC: 7.8 MG/DL (ref 8.3–10.6)
CHLORIDE SERPL-SCNC: 103 MMOL/L (ref 99–110)
CO2 SERPL-SCNC: 25 MMOL/L (ref 21–32)
CREAT SERPL-MCNC: 0.8 MG/DL (ref 0.8–1.3)
DEPRECATED RDW RBC AUTO: 15.1 % (ref 12.4–15.4)
EOSINOPHIL # BLD: 0.2 K/UL (ref 0–0.6)
EOSINOPHIL NFR BLD: 1 %
GFR SERPLBLD CREATININE-BSD FMLA CKD-EPI: >90 ML/MIN/{1.73_M2}
GLUCOSE SERPL-MCNC: 98 MG/DL (ref 70–99)
HCT VFR BLD AUTO: 35.5 % (ref 40.5–52.5)
HGB BLD-MCNC: 11.7 G/DL (ref 13.5–17.5)
LACTATE BLDV-SCNC: 1 MMOL/L (ref 0.4–1.9)
LYMPHOCYTES # BLD: 1.5 K/UL (ref 1–5.1)
LYMPHOCYTES NFR BLD: 9.4 %
MCH RBC QN AUTO: 31.8 PG (ref 26–34)
MCHC RBC AUTO-ENTMCNC: 32.9 G/DL (ref 31–36)
MCV RBC AUTO: 96.8 FL (ref 80–100)
MONOCYTES # BLD: 1.4 K/UL (ref 0–1.3)
MONOCYTES NFR BLD: 9 %
NEUTROPHILS # BLD: 12.6 K/UL (ref 1.7–7.7)
NEUTROPHILS NFR BLD: 79.9 %
NT-PROBNP SERPL-MCNC: 1451 PG/ML (ref 0–449)
PLATELET # BLD AUTO: 180 K/UL (ref 135–450)
PMV BLD AUTO: 7.9 FL (ref 5–10.5)
POTASSIUM SERPL-SCNC: 3.7 MMOL/L (ref 3.5–5.1)
RBC # BLD AUTO: 3.67 M/UL (ref 4.2–5.9)
SODIUM SERPL-SCNC: 137 MMOL/L (ref 136–145)
TROPONIN, HIGH SENSITIVITY: 39 NG/L (ref 0–22)
WBC # BLD AUTO: 15.7 K/UL (ref 4–11)

## 2024-08-02 PROCEDURE — 99285 EMERGENCY DEPT VISIT HI MDM: CPT

## 2024-08-02 PROCEDURE — 87040 BLOOD CULTURE FOR BACTERIA: CPT

## 2024-08-02 PROCEDURE — 83605 ASSAY OF LACTIC ACID: CPT

## 2024-08-02 PROCEDURE — 84484 ASSAY OF TROPONIN QUANT: CPT

## 2024-08-02 PROCEDURE — 6360000002 HC RX W HCPCS: Performed by: EMERGENCY MEDICINE

## 2024-08-02 PROCEDURE — 96374 THER/PROPH/DIAG INJ IV PUSH: CPT

## 2024-08-02 PROCEDURE — 2500000003 HC RX 250 WO HCPCS: Performed by: EMERGENCY MEDICINE

## 2024-08-02 PROCEDURE — 80048 BASIC METABOLIC PNL TOTAL CA: CPT

## 2024-08-02 PROCEDURE — 83880 ASSAY OF NATRIURETIC PEPTIDE: CPT

## 2024-08-02 PROCEDURE — 73080 X-RAY EXAM OF ELBOW: CPT

## 2024-08-02 PROCEDURE — 96361 HYDRATE IV INFUSION ADD-ON: CPT

## 2024-08-02 PROCEDURE — 6360000004 HC RX CONTRAST MEDICATION: Performed by: EMERGENCY MEDICINE

## 2024-08-02 PROCEDURE — 93005 ELECTROCARDIOGRAM TRACING: CPT | Performed by: EMERGENCY MEDICINE

## 2024-08-02 PROCEDURE — 73201 CT UPPER EXTREMITY W/DYE: CPT

## 2024-08-02 PROCEDURE — 85025 COMPLETE CBC W/AUTO DIFF WBC: CPT

## 2024-08-02 PROCEDURE — 96376 TX/PRO/DX INJ SAME DRUG ADON: CPT

## 2024-08-02 PROCEDURE — 73090 X-RAY EXAM OF FOREARM: CPT

## 2024-08-02 PROCEDURE — 73130 X-RAY EXAM OF HAND: CPT

## 2024-08-02 PROCEDURE — 96375 TX/PRO/DX INJ NEW DRUG ADDON: CPT

## 2024-08-02 PROCEDURE — 71045 X-RAY EXAM CHEST 1 VIEW: CPT

## 2024-08-02 PROCEDURE — 2580000003 HC RX 258: Performed by: EMERGENCY MEDICINE

## 2024-08-02 RX ORDER — METOPROLOL TARTRATE 1 MG/ML
2.5 INJECTION, SOLUTION INTRAVENOUS ONCE
Status: COMPLETED | OUTPATIENT
Start: 2024-08-02 | End: 2024-08-02

## 2024-08-02 RX ORDER — 0.9 % SODIUM CHLORIDE 0.9 %
1000 INTRAVENOUS SOLUTION INTRAVENOUS ONCE
Status: COMPLETED | OUTPATIENT
Start: 2024-08-02 | End: 2024-08-02

## 2024-08-02 RX ORDER — ONDANSETRON 2 MG/ML
4 INJECTION INTRAMUSCULAR; INTRAVENOUS ONCE
Status: COMPLETED | OUTPATIENT
Start: 2024-08-02 | End: 2024-08-02

## 2024-08-02 RX ORDER — MORPHINE SULFATE 4 MG/ML
4 INJECTION INTRAVENOUS ONCE
Status: COMPLETED | OUTPATIENT
Start: 2024-08-02 | End: 2024-08-02

## 2024-08-02 RX ADMIN — VANCOMYCIN HYDROCHLORIDE 1000 MG: 1 INJECTION, POWDER, LYOPHILIZED, FOR SOLUTION INTRAVENOUS at 23:07

## 2024-08-02 RX ADMIN — MORPHINE SULFATE 4 MG: 4 INJECTION, SOLUTION INTRAMUSCULAR; INTRAVENOUS at 20:00

## 2024-08-02 RX ADMIN — SODIUM CHLORIDE 1500 MG: 9 INJECTION, SOLUTION INTRAVENOUS at 23:19

## 2024-08-02 RX ADMIN — IOPAMIDOL 75 ML: 755 INJECTION, SOLUTION INTRAVENOUS at 21:26

## 2024-08-02 RX ADMIN — ONDANSETRON 4 MG: 2 INJECTION INTRAMUSCULAR; INTRAVENOUS at 20:00

## 2024-08-02 RX ADMIN — SODIUM CHLORIDE 1000 ML: 9 INJECTION, SOLUTION INTRAVENOUS at 21:04

## 2024-08-02 RX ADMIN — METOPROLOL TARTRATE 2.5 MG: 1 INJECTION, SOLUTION INTRAVENOUS at 23:42

## 2024-08-02 RX ADMIN — MORPHINE SULFATE 4 MG: 4 INJECTION, SOLUTION INTRAMUSCULAR; INTRAVENOUS at 22:27

## 2024-08-02 ASSESSMENT — PAIN DESCRIPTION - DESCRIPTORS: DESCRIPTORS: ACHING

## 2024-08-02 ASSESSMENT — PAIN SCALES - GENERAL
PAINLEVEL_OUTOF10: 7
PAINLEVEL_OUTOF10: 10
PAINLEVEL_OUTOF10: 10

## 2024-08-02 ASSESSMENT — PAIN DESCRIPTION - FREQUENCY: FREQUENCY: CONTINUOUS

## 2024-08-02 ASSESSMENT — PAIN DESCRIPTION - ORIENTATION: ORIENTATION: LEFT

## 2024-08-02 ASSESSMENT — PAIN DESCRIPTION - LOCATION: LOCATION: ARM;HAND;WRIST

## 2024-08-02 ASSESSMENT — PAIN DESCRIPTION - PAIN TYPE: TYPE: ACUTE PAIN

## 2024-08-02 ASSESSMENT — PAIN - FUNCTIONAL ASSESSMENT: PAIN_FUNCTIONAL_ASSESSMENT: 0-10

## 2024-08-02 NOTE — ED PROVIDER NOTES
furoate-vilanterol (BREO ELLIPTA) 100-25 MCG/ACT inhaler, INHALE 1 PUFF INTO THE LUNGS DAILY, Disp: 180 each, Rfl: 3    rosuvastatin (CRESTOR) 40 MG tablet, TAKE 1 TABLET BY MOUTH DAILY FOR HIGH CHOLESTEROL, Disp: 90 tablet, Rfl: 3    pioglitazone (ACTOS) 15 MG tablet, TAKE 1 TABLET BY MOUTH EVERY DAY, Disp: 90 tablet, Rfl: 3    ezetimibe (ZETIA) 10 MG tablet, TAKE 1 TABLET BY MOUTH DAILY FOR HIGH CHOLESTEROL, Disp: 90 tablet, Rfl: 3    triamcinolone (KENALOG) 0.1 % ointment, APPLY TO AFFECTED AREAS TWICE DAILY FOR UP TO 2 WEEKS OR UNTIL IMPROVED., Disp: 80 g, Rfl: 1    amLODIPine (NORVASC) 5 MG tablet, TAKE 1 TABLET BY MOUTH EVERY DAY, Disp: 90 tablet, Rfl: 3    tamsulosin (FLOMAX) 0.4 MG capsule, Take 1 capsule by mouth daily, Disp: 10 capsule, Rfl: 0    JANUVIA 100 MG tablet, TAKE 1 TABLET BY MOUTH EVERY DAY, Disp: 90 tablet, Rfl: 3    ibuprofen (ADVIL;MOTRIN) 800 MG tablet, , Disp: , Rfl:     ammonium lactate (LAC-HYDRIN) 12 % lotion, APPLY TO AFFECTED AREA EVERY DAY, Disp: 400 mL, Rfl: 1    NICOTROL 10 MG inhaler, INHALE 1 PUFF INTO LUNGS AS NEEDED FOR SMOKING CESSATION, Disp: 1 Inhaler, Rfl: 2    Blood Glucose Monitoring Suppl (TRUE METRIX AIR GLUCOSE METER) w/Device KIT, Test 3 times daily, Disp: 1 kit, Rfl: 0    blood glucose test strips (TRUE METRIX BLOOD GLUCOSE TEST) strip, 1 each by In Vitro route daily As needed., Disp: 100 each, Rfl: 3    Spacer/Aero-Holding Chambers GUILLERMO, 1 Device by Does not apply route daily as needed (with inhaler), Disp: 1 Device, Rfl: 0    albuterol sulfate HFA (PROVENTIL HFA) 108 (90 Base) MCG/ACT inhaler, Inhale 2 puffs into the lungs every 6 hours as needed for Wheezing, Disp: 1 Inhaler, Rfl: 3    aspirin 81 MG EC tablet, Take 1 tablet by mouth daily, Disp: , Rfl:     No Known Allergies    Social History     Socioeconomic History    Marital status: Single     Spouse name: Not on file    Number of children: Not on file    Years of education: Not on file    Highest education  lungs with no bronchospasm and no increased work of breathing.  Left arm markedly swollen with erythema and warmth but no diomedes bruising.    Differential: Contusion, fracture, compartment syndrome, cellulitis, necrotizing fasciitis, sepsis, CHF, ACS, arrhythmia.     MDM: Patient presented to the emergency department with limited complaints of left arm pain.  Arm noted to be markedly swollen, erythematous, and warm.  He reports history of trauma preceding the pain and swelling and denies fever or recent wounds.  X-rays are obtained of the left elbow, left forearm, and left hand.  These x-rays were reviewed by myself.  There was no evidence of fracture or dislocation.  Radiology did not note any soft tissue gas.  Close therefore concern for cellulitis versus necrotizing fasciitis given the degree of swelling and redness.  Additionally he is tachycardic although not hypotensive or febrile so may be septic from cellulitis.  Patient had IV established.  He was started on IV fluid hydration and received an initial 1 L.  Patient did have leukocytosis with left shift.  He had no LAMBERTO.  Lactic acid was normal however.  Given the severity of the pain in his arm CT with contrast was obtained to evaluate for possible necrotizing fasciitis or abscess formation.  This shows soft tissue swelling but no abscess and no gas.  After IV fluids he was noted to have a decreased oxygenation.  He was also drowsier from the morphine that had been given.  His oxygen saturations did drop.  Chest x-ray was performed which showed some peribronchial cuffing and vascular congestion.  No further fluids were given for this reason.  He continued to have tachycardia despite IV fluids so EKG was performed showing what appeared to be either atrial fibrillation with a rapid ventricular response or sinus tachycardia with very frequent PACs.  There was no acute ischemic changes.  No ST segment elevation or depression present.  Troponin and BNP were added

## 2024-08-02 NOTE — ED TRIAGE NOTES
80y/o male presents to the ED with right forearm, wrist, and hand injury s/p fall yesterday. Pt states he was walking up the steps when he tripped and fell. +swelling and pain to left hand.

## 2024-08-03 PROBLEM — A41.9 SEPSIS (HCC): Status: ACTIVE | Noted: 2024-08-03

## 2024-08-03 PROBLEM — L03.114 LEFT ARM CELLULITIS: Status: ACTIVE | Noted: 2024-08-03

## 2024-08-03 LAB
CRP SERPL-MCNC: 62.3 MG/L (ref 0–5.1)
GLUCOSE BLD-MCNC: 125 MG/DL (ref 70–99)
GLUCOSE BLD-MCNC: 126 MG/DL (ref 70–99)
GLUCOSE BLD-MCNC: 128 MG/DL (ref 70–99)
PERFORMED ON: ABNORMAL
TROPONIN, HIGH SENSITIVITY: 38 NG/L (ref 0–22)

## 2024-08-03 PROCEDURE — 36415 COLL VENOUS BLD VENIPUNCTURE: CPT

## 2024-08-03 PROCEDURE — 2580000003 HC RX 258: Performed by: EMERGENCY MEDICINE

## 2024-08-03 PROCEDURE — 84484 ASSAY OF TROPONIN QUANT: CPT

## 2024-08-03 PROCEDURE — 87641 MR-STAPH DNA AMP PROBE: CPT

## 2024-08-03 PROCEDURE — 6360000002 HC RX W HCPCS: Performed by: HOSPITALIST

## 2024-08-03 PROCEDURE — 6360000002 HC RX W HCPCS: Performed by: EMERGENCY MEDICINE

## 2024-08-03 PROCEDURE — 94640 AIRWAY INHALATION TREATMENT: CPT

## 2024-08-03 PROCEDURE — 86140 C-REACTIVE PROTEIN: CPT

## 2024-08-03 PROCEDURE — 1200000000 HC SEMI PRIVATE

## 2024-08-03 PROCEDURE — 6370000000 HC RX 637 (ALT 250 FOR IP): Performed by: NURSE PRACTITIONER

## 2024-08-03 PROCEDURE — 2580000003 HC RX 258: Performed by: HOSPITALIST

## 2024-08-03 PROCEDURE — 6370000000 HC RX 637 (ALT 250 FOR IP): Performed by: HOSPITALIST

## 2024-08-03 PROCEDURE — 87040 BLOOD CULTURE FOR BACTERIA: CPT

## 2024-08-03 PROCEDURE — 94760 N-INVAS EAR/PLS OXIMETRY 1: CPT

## 2024-08-03 RX ORDER — POTASSIUM CHLORIDE 7.45 MG/ML
10 INJECTION INTRAVENOUS PRN
Status: DISCONTINUED | OUTPATIENT
Start: 2024-08-03 | End: 2024-08-08 | Stop reason: HOSPADM

## 2024-08-03 RX ORDER — GLUCAGON 1 MG/ML
1 KIT INJECTION PRN
Status: DISCONTINUED | OUTPATIENT
Start: 2024-08-03 | End: 2024-08-08 | Stop reason: HOSPADM

## 2024-08-03 RX ORDER — ACETAMINOPHEN 650 MG/1
650 SUPPOSITORY RECTAL EVERY 6 HOURS PRN
Status: DISCONTINUED | OUTPATIENT
Start: 2024-08-03 | End: 2024-08-08 | Stop reason: HOSPADM

## 2024-08-03 RX ORDER — SODIUM CHLORIDE 9 MG/ML
INJECTION, SOLUTION INTRAVENOUS PRN
Status: DISCONTINUED | OUTPATIENT
Start: 2024-08-03 | End: 2024-08-08 | Stop reason: HOSPADM

## 2024-08-03 RX ORDER — MAGNESIUM SULFATE IN WATER 40 MG/ML
2000 INJECTION, SOLUTION INTRAVENOUS PRN
Status: DISCONTINUED | OUTPATIENT
Start: 2024-08-03 | End: 2024-08-08 | Stop reason: HOSPADM

## 2024-08-03 RX ORDER — ONDANSETRON 4 MG/1
4 TABLET, ORALLY DISINTEGRATING ORAL EVERY 8 HOURS PRN
Status: DISCONTINUED | OUTPATIENT
Start: 2024-08-03 | End: 2024-08-08 | Stop reason: HOSPADM

## 2024-08-03 RX ORDER — INSULIN GLARGINE 100 [IU]/ML
5 INJECTION, SOLUTION SUBCUTANEOUS NIGHTLY
Status: DISCONTINUED | OUTPATIENT
Start: 2024-08-03 | End: 2024-08-08 | Stop reason: HOSPADM

## 2024-08-03 RX ORDER — OXYCODONE HYDROCHLORIDE 5 MG/1
5 TABLET ORAL EVERY 4 HOURS PRN
Status: DISCONTINUED | OUTPATIENT
Start: 2024-08-03 | End: 2024-08-08 | Stop reason: HOSPADM

## 2024-08-03 RX ORDER — POTASSIUM CHLORIDE 20 MEQ/1
40 TABLET, EXTENDED RELEASE ORAL PRN
Status: DISCONTINUED | OUTPATIENT
Start: 2024-08-03 | End: 2024-08-08 | Stop reason: HOSPADM

## 2024-08-03 RX ORDER — CETIRIZINE HYDROCHLORIDE 10 MG/1
10 TABLET ORAL DAILY
Status: DISCONTINUED | OUTPATIENT
Start: 2024-08-03 | End: 2024-08-08 | Stop reason: HOSPADM

## 2024-08-03 RX ORDER — POLYETHYLENE GLYCOL 3350 17 G/17G
17 POWDER, FOR SOLUTION ORAL DAILY PRN
Status: DISCONTINUED | OUTPATIENT
Start: 2024-08-03 | End: 2024-08-08 | Stop reason: HOSPADM

## 2024-08-03 RX ORDER — SODIUM CHLORIDE 0.9 % (FLUSH) 0.9 %
5-40 SYRINGE (ML) INJECTION EVERY 12 HOURS SCHEDULED
Status: DISCONTINUED | OUTPATIENT
Start: 2024-08-03 | End: 2024-08-08 | Stop reason: HOSPADM

## 2024-08-03 RX ORDER — INSULIN LISPRO 100 [IU]/ML
0-4 INJECTION, SOLUTION INTRAVENOUS; SUBCUTANEOUS NIGHTLY
Status: DISCONTINUED | OUTPATIENT
Start: 2024-08-03 | End: 2024-08-08 | Stop reason: HOSPADM

## 2024-08-03 RX ORDER — IPRATROPIUM BROMIDE AND ALBUTEROL SULFATE 2.5; .5 MG/3ML; MG/3ML
1 SOLUTION RESPIRATORY (INHALATION) EVERY 4 HOURS PRN
Status: DISCONTINUED | OUTPATIENT
Start: 2024-08-03 | End: 2024-08-08 | Stop reason: HOSPADM

## 2024-08-03 RX ORDER — TAMSULOSIN HYDROCHLORIDE 0.4 MG/1
0.4 CAPSULE ORAL DAILY
Status: DISCONTINUED | OUTPATIENT
Start: 2024-08-04 | End: 2024-08-08 | Stop reason: HOSPADM

## 2024-08-03 RX ORDER — INSULIN LISPRO 100 [IU]/ML
0-8 INJECTION, SOLUTION INTRAVENOUS; SUBCUTANEOUS
Status: DISCONTINUED | OUTPATIENT
Start: 2024-08-03 | End: 2024-08-08 | Stop reason: HOSPADM

## 2024-08-03 RX ORDER — TRIAMCINOLONE ACETONIDE 1 MG/G
OINTMENT TOPICAL 2 TIMES DAILY
Status: DISCONTINUED | OUTPATIENT
Start: 2024-08-03 | End: 2024-08-04

## 2024-08-03 RX ORDER — ACETAMINOPHEN 325 MG/1
650 TABLET ORAL EVERY 6 HOURS PRN
Status: DISCONTINUED | OUTPATIENT
Start: 2024-08-03 | End: 2024-08-08 | Stop reason: HOSPADM

## 2024-08-03 RX ORDER — EZETIMIBE 10 MG/1
10 TABLET ORAL DAILY
Status: DISCONTINUED | OUTPATIENT
Start: 2024-08-03 | End: 2024-08-08 | Stop reason: HOSPADM

## 2024-08-03 RX ORDER — ROSUVASTATIN CALCIUM 20 MG/1
40 TABLET, COATED ORAL DAILY
Status: DISCONTINUED | OUTPATIENT
Start: 2024-08-03 | End: 2024-08-08 | Stop reason: HOSPADM

## 2024-08-03 RX ORDER — DEXTROSE MONOHYDRATE 100 MG/ML
INJECTION, SOLUTION INTRAVENOUS CONTINUOUS PRN
Status: DISCONTINUED | OUTPATIENT
Start: 2024-08-03 | End: 2024-08-08 | Stop reason: HOSPADM

## 2024-08-03 RX ORDER — NICOTINE 21 MG/24HR
1 PATCH, TRANSDERMAL 24 HOURS TRANSDERMAL DAILY
Status: DISCONTINUED | OUTPATIENT
Start: 2024-08-03 | End: 2024-08-08 | Stop reason: HOSPADM

## 2024-08-03 RX ORDER — MORPHINE SULFATE 4 MG/ML
4 INJECTION INTRAVENOUS ONCE
Status: COMPLETED | OUTPATIENT
Start: 2024-08-03 | End: 2024-08-03

## 2024-08-03 RX ORDER — HYDROMORPHONE HYDROCHLORIDE 1 MG/ML
0.5 INJECTION, SOLUTION INTRAMUSCULAR; INTRAVENOUS; SUBCUTANEOUS EVERY 6 HOURS PRN
Status: DISCONTINUED | OUTPATIENT
Start: 2024-08-03 | End: 2024-08-08 | Stop reason: HOSPADM

## 2024-08-03 RX ORDER — SODIUM CHLORIDE 0.9 % (FLUSH) 0.9 %
5-40 SYRINGE (ML) INJECTION PRN
Status: DISCONTINUED | OUTPATIENT
Start: 2024-08-03 | End: 2024-08-08 | Stop reason: HOSPADM

## 2024-08-03 RX ORDER — ONDANSETRON 2 MG/ML
4 INJECTION INTRAMUSCULAR; INTRAVENOUS EVERY 6 HOURS PRN
Status: DISCONTINUED | OUTPATIENT
Start: 2024-08-03 | End: 2024-08-08 | Stop reason: HOSPADM

## 2024-08-03 RX ORDER — ENOXAPARIN SODIUM 100 MG/ML
30 INJECTION SUBCUTANEOUS 2 TIMES DAILY
Status: DISCONTINUED | OUTPATIENT
Start: 2024-08-03 | End: 2024-08-08 | Stop reason: HOSPADM

## 2024-08-03 RX ADMIN — SODIUM CHLORIDE, PRESERVATIVE FREE 10 ML: 5 INJECTION INTRAVENOUS at 10:37

## 2024-08-03 RX ADMIN — PIPERACILLIN AND TAZOBACTAM 3375 MG: 3; .375 INJECTION, POWDER, LYOPHILIZED, FOR SOLUTION INTRAVENOUS at 10:43

## 2024-08-03 RX ADMIN — SODIUM CHLORIDE: 9 INJECTION, SOLUTION INTRAVENOUS at 10:37

## 2024-08-03 RX ADMIN — CETIRIZINE HYDROCHLORIDE 10 MG: 10 TABLET, FILM COATED ORAL at 10:37

## 2024-08-03 RX ADMIN — PIPERACILLIN AND TAZOBACTAM 3375 MG: 3; .375 INJECTION, POWDER, LYOPHILIZED, FOR SOLUTION INTRAVENOUS at 02:18

## 2024-08-03 RX ADMIN — ARFORMOTEROL TARTRATE: 15 SOLUTION RESPIRATORY (INHALATION) at 21:03

## 2024-08-03 RX ADMIN — ENOXAPARIN SODIUM 30 MG: 100 INJECTION SUBCUTANEOUS at 19:46

## 2024-08-03 RX ADMIN — INSULIN GLARGINE 5 UNITS: 100 INJECTION, SOLUTION SUBCUTANEOUS at 19:45

## 2024-08-03 RX ADMIN — ROSUVASTATIN CALCIUM 40 MG: 20 TABLET, COATED ORAL at 10:37

## 2024-08-03 RX ADMIN — ENOXAPARIN SODIUM 30 MG: 100 INJECTION SUBCUTANEOUS at 10:37

## 2024-08-03 RX ADMIN — TRIAMCINOLONE ACETONIDE: 1 OINTMENT TOPICAL at 21:46

## 2024-08-03 RX ADMIN — EZETIMIBE 10 MG: 10 TABLET ORAL at 10:37

## 2024-08-03 RX ADMIN — PIPERACILLIN AND TAZOBACTAM 3375 MG: 3; .375 INJECTION, POWDER, LYOPHILIZED, FOR SOLUTION INTRAVENOUS at 18:50

## 2024-08-03 RX ADMIN — MORPHINE SULFATE 4 MG: 4 INJECTION, SOLUTION INTRAMUSCULAR; INTRAVENOUS at 04:00

## 2024-08-03 ASSESSMENT — PAIN DESCRIPTION - ORIENTATION: ORIENTATION: LEFT

## 2024-08-03 ASSESSMENT — PAIN SCALES - GENERAL
PAINLEVEL_OUTOF10: 0
PAINLEVEL_OUTOF10: 8
PAINLEVEL_OUTOF10: 0
PAINLEVEL_OUTOF10: 4
PAINLEVEL_OUTOF10: 0

## 2024-08-03 ASSESSMENT — PAIN - FUNCTIONAL ASSESSMENT: PAIN_FUNCTIONAL_ASSESSMENT: PREVENTS OR INTERFERES SOME ACTIVE ACTIVITIES AND ADLS

## 2024-08-03 ASSESSMENT — PAIN DESCRIPTION - PAIN TYPE: TYPE: ACUTE PAIN

## 2024-08-03 ASSESSMENT — PAIN DESCRIPTION - FREQUENCY: FREQUENCY: INTERMITTENT

## 2024-08-03 ASSESSMENT — PAIN DESCRIPTION - DESCRIPTORS: DESCRIPTORS: ACHING;DISCOMFORT

## 2024-08-03 ASSESSMENT — PAIN DESCRIPTION - ONSET: ONSET: ON-GOING

## 2024-08-03 ASSESSMENT — PAIN DESCRIPTION - LOCATION: LOCATION: HAND

## 2024-08-03 ASSESSMENT — PAIN DESCRIPTION - DIRECTION: RADIATING_TOWARDS: DENIES

## 2024-08-03 NOTE — ED NOTES
Patient asked if he would like to sit up in chair, prefers to stay in bed. Arms and back scratched for pt.

## 2024-08-03 NOTE — H&P
PRN  HYDROmorphone, 0.5 mg, Q6H PRN  ipratropium 0.5 mg-albuterol 2.5 mg, 1 Dose, Q4H PRN  glucose, 4 tablet, PRN  dextrose bolus, 125 mL, PRN   Or  dextrose bolus, 250 mL, PRN  glucagon (rDNA), 1 mg, PRN  dextrose, , Continuous PRN        Labs      CBC:   Recent Labs     08/02/24 2018   WBC 15.7*   HGB 11.7*        BMP:    Recent Labs     08/02/24 2018      K 3.7      CO2 25   BUN 10   CREATININE 0.8   GLUCOSE 98     Hepatic: No results for input(s): \"AST\", \"ALT\", \"BILITOT\", \"ALKPHOS\" in the last 72 hours.    Invalid input(s): \"ALB\"  Lipids:   Lab Results   Component Value Date/Time    CHOL 150 02/15/2024 01:38 PM    HDL 72 02/15/2024 01:38 PM    HDL 39 05/10/2012 09:43 AM    TRIG 73 02/15/2024 01:38 PM     Hemoglobin A1C:   Lab Results   Component Value Date/Time    LABA1C 5.6 05/09/2024 09:46 AM     TSH:   Lab Results   Component Value Date/Time    TSH 1.35 02/15/2024 01:38 PM     Troponin: No results found for: \"TROPONINT\"  Lactic Acid: No results for input(s): \"LACTA\" in the last 72 hours.  BNP:   Recent Labs     08/02/24 2018   PROBNP 1,451*     UA:  Lab Results   Component Value Date/Time    NITRU Negative 10/14/2023 11:51 PM    COLORU Yellow 10/14/2023 11:51 PM    PHUR 5.5 10/14/2023 11:51 PM    WBCUA 3-5 10/14/2023 11:51 PM    RBCUA >100 10/14/2023 11:51 PM    MUCUS 1+ 10/14/2023 11:51 PM    BACTERIA 2+ 10/14/2023 11:51 PM    CLARITYU SL CLOUDY 10/14/2023 11:51 PM    SPECGRAV 1.030 04/19/2018 10:19 AM    LEUKOCYTESUR Negative 10/14/2023 11:51 PM    UROBILINOGEN 1.0 10/14/2023 11:51 PM    BILIRUBINUR Negative 10/14/2023 11:51 PM    BILIRUBINUR Negative 04/19/2018 10:19 AM    BLOODU LARGE 10/14/2023 11:51 PM    GLUCOSEU Negative 10/14/2023 11:51 PM    KETUA TRACE 10/14/2023 11:51 PM    AMORPHOUS 2+ 02/24/2015 03:51 PM     Urine Cultures:   Lab Results   Component Value Date/Time    LABURIN No growth at 18-36 hours 04/19/2018 10:57 AM     Blood Cultures: No results found for: \"BC\"  No

## 2024-08-03 NOTE — PROGRESS NOTES
..4 Eyes Admission Assessment     I agree as the admission nurse that 2 RN's have performed a thorough Head to Toe Skin Assessment on the patient. ALL assessment sites listed below have been assessed on admission.       Areas assessed by both nurses:   [x]   Head, Face, and Ears   [x]   Shoulders, Back, and Chest  [x]   Arms, Elbows, and Hands   [x]   Coccyx, Sacrum, and Ischum  [x]   Legs, Feet, and Heels        Admission findings:  Patient has red and swollen left upper extremity. Patient also has itching and flaking of skin on back. Patient has scratched hard enough to have opened some of these sores ; scratches are shallow and not bleeding at this time. Dry and cracking on heels of bother lower extremities.       Does the Patient have Skin Breakdown?  Yes a wound was noted on the Admission Assessment and an LDA was Initiated documentation include the Savi-wound, Wound Assessment, Measurements, Dressing Treatment, Drainage, and Color\",         Shubham Prevention initiated:  NA   Wound Care Orders initiated:  NA      WOC nurse consulted for Pressure Injury (Stage 3,4, Unstageable, DTI, NWPT, and Complex wounds):  NA      Nurse 1 eSignature: Electronically signed by Huma Rm RN on 8/3/24 at 2:36 PM EDT    **SHARE this note so that the co-signing nurse is able to place an eSignature**    Nurse 2 eSignature: Electronically signed by Donny Cerrato RN on 8/3/24 at 2:41 PM EDT

## 2024-08-03 NOTE — PROGRESS NOTES
[facility] [place] Hospitalist transfer acceptance note    Case reviewed with ER physician.     Transfer from: [facility]  Prelim diagnosis: Sepsis secondary to left arm cellulitis, and pulmonary edema     Per discussion with ED provider, left arm pain and swelling following fall on 8/1. No numbness, head trauma or LOC. Had tachycardia with leukocytosis of 15.7 on arrival to ED. Received Vanc/Cefepime with 1L NS. CT of left arm with diffuse swelling only, no osseous involvement, gas or abscess. Was on room air, however, has desaturations when asleep, requiring 2L NC. CXR suggestive of pulmonary edema.    Will likely need further IV antibiotics, TTE and potential diuresis.     Patient has been accepted for transfer to [facility]. Once patient arrive please page ON CALL HOSPITALIST so patient can be seen.     Alvaro Douglas MD  Night hospitalist  08/03/24 2:25 AM

## 2024-08-03 NOTE — PLAN OF CARE
Problem: Pain  Goal: Verbalizes/displays adequate comfort level or baseline comfort level  Outcome: Progressing  Flowsheets (Taken 8/3/2024 1429)  Verbalizes/displays adequate comfort level or baseline comfort level:   Encourage patient to monitor pain and request assistance   Administer analgesics based on type and severity of pain and evaluate response   Consider cultural and social influences on pain and pain management   Assess pain using appropriate pain scale   Implement non-pharmacological measures as appropriate and evaluate response   Notify Licensed Independent Practitioner if interventions unsuccessful or patient reports new pain     Problem: ABCDS Injury Assessment  Goal: Absence of physical injury  Outcome: Progressing  Flowsheets (Taken 8/3/2024 1429)  Absence of Physical Injury: Implement safety measures based on patient assessment

## 2024-08-04 LAB
ALBUMIN SERPL-MCNC: 1.9 G/DL (ref 3.4–5)
ALBUMIN/GLOB SERPL: 0.6 {RATIO} (ref 1.1–2.2)
ALP SERPL-CCNC: 103 U/L (ref 40–129)
ALT SERPL-CCNC: <5 U/L (ref 10–40)
ANION GAP SERPL CALCULATED.3IONS-SCNC: 6 MMOL/L (ref 3–16)
AST SERPL-CCNC: 15 U/L (ref 15–37)
BASOPHILS # BLD: 0.1 K/UL (ref 0–0.2)
BASOPHILS NFR BLD: 0.5 %
BILIRUB SERPL-MCNC: 0.4 MG/DL (ref 0–1)
BUN SERPL-MCNC: 9 MG/DL (ref 7–20)
CALCIUM SERPL-MCNC: 7.2 MG/DL (ref 8.3–10.6)
CHLORIDE SERPL-SCNC: 102 MMOL/L (ref 99–110)
CO2 SERPL-SCNC: 28 MMOL/L (ref 21–32)
CREAT SERPL-MCNC: 0.7 MG/DL (ref 0.8–1.3)
CRP SERPL-MCNC: 68.4 MG/L (ref 0–5.1)
DEPRECATED RDW RBC AUTO: 14.8 % (ref 12.4–15.4)
EKG ATRIAL RATE: 105 BPM
EKG DIAGNOSIS: NORMAL
EKG DIAGNOSIS: NORMAL
EKG P AXIS: 69 DEGREES
EKG P-R INTERVAL: 152 MS
EKG Q-T INTERVAL: 338 MS
EKG Q-T INTERVAL: 364 MS
EKG QRS DURATION: 100 MS
EKG QRS DURATION: 100 MS
EKG QTC CALCULATION (BAZETT): 481 MS
EKG QTC CALCULATION (BAZETT): 487 MS
EKG R AXIS: -13 DEGREES
EKG R AXIS: -17 DEGREES
EKG T AXIS: 50 DEGREES
EKG T AXIS: 57 DEGREES
EKG VENTRICULAR RATE: 105 BPM
EKG VENTRICULAR RATE: 125 BPM
EOSINOPHIL # BLD: 0.4 K/UL (ref 0–0.6)
EOSINOPHIL NFR BLD: 3.7 %
GFR SERPLBLD CREATININE-BSD FMLA CKD-EPI: >90 ML/MIN/{1.73_M2}
GLUCOSE BLD-MCNC: 146 MG/DL (ref 70–99)
GLUCOSE BLD-MCNC: 175 MG/DL (ref 70–99)
GLUCOSE BLD-MCNC: 197 MG/DL (ref 70–99)
GLUCOSE BLD-MCNC: 86 MG/DL (ref 70–99)
GLUCOSE SERPL-MCNC: 80 MG/DL (ref 70–99)
HCT VFR BLD AUTO: 32.7 % (ref 40.5–52.5)
HGB BLD-MCNC: 10.7 G/DL (ref 13.5–17.5)
LYMPHOCYTES # BLD: 1.2 K/UL (ref 1–5.1)
LYMPHOCYTES NFR BLD: 10.5 %
MAGNESIUM SERPL-MCNC: 1.8 MG/DL (ref 1.8–2.4)
MCH RBC QN AUTO: 32 PG (ref 26–34)
MCHC RBC AUTO-ENTMCNC: 32.6 G/DL (ref 31–36)
MCV RBC AUTO: 98.3 FL (ref 80–100)
MONOCYTES # BLD: 1.3 K/UL (ref 0–1.3)
MONOCYTES NFR BLD: 11.3 %
NEUTROPHILS # BLD: 8.3 K/UL (ref 1.7–7.7)
NEUTROPHILS NFR BLD: 74 %
PERFORMED ON: ABNORMAL
PERFORMED ON: NORMAL
PLATELET # BLD AUTO: 168 K/UL (ref 135–450)
PMV BLD AUTO: 8 FL (ref 5–10.5)
POTASSIUM SERPL-SCNC: 3.5 MMOL/L (ref 3.5–5.1)
PROT SERPL-MCNC: 5.3 G/DL (ref 6.4–8.2)
RBC # BLD AUTO: 3.33 M/UL (ref 4.2–5.9)
SODIUM SERPL-SCNC: 136 MMOL/L (ref 136–145)
WBC # BLD AUTO: 11.2 K/UL (ref 4–11)

## 2024-08-04 PROCEDURE — 1200000000 HC SEMI PRIVATE

## 2024-08-04 PROCEDURE — 86140 C-REACTIVE PROTEIN: CPT

## 2024-08-04 PROCEDURE — 6360000002 HC RX W HCPCS: Performed by: HOSPITALIST

## 2024-08-04 PROCEDURE — 85025 COMPLETE CBC W/AUTO DIFF WBC: CPT

## 2024-08-04 PROCEDURE — 94760 N-INVAS EAR/PLS OXIMETRY 1: CPT

## 2024-08-04 PROCEDURE — 6370000000 HC RX 637 (ALT 250 FOR IP): Performed by: HOSPITALIST

## 2024-08-04 PROCEDURE — 2580000003 HC RX 258: Performed by: HOSPITALIST

## 2024-08-04 PROCEDURE — 94640 AIRWAY INHALATION TREATMENT: CPT

## 2024-08-04 PROCEDURE — 83735 ASSAY OF MAGNESIUM: CPT

## 2024-08-04 PROCEDURE — 93010 ELECTROCARDIOGRAM REPORT: CPT | Performed by: INTERNAL MEDICINE

## 2024-08-04 PROCEDURE — 80053 COMPREHEN METABOLIC PANEL: CPT

## 2024-08-04 PROCEDURE — 36415 COLL VENOUS BLD VENIPUNCTURE: CPT

## 2024-08-04 RX ORDER — BETAMETHASONE DIPROPIONATE 0.5 MG/G
CREAM TOPICAL 2 TIMES DAILY
Status: DISCONTINUED | OUTPATIENT
Start: 2024-08-04 | End: 2024-08-04

## 2024-08-04 RX ORDER — HYDROXYZINE HYDROCHLORIDE 10 MG/1
10 TABLET, FILM COATED ORAL 3 TIMES DAILY PRN
Status: DISCONTINUED | OUTPATIENT
Start: 2024-08-04 | End: 2024-08-08 | Stop reason: HOSPADM

## 2024-08-04 RX ORDER — BETAMETHASONE DIPROPIONATE 0.5 MG/G
CREAM TOPICAL 2 TIMES DAILY
Status: DISCONTINUED | OUTPATIENT
Start: 2024-08-04 | End: 2024-08-08 | Stop reason: HOSPADM

## 2024-08-04 RX ADMIN — OXYCODONE HYDROCHLORIDE 5 MG: 5 TABLET ORAL at 12:09

## 2024-08-04 RX ADMIN — BETAMETHASONE DIPROPIONATE: 0.5 CREAM TOPICAL at 20:42

## 2024-08-04 RX ADMIN — BETAMETHASONE DIPROPIONATE: 0.5 CREAM TOPICAL at 10:58

## 2024-08-04 RX ADMIN — EZETIMIBE 10 MG: 10 TABLET ORAL at 08:20

## 2024-08-04 RX ADMIN — ROSUVASTATIN CALCIUM 40 MG: 20 TABLET, COATED ORAL at 08:19

## 2024-08-04 RX ADMIN — ARFORMOTEROL TARTRATE: 15 SOLUTION RESPIRATORY (INHALATION) at 21:23

## 2024-08-04 RX ADMIN — ENOXAPARIN SODIUM 30 MG: 100 INJECTION SUBCUTANEOUS at 20:42

## 2024-08-04 RX ADMIN — TRIAMCINOLONE ACETONIDE: 1 OINTMENT TOPICAL at 08:21

## 2024-08-04 RX ADMIN — ENOXAPARIN SODIUM 30 MG: 100 INJECTION SUBCUTANEOUS at 08:20

## 2024-08-04 RX ADMIN — SODIUM CHLORIDE, PRESERVATIVE FREE 10 ML: 5 INJECTION INTRAVENOUS at 20:41

## 2024-08-04 RX ADMIN — INSULIN GLARGINE 5 UNITS: 100 INJECTION, SOLUTION SUBCUTANEOUS at 20:41

## 2024-08-04 RX ADMIN — OXYCODONE HYDROCHLORIDE 5 MG: 5 TABLET ORAL at 21:57

## 2024-08-04 RX ADMIN — SODIUM CHLORIDE, PRESERVATIVE FREE 10 ML: 5 INJECTION INTRAVENOUS at 08:20

## 2024-08-04 RX ADMIN — CETIRIZINE HYDROCHLORIDE 10 MG: 10 TABLET, FILM COATED ORAL at 08:20

## 2024-08-04 RX ADMIN — PIPERACILLIN AND TAZOBACTAM 3375 MG: 3; .375 INJECTION, POWDER, LYOPHILIZED, FOR SOLUTION INTRAVENOUS at 03:24

## 2024-08-04 RX ADMIN — WATER 2000 MG: 1 INJECTION INTRAMUSCULAR; INTRAVENOUS; SUBCUTANEOUS at 12:10

## 2024-08-04 RX ADMIN — TAMSULOSIN HYDROCHLORIDE 0.4 MG: 0.4 CAPSULE ORAL at 08:20

## 2024-08-04 RX ADMIN — OXYCODONE HYDROCHLORIDE 5 MG: 5 TABLET ORAL at 03:20

## 2024-08-04 RX ADMIN — WATER 2000 MG: 1 INJECTION INTRAMUSCULAR; INTRAVENOUS; SUBCUTANEOUS at 18:25

## 2024-08-04 ASSESSMENT — PAIN DESCRIPTION - ORIENTATION: ORIENTATION: LEFT

## 2024-08-04 ASSESSMENT — PAIN SCALES - GENERAL
PAINLEVEL_OUTOF10: 4
PAINLEVEL_OUTOF10: 0
PAINLEVEL_OUTOF10: 5
PAINLEVEL_OUTOF10: 0
PAINLEVEL_OUTOF10: 0
PAINLEVEL_OUTOF10: 8
PAINLEVEL_OUTOF10: 0

## 2024-08-04 ASSESSMENT — PAIN - FUNCTIONAL ASSESSMENT: PAIN_FUNCTIONAL_ASSESSMENT: PREVENTS OR INTERFERES SOME ACTIVE ACTIVITIES AND ADLS

## 2024-08-04 ASSESSMENT — PAIN DESCRIPTION - LOCATION: LOCATION: ARM

## 2024-08-04 ASSESSMENT — PAIN DESCRIPTION - DESCRIPTORS: DESCRIPTORS: ACHING

## 2024-08-04 NOTE — PROGRESS NOTES
Pt refused his Tx this morning as he was finishing his breakfast, Will atempt to stop by again as the schedule allows it.

## 2024-08-04 NOTE — PROGRESS NOTES
Medicated with Oxycodone 5 mg po as ordered, for complaints of pain to left arm. Arm remains elevated on pillows, continue on IV ATBx. Call light in reach

## 2024-08-04 NOTE — PLAN OF CARE
Problem: Pain  Goal: Verbalizes/displays adequate comfort level or baseline comfort level  Outcome: Progressing  Flowsheets (Taken 8/4/2024 0759)  Verbalizes/displays adequate comfort level or baseline comfort level:   Encourage patient to monitor pain and request assistance   Administer analgesics based on type and severity of pain and evaluate response   Consider cultural and social influences on pain and pain management   Assess pain using appropriate pain scale   Implement non-pharmacological measures as appropriate and evaluate response   Notify Licensed Independent Practitioner if interventions unsuccessful or patient reports new pain     Problem: Safety - Adult  Goal: Free from fall injury  Outcome: Progressing  Flowsheets (Taken 8/4/2024 0759)  Free From Fall Injury:   Instruct family/caregiver on patient safety   Based on caregiver fall risk screen, instruct family/caregiver to ask for assistance with transferring infant if caregiver noted to have fall risk factors

## 2024-08-04 NOTE — PROGRESS NOTES
chloride, 10 mEq, PRN  magnesium sulfate, 2,000 mg, PRN  ondansetron, 4 mg, Q8H PRN   Or  ondansetron, 4 mg, Q6H PRN  polyethylene glycol, 17 g, Daily PRN  acetaminophen, 650 mg, Q6H PRN   Or  acetaminophen, 650 mg, Q6H PRN  oxyCODONE, 5 mg, Q4H PRN  HYDROmorphone, 0.5 mg, Q6H PRN  ipratropium 0.5 mg-albuterol 2.5 mg, 1 Dose, Q4H PRN  glucose, 4 tablet, PRN  dextrose bolus, 125 mL, PRN   Or  dextrose bolus, 250 mL, PRN  glucagon (rDNA), 1 mg, PRN  dextrose, , Continuous PRN        Labs and Imaging   XR CHEST PORTABLE    Result Date: 8/2/2024  EXAM: PORTABLE AP CHEST X-RAY INDICATION: low sats, COMPARISON: 12/26/2019 FINDINGS: There is bilateral peribronchial thickening and pulmonary vascular congestion. There is no focal consolidation, pleural effusion, or pneumothorax. The cardiomediastinal silhouette is normal. The visible bony thorax is intact.     Bilateral peribronchial thickening and pulmonary vascular congestion. No focal consolidation. Electronically signed by Walker Clarke    CT RADIUS ULNA LEFT W CONTRAST    Result Date: 8/2/2024  EXAMINATION: CT left radius and ulna with contrast INDICATION: Pain and swelling TECHNIQUE: CT of the left radius and ulna was performed with contrast following administration of 75 mL Isovue-370 intravenous contrast according to standard protocol. Up-to-date CT equipment and radiation dose reduction techniques were employed. COMPARISON: radiographs from the same day FINDINGS: Osseous structures are intact. There is no acute fracture or dislocation. No osseous erosion or destruction is seen. There is a small cyst in the lunate. There is diffuse soft tissue swelling visible throughout the subcutaneous tissues without significant skin thickening. There is no evidence of soft tissue gas or foreign body. Vascular structures appear widely patent.     Diffuse soft tissue swelling. No acute osseous abnormality. Electronically signed by [employer]ssuk[facility]    XR HAND LEFT (MIN 3 VIEWS)    Result  Date: 8/2/2024  Exam: Left hand, 3 views History: fall, pain Comparison: None available Findings: There is no acute fracture or dislocation. The joint spaces are normal. No foreign body is seen. There is soft tissue swelling dorsally.     No acute osseous injury. Dorsal soft tissue swelling. Electronically signed by Walker Clarke    XR RADIUS ULNA LEFT (2 VIEWS)    Result Date: 8/2/2024  Exam: Left forearm, 2 views History: fall, pain Comparison: None available Findings: There is no acute fracture or dislocation. The joint spaces are normal. There is mild soft tissue swelling.     No acute osseous injury. Electronically signed by Walker Clarke    XR ELBOW LEFT (MIN 3 VIEWS)    Result Date: 8/2/2024  Exam: Left elbow, 3 views History: fall, pain Comparison: None available Findings: There is no acute fracture or dislocation. The joint space appears normal. Olecranon enthesophyte is noted. There is no joint effusion. There is mild soft tissue swelling.     No acute osseous injury. Mild soft tissue swelling dorsally at the elbow. Electronically signed by Walker Clarke      CBC:   Recent Labs     08/02/24 2018 08/04/24  0508   WBC 15.7* 11.2*   HGB 11.7* 10.7*    168     BMP:    Recent Labs     08/02/24 2018 08/04/24  0508    136   K 3.7 3.5    102   CO2 25 28   BUN 10 9   CREATININE 0.8 0.7*   GLUCOSE 98 80     Hepatic:   Recent Labs     08/04/24  0508   AST 15   ALT <5*   BILITOT 0.4   ALKPHOS 103     Lipids:   Lab Results   Component Value Date/Time    CHOL 150 02/15/2024 01:38 PM    HDL 72 02/15/2024 01:38 PM    HDL 39 05/10/2012 09:43 AM    TRIG 73 02/15/2024 01:38 PM     Hemoglobin A1C:   Lab Results   Component Value Date/Time    LABA1C 5.6 05/09/2024 09:46 AM     TSH:   Lab Results   Component Value Date/Time    TSH 1.35 02/15/2024 01:38 PM     Troponin: No results found for: \"TROPONINT\"  Lactic Acid: No results for input(s): \"LACTA\" in the last 72 hours.  BNP:   Recent Labs     08/02/24 2018

## 2024-08-05 LAB
ALBUMIN SERPL-MCNC: 1.9 G/DL (ref 3.4–5)
ALBUMIN/GLOB SERPL: 0.6 {RATIO} (ref 1.1–2.2)
ALP SERPL-CCNC: 109 U/L (ref 40–129)
ALT SERPL-CCNC: <5 U/L (ref 10–40)
ANION GAP SERPL CALCULATED.3IONS-SCNC: 7 MMOL/L (ref 3–16)
AST SERPL-CCNC: 16 U/L (ref 15–37)
BASOPHILS # BLD: 0.1 K/UL (ref 0–0.2)
BASOPHILS NFR BLD: 0.6 %
BILIRUB SERPL-MCNC: <0.2 MG/DL (ref 0–1)
BUN SERPL-MCNC: 10 MG/DL (ref 7–20)
CALCIUM SERPL-MCNC: 7.4 MG/DL (ref 8.3–10.6)
CHLORIDE SERPL-SCNC: 105 MMOL/L (ref 99–110)
CO2 SERPL-SCNC: 27 MMOL/L (ref 21–32)
CREAT SERPL-MCNC: 0.7 MG/DL (ref 0.8–1.3)
CRP SERPL-MCNC: 50.2 MG/L (ref 0–5.1)
DEPRECATED RDW RBC AUTO: 14.6 % (ref 12.4–15.4)
EOSINOPHIL # BLD: 0.2 K/UL (ref 0–0.6)
EOSINOPHIL NFR BLD: 2 %
GFR SERPLBLD CREATININE-BSD FMLA CKD-EPI: >90 ML/MIN/{1.73_M2}
GLUCOSE BLD-MCNC: 117 MG/DL (ref 70–99)
GLUCOSE BLD-MCNC: 150 MG/DL (ref 70–99)
GLUCOSE BLD-MCNC: 157 MG/DL (ref 70–99)
GLUCOSE BLD-MCNC: 72 MG/DL (ref 70–99)
GLUCOSE SERPL-MCNC: 93 MG/DL (ref 70–99)
HCT VFR BLD AUTO: 32.7 % (ref 40.5–52.5)
HGB BLD-MCNC: 10.5 G/DL (ref 13.5–17.5)
LYMPHOCYTES # BLD: 1.1 K/UL (ref 1–5.1)
LYMPHOCYTES NFR BLD: 9.6 %
MCH RBC QN AUTO: 31.4 PG (ref 26–34)
MCHC RBC AUTO-ENTMCNC: 32.1 G/DL (ref 31–36)
MCV RBC AUTO: 97.6 FL (ref 80–100)
MONOCYTES # BLD: 1.3 K/UL (ref 0–1.3)
MONOCYTES NFR BLD: 11.3 %
MRSA DNA SPEC QL NAA+PROBE: ABNORMAL
NEUTROPHILS # BLD: 8.7 K/UL (ref 1.7–7.7)
NEUTROPHILS NFR BLD: 76.5 %
ORGANISM: ABNORMAL
PERFORMED ON: ABNORMAL
PERFORMED ON: NORMAL
PLATELET # BLD AUTO: 190 K/UL (ref 135–450)
PMV BLD AUTO: 7.8 FL (ref 5–10.5)
POTASSIUM SERPL-SCNC: 3.8 MMOL/L (ref 3.5–5.1)
PROT SERPL-MCNC: 5.3 G/DL (ref 6.4–8.2)
RBC # BLD AUTO: 3.35 M/UL (ref 4.2–5.9)
SODIUM SERPL-SCNC: 139 MMOL/L (ref 136–145)
WBC # BLD AUTO: 11.4 K/UL (ref 4–11)

## 2024-08-05 PROCEDURE — 6360000002 HC RX W HCPCS: Performed by: HOSPITALIST

## 2024-08-05 PROCEDURE — 1200000000 HC SEMI PRIVATE

## 2024-08-05 PROCEDURE — 94640 AIRWAY INHALATION TREATMENT: CPT

## 2024-08-05 PROCEDURE — 2580000003 HC RX 258: Performed by: HOSPITALIST

## 2024-08-05 PROCEDURE — 6370000000 HC RX 637 (ALT 250 FOR IP): Performed by: HOSPITALIST

## 2024-08-05 PROCEDURE — 80053 COMPREHEN METABOLIC PANEL: CPT

## 2024-08-05 PROCEDURE — 85025 COMPLETE CBC W/AUTO DIFF WBC: CPT

## 2024-08-05 PROCEDURE — 36415 COLL VENOUS BLD VENIPUNCTURE: CPT

## 2024-08-05 PROCEDURE — 86140 C-REACTIVE PROTEIN: CPT

## 2024-08-05 RX ADMIN — OXYCODONE HYDROCHLORIDE 5 MG: 5 TABLET ORAL at 09:39

## 2024-08-05 RX ADMIN — SODIUM CHLORIDE 1500 MG: 9 INJECTION, SOLUTION INTRAVENOUS at 09:48

## 2024-08-05 RX ADMIN — OXYCODONE HYDROCHLORIDE 5 MG: 5 TABLET ORAL at 21:25

## 2024-08-05 RX ADMIN — ENOXAPARIN SODIUM 30 MG: 100 INJECTION SUBCUTANEOUS at 21:07

## 2024-08-05 RX ADMIN — SODIUM CHLORIDE, PRESERVATIVE FREE 10 ML: 5 INJECTION INTRAVENOUS at 21:06

## 2024-08-05 RX ADMIN — SODIUM CHLORIDE, PRESERVATIVE FREE 10 ML: 5 INJECTION INTRAVENOUS at 09:43

## 2024-08-05 RX ADMIN — ROSUVASTATIN CALCIUM 40 MG: 20 TABLET, COATED ORAL at 09:40

## 2024-08-05 RX ADMIN — SODIUM CHLORIDE 1500 MG: 9 INJECTION, SOLUTION INTRAVENOUS at 21:16

## 2024-08-05 RX ADMIN — ENOXAPARIN SODIUM 30 MG: 100 INJECTION SUBCUTANEOUS at 09:00

## 2024-08-05 RX ADMIN — CETIRIZINE HYDROCHLORIDE 10 MG: 10 TABLET, FILM COATED ORAL at 09:40

## 2024-08-05 RX ADMIN — WATER 2000 MG: 1 INJECTION INTRAMUSCULAR; INTRAVENOUS; SUBCUTANEOUS at 01:36

## 2024-08-05 RX ADMIN — EZETIMIBE 10 MG: 10 TABLET ORAL at 09:40

## 2024-08-05 RX ADMIN — BETAMETHASONE DIPROPIONATE: 0.5 CREAM TOPICAL at 09:46

## 2024-08-05 RX ADMIN — BETAMETHASONE DIPROPIONATE: 0.5 CREAM TOPICAL at 22:36

## 2024-08-05 RX ADMIN — ARFORMOTEROL TARTRATE: 15 SOLUTION RESPIRATORY (INHALATION) at 08:22

## 2024-08-05 RX ADMIN — ARFORMOTEROL TARTRATE: 15 SOLUTION RESPIRATORY (INHALATION) at 21:34

## 2024-08-05 RX ADMIN — TAMSULOSIN HYDROCHLORIDE 0.4 MG: 0.4 CAPSULE ORAL at 09:40

## 2024-08-05 RX ADMIN — SODIUM CHLORIDE: 9 INJECTION, SOLUTION INTRAVENOUS at 21:15

## 2024-08-05 RX ADMIN — INSULIN GLARGINE 5 UNITS: 100 INJECTION, SOLUTION SUBCUTANEOUS at 21:21

## 2024-08-05 RX ADMIN — OXYCODONE HYDROCHLORIDE 5 MG: 5 TABLET ORAL at 13:50

## 2024-08-05 ASSESSMENT — PAIN SCALES - WONG BAKER
WONGBAKER_NUMERICALRESPONSE: NO HURT
WONGBAKER_NUMERICALRESPONSE: NO HURT

## 2024-08-05 ASSESSMENT — PAIN DESCRIPTION - ORIENTATION
ORIENTATION: LEFT

## 2024-08-05 ASSESSMENT — PAIN DESCRIPTION - DESCRIPTORS
DESCRIPTORS: THROBBING
DESCRIPTORS: ACHING;THROBBING
DESCRIPTORS: THROBBING
DESCRIPTORS: THROBBING

## 2024-08-05 ASSESSMENT — PAIN DESCRIPTION - LOCATION
LOCATION: ARM

## 2024-08-05 ASSESSMENT — PAIN DESCRIPTION - FREQUENCY
FREQUENCY: INTERMITTENT
FREQUENCY: INTERMITTENT

## 2024-08-05 ASSESSMENT — PAIN SCALES - GENERAL
PAINLEVEL_OUTOF10: 0
PAINLEVEL_OUTOF10: 8
PAINLEVEL_OUTOF10: 0
PAINLEVEL_OUTOF10: 8
PAINLEVEL_OUTOF10: 7
PAINLEVEL_OUTOF10: 6
PAINLEVEL_OUTOF10: 4
PAINLEVEL_OUTOF10: 8

## 2024-08-05 ASSESSMENT — PAIN - FUNCTIONAL ASSESSMENT
PAIN_FUNCTIONAL_ASSESSMENT: PREVENTS OR INTERFERES SOME ACTIVE ACTIVITIES AND ADLS
PAIN_FUNCTIONAL_ASSESSMENT: PREVENTS OR INTERFERES SOME ACTIVE ACTIVITIES AND ADLS

## 2024-08-05 ASSESSMENT — PAIN DESCRIPTION - ONSET
ONSET: ON-GOING
ONSET: ON-GOING

## 2024-08-05 ASSESSMENT — PAIN DESCRIPTION - DIRECTION
RADIATING_TOWARDS: HAND
RADIATING_TOWARDS: HAND

## 2024-08-05 ASSESSMENT — PAIN DESCRIPTION - PAIN TYPE
TYPE: ACUTE PAIN
TYPE: ACUTE PAIN

## 2024-08-05 NOTE — CONSULTS
[facility] -  Clinical Pharmacy Note    Vancomycin - Management by Pharmacy    Consult Date(s): 8/5/24  Consulting Provider(s): Dr. Dodd    Assessment / Plan  1)  LUE cellulitis - Vancomycin  Concurrent Antimicrobials: n/a  Day of Vanc Therapy / Ordered Duration: 1 of 7  Current Dosing Method: Bayesian-Guided AUC Dosing  Therapeutic Goal: AUC < 500 mg/L*hr  Current Dose / Plan:   Will start 1500mg IV q12h.  Regimen predicts and AUC ~575 with trough = 16.4.  Will plan to check level in ~48 hrs if pt remains on Vancomycin.  Will continue to monitor clinical condition and make adjustments to regimen as appropriate.    Please call with questions--  Thanks--  Roxanne Tipton, PharmD, BCPS, BCGP  [id number] ([facility])   8/5/2024 9:22 AM      Interval update:  Nasal MRSA PCR is positive - Cefazolin changed to Vancomycin this AM.    Subjective/Objective:   Delano Daigle is a 79 y.o. male with a PMHx significant for COPD, DM 2, HTN, HLD, and eczema who is admitted with RUE cellulitis 2/2 mechanical fall.     Pharmacy is consulted to dose Vancomycin.    Ht Readings from Last 1 Encounters:   08/02/24 1.829 m (6')     Wt Readings from Last 1 Encounters:   08/02/24 101.1 kg (222 lb 14.2 oz)     Current & Prior Antimicrobial Regimen(s):   x1 dose 8/3 in ED   (8/3-8/4)   (8/4-8/5)  Vancomycin - Pharmacy to dose  1500mg IV q12h (8/5-current)    Vancomycin Level(s) / Doses:    Date Time Dose Type of Level / Level Interpretation                 Note: Serum levels collected for AUC-based dosing may be high if collected in close proximity to the dose administered. This is not necessarily indicative of toxicity.    Cultures & Sensitivities:    Date Site Micro Susceptibility / Result   8/2 Blood x2 No growth to date    8/3 Blood x2 No growth to date    8/3 MRSA nasal PCR positive      Recent Labs     08/02/24 2018 08/04/24  0508 08/05/24  0552   CREATININE 0.8 0.7* 0.7*   BUN 10 9 10   WBC 15.7* 11.2* 11.4*

## 2024-08-05 NOTE — PROGRESS NOTES
V2.0    [facility] [place]      Name:  Delano Daigle /Age/Sex: 1945  (79 y.o. male)   MRN & CSN:  [id number] & [id number] Encounter Date/Time: 2024 12:05 PM EDT   Location:  3306/3306-01 PCP: Jason Dawn MD     Attending:Bakari Dodd MD       Hospital Day: 4    Assessment and Recommendations   Delano Daigle is a 79 y.o. male with pmh of COPD, HTN, DMII, HLD, Tobacco abuse and Eczema who was transferred here for cellulitis and resp failure .     2 days before admission he had a fall while going up the stairs and he fell on Lt arm. Since then he has been having pain from his hand to his forearm associated with swelling and redness. No wounds . He was seen in the ED  where labs showed WBC 15.7 and CT showed diffuse soft tissue swelling . Other arm xrays were ok. He was given fluids then became hypoxic and CXR showed vascular congestion . He was given vanc/zosyn. He was tachycardic and was given 2.5 mg of IV metoprolol      LUE cellulitis 2/2 mechanical fall   -Forearm improved but his hand was still pretty swollen and tender. His MRSA screen is positive so switched yesterday Ancef to Vancomycin  -Today its slightly better but his is still pretty swollen and tender. Will ask ortho to have a look at his hand and will ask ID for opinion as well   -Edema wear      Sepsis 2/2 above - had leukocytosis and tachycardia . resolved     Hypoxia , resolved   Elevated trop  -BNP 1451. CXR showed vascular congestion . Clinically he is not symptomatic and denies any SOB or hx of cardiac disease  -resolved. He feels ok   -Pending Echo  -There was concern for Afib but there is clear P waves on the EKG. Tachycardia is due to pain and sepsis      Mild LFT elevation - resolved  Mild anemia - monitor for now     Hx of COPD , stable   Active tobacco abuse   HTN  DMII  HLD  -Hold BP meds for now  -Hold oral diabetic meds  -Cont SSI and lantus 5 units  -Cont other home meds   -Nicotine patch    Medical Decision

## 2024-08-05 NOTE — CARE COORDINATION
Case Management Assessment  Initial Evaluation    Date/Time of Evaluation: 8/5/2024 9:16 AM  Assessment Completed by: Huma Ayala RN    If patient is discharged prior to next notation, then this note serves as note for discharge by case management.    Patient Name: Delano Daigle                   YOB: 1945  Diagnosis: Elevated troponin [R79.89]  Cellulitis of left arm [L03.114]  Left arm cellulitis [L03.114]  Sepsis (HCC) [A41.9]                   Date / Time: 8/2/2024  6:37 PM    Patient Admission Status: Inpatient   Readmission Risk (Low < 19, Mod (19-27), High > 27): Readmission Risk Score: 15.6    Current PCP: Jason Dawn MD  PCP verified by CM? Yes    Chart Reviewed: Yes      History Provided by: Patient, Medical Record  Patient Orientation: Alert and Oriented    Patient Cognition: Alert    Hospitalization in the last 30 days (Readmission):  No    If yes, Readmission Assessment in CM Navigator will be completed.    Advance Directives:      Code Status: Full Code   Patient's Primary Decision Maker is: Legal Next of Kin      Discharge Planning:    Patient lives with: Alone Type of Home: Apartment  Primary Care Giver: Self  Patient Support Systems include: Family Members   Current Financial resources: Medicare  Current community resources: None  Current services prior to admission: None            Current DME:              Type of Home Care services:  None    ADLS  Prior functional level: Independent in ADLs/IADLs  Current functional level: Independent in ADLs/IADLs    PT AM-PAC:   /24  OT AM-PAC:   /24    Family can provide assistance at DC: No  Would you like Case Management to discuss the discharge plan with any other family members/significant others, and if so, who? No  Plans to Return to Present Housing: Yes  Potential Assistance needed at discharge: Home Care            Potential DME:  deferred  Patient expects to discharge to: Apartment  Plan for transportation at discharge:  may

## 2024-08-05 NOTE — PROGRESS NOTES
V2.0    [facility] Progress Note      Name:  Delano Daigle /Age/Sex: 1945  (79 y.o. male)   MRN & CSN:  [id number] & [id number] Encounter Date/Time: 2024 12:05 PM EDT   Location:  6317/6317-01 PCP: Jason Dawn MD     Attending:Bakari Dodd MD       Hospital Day: 4    Assessment and Recommendations   Delano Daigle is a 79 y.o. male with pmh of COPD, HTN, DMII, HLD, Tobacco abuse and Eczema who was transferred here for cellulitis and resp failure .     2 days before admission he had a fall while going up the stairs and he fell on Lt arm. Since then he has been having pain from his hand to his forearm associated with swelling and redness. No wounds . He was seen in the ED  where labs showed WBC 15.7 and CT showed diffuse soft tissue swelling . Other arm xrays were ok. He was given fluids then became hypoxic and CXR showed vascular congestion . He was given vanc/zosyn. He was tachycardic and was given 2.5 mg of IV metoprolol      LUE cellulitis 2/2 mechanical fall   -Forearm improved but his hand is still pretty swollen and tender. His MRSA screen is positive so will switch Ancef to Vancomycin  -If no improvement will ask ortho to have a look  -Edema wear      Sepsis 2/2 above - had leukocytosis and tachycardia . resolved     Hypoxia   COPD   Elevated trop  -BNP 1451. CXR showed vascular congestion . Clinically he is not symptomatic and denies any SOB or hx of cardiac disease  -resolved. He feels ok   -Pending Echo  -There was concern for Afib but there is clear P waves on the EKG. Tachycardia is due to pain and sepsis      Mild LFT elevation - resolved  Mild anemia - monitor for now      Active tobacco abuse   HTN  DMII  HLD  -Hold BP meds for now  -Hold oral diabetic meds  -Cont SSI and lantus 5 units  -Cont other home meds   -Nicotine patch    Medical Decision Making:  The following items were considered in medical decision making:  Discussion of patient care with other providers  Reviewed

## 2024-08-06 ENCOUNTER — APPOINTMENT (OUTPATIENT)
Dept: CT IMAGING | Age: 79
End: 2024-08-06
Payer: MEDICARE

## 2024-08-06 LAB
ALBUMIN SERPL-MCNC: 2 G/DL (ref 3.4–5)
ALBUMIN/GLOB SERPL: 0.5 {RATIO} (ref 1.1–2.2)
ALP SERPL-CCNC: 110 U/L (ref 40–129)
ALT SERPL-CCNC: 7 U/L (ref 10–40)
ANION GAP SERPL CALCULATED.3IONS-SCNC: 4 MMOL/L (ref 3–16)
AST SERPL-CCNC: 22 U/L (ref 15–37)
BASOPHILS # BLD: 0.1 K/UL (ref 0–0.2)
BASOPHILS NFR BLD: 0.7 %
BILIRUB SERPL-MCNC: <0.2 MG/DL (ref 0–1)
BUN SERPL-MCNC: 10 MG/DL (ref 7–20)
CALCIUM SERPL-MCNC: 7.6 MG/DL (ref 8.3–10.6)
CHLORIDE SERPL-SCNC: 107 MMOL/L (ref 99–110)
CO2 SERPL-SCNC: 28 MMOL/L (ref 21–32)
CREAT SERPL-MCNC: 0.7 MG/DL (ref 0.8–1.3)
CRP SERPL-MCNC: 37.2 MG/L (ref 0–5.1)
DEPRECATED RDW RBC AUTO: 14.6 % (ref 12.4–15.4)
EOSINOPHIL # BLD: 0.3 K/UL (ref 0–0.6)
EOSINOPHIL NFR BLD: 3 %
GFR SERPLBLD CREATININE-BSD FMLA CKD-EPI: >90 ML/MIN/{1.73_M2}
GLUCOSE BLD-MCNC: 157 MG/DL (ref 70–99)
GLUCOSE BLD-MCNC: 84 MG/DL (ref 70–99)
GLUCOSE BLD-MCNC: 87 MG/DL (ref 70–99)
GLUCOSE BLD-MCNC: 88 MG/DL (ref 70–99)
GLUCOSE SERPL-MCNC: 92 MG/DL (ref 70–99)
HCT VFR BLD AUTO: 33.4 % (ref 40.5–52.5)
HGB BLD-MCNC: 10.9 G/DL (ref 13.5–17.5)
LYMPHOCYTES # BLD: 1.2 K/UL (ref 1–5.1)
LYMPHOCYTES NFR BLD: 12.9 %
MCH RBC QN AUTO: 31.9 PG (ref 26–34)
MCHC RBC AUTO-ENTMCNC: 32.5 G/DL (ref 31–36)
MCV RBC AUTO: 98.2 FL (ref 80–100)
MONOCYTES # BLD: 1 K/UL (ref 0–1.3)
MONOCYTES NFR BLD: 10.9 %
NEUTROPHILS # BLD: 6.9 K/UL (ref 1.7–7.7)
NEUTROPHILS NFR BLD: 72.5 %
PERFORMED ON: ABNORMAL
PERFORMED ON: NORMAL
PLATELET # BLD AUTO: 217 K/UL (ref 135–450)
PMV BLD AUTO: 8 FL (ref 5–10.5)
POTASSIUM SERPL-SCNC: 4 MMOL/L (ref 3.5–5.1)
PROT SERPL-MCNC: 5.8 G/DL (ref 6.4–8.2)
RBC # BLD AUTO: 3.4 M/UL (ref 4.2–5.9)
SODIUM SERPL-SCNC: 139 MMOL/L (ref 136–145)
WBC # BLD AUTO: 9.5 K/UL (ref 4–11)

## 2024-08-06 PROCEDURE — 1200000000 HC SEMI PRIVATE

## 2024-08-06 PROCEDURE — 86140 C-REACTIVE PROTEIN: CPT

## 2024-08-06 PROCEDURE — 6360000004 HC RX CONTRAST MEDICATION: Performed by: PHYSICIAN ASSISTANT

## 2024-08-06 PROCEDURE — 94640 AIRWAY INHALATION TREATMENT: CPT

## 2024-08-06 PROCEDURE — 6360000002 HC RX W HCPCS: Performed by: HOSPITALIST

## 2024-08-06 PROCEDURE — 6370000000 HC RX 637 (ALT 250 FOR IP): Performed by: HOSPITALIST

## 2024-08-06 PROCEDURE — 36415 COLL VENOUS BLD VENIPUNCTURE: CPT

## 2024-08-06 PROCEDURE — 80053 COMPREHEN METABOLIC PANEL: CPT

## 2024-08-06 PROCEDURE — 85025 COMPLETE CBC W/AUTO DIFF WBC: CPT

## 2024-08-06 PROCEDURE — 2580000003 HC RX 258: Performed by: HOSPITALIST

## 2024-08-06 PROCEDURE — 99223 1ST HOSP IP/OBS HIGH 75: CPT | Performed by: ORTHOPAEDIC SURGERY

## 2024-08-06 PROCEDURE — 99223 1ST HOSP IP/OBS HIGH 75: CPT | Performed by: INTERNAL MEDICINE

## 2024-08-06 PROCEDURE — 73201 CT UPPER EXTREMITY W/DYE: CPT

## 2024-08-06 RX ORDER — MIDODRINE HYDROCHLORIDE 5 MG/1
5 TABLET ORAL 2 TIMES DAILY PRN
Status: DISCONTINUED | OUTPATIENT
Start: 2024-08-06 | End: 2024-08-06

## 2024-08-06 RX ADMIN — SODIUM CHLORIDE: 9 INJECTION, SOLUTION INTRAVENOUS at 20:48

## 2024-08-06 RX ADMIN — ENOXAPARIN SODIUM 30 MG: 100 INJECTION SUBCUTANEOUS at 08:28

## 2024-08-06 RX ADMIN — SODIUM CHLORIDE: 9 INJECTION, SOLUTION INTRAVENOUS at 11:29

## 2024-08-06 RX ADMIN — SODIUM CHLORIDE, PRESERVATIVE FREE 10 ML: 5 INJECTION INTRAVENOUS at 08:29

## 2024-08-06 RX ADMIN — SODIUM CHLORIDE 1500 MG: 9 INJECTION, SOLUTION INTRAVENOUS at 11:30

## 2024-08-06 RX ADMIN — OXYCODONE HYDROCHLORIDE 5 MG: 5 TABLET ORAL at 18:49

## 2024-08-06 RX ADMIN — ROSUVASTATIN CALCIUM 40 MG: 20 TABLET, COATED ORAL at 08:28

## 2024-08-06 RX ADMIN — SODIUM CHLORIDE, PRESERVATIVE FREE 10 ML: 5 INJECTION INTRAVENOUS at 20:43

## 2024-08-06 RX ADMIN — OXYCODONE HYDROCHLORIDE 5 MG: 5 TABLET ORAL at 08:28

## 2024-08-06 RX ADMIN — ARFORMOTEROL TARTRATE: 15 SOLUTION RESPIRATORY (INHALATION) at 10:10

## 2024-08-06 RX ADMIN — SODIUM CHLORIDE 1500 MG: 9 INJECTION, SOLUTION INTRAVENOUS at 20:49

## 2024-08-06 RX ADMIN — INSULIN GLARGINE 5 UNITS: 100 INJECTION, SOLUTION SUBCUTANEOUS at 20:46

## 2024-08-06 RX ADMIN — EZETIMIBE 10 MG: 10 TABLET ORAL at 08:33

## 2024-08-06 RX ADMIN — CETIRIZINE HYDROCHLORIDE 10 MG: 10 TABLET, FILM COATED ORAL at 08:28

## 2024-08-06 RX ADMIN — TAMSULOSIN HYDROCHLORIDE 0.4 MG: 0.4 CAPSULE ORAL at 08:28

## 2024-08-06 RX ADMIN — ARFORMOTEROL TARTRATE: 15 SOLUTION RESPIRATORY (INHALATION) at 20:55

## 2024-08-06 RX ADMIN — IOPAMIDOL 75 ML: 755 INJECTION, SOLUTION INTRAVENOUS at 15:27

## 2024-08-06 RX ADMIN — ENOXAPARIN SODIUM 30 MG: 100 INJECTION SUBCUTANEOUS at 20:43

## 2024-08-06 ASSESSMENT — PAIN - FUNCTIONAL ASSESSMENT
PAIN_FUNCTIONAL_ASSESSMENT: PREVENTS OR INTERFERES SOME ACTIVE ACTIVITIES AND ADLS
PAIN_FUNCTIONAL_ASSESSMENT: ACTIVITIES ARE NOT PREVENTED

## 2024-08-06 ASSESSMENT — PAIN SCALES - GENERAL
PAINLEVEL_OUTOF10: 0
PAINLEVEL_OUTOF10: 8
PAINLEVEL_OUTOF10: 1
PAINLEVEL_OUTOF10: 0
PAINLEVEL_OUTOF10: 8
PAINLEVEL_OUTOF10: 1
PAINLEVEL_OUTOF10: 0
PAINLEVEL_OUTOF10: 2
PAINLEVEL_OUTOF10: 8

## 2024-08-06 ASSESSMENT — PAIN DESCRIPTION - LOCATION
LOCATION: BACK
LOCATION: HAND
LOCATION: ARM
LOCATION: HAND

## 2024-08-06 ASSESSMENT — PAIN DESCRIPTION - DESCRIPTORS
DESCRIPTORS: THROBBING
DESCRIPTORS: THROBBING

## 2024-08-06 ASSESSMENT — PAIN DESCRIPTION - ORIENTATION
ORIENTATION: LEFT

## 2024-08-06 ASSESSMENT — PAIN DESCRIPTION - PAIN TYPE
TYPE: ACUTE PAIN
TYPE: ACUTE PAIN

## 2024-08-06 ASSESSMENT — PAIN DESCRIPTION - ONSET
ONSET: ON-GOING
ONSET: ON-GOING

## 2024-08-06 ASSESSMENT — PAIN DESCRIPTION - FREQUENCY
FREQUENCY: INTERMITTENT
FREQUENCY: INTERMITTENT

## 2024-08-06 NOTE — PROGRESS NOTES
Physician Progress Note      PATIENT:               GIGI MENDEZ  CSN #:                  [id number]  :                       1945  ADMIT DATE:       2024 6:37 PM  DISCH DATE:  RESPONDING  PROVIDER #:        Bakari Dodd MD          QUERY TEXT:    Internal Medicine,    Patient admitted with sepsis due to cellulitis and has hypoxia documented.    Progress note documentation states patient was transferred from another   facility for cellulitis and respiratory failure. It is unclear if the   respiratory failure was confirmed or ruled out. If possible, please document   if the diagnosis of acute respiratory failure has been ruled out after further   study.    The medical record reflects the following:  Risk Factors: sepsis  Clinical Indicators: documentation of transfer for resp failure and cellulitis   however further documentation states: \"Hypoxia , resolved Elevated trop-BNP   1451. CXR showed vascular congestion . Clinically he is not symptomatic and   denies any SOB or hx of cardiac disease-resolved. \"  -pulmonary edema documented, RR >20 verified up to 29, O2 sats verified 84,   nor resp distress is documented by ED physician  Treatment: labs, imaging, O2, infection tx, duonebs, medical management    Thank you,  Libertad Bañuelos RN [facility]  [phone]    Acute Respiratory Failure Clinical Indicators per  MS-DRG Training Guide and   Quick Reference Guide:  pO2 < 60 mmHg or SpO2 (pulse oximetry) < 91% breathing room air  pCO2 > 50 and pH < 7.35  P/F ratio (pO2 / FIO2) < 300  pO2 decrease or pCO2 increase by 10 mmHg from baseline (if known)  Supplemental oxygen of 40% or more  Presence of respiratory distress, tachypnea, dyspnea, shortness of breath,   wheezing  Unable to speak in complete sentences  Use of accessory muscles to breathe  Extreme anxiety and feeling of impending doom  Tripod position  Confusion/altered mental status/obtunded  Options provided:  -- Acute Respiratory Failure as evidenced by,

## 2024-08-06 NOTE — CARE COORDINATION
SW following for discharge coordination.     Ortho team following and per MD, wants to keep patient for more days to get IV ABX.     SW following and communicating with ID to assess ABX needs at discharge.     Electronically signed by BRANDY Negron on 8/6/2024 at 3:43 PM

## 2024-08-06 NOTE — PROGRESS NOTES
Patient is A&O x4.  RA, sat 99%.  No complaints of SOB.   Medicated for c/o pain as needed.   Vital signs stable as charted.  Respirations appear to easy and unlabored.  Lungs clear.  Respirations easy with no complaints of cough.  No complaints of nausea/vomiting/diarrhea.  Up ad bob to the bathroom or chair as needed.  Right FA PIV intact and flushed.   Tolerating regular diet.  LUE dressing with wrap intact.    Plan of care and safety measures reviewed with the patient.  Call light in reach.  Will continue to monitor.  Electronically signed by Madhuri Bolanos RN on 8/5/2024 at 10:05 PM

## 2024-08-06 NOTE — PLAN OF CARE
Pain/discomfort being managed with PRN analgesics per MD orders. Pt able to express presence and absence of pain and rate pain appropriately using numerical scale.    Pt free from falls this shift. Fall precautions in place at all times. Call light always withinreach. Pt able and agreeable to contact for safety appropriately.

## 2024-08-06 NOTE — CONSULTS
Infectious Diseases   Consult Note      Reason for Consult: Left arm and hand swelling  Requesting Physician: Dr. Dodd  Date of Admission: 8/2/2024  Subjective:   CHIEF COMPLAINT: Left arm pain and swelling    HPI:  79-year-old male with history of COPD, HTN, DM2, HLD, nicotine abuse and eczema that presented on 8/2 with left arm swelling and redness.  The patient states 2 days prior to presentation, he had a fall while going up stairs and fell on his left arm.  Since that time, he has been having increasing swelling and redness.  He denies any wounds.  Upon presentation, WBC was 15.7 and he was afebrile.  He was started empirically on vancomycin and pip-tazo, later changed to vanco alone.  Blood cultures x 2 have been no growth to date and MRSA nares was positive.  A CT of the left radius and ulnar showed diffuse soft tissue swelling without acute osseous abnormality.  X-ray of the left upper extremity did not show any evidence of osseous injuries.  Was evaluated by orthopedics and no further intervention is planned at this time.  CRP on presentation was 62.3 which has since down trended to 37.2.                     Current abx: Vancomycin         Past Surgical History:       Diagnosis Date    Allergic rhinitis     Eczema     Hearing loss     Hyperlipidemia     Hypertension     Type 2 diabetes mellitus without complication (HCC)          Procedure Laterality Date    COLONOSCOPY      COLONOSCOPY N/A 11/10/2021    COLONOSCOPY POLYPECTOMY SNARE/COLD BIOPSY performed by Justine Haq MD at [facility] ENDOSCOPY    SKIN GRAFT         Social History:    TOBACCO:   reports that he has been smoking cigarettes. He has a 59.0 pack-year smoking history. He has never used smokeless tobacco.  ETOH:   reports current alcohol use of about 2.0 standard drinks of alcohol per week.  There is no history of illicit drug use or other significant epidemiologic exposures.    Family History:       Problem Relation Age of Onset    Diabetes Mother  date.  Continue with vancomycin, adjust levels per goal trough 15-20 to avoid toxicities.  -if patient has continued improvement, would recommend transition to oral antibiotic course to complete treatment course for SSTI    DM 2:  -Last A1c 2 months ago was 5.6  -Good glycemic control to aid in healing and prevention of further infections         Medical Decision Making:  The following items were considered in medical decision making:  Discussion of patient care with other providers  Reviewed clinical lab tests  Reviewed radiology tests  Reviewed other diagnostic tests/interventions  Independent review of radiologic images  Microbiology cultures and other micro tests reviewed      Risk of Complications/Morbidity: High   Illness(es)/ Infection present that pose threat to bodily function.   There is potential for severe exacerbation of infection/side effects of treatment.  Therapy requires intensive monitoring for antimicrobial agent toxicity    Discussed with patient, his RN, brian Norris and primary team, Dr. Ju Corea MD

## 2024-08-06 NOTE — PROGRESS NOTES
[facility] -  Clinical Pharmacy Note    Vancomycin - Management by Pharmacy    Consult Date(s): 8/5/24  Consulting Provider(s): Dr. Dodd    Assessment / Plan  1)  LUE cellulitis - Vancomycin  Concurrent Antimicrobials: n/a  Day of Vanc Therapy / Ordered Duration: 2 of 7  Current Dosing Method: Bayesian-Guided AUC Dosing  Therapeutic Goal: AUC < 500 mg/L*hr  Current Dose / Plan:   Continues on 1500mg IV q12h.  SCr stable at 0.7.  Regimen predicts and AUC ~575 with trough = 16.4.  Random level is ordered for 8/7 AM to further evaluate above regimen.  Will continue to monitor clinical condition and make adjustments to regimen as appropriate.    Please call with questions--  Thanks--  Roxanne Tipton, PharmD, BCPS, BCGP  [id number] ([facility])   8/6/2024 12:09 PM      Interval update:  Nasal MRSA PCR is positive - Cefazolin changed to Vancomycin yesterday.  Notes indicate some improvement in LUE, but still swollen and tender.  Ortho and ID consulted to evaluate.    Subjective/Objective:   Delano Daigle is a 79 y.o. male with a PMHx significant for COPD, DM 2, HTN, HLD, and eczema who is admitted with RUE cellulitis 2/2 mechanical fall.     Pharmacy is consulted to dose Vancomycin.    Ht Readings from Last 1 Encounters:   08/02/24 1.829 m (6')     Wt Readings from Last 1 Encounters:   08/02/24 101.1 kg (222 lb 14.2 oz)     Current & Prior Antimicrobial Regimen(s):   x1 dose 8/3 in ED   (8/3-8/4)   (8/4-8/5)  Vancomycin - Pharmacy to dose  1500mg IV q12h (8/5-current)    Vancomycin Level(s) / Doses:    /Date Time Dose Type of Level / Level Interpretation   8/7  1500mg q12h Random = ordered           Note: Serum levels collected for AUC-based dosing may be high if collected in close proximity to the dose administered. This is not necessarily indicative of toxicity.    Cultures & Sensitivities:    Date Site Micro Susceptibility / Result   8/2 Blood x2 No growth to date    8/3 Blood x2 No growth to date    8/3

## 2024-08-06 NOTE — PLAN OF CARE
[facility] Orthopedic Surgery  Consult Note          Orthopedic Consult, full note to follow later today.    Delano Daigle 79 y.o. admitted to [facility] on 8/2/2024 for left hand cellulitis and resp failure .    CT scan left radius and ulna dated 8/2/2024 was reviewed and showed; Diffuse soft tissue swelling. No acute osseous abnormality.       Plan:  - We will evaluate the patient later today.  - Continue IV ABX.    Thank you very much for the kind consultation and allowing me to participate in this patient's care.  I will continue to keep you apprised of his progress.         Dillon Holland MD, 8/6/2024 7:40 AM

## 2024-08-07 ENCOUNTER — APPOINTMENT (OUTPATIENT)
Age: 79
End: 2024-08-07
Attending: HOSPITALIST
Payer: MEDICARE

## 2024-08-07 PROBLEM — M25.442 SWELLING OF HAND JOINT, LEFT: Status: ACTIVE | Noted: 2024-08-07

## 2024-08-07 LAB
BACTERIA BLD CULT ORG #2: NORMAL
BACTERIA BLD CULT ORG #2: NORMAL
BACTERIA BLD CULT: NORMAL
BACTERIA BLD CULT: NORMAL
ECHO AO ASC DIAM: 3.1 CM
ECHO AO ASCENDING AORTA INDEX: 1.39 CM/M2
ECHO AO ROOT DIAM: 3.5 CM
ECHO AO ROOT INDEX: 1.57 CM/M2
ECHO AV AREA PEAK VELOCITY: 3.9 CM2
ECHO AV AREA VTI: 3.6 CM2
ECHO AV AREA/BSA PEAK VELOCITY: 1.7 CM2/M2
ECHO AV AREA/BSA VTI: 1.6 CM2/M2
ECHO AV MEAN GRADIENT: 6 MMHG
ECHO AV MEAN VELOCITY: 1.2 M/S
ECHO AV PEAK GRADIENT: 11 MMHG
ECHO AV PEAK VELOCITY: 1.7 M/S
ECHO AV VELOCITY RATIO: 0.65
ECHO AV VTI: 36.7 CM
ECHO BSA: 2.26 M2
ECHO LA AREA 2C: 27.9 CM2
ECHO LA AREA 4C: 21.6 CM2
ECHO LA DIAMETER INDEX: 1.52 CM/M2
ECHO LA DIAMETER: 3.4 CM
ECHO LA MAJOR AXIS: 6.7 CM
ECHO LA MINOR AXIS: 6.2 CM
ECHO LA TO AORTIC ROOT RATIO: 0.97
ECHO LA VOL BP: 78 ML (ref 18–58)
ECHO LA VOL MOD A2C: 103 ML (ref 18–58)
ECHO LA VOL MOD A4C: 54 ML (ref 18–58)
ECHO LA VOL/BSA BIPLANE: 35 ML/M2 (ref 16–34)
ECHO LA VOLUME INDEX MOD A2C: 46 ML/M2 (ref 16–34)
ECHO LA VOLUME INDEX MOD A4C: 24 ML/M2 (ref 16–34)
ECHO LV E' LATERAL VELOCITY: 9 CM/S
ECHO LV E' SEPTAL VELOCITY: 7 CM/S
ECHO LV EDV A2C: 119 ML
ECHO LV EDV A4C: 159 ML
ECHO LV EDV INDEX A4C: 71 ML/M2
ECHO LV EDV NDEX A2C: 53 ML/M2
ECHO LV EJECTION FRACTION A2C: 51 %
ECHO LV EJECTION FRACTION A4C: 56 %
ECHO LV EJECTION FRACTION BIPLANE: 52 % (ref 55–100)
ECHO LV ESV A2C: 58 ML
ECHO LV ESV A4C: 70 ML
ECHO LV ESV INDEX A2C: 26 ML/M2
ECHO LV ESV INDEX A4C: 31 ML/M2
ECHO LV FRACTIONAL SHORTENING: 27 % (ref 28–44)
ECHO LV INTERNAL DIMENSION DIASTOLE INDEX: 2.51 CM/M2
ECHO LV INTERNAL DIMENSION DIASTOLIC: 5.6 CM (ref 4.2–5.9)
ECHO LV INTERNAL DIMENSION SYSTOLIC INDEX: 1.84 CM/M2
ECHO LV INTERNAL DIMENSION SYSTOLIC: 4.1 CM
ECHO LV IVSD: 0.7 CM (ref 0.6–1)
ECHO LV MASS 2D: 152.3 G (ref 88–224)
ECHO LV MASS INDEX 2D: 68.3 G/M2 (ref 49–115)
ECHO LV POSTERIOR WALL DIASTOLIC: 0.8 CM (ref 0.6–1)
ECHO LV RELATIVE WALL THICKNESS RATIO: 0.29
ECHO LVOT AREA: 5.7 CM2
ECHO LVOT AV VTI INDEX: 0.62
ECHO LVOT DIAM: 2.7 CM
ECHO LVOT MEAN GRADIENT: 3 MMHG
ECHO LVOT PEAK GRADIENT: 5 MMHG
ECHO LVOT PEAK VELOCITY: 1.1 M/S
ECHO LVOT STROKE VOLUME INDEX: 58.5 ML/M2
ECHO LVOT SV: 130.5 ML
ECHO LVOT VTI: 22.8 CM
ECHO MV A VELOCITY: 1.21 M/S
ECHO MV AREA VTI: 4.3 CM2
ECHO MV E DECELERATION TIME (DT): 124 MS
ECHO MV E VELOCITY: 1.25 M/S
ECHO MV E/A RATIO: 1.03
ECHO MV E/E' LATERAL: 13.89
ECHO MV E/E' RATIO (AVERAGED): 15.87
ECHO MV E/E' SEPTAL: 17.86
ECHO MV LVOT VTI INDEX: 1.34
ECHO MV MAX VELOCITY: 1.2 M/S
ECHO MV MEAN GRADIENT: 3 MMHG
ECHO MV MEAN VELOCITY: 0.8 M/S
ECHO MV PEAK GRADIENT: 6 MMHG
ECHO MV VTI: 30.6 CM
ECHO PV ACCELERATION TIME (AT): 103 MS
ECHO PV MAX VELOCITY: 0.9 M/S
ECHO PV PEAK GRADIENT: 3 MMHG
ECHO RA AREA 4C: 26.3 CM2
ECHO RA END SYSTOLIC VOLUME APICAL 4 CHAMBER INDEX BSA: 40 ML/M2
ECHO RA VOLUME: 90 ML
ECHO RV BASAL DIMENSION: 3.7 CM
ECHO RV FREE WALL PEAK S': 16 CM/S
ECHO RV MID DIMENSION: 2.4 CM
ECHO RV TAPSE: 2.4 CM (ref 1.7–?)
ECHO TV REGURGITANT MAX VELOCITY: 3.41 M/S
ECHO TV REGURGITANT PEAK GRADIENT: 47 MMHG
GLUCOSE BLD-MCNC: 105 MG/DL (ref 70–99)
GLUCOSE BLD-MCNC: 114 MG/DL (ref 70–99)
GLUCOSE BLD-MCNC: 141 MG/DL (ref 70–99)
GLUCOSE BLD-MCNC: 98 MG/DL (ref 70–99)
PERFORMED ON: ABNORMAL
PERFORMED ON: NORMAL
VANCOMYCIN SERPL-MCNC: 20.9 UG/ML

## 2024-08-07 PROCEDURE — 80202 ASSAY OF VANCOMYCIN: CPT

## 2024-08-07 PROCEDURE — 94640 AIRWAY INHALATION TREATMENT: CPT

## 2024-08-07 PROCEDURE — 93306 TTE W/DOPPLER COMPLETE: CPT | Performed by: INTERNAL MEDICINE

## 2024-08-07 PROCEDURE — 99231 SBSQ HOSP IP/OBS SF/LOW 25: CPT | Performed by: PHYSICIAN ASSISTANT

## 2024-08-07 PROCEDURE — 6370000000 HC RX 637 (ALT 250 FOR IP): Performed by: HOSPITALIST

## 2024-08-07 PROCEDURE — 6360000002 HC RX W HCPCS: Performed by: HOSPITALIST

## 2024-08-07 PROCEDURE — 36415 COLL VENOUS BLD VENIPUNCTURE: CPT

## 2024-08-07 PROCEDURE — 1200000000 HC SEMI PRIVATE

## 2024-08-07 PROCEDURE — 99233 SBSQ HOSP IP/OBS HIGH 50: CPT | Performed by: INTERNAL MEDICINE

## 2024-08-07 PROCEDURE — 2580000003 HC RX 258: Performed by: HOSPITALIST

## 2024-08-07 PROCEDURE — C8929 TTE W OR WO FOL WCON,DOPPLER: HCPCS

## 2024-08-07 PROCEDURE — 99232 SBSQ HOSP IP/OBS MODERATE 35: CPT | Performed by: ORTHOPAEDIC SURGERY

## 2024-08-07 RX ADMIN — BETAMETHASONE DIPROPIONATE: 0.5 CREAM TOPICAL at 00:04

## 2024-08-07 RX ADMIN — ENOXAPARIN SODIUM 30 MG: 100 INJECTION SUBCUTANEOUS at 21:26

## 2024-08-07 RX ADMIN — BETAMETHASONE DIPROPIONATE: 0.5 CREAM TOPICAL at 21:27

## 2024-08-07 RX ADMIN — BETAMETHASONE DIPROPIONATE: 0.5 CREAM TOPICAL at 10:36

## 2024-08-07 RX ADMIN — INSULIN GLARGINE 5 UNITS: 100 INJECTION, SOLUTION SUBCUTANEOUS at 21:26

## 2024-08-07 RX ADMIN — ENOXAPARIN SODIUM 30 MG: 100 INJECTION SUBCUTANEOUS at 10:16

## 2024-08-07 RX ADMIN — SODIUM CHLORIDE: 9 INJECTION, SOLUTION INTRAVENOUS at 10:10

## 2024-08-07 RX ADMIN — ARFORMOTEROL TARTRATE: 15 SOLUTION RESPIRATORY (INHALATION) at 07:59

## 2024-08-07 RX ADMIN — ROSUVASTATIN CALCIUM 40 MG: 20 TABLET, COATED ORAL at 10:15

## 2024-08-07 RX ADMIN — SODIUM CHLORIDE 1250 MG: 9 INJECTION, SOLUTION INTRAVENOUS at 21:49

## 2024-08-07 RX ADMIN — OXYCODONE HYDROCHLORIDE 5 MG: 5 TABLET ORAL at 10:33

## 2024-08-07 RX ADMIN — CETIRIZINE HYDROCHLORIDE 10 MG: 10 TABLET, FILM COATED ORAL at 10:16

## 2024-08-07 RX ADMIN — EZETIMIBE 10 MG: 10 TABLET ORAL at 12:13

## 2024-08-07 RX ADMIN — ARFORMOTEROL TARTRATE: 15 SOLUTION RESPIRATORY (INHALATION) at 19:46

## 2024-08-07 RX ADMIN — TAMSULOSIN HYDROCHLORIDE 0.4 MG: 0.4 CAPSULE ORAL at 10:16

## 2024-08-07 RX ADMIN — SODIUM CHLORIDE, PRESERVATIVE FREE 10 ML: 5 INJECTION INTRAVENOUS at 21:27

## 2024-08-07 RX ADMIN — SODIUM CHLORIDE, PRESERVATIVE FREE 10 ML: 5 INJECTION INTRAVENOUS at 10:33

## 2024-08-07 RX ADMIN — OXYCODONE HYDROCHLORIDE 5 MG: 5 TABLET ORAL at 05:26

## 2024-08-07 RX ADMIN — SODIUM CHLORIDE 1250 MG: 9 INJECTION, SOLUTION INTRAVENOUS at 10:10

## 2024-08-07 ASSESSMENT — PAIN SCALES - GENERAL
PAINLEVEL_OUTOF10: 0
PAINLEVEL_OUTOF10: 6
PAINLEVEL_OUTOF10: 7
PAINLEVEL_OUTOF10: 3
PAINLEVEL_OUTOF10: 0

## 2024-08-07 ASSESSMENT — PAIN DESCRIPTION - ORIENTATION
ORIENTATION: LEFT

## 2024-08-07 ASSESSMENT — PAIN DESCRIPTION - LOCATION
LOCATION: HAND

## 2024-08-07 ASSESSMENT — PAIN DESCRIPTION - ONSET: ONSET: ON-GOING

## 2024-08-07 ASSESSMENT — PAIN DESCRIPTION - FREQUENCY: FREQUENCY: INTERMITTENT

## 2024-08-07 ASSESSMENT — PAIN DESCRIPTION - DESCRIPTORS
DESCRIPTORS: THROBBING
DESCRIPTORS: THROBBING

## 2024-08-07 ASSESSMENT — PAIN DESCRIPTION - PAIN TYPE: TYPE: ACUTE PAIN

## 2024-08-07 ASSESSMENT — PAIN - FUNCTIONAL ASSESSMENT: PAIN_FUNCTIONAL_ASSESSMENT: ACTIVITIES ARE NOT PREVENTED

## 2024-08-07 NOTE — PROGRESS NOTES
Patient is A&O x4.  RA, sat 98%.  No complaints of SOB.   Medicated for c/o pain as needed.   Vital signs stable as charted.  Respirations appear to easy and unlabored.  Lungs clear.  Respirations easy with c/o non-productive cough.  No complaints of nausea/vomiting/diarrhea.  Up ad bob to the bathroom or chair as needed.  Right AC PIV intact and flushed.   Tolerating regular diet.  LUE GRICELDA and elevated.    Plan of care and safety measures reviewed with the patient.  Call light in reach.  Will continue to monitor.   Electronically signed by Madhuri Bolanos RN on 8/6/2024 at 10:10 PM

## 2024-08-07 NOTE — PROGRESS NOTES
V2.0    [facility] Progress Note      Name:  Delano Daigle /Age/Sex: 1945  (79 y.o. male)   MRN & CSN:  [id number] & [id number] Encounter Date/Time: 2024 12:05 PM EDT   Location:  [facility]3306-01 PCP: Jason Dawn MD     Attending:Bakari Dodd MD       Hospital Day: 6    Assessment and Recommendations   Delano Daigle is a 79 y.o. male with pmh of COPD, HTN, DMII, HLD, Tobacco abuse and Eczema who was transferred here for cellulitis and resp failure .     2 days before admission he had a fall while going up the stairs and he fell on Lt arm. Since then he has been having pain from his hand to his forearm associated with swelling and redness. No wounds . He was seen in the ED  where labs showed WBC 15.7 and CT showed diffuse soft tissue swelling . Other arm xrays were ok. He was given fluids then became hypoxic and CXR showed vascular congestion . He was given vanc/zosyn. He was tachycardic and was given 2.5 mg of IV metoprolol      LUE cellulitis 2/2 mechanical fall   -Still with very tender, and swollen hand. CT hand  did not show osteo or abscess    -MRSA screen is positive. Cont Vanco   -ID and ortho on board      Sepsis 2/2 above - had leukocytosis and tachycardia . resolved     Hypoxia , resolved   Elevated trop  -BNP 1451. CXR showed vascular congestion . Clinically he is not symptomatic and denies any SOB or hx of cardiac disease  -resolved. He feels ok   -Pending Echo  -There was concern for Afib but there is clear P waves on the EKG. Tachycardia is due to pain and sepsis      Mild LFT elevation - resolved  Mild anemia - monitor for now     Hx of COPD , stable   Active tobacco abuse   HTN  DMII  HLD  -Hold BP meds for now  -Hold oral diabetic meds  -Cont SSI and lantus 5 units  -Cont other home meds   -Nicotine patch    Medical Decision Making:  The following items were considered in medical decision making:  Discussion of patient care with other providers  Reviewed clinical lab tests if  structures appear widely patent.     Diffuse soft tissue swelling. No acute osseous abnormality. Electronically signed by Walker Clarke    XR HAND LEFT (MIN 3 VIEWS)    Result Date: 8/2/2024  Exam: Left hand, 3 views History: fall, pain Comparison: None available Findings: There is no acute fracture or dislocation. The joint spaces are normal. No foreign body is seen. There is soft tissue swelling dorsally.     No acute osseous injury. Dorsal soft tissue swelling. Electronically signed by Walker Clarke    XR RADIUS ULNA LEFT (2 VIEWS)    Result Date: 8/2/2024  Exam: Left forearm, 2 views History: fall, pain Comparison: None available Findings: There is no acute fracture or dislocation. The joint spaces are normal. There is mild soft tissue swelling.     No acute osseous injury. Electronically signed by Walker Clarke    XR ELBOW LEFT (MIN 3 VIEWS)    Result Date: 8/2/2024  Exam: Left elbow, 3 views History: fall, pain Comparison: None available Findings: There is no acute fracture or dislocation. The joint space appears normal. Olecranon enthesophyte is noted. There is no joint effusion. There is mild soft tissue swelling.     No acute osseous injury. Mild soft tissue swelling dorsally at the elbow. Electronically signed by Walker Clarke      CBC:   Recent Labs     08/05/24  0552 08/06/24  0437   WBC 11.4* 9.5   HGB 10.5* 10.9*    217       BMP:    Recent Labs     08/05/24  0552 08/06/24  0437    139   K 3.8 4.0    107   CO2 27 28   BUN 10 10   CREATININE 0.7* 0.7*   GLUCOSE 93 92       Hepatic:   Recent Labs     08/05/24  0552 08/06/24  0437   AST 16 22   ALT <5* 7*   BILITOT <0.2 <0.2   ALKPHOS 109 110       Lipids:   Lab Results   Component Value Date/Time    CHOL 150 02/15/2024 01:38 PM    HDL 72 02/15/2024 01:38 PM    HDL 39 05/10/2012 09:43 AM    TRIG 73 02/15/2024 01:38 PM     Hemoglobin A1C:   Lab Results   Component Value Date/Time    LABA1C 5.6 05/09/2024 09:46 AM     TSH:   Lab Results   Component

## 2024-08-07 NOTE — CARE COORDINATION
SW following for discharge coordination.     ID following and plans to switch patient to Oral ABX before discharge.     Patient anticipates no needs at discharge. SW following.     Electronically signed by BRANDY Negron on 8/7/2024 at 3:52 PM

## 2024-08-07 NOTE — PROGRESS NOTES
[facility] -  Clinical Pharmacy Note    Vancomycin - Management by Pharmacy    Consult Date(s): 8/5/24  Consulting Provider(s): Dr. Dodd    Assessment / Plan  1)  LUE cellulitis - Vancomycin  Concurrent Antimicrobials: n/a  Day of Vanc Therapy / Ordered Duration: 3 of 7  Current Dosing Method: Bayesian-Guided AUC Dosing  Therapeutic Goal: AUC < 500 mg/L*hr  Current Dose / Plan:   Continues on 1500mg IV q12h.  SCr stable at 0.7 yesterday; not checked today.  Random level this AM = 20.9 mcg/mL (drawn ~8h after prior dose)  Calculated AUC slightly supratherapeutic at 624..  Will reduced dose to 1250mg IV q12h.  Regimen predicts an AUC = 526 with trouhg = 16.2 mcg/mL.  Will plan for repeat level in 2-3 days (or sooner if clinically indicated).  Will continue to monitor clinical condition and make adjustments to regimen as appropriate.    Please call with questions--  Thanks--  Roxanne Tipton, PharmD, BCPS, BCGP  [id number] ([facility])   8/7/2024 7:44 AM      Interval update:  Evaluated by ortho - no surgery indicated at this time as CT negative for acute fracture / abscess.  Per ID, will likely be able to transition to oral abx if pt continues to improve clinically.    Subjective/Objective:   Delano Daigle is a 79 y.o. male with a PMHx significant for COPD, DM 2, HTN, HLD, and eczema who is admitted with RUE cellulitis 2/2 mechanical fall.     Pharmacy is consulted to dose Vancomycin.    Ht Readings from Last 1 Encounters:   08/02/24 1.829 m (6')     Wt Readings from Last 1 Encounters:   08/02/24 101.1 kg (222 lb 14.2 oz)     Current & Prior Antimicrobial Regimen(s):   x1 dose 8/3 in ED   (8/3-8/4)   (8/4-8/5)  Vancomycin - Pharmacy to dose  1500mg IV q12h (8/5-8/7)  1250mg IV q12h (8/7-current)    Vancomycin Level(s) / Doses:    /Date Time Dose Type of Level / Level Interpretation   8/7 04:57 1500mg q12h Random = 20.9 mcg/mL Drawn ~8h after prior dose  AUC = 624  Decrease to 1250mg q12h          Note:

## 2024-08-07 NOTE — PROGRESS NOTES
Department of Orthopedic Surgery  Physician Assistant   Progress Note    Subjective:       Systemic or Specific Complaints:States he does feel better today.  Able to move the hand better today.    Objective:     Patient Vitals for the past 24 hrs:   BP Temp Temp src Pulse Resp SpO2   08/07/24 0933 (!) 146/77 97.8 °F (36.6 °C) Oral 91 18 94 %   08/07/24 0800 -- -- -- 93 18 99 %   08/07/24 0556 -- -- -- -- 18 --   08/07/24 0420 (!) 154/91 98 °F (36.7 °C) Oral 73 18 96 %   08/06/24 2057 -- -- -- 97 16 97 %   08/06/24 2042 139/86 98 °F (36.7 °C) Oral 93 18 97 %   08/06/24 1919 -- -- -- -- 18 --   08/06/24 1606 (!) 141/73 97.8 °F (36.6 °C) Oral 89 18 --       General: alert, appears stated age, and cooperative   Wound:    Motion: Painful range of Motion in affected extremity   DVT Exam: No evidence of DVT seen on physical exam.     Additional exam: moderate swelling remains dorsal hand.  No fluctuance.  No palmar pain.  Moving fingers better.  No forearm or wrist pain.  Distal pulses intact.  Sensation intact.      Data Review  CBC:   Lab Results   Component Value Date/Time    WBC 9.5 08/06/2024 04:37 AM    RBC 3.40 08/06/2024 04:37 AM    HGB 10.9 08/06/2024 04:37 AM    HCT 33.4 08/06/2024 04:37 AM     08/06/2024 04:37 AM       Renal:   Lab Results   Component Value Date/Time     08/06/2024 04:37 AM    K 4.0 08/06/2024 04:37 AM     08/06/2024 04:37 AM    CO2 28 08/06/2024 04:37 AM    BUN 10 08/06/2024 04:37 AM    CREATININE 0.7 08/06/2024 04:37 AM    GLUCOSE 92 08/06/2024 04:37 AM    CALCIUM 7.6 08/06/2024 04:37 AM            Assessment:      left hand cellulitis.     Plan:      1:  PT/OT as able.  Will try skyhook elevation today during the day.  No surgical intervention necessary.  2:  Continue Deep venous thrombosis prophylaxis  3:  Continue Pain Control - minimize opioids as able to maintain adequate pain control. Ice for swellng  4:  Discharge Home anticipated    MIAN Rodriguez

## 2024-08-07 NOTE — PROGRESS NOTES
ID Follow-up NOTE    CC:   Left arm and hand swelling  Antibiotics: Vancomycin    Admit Date: 8/2/2024  Hospital Day: 6    Subjective:     Patient continues to have left hand swelling and pain though improved, unable to make a fist or move fingers much.  No fevers overnight      Objective:     Patient Vitals for the past 8 hrs:   BP Temp Temp src Pulse Resp SpO2   08/07/24 0933 (!) 146/77 97.8 °F (36.6 °C) Oral 91 18 94 %   08/07/24 0800 -- -- -- 93 18 99 %   08/07/24 0556 -- -- -- -- 18 --   08/07/24 0420 (!) 154/91 98 °F (36.7 °C) Oral 73 18 96 %     I/O last 3 completed shifts:  In: 3135.7 [P.O.:2240; I.V.:145.2; IV Piggyback:750.4]  Out: -   I/O this shift:  In: 10 [I.V.:10]  Out: -     EXAM:  GENERAL: No apparent distress.    HEENT: Membranes moist, no oral lesion  NECK:  Supple, no lymphadenopathy  LUNGS: Clear b/l, no rales, no dullness  CARDIAC: RRR, no murmur appreciated  ABD:  + BS, soft / NT  EXT:  Left arm swelling down to hands, range of motion limited by swelling and pain. Swelling more evident in hand    NEURO: No focal neurologic findings  PSYCH: Orientation, sensorium, mood normal  LINES:  Peripheral iv       Data Review:  Lab Results   Component Value Date    WBC 9.5 08/06/2024    HGB 10.9 (L) 08/06/2024    HCT 33.4 (L) 08/06/2024    MCV 98.2 08/06/2024     08/06/2024     Lab Results   Component Value Date    CREATININE 0.7 (L) 08/06/2024    BUN 10 08/06/2024     08/06/2024    K 4.0 08/06/2024     08/06/2024    CO2 28 08/06/2024       Hepatic Function Panel:   Lab Results   Component Value Date/Time    ALKPHOS 110 08/06/2024 04:37 AM    ALT 7 08/06/2024 04:37 AM    AST 22 08/06/2024 04:37 AM    BILITOT <0.2 08/06/2024 04:37 AM    BILIDIR 0.1 05/20/2014 09:36 AM    IBILI 0.2 05/20/2014 09:36 AM       MICRO:  MRSA nares 8/30: Positive  Blood cultures x 2 8/3: Negative  Blood cultures x 2 8/2: Negative    IMAGING:I have independently reviewed the images and reports.       CT left

## 2024-08-07 NOTE — PLAN OF CARE
Pt free from falls this shift. Fall precautions in place at all times. Call light always withinreach. Pt able and agreeable to contact for safety appropriately.     Pain/discomfort being managed with PRN analgesics per MD orders. Pt able to express presence and absence of pain and rate pain appropriately using numerical scale.

## 2024-08-07 NOTE — DISCHARGE SUMMARY
V2.0  Discharge Summary    Name:  Delano Daigle /Age/Sex: 1945 (79 y.o. male)   Admit Date: 2024  Discharge Date: 24    MRN & CSN:  [id number] & [id number] Encounter Date and Time 24 2:37 PM EDT    Attending:  No att. providers found Discharging Provider: Bakari Dodd MD       Hospital Course:     Brief HPI: Delano Daigle is a 79 y.o. male with pmh of COPD, HTN, DMII, HLD, Tobacco abuse and Eczema who was transferred here for cellulitis and resp failure .     2 days before admission he had a fall while going up the stairs and he fell on Lt arm. Since then he has been having pain from his hand to his forearm associated with swelling and redness. No wounds . He was seen in the ED  where labs showed WBC 15.7 and CT showed diffuse soft tissue swelling . Other arm xrays were ok. He was given fluids then became hypoxic and CXR showed vascular congestion . He was given vanc/zosyn. He was tachycardic and was given 2.5 mg of IV metoprolol      LUE cellulitis 2/2 mechanical fall   -His arm improved quickly but his hand had slow improvement. He cont to have some swelling and tenderness although better. He was seen by ortho and ID who recommended medical management   -MRSA screen was positive so antibiotics switched to Vanco and will discharge on keflex and doxy x 2 weeks      Sepsis 2/2 above - had leukocytosis and tachycardia . resolved     Hypoxia , resolved   Elevated trop- demand ischemia   Mild LFT elevation - resolved  Mild anemia - monitor for now      Hx of COPD , stable   Active tobacco abuse   HTN  DMII  HLD      The patient expressed appropriate understanding of, and agreement with the discharge recommendations, medications, and plan.     Consults this admission:  PHARMACY TO DOSE VANCOMYCIN  IP CONSULT TO ORTHOPEDIC SURGERY  IP CONSULT TO INFECTIOUS DISEASES    Discharge Diagnosis:   Cellulitis of left arm        Discharge Instruction:   Follow up appointments: PCP  Primary care

## 2024-08-07 NOTE — PROGRESS NOTES
[facility] Orthopedic Surgery  Progress Note           CHIEF COMPLAINT:  Left wrist and hand pain/ cellulitis, much better.     HISTORY OF PRESENT ILLNESS:    Mr. Daigle is a 79 y.o.  male right handed who seen today at [facility] for f/u evaluation of a left wrist and hand swelling after an injury last Thursday.  The patient reports that this injury occurred when he fell.  He was first seen and evaluated in [facility], when he was x-rayed and admitted, and I was consulted. The patient denies any other injuries. Rates pain a 4/10 VAS moderate, sharp, constant and show no change.  Movement makes the pain worse, the splint and resting makes the pain better. Alleviating factors rest. No numbness or tingling sensation.      Past Medical History:        Diagnosis Date    Allergic rhinitis     Eczema     Hearing loss     Hyperlipidemia     Hypertension     Type 2 diabetes mellitus without complication (HCC)        Past Surgical History:        Procedure Laterality Date    COLONOSCOPY      COLONOSCOPY N/A 11/10/2021    COLONOSCOPY POLYPECTOMY SNARE/COLD BIOPSY performed by Justine Haq MD at [facility] ENDOSCOPY    SKIN GRAFT         Medications prior to admission:   Prior to Admission medications    Medication Sig Start Date End Date Taking? Authorizing Provider   losartan (COZAAR) 25 MG tablet TAKE 2 TABLETS BY MOUTH EVERY DAY 7/25/24   Jason Dawn MD   desoximetasone (TOPICORT) 0.25 % cream Apply topically 2 times daily. 6/5/24   Jason Dawn MD   cetirizine (ZYRTEC) 10 MG tablet TAKE 1 TABLET BY MOUTH DAILY FOR ITCHING. 6/4/24   Jason Dawn MD   fluticasone furoate-vilanterol (BREO ELLIPTA) 100-25 MCG/ACT inhaler INHALE 1 PUFF INTO THE LUNGS DAILY 5/18/24   Jason Dawn MD   rosuvastatin (CRESTOR) 40 MG tablet TAKE 1 TABLET BY MOUTH DAILY FOR HIGH CHOLESTEROL 3/5/24   Jason Dawn MD   pioglitazone (ACTOS) 15 MG tablet TAKE 1 TABLET BY MOUTH EVERY DAY 3/5/24   Jason Dawn MD   ezetimibe (ZETIA) 10 MG  remainder of the extremity.  Range of motion is decreased secondary to pain over the left wrist, but no mechanical block.  The skin overlying the left wrist is intact without evidence of lesion, laceration or abrasion.  Distal pulses are 2+ and symmetric bilaterally.  Sensation is grossly intact to light touch and symmetric bilaterally.    NEUROLOGIC:   Sensory:    Touch:                     Right Upper Extremity:  normal                   Left Upper Extremity:  normal                  Right Lower Extremity:  normal                  Left Lower Extremity:  normal        DATA:    CBC:   Lab Results   Component Value Date/Time    WBC 9.5 08/06/2024 04:37 AM    RBC 3.40 08/06/2024 04:37 AM    HGB 10.9 08/06/2024 04:37 AM    HCT 33.4 08/06/2024 04:37 AM    MCV 98.2 08/06/2024 04:37 AM    MCH 31.9 08/06/2024 04:37 AM    MCHC 32.5 08/06/2024 04:37 AM    RDW 14.6 08/06/2024 04:37 AM     08/06/2024 04:37 AM    MPV 8.0 08/06/2024 04:37 AM     WBC:    Lab Results   Component Value Date/Time    WBC 9.5 08/06/2024 04:37 AM     PT/INR:  No results found for: \"PROTIME\", \"INR\"  PTT:  No results found for: \"APTT\"[APTT    IMAGING: Xrays dated 8/2/2024, 3 views of left hand were reviewed, and showed no fracture.    CT scan left hand dated 8/6/2024 was reviewed and showed;    Soft tissue edema is nonspecific and could represent venous stasis or cellulitis. No drainable abscess or evidence of osteomyelitis.     IMPRESSION: Left wrist and hand pain/ cellulitis, much better.      PLAN:  -  I assured the patient that the CT scan is negative for acute fracture and no abscess.   - Recommend continue IV ABX with left hand elevation.  - No surgery indicated at this point.  - We will follow.       Dillon Holland MD   8/7/2024  5:47 PM

## 2024-08-08 VITALS
RESPIRATION RATE: 16 BRPM | TEMPERATURE: 97.8 F | HEART RATE: 79 BPM | HEIGHT: 72 IN | WEIGHT: 222 LBS | OXYGEN SATURATION: 95 % | BODY MASS INDEX: 30.07 KG/M2 | SYSTOLIC BLOOD PRESSURE: 130 MMHG | DIASTOLIC BLOOD PRESSURE: 73 MMHG

## 2024-08-08 LAB
GLUCOSE BLD-MCNC: 107 MG/DL (ref 70–99)
GLUCOSE BLD-MCNC: 73 MG/DL (ref 70–99)
PERFORMED ON: ABNORMAL
PERFORMED ON: NORMAL

## 2024-08-08 PROCEDURE — 6360000002 HC RX W HCPCS: Performed by: HOSPITALIST

## 2024-08-08 PROCEDURE — 2580000003 HC RX 258: Performed by: HOSPITALIST

## 2024-08-08 PROCEDURE — 6370000000 HC RX 637 (ALT 250 FOR IP): Performed by: HOSPITALIST

## 2024-08-08 PROCEDURE — 99232 SBSQ HOSP IP/OBS MODERATE 35: CPT | Performed by: ORTHOPAEDIC SURGERY

## 2024-08-08 RX ORDER — DOXYCYCLINE HYCLATE 100 MG
100 TABLET ORAL 2 TIMES DAILY
Qty: 28 TABLET | Refills: 0 | Status: SHIPPED | OUTPATIENT
Start: 2024-08-08 | End: 2024-08-22

## 2024-08-08 RX ORDER — CEPHALEXIN 500 MG/1
500 CAPSULE ORAL 4 TIMES DAILY
Qty: 56 CAPSULE | Refills: 0 | Status: SHIPPED | OUTPATIENT
Start: 2024-08-08

## 2024-08-08 RX ADMIN — EZETIMIBE 10 MG: 10 TABLET ORAL at 09:37

## 2024-08-08 RX ADMIN — ENOXAPARIN SODIUM 30 MG: 100 INJECTION SUBCUTANEOUS at 09:33

## 2024-08-08 RX ADMIN — TAMSULOSIN HYDROCHLORIDE 0.4 MG: 0.4 CAPSULE ORAL at 09:32

## 2024-08-08 RX ADMIN — SODIUM CHLORIDE, PRESERVATIVE FREE 10 ML: 5 INJECTION INTRAVENOUS at 09:34

## 2024-08-08 RX ADMIN — OXYCODONE HYDROCHLORIDE 5 MG: 5 TABLET ORAL at 09:32

## 2024-08-08 RX ADMIN — CETIRIZINE HYDROCHLORIDE 10 MG: 10 TABLET, FILM COATED ORAL at 09:32

## 2024-08-08 RX ADMIN — ROSUVASTATIN CALCIUM 40 MG: 20 TABLET, COATED ORAL at 09:32

## 2024-08-08 ASSESSMENT — PAIN SCALES - GENERAL
PAINLEVEL_OUTOF10: 3
PAINLEVEL_OUTOF10: 8
PAINLEVEL_OUTOF10: 0
PAINLEVEL_OUTOF10: 8

## 2024-08-08 ASSESSMENT — PAIN DESCRIPTION - PAIN TYPE: TYPE: ACUTE PAIN

## 2024-08-08 ASSESSMENT — PAIN DESCRIPTION - LOCATION: LOCATION: HAND

## 2024-08-08 ASSESSMENT — PAIN SCALES - WONG BAKER: WONGBAKER_NUMERICALRESPONSE: NO HURT

## 2024-08-08 ASSESSMENT — PAIN - FUNCTIONAL ASSESSMENT: PAIN_FUNCTIONAL_ASSESSMENT: ACTIVITIES ARE NOT PREVENTED

## 2024-08-08 ASSESSMENT — PAIN DESCRIPTION - ONSET: ONSET: ON-GOING

## 2024-08-08 ASSESSMENT — PAIN DESCRIPTION - FREQUENCY: FREQUENCY: CONTINUOUS

## 2024-08-08 ASSESSMENT — PAIN DESCRIPTION - DESCRIPTORS: DESCRIPTORS: ACHING

## 2024-08-08 ASSESSMENT — PAIN DESCRIPTION - ORIENTATION: ORIENTATION: LEFT

## 2024-08-08 NOTE — PLAN OF CARE
Problem: Discharge Planning  Goal: Discharge to home or other facility with appropriate resources  8/8/2024 1131 by Nila Gray RN  Outcome: Adequate for Discharge     Problem: Pain  Goal: Verbalizes/displays adequate comfort level or baseline comfort level  8/8/2024 1131 by Nila Gray RN  Outcome: Adequate for Discharge     Problem: ABCDS Injury Assessment  Goal: Absence of physical injury  8/8/2024 1131 by Nila Gray RN  Outcome: Adequate for Discharge     Problem: Safety - Adult  Goal: Free from fall injury  8/8/2024 1131 by Nila Gray RN  Outcome: Adequate for Discharge     Problem: Skin/Tissue Integrity  Goal: Absence of new skin breakdown  Description: 1.  Monitor for areas of redness and/or skin breakdown  2.  Assess vascular access sites hourly  3.  Every 4-6 hours minimum:  Change oxygen saturation probe site  4.  Every 4-6 hours:  If on nasal continuous positive airway pressure, respiratory therapy assess nares and determine need for appliance change or resting period.  Outcome: Adequate for Discharge     Problem: Chronic Conditions and Co-morbidities  Goal: Patient's chronic conditions and co-morbidity symptoms are monitored and maintained or improved  Outcome: Adequate for Discharge   Patient discharged home with his son. IV removed, site unremarkable. He will  antibiotics at [facility]. Reviewed discharge instructions with patient and he verbalizes understanding. Taken by wheelchair to son's truck.

## 2024-08-08 NOTE — PROGRESS NOTES
[facility] -  Clinical Pharmacy Note    Vancomycin - Management by Pharmacy    Consult Date(s): 8/5/24  Consulting Provider(s): Dr. Dodd    Assessment / Plan  1)  LUE cellulitis - Vancomycin  Concurrent Antimicrobials: n/a  Day of Vanc Therapy / Ordered Duration: 4 of 7  Current Dosing Method: Bayesian-Guided AUC Dosing  Therapeutic Goal: AUC < 500 mg/L*hr  Current Dose / Plan:   Currently on 1250mg IV q12h.    No new SCr today.   Calculated AUC = 526 with trouhg = 16.2 mcg/mL based on level 8/7.  Continue same regimen for now.  Planning to discharge soon (today?) on oral abx - repeat level will likely not be needed.  Will monitor and order level if plan changes and level is indicated.  Will continue to monitor clinical condition and make adjustments to regimen as appropriate.    Please call with questions--  Thanks--  Roxanne Tipton, PharmD, BCPS, BCGP  [id number] ([facility])   8/8/2024 9:54 AM      Interval update:  Planning for discharge soon (possibly today) on oral Keflex / Doxy per ID recommendations.    Subjective/Objective:   Delano Daigle is a 79 y.o. male with a PMHx significant for COPD, DM 2, HTN, HLD, and eczema who is admitted with RUE cellulitis 2/2 mechanical fall.     Pharmacy is consulted to dose Vancomycin.    Ht Readings from Last 1 Encounters:   08/07/24 1.829 m (6')     Wt Readings from Last 1 Encounters:   08/07/24 100.7 kg (222 lb)     Current & Prior Antimicrobial Regimen(s):   x1 dose 8/3 in ED   (8/3-8/4)   (8/4-8/5)  Vancomycin - Pharmacy to dose  1500mg IV q12h (8/5-8/7)  1250mg IV q12h (8/7-current)    Vancomycin Level(s) / Doses:    /Date Time Dose Type of Level / Level Interpretation   8/7 04:57 1500mg q12h Random = 20.9 mcg/mL Drawn ~8h after prior dose  AUC = 624  Decrease to 1250mg q12h          Note: Serum levels collected for AUC-based dosing may be high if collected in close proximity to the dose administered. This is not necessarily indicative of  toxicity.    Cultures & Sensitivities:    Date Site Micro Susceptibility / Result   8/2 Blood x2 No growth to date    8/3 Blood x2 No growth to date    8/3 MRSA nasal PCR positive      Recent Labs     08/06/24  0437   CREATININE 0.7*   BUN 10   WBC 9.5         Estimated Creatinine Clearance: 107 mL/min (A) (based on SCr of 0.7 mg/dL (L)).    Additional Lab Values / Findings of Note:    No results for input(s): \"PROCAL\" in the last 72 hours.

## 2024-08-08 NOTE — PLAN OF CARE
Problem: Discharge Planning  Goal: Discharge to home or other facility with appropriate resources  Outcome: Adequate for Discharge  Flowsheets (Taken 8/8/2024 0251)  Discharge to home or other facility with appropriate resources:   Identify barriers to discharge with patient and caregiver   Arrange for interpreters to assist at discharge as needed   Arrange for needed discharge resources and transportation as appropriate   Identify discharge learning needs (meds, wound care, etc)   Refer to discharge planning if patient needs post-hospital services based on physician order or complex needs related to functional status, cognitive ability or social support system     Problem: Pain  Goal: Verbalizes/displays adequate comfort level or baseline comfort level  Outcome: Adequate for Discharge  Flowsheets (Taken 8/8/2024 0251)  Verbalizes/displays adequate comfort level or baseline comfort level:   Encourage patient to monitor pain and request assistance   Assess pain using appropriate pain scale   Administer analgesics based on type and severity of pain and evaluate response   Implement non-pharmacological measures as appropriate and evaluate response   Notify Licensed Independent Practitioner if interventions unsuccessful or patient reports new pain   Consider cultural and social influences on pain and pain management     Problem: ABCDS Injury Assessment  Goal: Absence of physical injury  Outcome: Adequate for Discharge  Flowsheets (Taken 8/8/2024 0251)  Absence of Physical Injury: Implement safety measures based on patient assessment     Problem: Safety - Adult  Goal: Free from fall injury  Outcome: Adequate for Discharge  Flowsheets (Taken 8/8/2024 0251)  Free From Fall Injury:   Instruct family/caregiver on patient safety   Based on caregiver fall risk screen, instruct family/caregiver to ask for assistance with transferring infant if caregiver noted to have fall risk factors

## 2024-08-08 NOTE — PROGRESS NOTES
[facility] Orthopedic Surgery  Progress Note           CHIEF COMPLAINT:  Left wrist and hand pain/ cellulitis, much better.     HISTORY OF PRESENT ILLNESS:    Mr. Daigle is a 79 y.o.  male right handed who seen today at [facility] for f/u evaluation of a left wrist and hand swelling after an injury last Thursday.  The patient reports that this injury occurred when he fell.  He was first seen and evaluated in [facility], when he was x-rayed and admitted, and I was consulted. The patient denies any other injuries. Rates pain a 2/10 VAS mild, achy, and improving.  Movement makes the pain worse, the splint and resting makes the pain better. Alleviating factors rest. No numbness or tingling sensation.      Past Medical History:        Diagnosis Date    Allergic rhinitis     Eczema     Hearing loss     Hyperlipidemia     Hypertension     Type 2 diabetes mellitus without complication (HCC)        Past Surgical History:        Procedure Laterality Date    COLONOSCOPY      COLONOSCOPY N/A 11/10/2021    COLONOSCOPY POLYPECTOMY SNARE/COLD BIOPSY performed by Justine Haq MD at [facility] ENDOSCOPY    SKIN GRAFT         Medications prior to admission:   Prior to Admission medications    Medication Sig Start Date End Date Taking? Authorizing Provider   losartan (COZAAR) 25 MG tablet TAKE 2 TABLETS BY MOUTH EVERY DAY 7/25/24   Jason Dawn MD   desoximetasone (TOPICORT) 0.25 % cream Apply topically 2 times daily. 6/5/24   Jason Dawn MD   cetirizine (ZYRTEC) 10 MG tablet TAKE 1 TABLET BY MOUTH DAILY FOR ITCHING. 6/4/24   Jason Dawn MD   fluticasone furoate-vilanterol (BREO ELLIPTA) 100-25 MCG/ACT inhaler INHALE 1 PUFF INTO THE LUNGS DAILY 5/18/24   Jason Dawn MD   rosuvastatin (CRESTOR) 40 MG tablet TAKE 1 TABLET BY MOUTH DAILY FOR HIGH CHOLESTEROL 3/5/24   Jason Dawn MD   pioglitazone (ACTOS) 15 MG tablet TAKE 1 TABLET BY MOUTH EVERY DAY 3/5/24   Jason Dawn MD   ezetimibe (ZETIA) 10 MG tablet TAKE 1 TABLET  BY MOUTH DAILY FOR HIGH CHOLESTEROL 3/5/24   Jason Dawn MD   triamcinolone (KENALOG) 0.1 % ointment APPLY TO AFFECTED AREAS TWICE DAILY FOR UP TO 2 WEEKS OR UNTIL IMPROVED. 3/5/24   Jason Dawn MD   amLODIPine (NORVASC) 5 MG tablet TAKE 1 TABLET BY MOUTH EVERY DAY 1/20/24   Jason Dawn MD   tamsulosin (FLOMAX) 0.4 MG capsule Take 1 capsule by mouth daily 10/15/23   Tameka Cortes MD   JANUVIA 100 MG tablet TAKE 1 TABLET BY MOUTH EVERY DAY 6/27/23   Jason Dawn MD   ibuprofen (ADVIL;MOTRIN) 800 MG tablet  11/7/22   ProviderJonathan MD   ammonium lactate (LAC-HYDRIN) 12 % lotion APPLY TO AFFECTED AREA EVERY DAY 5/9/22   Jason Dawn MD   NICOTROL 10 MG inhaler INHALE 1 PUFF INTO LUNGS AS NEEDED FOR SMOKING CESSATION 4/12/21   Jason Dawn MD   Blood Glucose Monitoring Suppl (TRUE METRIX AIR GLUCOSE METER) w/Device KIT Test 3 times daily 4/7/20   Jason Dawn MD   blood glucose test strips (TRUE METRIX BLOOD GLUCOSE TEST) strip 1 each by In Vitro route daily As needed. 4/7/20   Jason Dawn MD   Spacer/Aero-Holding Chambers GUILLERMO 1 Device by Does not apply route daily as needed (with inhaler) 12/26/19   DENA Pozo,    albuterol sulfate HFA (PROVENTIL HFA) 108 (90 Base) MCG/ACT inhaler Inhale 2 puffs into the lungs every 6 hours as needed for Wheezing 6/5/19   Jason Dawn MD   aspirin 81 MG EC tablet Take 1 tablet by mouth daily    Provider, MD Jonathan       Current Medications:   Current Facility-Administered Medications: vancomycin (VANCOCIN) 1,250 mg in sodium chloride 0.9 % 250 mL IVPB ([id number]), 1,250 mg, IntraVENous, Q12H  perflutren lipid microspheres (DEFINITY) injection 1.5 mL, 1.5 mL, IntraVENous, ONCE PRN  hydrOXYzine HCl (ATARAX) tablet 10 mg, 10 mg, Oral, TID PRN  betamethasone dipropionate 0.05 % cream, , Topical, BID  cetirizine (ZYRTEC) tablet 10 mg, 10 mg, Oral, Daily  ezetimibe (ZETIA) tablet 10 mg, 10 mg, Oral, Daily  arformoterol 15

## 2024-08-12 ENCOUNTER — TELEPHONE (OUTPATIENT)
Dept: INTERNAL MEDICINE CLINIC | Age: 79
End: 2024-08-12

## 2024-08-12 NOTE — TELEPHONE ENCOUNTER
[facility] Transitions Initial Follow Up Call    Outreach made within 2 business days of discharge: Yes    Patient: Delano Daigle Patient : 1945   MRN: [id number]  Reason for Admission: UNKNOWN  Discharge Date: 24       Spoke with: NO ONE    Discharge department/facility: [facility]      Scheduled appointment with PCP within 7-14 days    Follow Up  Future Appointments   Date Time Provider Department Center   10/18/2024 11:00 AM Jason Dawn MD [place] [facility] ECC DEP       Alma Pineda MA

## 2024-08-15 ENCOUNTER — TELEPHONE (OUTPATIENT)
Dept: INTERNAL MEDICINE CLINIC | Age: 79
End: 2024-08-15

## 2024-08-20 ENCOUNTER — OFFICE VISIT (OUTPATIENT)
Dept: INTERNAL MEDICINE CLINIC | Age: 79
End: 2024-08-20

## 2024-08-20 VITALS
OXYGEN SATURATION: 97 % | SYSTOLIC BLOOD PRESSURE: 135 MMHG | DIASTOLIC BLOOD PRESSURE: 75 MMHG | TEMPERATURE: 97.1 F | HEIGHT: 72 IN | WEIGHT: 212.8 LBS | HEART RATE: 85 BPM | BODY MASS INDEX: 28.82 KG/M2

## 2024-08-20 DIAGNOSIS — E11.8 TYPE 2 DIABETES MELLITUS WITH COMPLICATION, WITHOUT LONG-TERM CURRENT USE OF INSULIN (HCC): ICD-10-CM

## 2024-08-20 DIAGNOSIS — Z86.19 H/O SEPSIS: ICD-10-CM

## 2024-08-20 DIAGNOSIS — L30.9 ECZEMA, UNSPECIFIED TYPE: ICD-10-CM

## 2024-08-20 DIAGNOSIS — L03.114 CELLULITIS OF LEFT UPPER EXTREMITY: ICD-10-CM

## 2024-08-20 DIAGNOSIS — Z09 HOSPITAL DISCHARGE FOLLOW-UP: ICD-10-CM

## 2024-08-20 LAB
CHP ED QC CHECK: NORMAL
GLUCOSE BLD-MCNC: 118 MG/DL
HBA1C MFR BLD: 5.8 %

## 2024-08-20 RX ORDER — AMMONIUM LACTATE 12 G/100G
LOTION TOPICAL
Qty: 400 ML | Refills: 1 | Status: SHIPPED | OUTPATIENT
Start: 2024-08-20

## 2024-08-20 RX ORDER — CLOBETASOL PROPIONATE 0.5 MG/G
CREAM TOPICAL
Qty: 120 G | Refills: 1 | Status: SHIPPED | OUTPATIENT
Start: 2024-08-20

## 2024-08-20 SDOH — ECONOMIC STABILITY: FOOD INSECURITY: WITHIN THE PAST 12 MONTHS, THE FOOD YOU BOUGHT JUST DIDN'T LAST AND YOU DIDN'T HAVE MONEY TO GET MORE.: NEVER TRUE

## 2024-08-20 SDOH — ECONOMIC STABILITY: FOOD INSECURITY: WITHIN THE PAST 12 MONTHS, YOU WORRIED THAT YOUR FOOD WOULD RUN OUT BEFORE YOU GOT MONEY TO BUY MORE.: NEVER TRUE

## 2024-08-20 SDOH — ECONOMIC STABILITY: INCOME INSECURITY: HOW HARD IS IT FOR YOU TO PAY FOR THE VERY BASICS LIKE FOOD, HOUSING, MEDICAL CARE, AND HEATING?: NOT HARD AT ALL

## 2024-08-20 NOTE — PROGRESS NOTES
Post-Discharge Transitional Care  Follow Up      Delano Daigle   YOB: 1945    Date of Office Visit:  8/20/2024  Date of Hospital Admission: 8/2/24  Date of Hospital Discharge: 8/8/24  Risk of hospital readmission (high >=14%. Medium >=10%) :Readmission Risk Score: 15.4      Care management risk score Rising risk (score 2-5) and Complex Care (Scores >=6): No Risk Score On File     Non face to face  following discharge, date last encounter closed (first attempt may have been earlier): 08/12/2024    Call initiated 2 business days of discharge: Yes    ASSESSMENT/PLAN:    Diagnosis Orders   1. Hospital discharge follow-up  LA DISCHARGE MEDS RECONCILED W/ CURRENT OUTPATIENT MED LIST        2. Cellulitis of left upper extremity  Continue antibiotics until completion.   F/U.      3. H/O sepsis  Continue antibiotic treatment.       4. Type 2 diabetes mellitus with complication, without long-term current use of insulin (HCC)  Diet, physical activity, weight reduction discussed.   POCT Glucose    POCT glycosylated hemoglobin (Hb A1C)      5. Eczema, unspecified type  Non [facility] - External Referral To Dermatology    clobetasol (TEMOVATE) 0.05 % cream    ammonium lactate (LAC-HYDRIN) 12 % lotion  Increase water consumption.               Medical Decision Making: low complexity  No follow-ups on file.           Subjective:   HPI:  Follow up of Hospital problems/diagnosis(es):   Recently hosp for LUE cellulitis and sepsis. MRSA detected. Antibiotics adjusted. He did improve and was d/cd in good condition. Post hospital antibiotics were doxycycline and keflex for 2 weeks. Left dorsal wrist and hand still swollen but vastly improved. Original injury was a mechanical fall.  Eczema has been exacerbated. Needs refill on topical creams.       Inpatient course: Discharge summary reviewed- see chart.    Interval history/Current status: stable.    Patient Active Problem List   Diagnosis    Hypertension associated with  ammonium lactate (LAC-HYDRIN) 12 % lotion APPLY TO AFFECTED AREA EVERY  mL 1    NICOTROL 10 MG inhaler INHALE 1 PUFF INTO LUNGS AS NEEDED FOR SMOKING CESSATION 1 Inhaler 2    Blood Glucose Monitoring Suppl (TRUE METRIX AIR GLUCOSE METER) w/Device KIT Test 3 times daily 1 kit 0    blood glucose test strips (TRUE METRIX BLOOD GLUCOSE TEST) strip 1 each by In Vitro route daily As needed. 100 each 3    Spacer/Aero-Holding Chambers GUILLERMO 1 Device by Does not apply route daily as needed (with inhaler) 1 Device 0    albuterol sulfate HFA (PROVENTIL HFA) 108 (90 Base) MCG/ACT inhaler Inhale 2 puffs into the lungs every 6 hours as needed for Wheezing 1 Inhaler 3    aspirin 81 MG EC tablet Take 1 tablet by mouth daily          Medications patient taking as of now reconciled against medications ordered at time of hospital discharge: Yes        Objective:    /75   Pulse 85   Temp 97.1 °F (36.2 °C) (Temporal)   Ht 1.829 m (6')   Wt 96.5 kg (212 lb 12.8 oz)   SpO2 97%   BMI 28.86 kg/m²         An electronic signature was used to authenticate this note.  --Jason Dawn MD

## 2024-09-18 ENCOUNTER — OFFICE VISIT (OUTPATIENT)
Dept: INTERNAL MEDICINE CLINIC | Age: 79
End: 2024-09-18

## 2024-09-18 VITALS — HEIGHT: 72 IN | BODY MASS INDEX: 28.86 KG/M2

## 2024-09-18 DIAGNOSIS — I10 PRIMARY HYPERTENSION: ICD-10-CM

## 2024-09-18 DIAGNOSIS — E78.2 MIXED HYPERLIPIDEMIA: ICD-10-CM

## 2024-09-18 DIAGNOSIS — Z87.891 FORMER CIGARETTE SMOKER: ICD-10-CM

## 2024-09-18 DIAGNOSIS — I48.91 ATRIAL FIBRILLATION, NEW ONSET (HCC): ICD-10-CM

## 2024-09-18 DIAGNOSIS — E11.8 TYPE 2 DIABETES MELLITUS WITH COMPLICATION, WITHOUT LONG-TERM CURRENT USE OF INSULIN (HCC): Primary | ICD-10-CM

## 2024-09-18 DIAGNOSIS — L30.8 OTHER ECZEMA: ICD-10-CM

## 2024-09-18 DIAGNOSIS — J43.9 PULMONARY EMPHYSEMA, UNSPECIFIED EMPHYSEMA TYPE (HCC): ICD-10-CM

## 2024-09-18 LAB
CHP ED QC CHECK: NORMAL
GLUCOSE BLD-MCNC: 117 MG/DL

## 2024-09-18 RX ORDER — METOPROLOL SUCCINATE 25 MG/1
25 TABLET, EXTENDED RELEASE ORAL DAILY
Qty: 30 TABLET | Refills: 2 | Status: SHIPPED | OUTPATIENT
Start: 2024-09-18 | End: 2024-10-10 | Stop reason: SDUPTHER

## 2024-09-19 ENCOUNTER — TELEPHONE (OUTPATIENT)
Dept: INTERNAL MEDICINE CLINIC | Age: 79
End: 2024-09-19

## 2024-10-02 ENCOUNTER — OFFICE VISIT (OUTPATIENT)
Dept: INTERNAL MEDICINE CLINIC | Age: 79
End: 2024-10-02

## 2024-10-02 ENCOUNTER — HOSPITAL ENCOUNTER (INPATIENT)
Age: 79
LOS: 2 days | Discharge: HOME | DRG: 872 | End: 2024-10-04
Attending: EMERGENCY MEDICINE | Admitting: STUDENT IN AN ORGANIZED HEALTH CARE EDUCATION/TRAINING PROGRAM
Payer: MEDICARE

## 2024-10-02 VITALS — DIASTOLIC BLOOD PRESSURE: 65 MMHG | SYSTOLIC BLOOD PRESSURE: 112 MMHG | OXYGEN SATURATION: 96 % | HEART RATE: 71 BPM

## 2024-10-02 DIAGNOSIS — R29.898 RIGHT LEG WEAKNESS: ICD-10-CM

## 2024-10-02 DIAGNOSIS — L98.9 SKIN LESION: Primary | ICD-10-CM

## 2024-10-02 DIAGNOSIS — R21 RASH AND OTHER NONSPECIFIC SKIN ERUPTION: Primary | ICD-10-CM

## 2024-10-02 DIAGNOSIS — E11.8 TYPE 2 DIABETES MELLITUS WITH COMPLICATION, WITHOUT LONG-TERM CURRENT USE OF INSULIN (HCC): ICD-10-CM

## 2024-10-02 DIAGNOSIS — L02.91 ABSCESS: ICD-10-CM

## 2024-10-02 DIAGNOSIS — I48.20 ATRIAL FIBRILLATION, CHRONIC (HCC): ICD-10-CM

## 2024-10-02 DIAGNOSIS — R63.4 WEIGHT LOSS: ICD-10-CM

## 2024-10-02 LAB
ANION GAP SERPL CALCULATED.3IONS-SCNC: 10 MMOL/L (ref 3–16)
ANISOCYTOSIS BLD QL SMEAR: ABNORMAL
BASE EXCESS BLDV CALC-SCNC: 3.4 MMOL/L (ref -2–3)
BASOPHILS # BLD: 0.4 K/UL (ref 0–0.2)
BASOPHILS NFR BLD: 2 %
BUN SERPL-MCNC: 12 MG/DL (ref 7–20)
CALCIUM SERPL-MCNC: 8.2 MG/DL (ref 8.3–10.6)
CHLORIDE SERPL-SCNC: 97 MMOL/L (ref 99–110)
CHP ED QC CHECK: NORMAL
CO2 BLDV-SCNC: 29 MMOL/L
CO2 SERPL-SCNC: 25 MMOL/L (ref 21–32)
COHGB MFR BLDV: 3.7 % (ref 0–1.5)
CREAT SERPL-MCNC: 0.8 MG/DL (ref 0.8–1.3)
DEPRECATED RDW RBC AUTO: 14.9 % (ref 12.4–15.4)
EOSINOPHIL # BLD: 0 K/UL (ref 0–0.6)
EOSINOPHIL NFR BLD: 0 %
GFR SERPLBLD CREATININE-BSD FMLA CKD-EPI: >90 ML/MIN/{1.73_M2}
GLUCOSE BLD-MCNC: 147 MG/DL
GLUCOSE BLD-MCNC: 91 MG/DL (ref 70–99)
GLUCOSE SERPL-MCNC: 103 MG/DL (ref 70–99)
HCO3 BLDV-SCNC: 27.6 MMOL/L (ref 24–28)
HCT VFR BLD AUTO: 38.4 % (ref 40.5–52.5)
HGB BLD-MCNC: 12.4 G/DL (ref 13.5–17.5)
LACTATE BLDV-SCNC: 2.6 MMOL/L (ref 0.4–2)
LYMPHOCYTES # BLD: 1.8 K/UL (ref 1–5.1)
LYMPHOCYTES NFR BLD: 10 %
MCH RBC QN AUTO: 31 PG (ref 26–34)
MCHC RBC AUTO-ENTMCNC: 32.4 G/DL (ref 31–36)
MCV RBC AUTO: 95.6 FL (ref 80–100)
METHGB MFR BLDV: 0 % (ref 0–1.5)
MONOCYTES # BLD: 2.1 K/UL (ref 0–1.3)
MONOCYTES NFR BLD: 12 %
NEUTROPHILS # BLD: 13.4 K/UL (ref 1.7–7.7)
NEUTROPHILS NFR BLD: 73 %
NEUTS BAND NFR BLD MANUAL: 3 % (ref 0–7)
NRBC BLD-RTO: 1 /100 WBC
OVALOCYTES BLD QL SMEAR: ABNORMAL
PATH INTERP BLD-IMP: NORMAL
PATH INTERP BLD-IMP: YES
PCO2 BLDV: 39.8 MMHG (ref 41–51)
PERFORMED ON: NORMAL
PH BLDV: 7.45 [PH] (ref 7.35–7.45)
PLATELET # BLD AUTO: 216 K/UL (ref 135–450)
PLATELET BLD QL SMEAR: ADEQUATE
PMV BLD AUTO: 7.4 FL (ref 5–10.5)
PO2 BLDV: 98.8 MMHG (ref 25–40)
POLYCHROMASIA BLD QL SMEAR: ABNORMAL
POTASSIUM SERPL-SCNC: 3.9 MMOL/L (ref 3.5–5.1)
RBC # BLD AUTO: 4.02 M/UL (ref 4.2–5.9)
SAO2 % BLDV: 99 %
SODIUM SERPL-SCNC: 132 MMOL/L (ref 136–145)
WBC # BLD AUTO: 17.6 K/UL (ref 4–11)

## 2024-10-02 PROCEDURE — 87205 SMEAR GRAM STAIN: CPT

## 2024-10-02 PROCEDURE — 87070 CULTURE OTHR SPECIMN AEROBIC: CPT

## 2024-10-02 PROCEDURE — 80048 BASIC METABOLIC PNL TOTAL CA: CPT

## 2024-10-02 PROCEDURE — 82803 BLOOD GASES ANY COMBINATION: CPT

## 2024-10-02 PROCEDURE — 2580000003 HC RX 258: Performed by: STUDENT IN AN ORGANIZED HEALTH CARE EDUCATION/TRAINING PROGRAM

## 2024-10-02 PROCEDURE — 87186 SC STD MICRODIL/AGAR DIL: CPT

## 2024-10-02 PROCEDURE — 85025 COMPLETE CBC W/AUTO DIFF WBC: CPT

## 2024-10-02 PROCEDURE — 87077 CULTURE AEROBIC IDENTIFY: CPT

## 2024-10-02 PROCEDURE — 96366 THER/PROPH/DIAG IV INF ADDON: CPT

## 2024-10-02 PROCEDURE — 6360000002 HC RX W HCPCS: Performed by: PHYSICIAN ASSISTANT

## 2024-10-02 PROCEDURE — G0378 HOSPITAL OBSERVATION PER HR: HCPCS

## 2024-10-02 PROCEDURE — 86403 PARTICLE AGGLUT ANTBDY SCRN: CPT

## 2024-10-02 PROCEDURE — 6370000000 HC RX 637 (ALT 250 FOR IP): Performed by: STUDENT IN AN ORGANIZED HEALTH CARE EDUCATION/TRAINING PROGRAM

## 2024-10-02 PROCEDURE — 36415 COLL VENOUS BLD VENIPUNCTURE: CPT

## 2024-10-02 PROCEDURE — 99223 1ST HOSP IP/OBS HIGH 75: CPT | Performed by: INTERNAL MEDICINE

## 2024-10-02 PROCEDURE — 1200000000 HC SEMI PRIVATE

## 2024-10-02 PROCEDURE — 96365 THER/PROPH/DIAG IV INF INIT: CPT

## 2024-10-02 PROCEDURE — 83605 ASSAY OF LACTIC ACID: CPT

## 2024-10-02 PROCEDURE — 87040 BLOOD CULTURE FOR BACTERIA: CPT

## 2024-10-02 PROCEDURE — 2580000003 HC RX 258: Performed by: PHYSICIAN ASSISTANT

## 2024-10-02 PROCEDURE — 99285 EMERGENCY DEPT VISIT HI MDM: CPT

## 2024-10-02 PROCEDURE — 6370000000 HC RX 637 (ALT 250 FOR IP): Performed by: PHYSICIAN ASSISTANT

## 2024-10-02 RX ORDER — SODIUM CHLORIDE 0.9 % (FLUSH) 0.9 %
5-40 SYRINGE (ML) INJECTION EVERY 12 HOURS SCHEDULED
Status: DISCONTINUED | OUTPATIENT
Start: 2024-10-02 | End: 2024-10-04 | Stop reason: HOSPADM

## 2024-10-02 RX ORDER — ALBUTEROL SULFATE 90 UG/1
2 INHALANT RESPIRATORY (INHALATION) EVERY 6 HOURS PRN
Status: DISCONTINUED | OUTPATIENT
Start: 2024-10-02 | End: 2024-10-02 | Stop reason: CLARIF

## 2024-10-02 RX ORDER — ENOXAPARIN SODIUM 100 MG/ML
40 INJECTION SUBCUTANEOUS DAILY
Status: DISCONTINUED | OUTPATIENT
Start: 2024-10-02 | End: 2024-10-02

## 2024-10-02 RX ORDER — ACETAMINOPHEN 650 MG/1
650 SUPPOSITORY RECTAL EVERY 6 HOURS PRN
Status: DISCONTINUED | OUTPATIENT
Start: 2024-10-02 | End: 2024-10-04 | Stop reason: HOSPADM

## 2024-10-02 RX ORDER — POTASSIUM CHLORIDE 1500 MG/1
40 TABLET, EXTENDED RELEASE ORAL PRN
Status: DISCONTINUED | OUTPATIENT
Start: 2024-10-02 | End: 2024-10-04 | Stop reason: HOSPADM

## 2024-10-02 RX ORDER — SODIUM CHLORIDE 0.9 % (FLUSH) 0.9 %
5-40 SYRINGE (ML) INJECTION PRN
Status: DISCONTINUED | OUTPATIENT
Start: 2024-10-02 | End: 2024-10-04 | Stop reason: HOSPADM

## 2024-10-02 RX ORDER — ACETAMINOPHEN 500 MG
1000 TABLET ORAL ONCE
Status: COMPLETED | OUTPATIENT
Start: 2024-10-02 | End: 2024-10-02

## 2024-10-02 RX ORDER — METOPROLOL SUCCINATE 25 MG/1
25 TABLET, EXTENDED RELEASE ORAL DAILY
Status: DISCONTINUED | OUTPATIENT
Start: 2024-10-03 | End: 2024-10-04 | Stop reason: HOSPADM

## 2024-10-02 RX ORDER — ONDANSETRON 4 MG/1
4 TABLET, ORALLY DISINTEGRATING ORAL EVERY 8 HOURS PRN
Status: DISCONTINUED | OUTPATIENT
Start: 2024-10-02 | End: 2024-10-04 | Stop reason: HOSPADM

## 2024-10-02 RX ORDER — ACETAMINOPHEN 325 MG/1
650 TABLET ORAL EVERY 6 HOURS PRN
Status: DISCONTINUED | OUTPATIENT
Start: 2024-10-02 | End: 2024-10-04 | Stop reason: HOSPADM

## 2024-10-02 RX ORDER — ALBUTEROL SULFATE 0.83 MG/ML
2.5 SOLUTION RESPIRATORY (INHALATION) EVERY 6 HOURS PRN
Status: DISCONTINUED | OUTPATIENT
Start: 2024-10-02 | End: 2024-10-04 | Stop reason: HOSPADM

## 2024-10-02 RX ORDER — MAGNESIUM SULFATE IN WATER 40 MG/ML
2000 INJECTION, SOLUTION INTRAVENOUS PRN
Status: DISCONTINUED | OUTPATIENT
Start: 2024-10-02 | End: 2024-10-04 | Stop reason: HOSPADM

## 2024-10-02 RX ORDER — ONDANSETRON 2 MG/ML
4 INJECTION INTRAMUSCULAR; INTRAVENOUS EVERY 6 HOURS PRN
Status: DISCONTINUED | OUTPATIENT
Start: 2024-10-02 | End: 2024-10-04 | Stop reason: HOSPADM

## 2024-10-02 RX ORDER — AMLODIPINE BESYLATE 5 MG/1
5 TABLET ORAL DAILY
Status: DISCONTINUED | OUTPATIENT
Start: 2024-10-03 | End: 2024-10-04 | Stop reason: HOSPADM

## 2024-10-02 RX ORDER — POTASSIUM CHLORIDE 7.45 MG/ML
10 INJECTION INTRAVENOUS PRN
Status: DISCONTINUED | OUTPATIENT
Start: 2024-10-02 | End: 2024-10-04 | Stop reason: HOSPADM

## 2024-10-02 RX ORDER — SODIUM CHLORIDE 9 MG/ML
INJECTION, SOLUTION INTRAVENOUS PRN
Status: DISCONTINUED | OUTPATIENT
Start: 2024-10-02 | End: 2024-10-04 | Stop reason: HOSPADM

## 2024-10-02 RX ORDER — ROSUVASTATIN CALCIUM 20 MG/1
40 TABLET, COATED ORAL NIGHTLY
Status: DISCONTINUED | OUTPATIENT
Start: 2024-10-02 | End: 2024-10-04 | Stop reason: HOSPADM

## 2024-10-02 RX ORDER — LOSARTAN POTASSIUM 50 MG/1
50 TABLET ORAL DAILY
Status: DISCONTINUED | OUTPATIENT
Start: 2024-10-03 | End: 2024-10-04 | Stop reason: HOSPADM

## 2024-10-02 RX ADMIN — ROSUVASTATIN CALCIUM 40 MG: 20 TABLET, COATED ORAL at 19:42

## 2024-10-02 RX ADMIN — APIXABAN 5 MG: 5 TABLET, FILM COATED ORAL at 19:42

## 2024-10-02 RX ADMIN — VANCOMYCIN HYDROCHLORIDE 2250 MG: 10 INJECTION, POWDER, LYOPHILIZED, FOR SOLUTION INTRAVENOUS at 14:12

## 2024-10-02 RX ADMIN — SODIUM CHLORIDE, PRESERVATIVE FREE 5 ML: 5 INJECTION INTRAVENOUS at 19:43

## 2024-10-02 RX ADMIN — ACETAMINOPHEN 650 MG: 325 TABLET ORAL at 21:37

## 2024-10-02 RX ADMIN — ACETAMINOPHEN 1000 MG: 500 TABLET ORAL at 13:44

## 2024-10-02 ASSESSMENT — ENCOUNTER SYMPTOMS
SHORTNESS OF BREATH: 0
VOMITING: 0
CHEST TIGHTNESS: 0
NAUSEA: 0

## 2024-10-02 ASSESSMENT — PATIENT HEALTH QUESTIONNAIRE - PHQ9
SUM OF ALL RESPONSES TO PHQ9 QUESTIONS 1 & 2: 4
3. TROUBLE FALLING OR STAYING ASLEEP: NOT AT ALL
8. MOVING OR SPEAKING SO SLOWLY THAT OTHER PEOPLE COULD HAVE NOTICED. OR THE OPPOSITE, BEING SO FIGETY OR RESTLESS THAT YOU HAVE BEEN MOVING AROUND A LOT MORE THAN USUAL: NOT AT ALL
7. TROUBLE CONCENTRATING ON THINGS, SUCH AS READING THE NEWSPAPER OR WATCHING TELEVISION: NOT AT ALL
9. THOUGHTS THAT YOU WOULD BE BETTER OFF DEAD, OR OF HURTING YOURSELF: NOT AT ALL
SUM OF ALL RESPONSES TO PHQ QUESTIONS 1-9: 4
5. POOR APPETITE OR OVEREATING: NOT AT ALL
6. FEELING BAD ABOUT YOURSELF - OR THAT YOU ARE A FAILURE OR HAVE LET YOURSELF OR YOUR FAMILY DOWN: NOT AT ALL
SUM OF ALL RESPONSES TO PHQ QUESTIONS 1-9: 4
1. LITTLE INTEREST OR PLEASURE IN DOING THINGS: MORE THAN HALF THE DAYS
2. FEELING DOWN, DEPRESSED OR HOPELESS: MORE THAN HALF THE DAYS
4. FEELING TIRED OR HAVING LITTLE ENERGY: NOT AT ALL

## 2024-10-02 ASSESSMENT — PAIN - FUNCTIONAL ASSESSMENT
PAIN_FUNCTIONAL_ASSESSMENT: ACTIVITIES ARE NOT PREVENTED
PAIN_FUNCTIONAL_ASSESSMENT: 0-10

## 2024-10-02 ASSESSMENT — PAIN SCALES - GENERAL
PAINLEVEL_OUTOF10: 10
PAINLEVEL_OUTOF10: 10
PAINLEVEL_OUTOF10: 6
PAINLEVEL_OUTOF10: 8

## 2024-10-02 ASSESSMENT — PAIN DESCRIPTION - LOCATION
LOCATION: GENERALIZED
LOCATION: BUTTOCKS
LOCATION: OTHER (COMMENT)

## 2024-10-02 ASSESSMENT — PAIN DESCRIPTION - ORIENTATION: ORIENTATION: RIGHT;LEFT

## 2024-10-02 ASSESSMENT — PAIN DESCRIPTION - DESCRIPTORS
DESCRIPTORS: SORE
DESCRIPTORS: ACHING;SORE

## 2024-10-02 ASSESSMENT — LIFESTYLE VARIABLES
HOW OFTEN DO YOU HAVE A DRINK CONTAINING ALCOHOL: MONTHLY OR LESS
HOW MANY STANDARD DRINKS CONTAINING ALCOHOL DO YOU HAVE ON A TYPICAL DAY: 1 OR 2

## 2024-10-02 ASSESSMENT — PAIN DESCRIPTION - PAIN TYPE: TYPE: ACUTE PAIN

## 2024-10-02 ASSESSMENT — PAIN DESCRIPTION - ONSET: ONSET: GRADUAL

## 2024-10-02 ASSESSMENT — PAIN DESCRIPTION - FREQUENCY: FREQUENCY: INTERMITTENT

## 2024-10-02 NOTE — PROGRESS NOTES
Patient: Delano Daigle is a 79 y.o. male who presents today with the following Chief Complaint(s):    Chief Complaint   Patient presents with    Medicare AWV    Diabetes         HPIHe is here for f/u on diffuse 'bumps' - skin eruption distributed over his body. Noted first 4 days ago. Awakened with problem. Bumps draining secretions he describes as oranges orange, red,  yellow in color. Says they are painful to touch. This is a new out break. Non pruritic. Has h/o atopic dermatitis/eczema treated with topical steroids. He was recently hospitalized for cellulitis and sepsis secondary to MRSA. His treatment after discharge included 2 weeks of antibiotics which were doxycycline and keflex. He was compliant with the regimen.   He also complains of right leg feeling tight with difficulty ambulating. Denies injury to the extremity.      Current Outpatient Medications   Medication Sig Dispense Refill    metoprolol succinate (TOPROL XL) 25 MG extended release tablet Take 1 tablet by mouth daily 30 tablet 2    apixaban (ELIQUIS) 5 MG TABS tablet Take 1 tablet by mouth 2 times daily 60 tablet 2    clobetasol (TEMOVATE) 0.05 % cream Apply topically 2 times daily. 120 g 1    ammonium lactate (LAC-HYDRIN) 12 % lotion Apply topically as needed. 400 mL 1    cephALEXin (KEFLEX) 500 MG capsule Take 1 capsule by mouth 4 times daily 56 capsule 0    losartan (COZAAR) 25 MG tablet TAKE 2 TABLETS BY MOUTH EVERY  tablet 3    desoximetasone (TOPICORT) 0.25 % cream Apply topically 2 times daily. 100 g 0    cetirizine (ZYRTEC) 10 MG tablet TAKE 1 TABLET BY MOUTH DAILY FOR ITCHING. 90 tablet 1    fluticasone furoate-vilanterol (BREO ELLIPTA) 100-25 MCG/ACT inhaler INHALE 1 PUFF INTO THE LUNGS DAILY 180 each 3    rosuvastatin (CRESTOR) 40 MG tablet TAKE 1 TABLET BY MOUTH DAILY FOR HIGH CHOLESTEROL 90 tablet 3    pioglitazone (ACTOS) 15 MG tablet TAKE 1 TABLET BY MOUTH EVERY DAY 90 tablet 3    ezetimibe (ZETIA) 10 MG tablet TAKE 1

## 2024-10-02 NOTE — CONSULTS
Infectious Diseases   Consult Note      Reason for Consult: Multiple abscesses  Requesting Physician: Dr. Paula  Date of Admission: 10/2/2024  Subjective:   CHIEF COMPLAINT: Multiple bumps, draining    HPI:  79-year-old -American male with history of COPD, HTN, DM 2, HLD, nicotine abuse and eczema that presented on 10/2 with complaints of multiple draining boils around his body.  Of note, he was recently admitted to the hospital on 8/2/2024 where he had fallen on his left arm resulting in cellulitis in this region.  Of note, at that point MRSA nares was positive.  A CT of the left radius and ulnar showed diffuse soft tissue swelling without acute osseous abnormalities.  He was evaluated by orthopedics on that admission and no surgical intervention was recommended.  He was sent on a 2-week course of p.o. doxycycline and Keflex  upon presentation today, WBC was 17.6 and he was afebrile.  He was empirically started on IV vancomycin.  Exam showed multiple bumps in bilateral axillae, his back, buttocks and left legs.  He states over the last few days as these have been draining clear to yellow fluid.  He denies any known fevers or chills but has been having malaise and poor appetite.  Blood cultures x 2 were collected on admission and pending.                     Current abx: Vancomycin         Past Surgical History:       Diagnosis Date    Allergic rhinitis     Eczema     Hearing loss     Hyperlipidemia     Hypertension     Type 2 diabetes mellitus without complication (HCC)          Procedure Laterality Date    COLONOSCOPY      COLONOSCOPY N/A 11/10/2021    COLONOSCOPY POLYPECTOMY SNARE/COLD BIOPSY performed by Justine Haq MD at [facility] ENDOSCOPY    SKIN GRAFT         Social History:    TOBACCO:   reports that he has been smoking cigarettes. He has a 59 pack-year smoking history. He has never used smokeless tobacco.  ETOH:   reports current alcohol use of about 2.0 standard drinks of alcohol per week.  There  burn\".   -Patient with known MRSA colonization, MRSA nares positive from previous admission on 8/3/2024.  -Blood cultures x 2 pending from admission  -Agree with continuation of vancomycin.  Closely monitor vancomycin and adjustment of dosing per goal trough 15-20 to avoid toxicities.  - obtained wound cx of one lesion on the right axilla, will follow for cx results.     DM2  -Last A1c 1 month ago was 5.8  -Good glycemic control to aid in healing and prevention of further infections     Medical Decision Making:  The following items were considered in medical decision making:  Discussion of patient care with other providers  Reviewed clinical lab tests  Reviewed radiology tests  Reviewed other diagnostic tests/interventions  Independent review of radiologic images  Microbiology cultures and other micro tests reviewed      Risk of Complications/Morbidity: High   Illness(es)/ Infection present that pose threat to bodily function.   There is potential for severe exacerbation of infection/side effects of treatment.  Therapy requires intensive monitoring for antimicrobial agent toxicity    Discussed with patient, his RN at bedside and primary team, Dr. Paula.  Mirna Corea MD

## 2024-10-02 NOTE — ED NOTES
How does patient ambulate?   []Low Fall Risk (ambulates by themselves without support)  []Stand by assist   []Contact Guard   []Front wheel walker  []Wheelchair   []Steady  []Bed bound  []History of Lower Extremity Amputation  [x]Unknown, did not assess in the emergency department   How does patient take pills?  [x]Whole with Water  []Crushed in applesauce  []Crushed in pudding  []Other  []Unknown no oral medications were given in the ED  Is patient alert?   [x]Alert  []Drowsy but responds to voice  []Doesn't respond to voice but responds to painful stimuli  []Unresponsive  Is patient oriented?   [x]To person  [x]To place  [x]To time  [x]To situation  []Confused  []Agitated  []Follows commands  If patient is disoriented or from a Skill Nursing Facility has family been notified of admission?   []Yes   [x]No  Patient belongings?   []Cell phone  []Wallet   []Dentures  [x]Clothing  Any specific patient or family belongings/needs/dynamics?   none  Miscellaneous comments/pending orders?  Waiting for pharmacy to send vancomycin. Pt is in contact precautions for MRSA.      If there are any additional questions please reach out to the Emergency Department.           David Mathews RN  10/02/24 1229

## 2024-10-02 NOTE — PROGRESS NOTES
Patient abran cabrera to room 5314 from ed. Patient is A&O x 4. VSS. Patient oriented to the room all safety measures in place. Patient given IS and SCDs at this time. Admission orders released and patient 4 eyes completed. Admission documentation completed. No other needs are noted at this time.    [x] Bed alarm on and cord plugged into wall  [x] Bed in lowest position  [x] Call light and bedside table within reach  [x] Patient educated on all safety measures  []Oxygen connected to wall (if applicable)     Nurse 1 Esignature: Electronically signed by Sriram Valentino RN on 10/2/24 at 3:15 PM EDT  Nurse 2 Esignature: Electronically signed by Zach Colvin RN on 10/2/24 at 4:22 PM EDT

## 2024-10-02 NOTE — CONSULTS
Clinical Pharmacy Consult Note    ED Encounter Date: 10/2/2024     Patient presents to the ED with complaints of pain and itchy bumps found around body.    Pharmacy is consulted to dose Vancomycin x1 dose in ED per Dr. Linda Cadet.    Ht: 182.9 cm  Wt: 90.2 kg    Estimated Creatinine Clearance: 84 mL/min (based on SCr of 0.8 mg/dL).    Assessment/Plan:  Will order Vancomycin 2250 (~24.9 mg/kg) IV x1 for administration in ED per ER pharmacy consult.    If Vancomycin is to continue on admission and pharmacy is to manage dosing, please re-consult with admission orders.    Thanks for consulting pharmacy!  Mj Dallas, PharmD  PGY1 Pharmacy Resident   Wireless: [id number]  10/02/24

## 2024-10-02 NOTE — ED TRIAGE NOTES
Pt states on Saturday he woke up in pain, states he has multiple bumps that itch and hurt all over his body. Pt denies any new meds or creams.

## 2024-10-02 NOTE — PROGRESS NOTES
4 Eyes Skin Assessment     NAME:  Delano Daigle  YOB: 1945  MEDICAL RECORD NUMBER:  [id number]    The patient is being assessed for  Admission    I agree that at least one RN has performed a thorough Head to Toe Skin Assessment on the patient. ALL assessment sites listed below have been assessed.      Areas assessed by both nurses:    Head, Face, Ears, Shoulders, Back, Chest, Arms, Elbows, Hands, Sacrum. Buttock, Coccyx, Ischium, Legs. Feet and Heels, and Under Medical Devices         Does the Patient have a Wound? No noted wound(s)  Pt has multiple abscess sites of sacrum, ankles, and armpits       Shubham Prevention initiated by RN: No  Wound Care Orders initiated by RN: No    Pressure Injury (Stage 3,4, Unstageable, DTI, NWPT, and Complex wounds) if present, place Wound referral order by RN under : No    New Ostomies, if present place, Ostomy referral order under : No     Nurse 1 eSignature: Electronically signed by Sriram Valentino RN on 10/2/24 at 3:14 PM EDT    **SHARE this note so that the co-signing nurse can place an eSignature**    Nurse 2 eSignature: Electronically signed by Zach Colvin RN on 10/2/24 at 4:22 PM EDT

## 2024-10-02 NOTE — H&P
V2.0  History and Physical      Name:  Delano Daigle /Age/Sex: 1945  (79 y.o. male)   MRN & CSN:  [id number] & [id number] Encounter Date/Time: 10/2/2024 1:51 PM EDT   Location:  [place] [address] PCP: Jason Dawn MD       Hospital Day: 1    Assessment and Plan:     78yo male with Hx of possible  MRSA cellulitis presenting with multiple skin lesions concerning for active skin MRSA infection.     Multiple skin lesions  Hx of recent possible recent MRSA Cellulitis  -ID consult as ordered. Appreciate recs  -Continue Vancomycin as ordered  -Follow up BC    Recent/onset Afib  -Continue Metoprolol  -Continue Eliquis for stroke prophylaxis    HTN  -Continue chronic home meds    TD2M  -Controlled. Monitor clinically.     Hx of recent falls/Syncope  -PT/OT. Needs to continue outpatient follow up with PCP.     Disposition:   Current Living situation: Home  Expected Disposition: TBD  Estimated D/C: 1-2 days    Diet Diet NPO   DVT Prophylaxis [] Lovenox, []  Heparin, [] SCDs, [] Ambulation,  [x] Eliquis, [] Xarelto, [] Coumadin   Code Status Full Code   Surrogate Decision Maker/ POA On file, full code.      Personally reviewed Lab Studies and Imaging           History from:     patient, electronic medical record    History of Present Illness:     78yo male with hx of prevvious possible MRSA cellulitis, recent a-fib diagnosis, previous tobacco abuse, T2DM, HTN, presenting with multiple recent skin lesions. Patient states that for the last 3-4 days he has been experiencing multiple growing, painful lesions in multiple areas of his body including axiilla, bilateral lower extremities, chest, buttocks, back etc... Reportedly this has never happened before.      Review of Systems:        Pertinent positives and negatives discussed in HPI     Objective:   No intake or output data in the 24 hours ending 10/02/24 1351   Vitals:   Vitals:    10/02/24 1202 10/02/24 1330 10/02/24 1345   BP: (!) 101/57 124/78    Pulse: 59 65   Sexual Activity   • Alcohol use: Yes     Alcohol/week: 2.0 standard drinks of alcohol     Types: 2 Shots of liquor per week     Comment: occass   • Drug use: No   • Sexual activity: Yes     Partners: Female     Social Determinants of Health     Financial Resource Strain: Low Risk  (8/20/2024)    Overall Financial Resource Strain (CARDIA)    • Difficulty of Paying Living Expenses: Not hard at all   Food Insecurity: No Food Insecurity (8/20/2024)    Hunger Vital Sign    • Worried About Running Out of Food in the Last Year: Never true    • Ran Out of Food in the Last Year: Never true   Transportation Needs: No Transportation Needs (8/20/2024)    PRAPARE - Transportation    • Lack of Transportation (Medical): No    • Lack of Transportation (Non-Medical): No   Physical Activity: Inactive (10/2/2024)    Exercise Vital Sign    • Days of Exercise per Week: 0 days    • Minutes of Exercise per Session: 0 min   Stress: Not on File (8/25/2019)    Received from [facility]    Stress    • Stress: 0   Social Connections: Not on File (8/25/2019)    Received from [facility]    Social Connections    • Social Connections and Isolation: 0    Received from [facility]    Interpersonal Safety   Housing Stability: Unknown (8/20/2024)    Housing Stability Vital Sign    • Unable to Pay for Housing in the Last Year: No    • Homeless in the Last Year: No       Medications:   Medications:   • sodium chloride flush  5-40 mL IntraVENous 2 times per day   • enoxaparin  40 mg SubCUTAneous Daily      Infusions:   • sodium chloride       PRN Meds: sodium chloride flush, 5-40 mL, PRN  sodium chloride, , PRN  potassium chloride, 40 mEq, PRN   Or  potassium alternative oral replacement, 40 mEq, PRN   Or  potassium chloride, 10 mEq, PRN  magnesium sulfate, 2,000 mg, PRN  ondansetron, 4 mg, Q8H PRN   Or  ondansetron, 4 mg, Q6H PRN  acetaminophen, 650 mg, Q6H PRN   Or  acetaminophen, 650 mg, Q6H PRN        Labs      CBC:

## 2024-10-02 NOTE — ED PROVIDER NOTES
THE [facility]  EMERGENCY DEPARTMENT ENCOUNTER          PHYSICIAN ASSISTANT NOTE       Date of evaluation: 10/2/2024    Chief Complaint     Rash      History of Present Illness     Delano Daigle is a 79 y.o. male with past medical history of diabetes presenting to the emergency department for evaluation of painful, draining bumps on his body.  He first noticed the bumps developing 4 days ago, and they have worsened over the last few days and are now draining yellow/clear liquid.  Bumps are in both of his armpits, on his back, buttock and left leg.  He has never had symptoms like this in the past and cannot identify any aggravating features.  Does report diffuse dryness of his skin as well as itching, and has a history of atopic dermatitis/eczema.  He has not been able to eat anything over the last 24 hours because he has not been able to get up feeling generally unwell.  No vomiting or abdominal pain.    ASSESSMENT / PLAN  (MEDICAL DECISION MAKING)     INITIAL VITALS: BP: (!) 101/57, Temp: 98.4 °F (36.9 °C), Pulse: 59, Respirations: 24, SpO2: 96 %    Delano Daigle is a 79 y.o. male who presented to the emergency department for evaluation of rash that started 4 days ago with small bumps under the skin.  These areas are consistent with numerous abscesses that are on his back, buttock, and both axillas, and in the left inner thigh.  He has never had symptoms like this in the past.  Upon presentation vital stable and he was afebrile.  He had leukocytosis of 17.6, lactate of 2.6.  He was admitted in the hospital a few months ago for cellulitis of his leg and grew MRSA, so started on vancomycin in the ED today.  Blood culture sent and pending.  Vitals have remained stable, will continue to monitor.  Patient does meet criteria for sepsis but not SIRS, given 1L IV fluid but at this time does not need full 30 cc/kg bolus.     On my exam he has multiple abscesses, the large ones are spontaneously draining so

## 2024-10-02 NOTE — ED PROVIDER NOTES
ED Attending Attestation Note     Date of evaluation: 10/2/2024    This patient was seen by the advance practice provider.  I have seen and examined the patient, agree with the workup, evaluation, management and diagnosis. The care plan has been discussed.  My assessment reveals a 79-year-old male with past medical history of eczema who presents with multiple draining fluid-filled skin lesions. Concerning for abscess. Leukocytosis. No tachycardia.     Is this patient to be included in the SEP-1 core measure? No Exclusion criteria - the patient is NOT to be included for SEP-1 Core Measure due to: 2+ SIRS criteria are not met (patient only with leukocytosis for SIRS criteria)         Felix Correia MD  10/02/24 1226       Felix Correia MD  10/02/24 4010

## 2024-10-03 LAB
ANION GAP SERPL CALCULATED.3IONS-SCNC: 7 MMOL/L (ref 3–16)
BASOPHILS # BLD: 0 K/UL (ref 0–0.2)
BASOPHILS NFR BLD: 0 %
BUN SERPL-MCNC: 13 MG/DL (ref 7–20)
CALCIUM SERPL-MCNC: 8.2 MG/DL (ref 8.3–10.6)
CHLORIDE SERPL-SCNC: 103 MMOL/L (ref 99–110)
CO2 SERPL-SCNC: 28 MMOL/L (ref 21–32)
CREAT SERPL-MCNC: 0.7 MG/DL (ref 0.8–1.3)
DACRYOCYTES BLD QL SMEAR: ABNORMAL
DEPRECATED RDW RBC AUTO: 14.9 % (ref 12.4–15.4)
EOSINOPHIL # BLD: 0.9 K/UL (ref 0–0.6)
EOSINOPHIL NFR BLD: 7 %
GFR SERPLBLD CREATININE-BSD FMLA CKD-EPI: >90 ML/MIN/{1.73_M2}
GLUCOSE BLD-MCNC: 95 MG/DL (ref 70–99)
GLUCOSE SERPL-MCNC: 66 MG/DL (ref 70–99)
HCT VFR BLD AUTO: 36.9 % (ref 40.5–52.5)
HGB BLD-MCNC: 12 G/DL (ref 13.5–17.5)
LYMPHOCYTES # BLD: 0.3 K/UL (ref 1–5.1)
LYMPHOCYTES NFR BLD: 2 %
MCH RBC QN AUTO: 31.3 PG (ref 26–34)
MCHC RBC AUTO-ENTMCNC: 32.6 G/DL (ref 31–36)
MCV RBC AUTO: 96 FL (ref 80–100)
MONOCYTES # BLD: 1.2 K/UL (ref 0–1.3)
MONOCYTES NFR BLD: 9 %
NEUTROPHILS # BLD: 10.8 K/UL (ref 1.7–7.7)
NEUTROPHILS NFR BLD: 82 %
OVALOCYTES BLD QL SMEAR: ABNORMAL
PERFORMED ON: NORMAL
PLATELET # BLD AUTO: 204 K/UL (ref 135–450)
PMV BLD AUTO: 8 FL (ref 5–10.5)
POTASSIUM SERPL-SCNC: 3.4 MMOL/L (ref 3.5–5.1)
RBC # BLD AUTO: 3.84 M/UL (ref 4.2–5.9)
SODIUM SERPL-SCNC: 138 MMOL/L (ref 136–145)
WBC # BLD AUTO: 13.2 K/UL (ref 4–11)

## 2024-10-03 PROCEDURE — 85025 COMPLETE CBC W/AUTO DIFF WBC: CPT

## 2024-10-03 PROCEDURE — 6370000000 HC RX 637 (ALT 250 FOR IP): Performed by: NURSE PRACTITIONER

## 2024-10-03 PROCEDURE — 96361 HYDRATE IV INFUSION ADD-ON: CPT

## 2024-10-03 PROCEDURE — 97535 SELF CARE MNGMENT TRAINING: CPT

## 2024-10-03 PROCEDURE — 97161 PT EVAL LOW COMPLEX 20 MIN: CPT

## 2024-10-03 PROCEDURE — 97116 GAIT TRAINING THERAPY: CPT

## 2024-10-03 PROCEDURE — 97530 THERAPEUTIC ACTIVITIES: CPT

## 2024-10-03 PROCEDURE — 2580000003 HC RX 258: Performed by: STUDENT IN AN ORGANIZED HEALTH CARE EDUCATION/TRAINING PROGRAM

## 2024-10-03 PROCEDURE — 6370000000 HC RX 637 (ALT 250 FOR IP): Performed by: STUDENT IN AN ORGANIZED HEALTH CARE EDUCATION/TRAINING PROGRAM

## 2024-10-03 PROCEDURE — 97166 OT EVAL MOD COMPLEX 45 MIN: CPT

## 2024-10-03 PROCEDURE — 1200000000 HC SEMI PRIVATE

## 2024-10-03 PROCEDURE — G0378 HOSPITAL OBSERVATION PER HR: HCPCS

## 2024-10-03 PROCEDURE — 36415 COLL VENOUS BLD VENIPUNCTURE: CPT

## 2024-10-03 PROCEDURE — 99233 SBSQ HOSP IP/OBS HIGH 50: CPT | Performed by: INTERNAL MEDICINE

## 2024-10-03 PROCEDURE — 80048 BASIC METABOLIC PNL TOTAL CA: CPT

## 2024-10-03 RX ORDER — POTASSIUM CHLORIDE 1500 MG/1
40 TABLET, EXTENDED RELEASE ORAL ONCE
Status: COMPLETED | OUTPATIENT
Start: 2024-10-03 | End: 2024-10-03

## 2024-10-03 RX ORDER — HYDROCODONE BITARTRATE AND ACETAMINOPHEN 5; 325 MG/1; MG/1
1 TABLET ORAL EVERY 6 HOURS PRN
Status: DISCONTINUED | OUTPATIENT
Start: 2024-10-03 | End: 2024-10-04 | Stop reason: HOSPADM

## 2024-10-03 RX ORDER — SODIUM CHLORIDE, SODIUM LACTATE, POTASSIUM CHLORIDE, AND CALCIUM CHLORIDE .6; .31; .03; .02 G/100ML; G/100ML; G/100ML; G/100ML
500 INJECTION, SOLUTION INTRAVENOUS ONCE
Status: COMPLETED | OUTPATIENT
Start: 2024-10-03 | End: 2024-10-03

## 2024-10-03 RX ADMIN — ROSUVASTATIN CALCIUM 40 MG: 20 TABLET, COATED ORAL at 20:56

## 2024-10-03 RX ADMIN — SODIUM CHLORIDE, PRESERVATIVE FREE 10 ML: 5 INJECTION INTRAVENOUS at 20:56

## 2024-10-03 RX ADMIN — APIXABAN 5 MG: 5 TABLET, FILM COATED ORAL at 20:56

## 2024-10-03 RX ADMIN — SODIUM CHLORIDE, PRESERVATIVE FREE 10 ML: 5 INJECTION INTRAVENOUS at 10:51

## 2024-10-03 RX ADMIN — APIXABAN 5 MG: 5 TABLET, FILM COATED ORAL at 10:51

## 2024-10-03 RX ADMIN — SODIUM CHLORIDE, POTASSIUM CHLORIDE, SODIUM LACTATE AND CALCIUM CHLORIDE 500 ML: 600; 310; 30; 20 INJECTION, SOLUTION INTRAVENOUS at 14:28

## 2024-10-03 RX ADMIN — HYDROCODONE BITARTRATE AND ACETAMINOPHEN 1 TABLET: 5; 325 TABLET ORAL at 04:07

## 2024-10-03 RX ADMIN — METOPROLOL SUCCINATE 25 MG: 25 TABLET, EXTENDED RELEASE ORAL at 10:51

## 2024-10-03 RX ADMIN — POTASSIUM CHLORIDE 40 MEQ: 1500 TABLET, EXTENDED RELEASE ORAL at 10:51

## 2024-10-03 ASSESSMENT — PAIN DESCRIPTION - LOCATION: LOCATION: GENERALIZED

## 2024-10-03 ASSESSMENT — PAIN DESCRIPTION - ONSET: ONSET: GRADUAL

## 2024-10-03 ASSESSMENT — PAIN SCALES - GENERAL
PAINLEVEL_OUTOF10: 8
PAINLEVEL_OUTOF10: 3
PAINLEVEL_OUTOF10: 3
PAINLEVEL_OUTOF10: 8

## 2024-10-03 ASSESSMENT — PAIN DESCRIPTION - DESCRIPTORS: DESCRIPTORS: ACHING;SORE

## 2024-10-03 ASSESSMENT — PAIN DESCRIPTION - FREQUENCY: FREQUENCY: INTERMITTENT

## 2024-10-03 ASSESSMENT — PAIN DESCRIPTION - ORIENTATION: ORIENTATION: ANTERIOR;POSTERIOR

## 2024-10-03 ASSESSMENT — PAIN - FUNCTIONAL ASSESSMENT: PAIN_FUNCTIONAL_ASSESSMENT: ACTIVITIES ARE NOT PREVENTED

## 2024-10-03 ASSESSMENT — PAIN DESCRIPTION - PAIN TYPE: TYPE: ACUTE PAIN

## 2024-10-03 NOTE — PROGRESS NOTES
ID Follow-up NOTE    CC:   Multiple skin lesions, draining   Antibiotics: Vanco     Admit Date: 10/2/2024  Hospital Day: 2    Subjective:     Patient denies any fevers or chills. No worsening lesions, persistent pain.       Objective:     Patient Vitals for the past 8 hrs:   BP Temp Temp src Pulse Resp SpO2   10/03/24 1050 (!) 111/58 97.9 °F (36.6 °C) -- (!) 104 -- 94 %   10/03/24 0900 (!) 102/59 -- -- -- -- --   10/03/24 0802 100/65 97.9 °F (36.6 °C) Oral 98 16 95 %   10/03/24 0437 -- -- -- -- 17 --   10/03/24 0343 134/71 98.8 °F (37.1 °C) Oral 97 17 --     I/O last 3 completed shifts:  In: -   Out: 300 [Urine:300]  No intake/output data recorded.    EXAM:  GENERAL: No apparent distress.    HEENT: Membranes moist, no oral lesion  NECK:  Supple, no lymphadenopathy  LUNGS: Clear b/l, no rales, no dullness  CARDIAC: RRR, no murmur appreciated  ABD:  + BS, soft / NT  EXT:  There are multiple approximate 1 cm in diameter subdermal indurations around bilateral axillae.  There is thin skin with excoriation in the buttocks region with similar lesions, drainage of slightly yellow serous material.  See photos from 10/2:  Right axilla       Left Axilla       Gluteal     NEURO: No focal neurologic findings  PSYCH: Orientation, sensorium, mood normal  LINES:  Peripheral iv       Data Review:  Lab Results   Component Value Date    WBC 13.2 (H) 10/03/2024    HGB 12.0 (L) 10/03/2024    HCT 36.9 (L) 10/03/2024    MCV 96.0 10/03/2024     10/03/2024     Lab Results   Component Value Date    CREATININE 0.7 (L) 10/03/2024    BUN 13 10/03/2024     10/03/2024    K 3.4 (L) 10/03/2024     10/03/2024    CO2 28 10/03/2024       Hepatic Function Panel:   Lab Results   Component Value Date/Time    ALKPHOS 110 08/06/2024 04:37 AM    ALT 7 08/06/2024 04:37 AM    AST 22 08/06/2024 04:37 AM    BILITOT <0.2 08/06/2024 04:37 AM    BILIDIR 0.1 05/20/2014 09:36 AM    IBILI 0.2 05/20/2014 09:36 AM       MICRO:  Blood cultures x 2

## 2024-10-03 NOTE — PROGRESS NOTES
Pt boils seem to be oozing and bleeding more, MD Paula made aware. Boils cleansed with normal saline and abd pads applied to absorb drainage.    Electronically signed by Zach Colvin RN on 10/3/2024 at 9:13 AM

## 2024-10-03 NOTE — CARE COORDINATION
Case Management Assessment  Initial Evaluation    Date/Time of Evaluation: 10/3/2024 3:49 PM  Assessment Completed by: Carol Encarnacion RN    If patient is discharged prior to next notation, then this note serves as note for discharge by case management.    Patient Name: Delano Daigle                   YOB: 1945  Diagnosis: Rash and other nonspecific skin eruption [R21]  Abscess [L02.91]  Abscess of multiple sites [L02.91]                   Date / Time: 10/2/2024 11:54 AM    Patient Admission Status: Inpatient   Readmission Risk (Low < 19, Mod (19-27), High > 27): Readmission Risk Score: 18    Current PCP: Jason Dawn MD  PCP verified by CM? No    Chart Reviewed: Yes      History Provided by: Patient  Patient Orientation: Alert and Oriented    Patient Cognition: Alert    Hospitalization in the last 30 days (Readmission):  No    If yes, Readmission Assessment in CM Navigator will be completed.    Advance Directives:      Code Status: Full Code   Patient's Primary Decision Maker is: Legal Next of Kin    Primary Decision Maker: Carlos Daigle - Child - [phone]    Discharge Planning:    Patient lives with: Alone Type of Home: Apartment  Primary Care Giver: Self  Patient Support Systems include: Children   Current Financial resources: Medicare  Current community resources: None  Current services prior to admission: Durable Medical Equipment            Current DME: Rafa Bryan            Type of Home Care services:  None    ADLS  Prior functional level: Independent in ADLs/IADLs  Current functional level: Independent in ADLs/IADLs    PT AM-PAC: 17 /24  OT AM-PAC: 16 /24    Family can provide assistance at DC: Yes  Would you like Case Management to discuss the discharge plan with any other family members/significant others, and if so, who? Yes (son Carlos)  Plans to Return to Present Housing: Yes  Other Identified Issues/Barriers to RETURNING to current housing: none  Potential Assistance needed  at discharge: N/A            Potential DME:    Patient expects to discharge to: Apartment  Plan for transportation at discharge:      Financial    Payor: [facility] MEDICARE / Plan: [employer] DUAL COMPLETE / Product Type: *No Product type* /     Does insurance require precert for SNF: Yes    Potential assistance Purchasing Medications:    Meds-to-Beds request: Yes      [id number] - [place], [place] - [address] - P [phone] - F [phone]  [address]  [facility] [address]  Phone: [phone] Fax: [phone]      Notes:    Factors facilitating achievement of predicted outcomes: Pleasant    Barriers to discharge: Pain    Additional Case Management Notes:   Patient is from home alone, in an apartment, uses cane and walker.  Patient is interested in COA referral and HHC at discharge.  Patient's son to transport home.    The Plan for Transition of Care is related to the following treatment goals of Rash and other nonspecific skin eruption [R21]  Abscess [L02.91]  Abscess of multiple sites [L02.91]    IF APPLICABLE: The Patient and/or patient representative Delano and his family were provided with a choice of provider and agrees with the discharge plan. Freedom of choice list with basic dialogue that supports the patient's individualized plan of care/goals and shares the quality data associated with the providers was provided to: Patient   Patient Representative Name:       The Patient and/or Patient Representative Agree with the Discharge Plan? Yes    Carol Encarnacion RN  Case Management Department  Ph: [phone] Fax: [phone]

## 2024-10-03 NOTE — CONSULTS
General Surgery   Resident Consult Note    Reason for Consult: multiple skin lesions    History of Present Illness:   Delano Daigle is a 79 y.o. male with Hx of HTN, HLD, T2DM who presents after a week of the eruption of multiple boils with pus filled fluid. The patient denies fevers and chills. He feels very itchy at the sites of bumps and boils. He states he has never felt these lesions before and that they all appeared recently. He denies any recent infectious contacts or bites. He noted that he did try to open some of these lesions to drain them. The patient smokes a pack of cigarettes a day.     The patient was admitted for these boils and started on Abx with Vancomycin per ID recommendations. Wound cultures were taken that grew MRSA. The patient has known MRSA colonization. General surgery was consulted for lesions at this time.     Past Medical History:        Diagnosis Date    Allergic rhinitis     Eczema     Hearing loss     Hyperlipidemia     Hypertension     Type 2 diabetes mellitus without complication (HCC)        Past Surgical History:           Procedure Laterality Date    COLONOSCOPY      COLONOSCOPY N/A 11/10/2021    COLONOSCOPY POLYPECTOMY SNARE/COLD BIOPSY performed by Justine Haq MD at [facility] ENDOSCOPY    SKIN GRAFT         Allergies:  Patient has no known allergies.    Medications:   Home Meds  No current facility-administered medications on file prior to encounter.     Current Outpatient Medications on File Prior to Encounter   Medication Sig Dispense Refill    metoprolol succinate (TOPROL XL) 25 MG extended release tablet Take 1 tablet by mouth daily 30 tablet 2    apixaban (ELIQUIS) 5 MG TABS tablet Take 1 tablet by mouth 2 times daily 60 tablet 2    losartan (COZAAR) 25 MG tablet TAKE 2 TABLETS BY MOUTH EVERY  tablet 3    rosuvastatin (CRESTOR) 40 MG tablet TAKE 1 TABLET BY MOUTH DAILY FOR HIGH CHOLESTEROL 90 tablet 3    ezetimibe (ZETIA) 10 MG tablet TAKE 1 TABLET BY MOUTH DAILY  FOR HIGH CHOLESTEROL 90 tablet 3    tamsulosin (FLOMAX) 0.4 MG capsule Take 1 capsule by mouth daily 10 capsule 0    Spacer/Aero-Holding Chambers GUILLERMO 1 Device by Does not apply route daily as needed (with inhaler) 1 Device 0    aspirin 81 MG EC tablet Take 1 tablet by mouth daily      clobetasol (TEMOVATE) 0.05 % cream Apply topically 2 times daily. 120 g 1    ammonium lactate (LAC-HYDRIN) 12 % lotion Apply topically as needed. 400 mL 1    cephALEXin (KEFLEX) 500 MG capsule Take 1 capsule by mouth 4 times daily 56 capsule 0    desoximetasone (TOPICORT) 0.25 % cream Apply topically 2 times daily. 100 g 0    cetirizine (ZYRTEC) 10 MG tablet TAKE 1 TABLET BY MOUTH DAILY FOR ITCHING. 90 tablet 1    fluticasone furoate-vilanterol (BREO ELLIPTA) 100-25 MCG/ACT inhaler INHALE 1 PUFF INTO THE LUNGS DAILY (Patient not taking: Reported on 10/2/2024) 180 each 3    pioglitazone (ACTOS) 15 MG tablet TAKE 1 TABLET BY MOUTH EVERY DAY 90 tablet 3    triamcinolone (KENALOG) 0.1 % ointment APPLY TO AFFECTED AREAS TWICE DAILY FOR UP TO 2 WEEKS OR UNTIL IMPROVED. 80 g 1    amLODIPine (NORVASC) 5 MG tablet TAKE 1 TABLET BY MOUTH EVERY DAY 90 tablet 3    JANUVIA 100 MG tablet TAKE 1 TABLET BY MOUTH EVERY DAY 90 tablet 3    NICOTROL 10 MG inhaler INHALE 1 PUFF INTO LUNGS AS NEEDED FOR SMOKING CESSATION 1 Inhaler 2    Blood Glucose Monitoring Suppl (TRUE METRIX AIR GLUCOSE METER) w/Device KIT Test 3 times daily 1 kit 0    blood glucose test strips (TRUE METRIX BLOOD GLUCOSE TEST) strip 1 each by In Vitro route daily As needed. 100 each 3    albuterol sulfate HFA (PROVENTIL HFA) 108 (90 Base) MCG/ACT inhaler Inhale 2 puffs into the lungs every 6 hours as needed for Wheezing (Patient not taking: Reported on 10/2/2024) 1 Inhaler 3       Current Meds  HYDROcodone-acetaminophen (NORCO) 5-325 MG per tablet 1 tablet, Q6H PRN  lactated ringers bolus 500 mL, Once  sodium chloride flush 0.9 % injection 5-40 mL, 2 times per day  sodium chloride

## 2024-10-03 NOTE — PROGRESS NOTES
Physical Therapy  Facility/Department: [address]  Physical Therapy Initial Assessment/Treatment    Name: Delano Daigle  : 1945  MRN: [id number]  Date of Service: 10/3/2024    Discharge Recommendations:  IP Rehab, 24 hour supervision or assist, Home with Home health PT   PT Equipment Recommendations  Equipment Needed: Yes (RW if going home)      Patient Diagnosis(es): The primary encounter diagnosis was Rash and other nonspecific skin eruption. A diagnosis of Abscess was also pertinent to this visit.  Past Medical History:  has a past medical history of Allergic rhinitis, Eczema, Hearing loss, Hyperlipidemia, Hypertension, and Type 2 diabetes mellitus without complication (HCC).  Past Surgical History:  has a past surgical history that includes Skin graft; Colonoscopy; and Colonoscopy (N/A, 11/10/2021).    Assessment  Body Structures, Functions, Activity Limitations Requiring Skilled Therapeutic Intervention: Decreased functional mobility   Assessment: Pt is currently functioning below his baseline. Reports it has been getting more difficult to care for himself recently & has had 3 recent falls. Pt requires up to min A for transfers & short distance ambulation using RW. Pt is a high fall risk & need for RW is new for him. Pt has 3 flights of stairs to enter his apt.  Recommend further inpt PT upon D/C. Will follow while here to maximize independence.  Treatment Diagnosis: decreased mobility  Therapy Prognosis: Good  Decision Making: Low Complexity  Requires PT Follow-Up: Yes  Activity Tolerance  Activity Tolerance: Patient tolerated evaluation without incident    Plan  Physical Therapy Plan  General Plan:  (2-5)  Current Treatment Recommendations: Balance training, Functional mobility training, Transfer training, Endurance training, Gait training, Stair training, Safety education & training, Equipment evaluation, education, & procurement, Therapeutic activities  Safety Devices  Type of Devices:  Short Term Goals: D/C  Short Term Goal 1: supine<->sit SBA  Short Term Goal 2: sit<->stand SBA  Short Term Goal 3: Amb 150 ft with AAD SBA  Short Term Goal 4: up/down fkight of stairs, 1 rail CGA (if going home)  Patient Goals   Patient Goals : to go home       Education  Patient Education  Education Given To: Patient  Education Provided: Role of Therapy;Plan of Care;Transfer Training;Equipment;Fall Prevention Strategies  Education Method: Verbal  Education Outcome: Verbalized understanding;Continued education needed      Therapy Time   Individual Concurrent Group Co-treatment   Time In 1120         Time Out 1213         Minutes 53                 Josey Barroso, PT

## 2024-10-03 NOTE — PROGRESS NOTES
V2.0    [facility] [place]      Name:  Delano Daigle /Age/Sex: 1945  (79 y.o. male)   MRN & CSN:  [id number] & [id number] Encounter Date/Time: 10/3/2024 1:47 PM EDT   Location:  [facility]/5314- PCP: Jason Dawn MD     Attending:Elvis Perry*       Hospital Day: 2    Assessment and Recommendations     78yo male with Hx of possible  MRSA cellulitis presenting with multiple skin lesions concerning for active skin MRSA infection.  On IV vancomycin.  Blood cultures have remained negative since admission.  ID in case.     Multiple skin lesions  Hx of recent possible recent MRSA Cellulitis  Possible Sepsis  -ID consult as ordered. Appreciate recs  -Continue Vancomycin as ordered  -Follow up BC.  Negative to date.   -General Surgery consult for indurated lesions  -Give fluid bolus.   -Add telemetry.      Recent/onset Afib  -Continue Metoprolol  -Continue Eliquis for stroke prophylaxis     HTN  -Continue chronic home meds     TD2M  -Controlled. Monitor clinically.      Hx of recent falls/Syncope  -PT/OT. Needs to continue outpatient follow up with PCP.     Diet ADULT DIET; Regular; No Added Salt (3-4 gm)   DVT Prophylaxis [] Lovenox, []  Heparin, [] SCDs, [] Ambulation,  [x] Eliquis, [] Xarelto  [] Coumadin   Code Status Full Code   Disposition From: Home  Expected Disposition: Home versus inpatient rehab  Estimated Date of Discharge: 1 to 2 days  Patient requires continued admission due to skin lesion   Surrogate Decision Maker/ POA On file     Personally reviewed Lab Studies and Imaging     Discussed management of the case with case management and ID.  I reviewed IDs notes        Subjective:     Patient seen and evaluated at bedside.  Complaining of pain.  Otherwise asking pertinent questions.    Review of Systems:      Pertinent positives and negatives discussed in HPI    Objective:     Intake/Output Summary (Last 24 hours) at 10/3/2024 1347  Last data filed at 10/3/2024 1250  Gross per 24 hour

## 2024-10-03 NOTE — PROGRESS NOTES
Occupational Therapy  Facility/Department: [address]  Occupational Therapy Initial Assessment/ Treatment    Name: Delano Daigle  : 1945  MRN: [id number]  Date of Service: 10/3/2024    Discharge Recommendations:  IP Rehab   OT Equipment Recommendations  Equipment Needed: No  Other: defer       Patient Diagnosis(es): The primary encounter diagnosis was Rash and other nonspecific skin eruption. A diagnosis of Abscess was also pertinent to this visit.  Past Medical History:  has a past medical history of Allergic rhinitis, Eczema, Hearing loss, Hyperlipidemia, Hypertension, and Type 2 diabetes mellitus without complication (HCC).  Past Surgical History:  has a past surgical history that includes Skin graft; Colonoscopy; and Colonoscopy (N/A, 11/10/2021).    Treatment Diagnosis: decreased fxl mobility and ADLs      Assessment  Performance deficits / Impairments: Decreased functional mobility ;Decreased ADL status;Decreased ROM;Decreased endurance;Decreased strength  Assessment: Pt is a 80 y/o male with abscess of multiple sites. Pt is independent at baseline for ADLs and with cane for fxl mobility. Today requiring Keron progressing to CGA w fxl mobility ~90ft using 2WW and Vcs to stand upright. Pt completing grooming in stance at sink requiring CGA. Pt seated EOB for bathing UE w setupA. Pt struggling with toilet transfer this date requiring ModA, stating that it is more difficulty now with the abscesses. Pt with 3 flights to apartment and 4 flights to laundry room. Pt would benefit from cont inpt care at [place]. If pt rtn home rec 24hr at d/c + HHOT. Rec cont care to maximize independence. Will cont to follow.  Treatment Diagnosis: decreased fxl mobility and ADLs  Prognosis: Good  Decision Making: Low Complexity  REQUIRES OT FOLLOW-UP: Yes  Activity Tolerance  Activity Tolerance: Patient Tolerated treatment well     Plan  Occupational Therapy Plan  Times Per Week: 2-5x  Current Treatment Recommendations:

## 2024-10-03 NOTE — PROGRESS NOTES
Lesions cleansed and abd pads replaced.    Electronically signed by Zach Colvin RN on 10/3/2024 at 6:02 PM

## 2024-10-04 VITALS
HEART RATE: 65 BPM | BODY MASS INDEX: 27.09 KG/M2 | WEIGHT: 200 LBS | DIASTOLIC BLOOD PRESSURE: 58 MMHG | HEIGHT: 72 IN | SYSTOLIC BLOOD PRESSURE: 119 MMHG | TEMPERATURE: 97.7 F | RESPIRATION RATE: 20 BRPM | OXYGEN SATURATION: 95 %

## 2024-10-04 LAB
ANION GAP SERPL CALCULATED.3IONS-SCNC: 2 MMOL/L (ref 3–16)
BACTERIA SPEC AEROBE CULT: ABNORMAL
BACTERIA SPEC AEROBE CULT: ABNORMAL
BASOPHILS # BLD: 0.1 K/UL (ref 0–0.2)
BASOPHILS NFR BLD: 0.5 %
BUN SERPL-MCNC: 13 MG/DL (ref 7–20)
CALCIUM SERPL-MCNC: 7.9 MG/DL (ref 8.3–10.6)
CHLORIDE SERPL-SCNC: 101 MMOL/L (ref 99–110)
CO2 SERPL-SCNC: 30 MMOL/L (ref 21–32)
CREAT SERPL-MCNC: 0.8 MG/DL (ref 0.8–1.3)
DEPRECATED RDW RBC AUTO: 14.9 % (ref 12.4–15.4)
EOSINOPHIL # BLD: 0.6 K/UL (ref 0–0.6)
EOSINOPHIL NFR BLD: 3.9 %
GFR SERPLBLD CREATININE-BSD FMLA CKD-EPI: >90 ML/MIN/{1.73_M2}
GLUCOSE SERPL-MCNC: 77 MG/DL (ref 70–99)
GRAM STN SPEC: ABNORMAL
HCT VFR BLD AUTO: 35.1 % (ref 40.5–52.5)
HGB BLD-MCNC: 11.5 G/DL (ref 13.5–17.5)
LYMPHOCYTES # BLD: 1.9 K/UL (ref 1–5.1)
LYMPHOCYTES NFR BLD: 12.4 %
MCH RBC QN AUTO: 31.3 PG (ref 26–34)
MCHC RBC AUTO-ENTMCNC: 32.8 G/DL (ref 31–36)
MCV RBC AUTO: 95.6 FL (ref 80–100)
MONOCYTES # BLD: 2.1 K/UL (ref 0–1.3)
MONOCYTES NFR BLD: 13.6 %
NEUTROPHILS # BLD: 10.6 K/UL (ref 1.7–7.7)
NEUTROPHILS NFR BLD: 69.6 %
ORGANISM: ABNORMAL
ORGANISM: ABNORMAL
PLATELET # BLD AUTO: 210 K/UL (ref 135–450)
PMV BLD AUTO: 7.1 FL (ref 5–10.5)
POTASSIUM SERPL-SCNC: 3.9 MMOL/L (ref 3.5–5.1)
RBC # BLD AUTO: 3.67 M/UL (ref 4.2–5.9)
SODIUM SERPL-SCNC: 133 MMOL/L (ref 136–145)
WBC # BLD AUTO: 15.2 K/UL (ref 4–11)

## 2024-10-04 PROCEDURE — 2580000003 HC RX 258: Performed by: STUDENT IN AN ORGANIZED HEALTH CARE EDUCATION/TRAINING PROGRAM

## 2024-10-04 PROCEDURE — G0378 HOSPITAL OBSERVATION PER HR: HCPCS

## 2024-10-04 PROCEDURE — 6370000000 HC RX 637 (ALT 250 FOR IP): Performed by: NURSE PRACTITIONER

## 2024-10-04 PROCEDURE — 80048 BASIC METABOLIC PNL TOTAL CA: CPT

## 2024-10-04 PROCEDURE — 36415 COLL VENOUS BLD VENIPUNCTURE: CPT

## 2024-10-04 PROCEDURE — 6370000000 HC RX 637 (ALT 250 FOR IP): Performed by: STUDENT IN AN ORGANIZED HEALTH CARE EDUCATION/TRAINING PROGRAM

## 2024-10-04 PROCEDURE — 85025 COMPLETE CBC W/AUTO DIFF WBC: CPT

## 2024-10-04 PROCEDURE — 99233 SBSQ HOSP IP/OBS HIGH 50: CPT | Performed by: INTERNAL MEDICINE

## 2024-10-04 RX ORDER — DOXYCYCLINE 100 MG/1
100 CAPSULE ORAL EVERY 12 HOURS SCHEDULED
Status: DISCONTINUED | OUTPATIENT
Start: 2024-10-04 | End: 2024-10-04 | Stop reason: HOSPADM

## 2024-10-04 RX ORDER — CLINDAMYCIN PHOSPHATE 10 MG/G
GEL TOPICAL 2 TIMES DAILY
Status: DISCONTINUED | OUTPATIENT
Start: 2024-10-04 | End: 2024-10-04 | Stop reason: HOSPADM

## 2024-10-04 RX ORDER — CEPHALEXIN 500 MG/1
500 CAPSULE ORAL EVERY 8 HOURS SCHEDULED
Qty: 42 CAPSULE | Refills: 0 | Status: SHIPPED | OUTPATIENT
Start: 2024-10-04 | End: 2024-10-18

## 2024-10-04 RX ORDER — DOXYCYCLINE 100 MG/1
100 CAPSULE ORAL EVERY 12 HOURS SCHEDULED
Qty: 28 CAPSULE | Refills: 0 | Status: SHIPPED | OUTPATIENT
Start: 2024-10-04 | End: 2024-10-18

## 2024-10-04 RX ADMIN — APIXABAN 5 MG: 5 TABLET, FILM COATED ORAL at 07:33

## 2024-10-04 RX ADMIN — HYDROCODONE BITARTRATE AND ACETAMINOPHEN 1 TABLET: 5; 325 TABLET ORAL at 07:32

## 2024-10-04 RX ADMIN — SODIUM CHLORIDE, PRESERVATIVE FREE 10 ML: 5 INJECTION INTRAVENOUS at 07:33

## 2024-10-04 RX ADMIN — METOPROLOL SUCCINATE 25 MG: 25 TABLET, EXTENDED RELEASE ORAL at 07:33

## 2024-10-04 RX ADMIN — CLINDAMYCIN PHOSPHATE GEL USP, 1% 30 G: 10 GEL TOPICAL at 10:15

## 2024-10-04 RX ADMIN — LOSARTAN POTASSIUM 50 MG: 50 TABLET, FILM COATED ORAL at 07:32

## 2024-10-04 RX ADMIN — HYDROCODONE BITARTRATE AND ACETAMINOPHEN 1 TABLET: 5; 325 TABLET ORAL at 00:14

## 2024-10-04 RX ADMIN — AMLODIPINE BESYLATE 5 MG: 5 TABLET ORAL at 07:32

## 2024-10-04 ASSESSMENT — PAIN SCALES - GENERAL
PAINLEVEL_OUTOF10: 4
PAINLEVEL_OUTOF10: 0
PAINLEVEL_OUTOF10: 7
PAINLEVEL_OUTOF10: 10

## 2024-10-04 ASSESSMENT — PAIN DESCRIPTION - ORIENTATION: ORIENTATION: RIGHT;LEFT

## 2024-10-04 ASSESSMENT — PAIN DESCRIPTION - LOCATION
LOCATION: GENERALIZED
LOCATION: GENERALIZED

## 2024-10-04 ASSESSMENT — PAIN - FUNCTIONAL ASSESSMENT
PAIN_FUNCTIONAL_ASSESSMENT: ACTIVITIES ARE NOT PREVENTED
PAIN_FUNCTIONAL_ASSESSMENT: ACTIVITIES ARE NOT PREVENTED

## 2024-10-04 ASSESSMENT — PAIN DESCRIPTION - DESCRIPTORS
DESCRIPTORS: ACHING
DESCRIPTORS: ACHING;DISCOMFORT

## 2024-10-04 NOTE — CARE COORDINATION
Case Management Assessment            Discharge Note                    Date / Time of Note: 10/4/2024 10:17 AM                  Discharge Note Completed by: Carol Encarnacion RN    Patient Name: Delano Daigle   YOB: 1945  Diagnosis: Rash and other nonspecific skin eruption [R21]  Abscess [L02.91]  Abscess of multiple sites [L02.91]   Date / Time: 10/2/2024 11:54 AM    Current PCP: Jason Dawn MD  Clinic patient: No    Hospitalization in the last 30 days: No       Advance Directives:  Code Status: Full Code  [place] DNR form completed and on chart: Not Indicated    Financial:  Payor: [facility] MEDICARE / Plan: [employer] DUAL COMPLETE / Product Type: *No Product type* /      Pharmacy:    [id number] [place] [facility] [address] - P [phone] - F [phone]  [address]  [facility] [address]  Phone: [phone] Fax: [phone]      Assistance purchasing medications?:    Assistance provided by Case Management: None at this time    Does patient want to participate in local refill/ meds to beds program?: Yes    Meds To Beds General Rules:  1. Can ONLY be done Monday- Friday between 8:30am-5pm  2. Prescription(s) must be in pharmacy by 3pm to be filled same day  3.Copy of patient's insurance/ prescription drug card and patient face sheet must be sent along with the prescription(s)  4. Cost of Rx cannot be added to hospital bill. If financial assistance is needed, please contact unit  or ;  or  CANNOT provide pharmacy voucher for patients co-pays  5. Patients can then  the prescription on their way out of the hospital at discharge, or pharmacy can deliver to the bedside if staff is available. (payment due at time of pick-up or delivery - cash, check, or card accepted)     Able to afford home medications/ co-pay costs: Yes    ADLS:  Current PT AM-PAC Score: 17 /24  Current OT AM-PAC Score: 16 /24    DISCHARGE

## 2024-10-04 NOTE — PLAN OF CARE
Problem: Skin/Tissue Integrity  Goal: Absence of new skin breakdown  Description: 1.  Monitor for areas of redness and/or skin breakdown  2.  Assess vascular access sites hourly  3.  Every 4-6 hours minimum:  Change oxygen saturation probe site  4.  Every 4-6 hours:  If on nasal continuous positive airway pressure, respiratory therapy assess nares and determine need for appliance change or resting period.  Outcome: Progressing  Note: No new skin breakdown. However, skin lesions are still oozing.

## 2024-10-04 NOTE — DISCHARGE SUMMARY
V2.0  Discharge Summary    Name:  Delano Daigle /Age/Sex: 1945 (79 y.o. male)   Admit Date: 10/2/2024  Discharge Date: 10/4/24    MRN & CSN:  [id number] & [id number] Encounter Date and Time 10/4/24 10:51 AM EDT    Attending:  Elvis Perry* Discharging Provider: Elvis Deleon MD       Hospital Course:     78yo male with Hx of possible  MRSA cellulitis presenting with multiple skin lesions concerning for active skin MRSA infection.  On IV vancomycin.  Blood cultures have remained negative since admission.  ID in case and recommending p.o. doxycycline and Keflex in the outpatient setting for 2 weeks.  General surgery consulted who felt patient has lesions consistent with hidradenitis suppurativa.  On 10/4/2024 after negative blood cultures patient was deemed fit for discharge.     Multiple skin lesions  Hx of recent possible recent MRSA Cellulitis  Possible Sepsis  -Treated with IV vancomycin for inpatient stay.  ID services consulted and patient to be discharged on p.o. doxycycline 100 mg p.o. twice daily for 2 weeks as well as Keflex for 2 weeks.  Patient to follow-up with ID for possible MRSA colonization 2 weeks after discharge.  - General Surgery consulted and suspecting patient to have lesions consistent with hidradenitis suppurativa.     Recent/onset Afib  -Continue Metoprolol in the outpatient setting  -Continue Eliquis for stroke prophylaxis in the outpatient setting     HTN  -Continue chronic home meds at discharge     TD2M  -Controlled.  Continue home meds at discharge     Hx of recent falls/Syncope  -PT/OT. Needs to continue outpatient follow up with PCP.            The patient expressed appropriate understanding of, and agreement with the discharge recommendations, medications, and plan.     Consults this admission:  PHARMACY TO DOSE VANCOMYCIN  IP CONSULT TO INFECTIOUS DISEASES  IP CONSULT TO GENERAL SURGERY  PHARMACY TO DOSE VANCOMYCIN  IP CONSULT TO HOME CARE  10/02/24  1235 10/03/24  0529 10/04/24  0448   WBC 17.6* 13.2* 15.2*   HGB 12.4* 12.0* 11.5*    204 210     BMP:    Recent Labs     10/02/24  1235 10/03/24  0529 10/04/24  0448   * 138 133*   K 3.9 3.4* 3.9   CL 97* 103 101   CO2 25 28 30   BUN 12 13 13   CREATININE 0.8 0.7* 0.8   GLUCOSE 103* 66* 77     Hepatic: No results for input(s): \"AST\", \"ALT\", \"BILITOT\", \"ALKPHOS\" in the last 72 hours.    Invalid input(s): \"ALB\"  Lipids:   Lab Results   Component Value Date/Time    CHOL 150 02/15/2024 01:38 PM    HDL 72 02/15/2024 01:38 PM    HDL 39 05/10/2012 09:43 AM    TRIG 73 02/15/2024 01:38 PM     Hemoglobin A1C:   Lab Results   Component Value Date/Time    LABA1C 5.8 08/20/2024 02:38 PM     TSH:   Lab Results   Component Value Date/Time    TSH 1.35 02/15/2024 01:38 PM     Troponin: No results found for: \"TROPONINT\"  Lactic Acid:   Recent Labs     10/02/24  1235   LACTA 2.6*     BNP: No results for input(s): \"PROBNP\" in the last 72 hours.  UA:  Lab Results   Component Value Date/Time    NITRU Negative 10/14/2023 11:51 PM    COLORU Yellow 10/14/2023 11:51 PM    PHUR 5.5 10/14/2023 11:51 PM    WBCUA 3-5 10/14/2023 11:51 PM    RBCUA >100 10/14/2023 11:51 PM    MUCUS 1+ 10/14/2023 11:51 PM    BACTERIA 2+ 10/14/2023 11:51 PM    CLARITYU SL CLOUDY 10/14/2023 11:51 PM    SPECGRAV 1.030 04/19/2018 10:19 AM    LEUKOCYTESUR Negative 10/14/2023 11:51 PM    UROBILINOGEN 1.0 10/14/2023 11:51 PM    BILIRUBINUR Negative 10/14/2023 11:51 PM    BILIRUBINUR Negative 04/19/2018 10:19 AM    BLOODU LARGE 10/14/2023 11:51 PM    GLUCOSEU Negative 10/14/2023 11:51 PM    KETUA TRACE 10/14/2023 11:51 PM    AMORPHOUS 2+ 02/24/2015 03:51 PM     Urine Cultures:   Lab Results   Component Value Date/Time    LABURIN No growth at 18-36 hours 04/19/2018 10:57 AM     Blood Cultures:   Lab Results   Component Value Date/Time    BC  10/02/2024 12:35 PM     No Growth to date.  Any change in status will be called.     Lab Results   Component

## 2024-10-04 NOTE — PROGRESS NOTES
Pt A+Ox4. VSS. Pt had some pain when sitting on toilet or after they reposition and disturb the skin abscesses. Pt had adequate urine output. Pt aware to call if help is needed. All needs met at this time. Standard and fall precautions in place.    Electronically signed by Good Davis RN on 10/4/2024 at 6:11 AM

## 2024-10-04 NOTE — PROGRESS NOTES
ID Follow-up NOTE    CC:   Multiple skin lesions, draining   Antibiotics: Vanco     Admit Date: 10/2/2024  Hospital Day: 3    Subjective:     Patient denies any fevers or chills. No worsening lesions, persistent pain. Going home today.       Objective:     Patient Vitals for the past 8 hrs:   BP Temp Temp src Pulse Resp SpO2   10/04/24 1117 (!) 119/58 97.7 °F (36.5 °C) Oral 65 20 95 %   10/04/24 0802 -- -- -- -- 16 --   10/04/24 0731 (!) 119/46 98.5 °F (36.9 °C) Oral 99 17 94 %     I/O last 3 completed shifts:  In: 1080 [P.O.:1080]  Out: 700 [Urine:700]  I/O this shift:  In: 400 [P.O.:400]  Out: -     EXAM:  GENERAL: No apparent distress.    HEENT: Membranes moist, no oral lesion  NECK:  Supple, no lymphadenopathy  LUNGS: Clear b/l, no rales, no dullness  CARDIAC: RRR, no murmur appreciated  ABD:  + BS, soft / NT  EXT:  There are multiple approximate 1 cm in diameter subdermal indurations around bilateral axillae.  There is thin skin with excoriation in the buttocks region with similar lesions, drainage of slightly yellow serous material.  See photos from 10/2:  Right axilla       Left Axilla       Gluteal     NEURO: No focal neurologic findings  PSYCH: Orientation, sensorium, mood normal  LINES:  Peripheral iv       Data Review:  Lab Results   Component Value Date    WBC 15.2 (H) 10/04/2024    HGB 11.5 (L) 10/04/2024    HCT 35.1 (L) 10/04/2024    MCV 95.6 10/04/2024     10/04/2024     Lab Results   Component Value Date    CREATININE 0.8 10/04/2024    BUN 13 10/04/2024     (L) 10/04/2024    K 3.9 10/04/2024     10/04/2024    CO2 30 10/04/2024       Hepatic Function Panel:   Lab Results   Component Value Date/Time    ALKPHOS 110 08/06/2024 04:37 AM    ALT 7 08/06/2024 04:37 AM    AST 22 08/06/2024 04:37 AM    BILITOT <0.2 08/06/2024 04:37 AM    BILIDIR 0.1 05/20/2014 09:36 AM    IBILI 0.2 05/20/2014 09:36 AM       MICRO:  Blood cultures x 2 10/2: MRSA and light growth of strep      Medical Decision Making:  The following items were considered in medical decision making:  Discussion of patient care with other providers  Reviewed clinical lab tests  Reviewed radiology tests  Reviewed other diagnostic tests/interventions  Independent review of radiologic images  Microbiology cultures and other micro tests reviewed      Discussed with patient, SW and primary team, Dr. Paula.   Mirna Corae MD

## 2024-10-04 NOTE — PROGRESS NOTES
Partial linen change, juan francisco pad changed, and multiple dressings changed and applied to areas with moderate or more amount of drainage.    Electronically signed by Good Davis RN on 10/4/2024 at 6:12 AM

## 2024-10-04 NOTE — PROGRESS NOTES
General Surgery   Daily Progress Note  Patient: Delano Daigle      CC: Hidradenitis    SUBJECTIVE:   Patient rested well overnight. States he is feeling a little better. His buttock area is hurting him the most.     ROS:   A 14 point review of systems was conducted, significant findings as noted above. All other systems negative.    OBJECTIVE:    PHYSICAL EXAM:    Vitals:    10/04/24 0014 10/04/24 0023 10/04/24 0044 10/04/24 0427   BP:  132/67  118/64   Pulse:  99  90   Resp: 16 16 16 16   Temp:  98.2 °F (36.8 °C)  97.9 °F (36.6 °C)   TempSrc:  Oral  Oral   SpO2:  94%  95%   Weight:       Height:           General appearance: alert, no acute distress  Eyes: PERRL, no scleral icterus  Neck: trachea midline, no JVD  Chest/Lungs: normal effort, no adventitious breathing, no accessory muscle use, on RA  Cardiovascular: RRR  Abdomen: soft, non-tender, non-distended, no guarding/rigidity  Skin: warm and dry, multiple draining lesions/nodules bilateral axilla L>R, as well as nodules in superior gluteal cleft.  See photos in media tab. Multiple excoriations at lesion sites  Extremities: no edema, no cyanosis  Neuro: A&Ox3, no focal deficits, sensation intact                          LABS:   Recent Labs     10/03/24  0529 10/04/24  0448   WBC 13.2* 15.2*   HGB 12.0* 11.5*   HCT 36.9* 35.1*   MCV 96.0 95.6    210        Recent Labs     10/03/24  0529 10/04/24  0448    133*   K 3.4* 3.9    101   CO2 28 30   BUN 13 13   CREATININE 0.7* 0.8      No results for input(s): \"AST\", \"ALT\", \"BILIDIR\", \"BILITOT\", \"ALKPHOS\" in the last 72 hours.    Invalid input(s): \"ALB\"   No results for input(s): \"LIPASE\", \"AMYLASE\" in the last 72 hours.   No results for input(s): \"INR\", \"APTT\" in the last 72 hours.    Invalid input(s): \"PROT\"   No results for input(s): \"CKTOTAL\", \"CKMB\", \"CKMBINDEX\", \"TROPONINI\" in the last 72 hours.      ASSESSMENT & PLAN:   This is a 79 y.o. male with Hx of HTN, HLD, T2DM who presents after a

## 2024-10-04 NOTE — CONSULTS
[facility] -  Clinical Pharmacy Note    Vancomycin - Management by Pharmacy    Consult Date(s): 10/4/24  Consulting Provider(s): Dr Corea    Assessment / Plan  1)  Multiple draining boils, likely hidradenitis suppurativa - Vancomycin  Concurrent Antimicrobials: Cephalexin, Doxycycline  Day of Vanc Therapy / Ordered Duration:  1 of 7  Current Dosing Method: Bayesian-Guided AUC Dosing  Therapeutic Goal: -600 mg/L*hr  Current Dose / Plan:   Pt received Vancomycin 2250mg IV x1 in ED 10/2 per x1 ED consult.  Not re-ordered / re-consulted on admission.  Consulted today to dose Vancomycin by ID x 7 days.  Will re-load with 2000mg IV x1 this AM, followed by 1000mg IV q12h.  Regimen predicts an AUC = 511 witth trough = 15.4 mcg/mL.  Will plan to check level in ~2 days (or sooner if clinically indicated).  Will continue to monitor clinical condition and make adjustments to regimen as appropriate.    Please call with questions--  Thanks--  Roxanne Tipton, PharmD, BCPS, BCGP  [id number] ([facility])   10/4/2024 9:34 AM      Interval update:  Per surgery, lesions consistent with hidradenitis suppurativa.  Not planning surgical intervention at this time.    Subjective/Objective:   Delano Daigle is a 79 y.o. male with a PMHx significant for COPD, HTN, DM 2, HLD, and eczema who is admitted with multiple draining boils.  .     Pharmacy is consulted to dose Vancomycin.    Ht Readings from Last 1 Encounters:   10/03/24 1.829 m (6')     Wt Readings from Last 1 Encounters:   10/03/24 90.7 kg (200 lb)     Vancomycin Dosing History:  August 2024 - Initially on 1500mg IV q12h, which resulted in AUC = 624 based on level drawn ~48 into regimen.  Dose was reduced to 1250mg IV q12h, and pt was discharged prior to obtaining repeat level.  SCr stable at 0.7 and Wt 100.7 kg at that time.    Current & Prior Antimicrobial Regimen(s):  Vancomycin 2250mg x1 10/2 14:00  Cephalexin (10/4-current)  Doxycycline

## 2024-10-04 NOTE — PLAN OF CARE
Problem: Skin/Tissue Integrity  Goal: Absence of new skin breakdown  Description: 1.  Monitor for areas of redness and/or skin breakdown  2.  Assess vascular access sites hourly  3.  Every 4-6 hours minimum:  Change oxygen saturation probe site  4.  Every 4-6 hours:  If on nasal continuous positive airway pressure, respiratory therapy assess nares and determine need for appliance change or resting period.

## 2024-10-04 NOTE — PROGRESS NOTES
Dressings changed, gown changed, and linens changed at this time as pt boils are oozing.     Electronically signed by Good Davis RN on 10/4/2024 at 12:25 AM

## 2024-10-04 NOTE — PROGRESS NOTES
Pt refusing ABX stating, \"I don't need them. I am going home today.\" Education provided, Pt still refusing medications, Dr. Paula made aware.   Electronically signed by Sandy Trevizo RN on 10/4/2024 at 10:22 AM

## 2024-10-06 LAB
BACTERIA BLD CULT ORG #2: NORMAL
BACTERIA BLD CULT: NORMAL

## 2024-10-06 NOTE — PROGRESS NOTES
Physician Progress Note      PATIENT:               GIGI MENDEZ  [facility] #:                  [id number]  :                       1945  ADMIT DATE:       10/2/2024 11:54 AM  DISCH DATE:        10/4/2024 1:55 PM  RESPONDING  PROVIDER #:        Elvis Deleon MD          QUERY TEXT:    Patient admitted with Multiple skin lesions, noted to have Atrial fibrillation   in H&P note on 10/2 and is maintained on Eliquis. If possible, please   document in progress notes and discharge summary if you are evaluating and/or   treating any of the following:?  ?  The medical record reflects the following:  Risk Factors: Age 79 M, HTN, DM  Clinical Indicators: 10/2 H&P noted - Atrial fibrillation and is maintained on   Eliquis.  Treatment: Eliquis.    Thanks,  Gilmer finley-  Options provided:  -- Secondary hypercoagulable state in a patient with atrial fibrillation  -- Other - I will add my own diagnosis  -- Disagree - Not applicable / Not valid  -- Disagree - Clinically unable to determine / Unknown  -- Refer to Clinical Documentation Reviewer    PROVIDER RESPONSE TEXT:    This patient has secondary hypercoagulable state in a patient with atrial   fibrillation.    Query created by: Gilmer Finley on 10/3/2024 1:54 AM      Electronically signed by:  Elvis Deleon MD 10/6/2024 6:39 PM

## 2024-10-07 ENCOUNTER — TELEPHONE (OUTPATIENT)
Dept: INTERNAL MEDICINE CLINIC | Age: 79
End: 2024-10-07

## 2024-10-07 ENCOUNTER — CARE COORDINATION (OUTPATIENT)
Dept: CASE MANAGEMENT | Age: 79
End: 2024-10-07

## 2024-10-07 DIAGNOSIS — B95.62 MRSA CELLULITIS: Primary | ICD-10-CM

## 2024-10-07 DIAGNOSIS — L03.90 MRSA CELLULITIS: Primary | ICD-10-CM

## 2024-10-07 PROCEDURE — 1111F DSCHRG MED/CURRENT MED MERGE: CPT | Performed by: INTERNAL MEDICINE

## 2024-10-07 NOTE — CARE COORDINATION
was received.  Patient states he has both PO ABX's and is taking as prescribed.      Care Transition Nurse reviewed discharge instructions, medical action plan, and red flags with patient. The patient was given an opportunity to ask questions; all questions answered at this time.. The patient verbalized understanding.  and needs reinforcement of information discussed.   Were discharge instructions available to patient? Yes.   Reviewed appropriate site of care based on symptoms and resources available to patient including: PCP  Specialist  Urgent care clinics  [facility]  When to call 911. The patient agrees to contact the primary care provider and/or specialist office for questions related to their healthcare.      Advance Care Planning:   Does patient have an Advance Directive: reviewed and current and   Primary Decision Maker: Carlos Daigle - Child - [phone] .    Medication Reconciliation:  Medication reconciliation was performed with patient,1111F entered: yes.     Assessments:  Care Transitions 24 Hour Call    Schedule Follow Up Appointment with PCP: Declined  Do you have a copy of your discharge instructions?: Yes  Do you have all of your prescriptions and are they filled?: Yes  Have you been contacted by a [facility] Pharmacist?: No  Have you scheduled your follow up appointment?: No  Do you have support at home?: Alone  Do you feel like you have everything you need to keep you well at home?: Yes  Are you an active caregiver in your home?: No  Care Transitions Interventions  No Identified Needs          Follow Up Appointment:   Patient does not have a follow up appointment scheduled at time of call. Prefers to self-schedule SANDRA appointment.   Future Appointments         Provider Specialty Dept Phone    10/23/2024 2:00 PM Doyle Blakely MD Cardiology [phone]            Care Transition Nurse provided contact information.  Plan for follow-up call in 6-10 days based on severity of symptoms and risk

## 2024-10-07 NOTE — TELEPHONE ENCOUNTER
[facility] Transitions Initial Follow Up Call    Outreach made within 2 business days of discharge: Yes    Patient: Delano Daigle Patient : 1945   MRN: [id number]  Reason for Admission: INFECTION IN BLOOD  Discharge Date: 10/4/24       Spoke with: DELANO DAIGLE    Discharge department/facility: [facility] Interactive Patient Contact:  Was patient able to fill all prescriptions: Yes  Was patient instructed to bring all medications to the follow-up visit: Yes  Is patient taking all medications as directed in the discharge summary? Yes  Does patient understand their discharge instructions: Yes  Does patient have questions or concerns that need addressed prior to 7-14 day follow up office visit: no    Scheduled appointment with PCP within 7-14 days    Follow Up  Future Appointments   Date Time Provider Department Center   10/23/2024  2:00 PM Doyle Blakely MD [facility] [place], [place]    Reached out to pt via phone. No answer. Phone goes directly to . Left  for CB.

## 2024-10-10 ENCOUNTER — OFFICE VISIT (OUTPATIENT)
Dept: INTERNAL MEDICINE CLINIC | Age: 79
End: 2024-10-10

## 2024-10-10 VITALS
OXYGEN SATURATION: 96 % | BODY MASS INDEX: 28.07 KG/M2 | SYSTOLIC BLOOD PRESSURE: 116 MMHG | HEART RATE: 100 BPM | WEIGHT: 207 LBS | DIASTOLIC BLOOD PRESSURE: 67 MMHG

## 2024-10-10 DIAGNOSIS — I48.91 ATRIAL FIBRILLATION, NEW ONSET (HCC): ICD-10-CM

## 2024-10-10 DIAGNOSIS — E78.2 MIXED HYPERLIPIDEMIA: Primary | ICD-10-CM

## 2024-10-10 DIAGNOSIS — L30.8 OTHER ECZEMA: ICD-10-CM

## 2024-10-10 DIAGNOSIS — J43.9 PULMONARY EMPHYSEMA, UNSPECIFIED EMPHYSEMA TYPE (HCC): ICD-10-CM

## 2024-10-10 DIAGNOSIS — Z87.891 FORMER CIGARETTE SMOKER: ICD-10-CM

## 2024-10-10 DIAGNOSIS — I10 PRIMARY HYPERTENSION: ICD-10-CM

## 2024-10-10 DIAGNOSIS — L73.2 HIDRADENITIS SUPPURATIVA: ICD-10-CM

## 2024-10-10 DIAGNOSIS — M25.551 RIGHT HIP PAIN: ICD-10-CM

## 2024-10-10 RX ORDER — METOPROLOL SUCCINATE 25 MG/1
25 TABLET, EXTENDED RELEASE ORAL DAILY
Qty: 30 TABLET | Refills: 2 | Status: SHIPPED | OUTPATIENT
Start: 2024-10-10 | End: 2024-10-21

## 2024-10-10 RX ORDER — METOPROLOL SUCCINATE 25 MG/1
25 TABLET, EXTENDED RELEASE ORAL DAILY
Qty: 90 TABLET | Refills: 1 | OUTPATIENT
Start: 2024-10-10

## 2024-10-10 NOTE — PROGRESS NOTES
Patient: Delano Daigle is a 79 y.o. male who presents today with the following Chief Complaint(s):    Chief Complaint   Patient presents with    Follow-Up from Hospital         [facility] for MRSA hidradenitis suppurativa. Lesions drying on doxy and keflex.   Right anterior thigh pain with walkngnand at times just gives on him.   Current Outpatient Medications   Medication Sig Dispense Refill    doxycycline monohydrate (MONODOX) 100 MG capsule Take 1 capsule by mouth every 12 hours for 14 days 28 capsule 0    cephALEXin (KEFLEX) 500 MG capsule Take 1 capsule by mouth every 8 hours for 14 days 42 capsule 0    metoprolol succinate (TOPROL XL) 25 MG extended release tablet Take 1 tablet by mouth daily 30 tablet 2    apixaban (ELIQUIS) 5 MG TABS tablet Take 1 tablet by mouth 2 times daily 60 tablet 2    clobetasol (TEMOVATE) 0.05 % cream Apply topically 2 times daily. 120 g 1    ammonium lactate (LAC-HYDRIN) 12 % lotion Apply topically as needed. 400 mL 1    losartan (COZAAR) 25 MG tablet TAKE 2 TABLETS BY MOUTH EVERY  tablet 3    desoximetasone (TOPICORT) 0.25 % cream Apply topically 2 times daily. 100 g 0    cetirizine (ZYRTEC) 10 MG tablet TAKE 1 TABLET BY MOUTH DAILY FOR ITCHING. 90 tablet 1    fluticasone furoate-vilanterol (BREO ELLIPTA) 100-25 MCG/ACT inhaler INHALE 1 PUFF INTO THE LUNGS DAILY 180 each 3    rosuvastatin (CRESTOR) 40 MG tablet TAKE 1 TABLET BY MOUTH DAILY FOR HIGH CHOLESTEROL 90 tablet 3    pioglitazone (ACTOS) 15 MG tablet TAKE 1 TABLET BY MOUTH EVERY DAY 90 tablet 3    ezetimibe (ZETIA) 10 MG tablet TAKE 1 TABLET BY MOUTH DAILY FOR HIGH CHOLESTEROL 90 tablet 3    triamcinolone (KENALOG) 0.1 % ointment APPLY TO AFFECTED AREAS TWICE DAILY FOR UP TO 2 WEEKS OR UNTIL IMPROVED. 80 g 1    amLODIPine (NORVASC) 5 MG tablet TAKE 1 TABLET BY MOUTH EVERY DAY 90 tablet 3    tamsulosin (FLOMAX) 0.4 MG capsule Take 1 capsule by mouth daily 10 capsule 0    JANUVIA 100 MG tablet TAKE 1 TABLET BY MOUTH

## 2024-10-14 ENCOUNTER — CARE COORDINATION (OUTPATIENT)
Dept: CASE MANAGEMENT | Age: 79
End: 2024-10-14

## 2024-10-14 NOTE — CARE COORDINATION
Care Transitions Note    Follow Up Call     Patient Current Location:  Home: [address] [place] [address] [address] [place] [id number]    [facility] Care Coordinator contacted the patient by telephone. Verified name and  as identifiers.    Additional needs identified to be addressed with provider   No needs identified                 Method of communication with provider: none.    Care Summary Note:   Patient reports he is doing ok, but he's tired and has low energy. Adequate appetite and fluid intake. No urinary or bowel problems. Patient denies sob, cough, fever, chills, dizziness, swelling, falls, ha, lh or pain. Yesterdays /49, 90. No BS or BP taken yet today. Patient stated he still hasn't heard from home care. Message left with [facility] to confirm referral. Taking medication as prescribed. No questions or needs voiced.     Plan of care updates since last contact:  Review of patient management of conditions/medications:    Home Health: left message at [facility]        Advance Care Planning:   Does patient have an Advance Directive: not on file.    Primary Decision Maker: Carlos Daigle - Child - [phone] .       Medication Review:  No changes since last call.     Remote Patient Monitoring:  Offered patient enrollment in the Remote Patient Monitoring (RPM) program for in-home monitoring: Yes, but did not enroll at this time: already monitoring with home equipment.    Assessments:  Care Transitions Subsequent and Final Call    Subsequent and Final Calls  Do you have any ongoing symptoms?: No  Have your medications changed?: No  Do you have any questions related to your medications?: No  Do you currently have any active services?: No  Care Transitions Interventions  Other Interventions:              Follow Up Appointment:   SANDRA appointment attended as scheduled   Future Appointments         Provider Specialty Dept Phone    10/23/2024 2:00 PM Doyle Blakely MD Cardiology [phone]    2024 3:00  No

## 2024-10-18 ASSESSMENT — ENCOUNTER SYMPTOMS
EYES NEGATIVE: 1
GASTROINTESTINAL NEGATIVE: 1

## 2024-10-19 NOTE — PROGRESS NOTES
Patient: Delano Daigle is a 79 y.o. male who presents today with the following Chief Complaint(s):    Chief Complaint   Patient presents with    Loss of Consciousness         HPI fall recently. Fell sisters 2 days. Got out of car went to turn and go the house and fainted. Right side injury. Loss consciuosness. Was clear when got up. No incontinence.   2 days before fell in kitchen. Cleaning stove went in refrigerator and  got water. Turned bottle up and fainted falling backward. No CP, SOB, fatique, weakness, no memory changes, no incontinence. Signifiicant weight loss since wife passed  Did not take medication.   128/68 84 130/78 90   Current Outpatient Medications   Medication Sig Dispense Refill    clobetasol (TEMOVATE) 0.05 % cream Apply topically 2 times daily. 120 g 1    ammonium lactate (LAC-HYDRIN) 12 % lotion Apply topically as needed. 400 mL 1    cephALEXin (KEFLEX) 500 MG capsule Take 1 capsule by mouth 4 times daily 56 capsule 0    losartan (COZAAR) 25 MG tablet TAKE 2 TABLETS BY MOUTH EVERY  tablet 3    desoximetasone (TOPICORT) 0.25 % cream Apply topically 2 times daily. 100 g 0    cetirizine (ZYRTEC) 10 MG tablet TAKE 1 TABLET BY MOUTH DAILY FOR ITCHING. 90 tablet 1    fluticasone furoate-vilanterol (BREO ELLIPTA) 100-25 MCG/ACT inhaler INHALE 1 PUFF INTO THE LUNGS DAILY 180 each 3    rosuvastatin (CRESTOR) 40 MG tablet TAKE 1 TABLET BY MOUTH DAILY FOR HIGH CHOLESTEROL 90 tablet 3    pioglitazone (ACTOS) 15 MG tablet TAKE 1 TABLET BY MOUTH EVERY DAY 90 tablet 3    ezetimibe (ZETIA) 10 MG tablet TAKE 1 TABLET BY MOUTH DAILY FOR HIGH CHOLESTEROL 90 tablet 3    triamcinolone (KENALOG) 0.1 % ointment APPLY TO AFFECTED AREAS TWICE DAILY FOR UP TO 2 WEEKS OR UNTIL IMPROVED. 80 g 1    amLODIPine (NORVASC) 5 MG tablet TAKE 1 TABLET BY MOUTH EVERY DAY 90 tablet 3    tamsulosin (FLOMAX) 0.4 MG capsule Take 1 capsule by mouth daily 10 capsule 0    JANUVIA 100 MG tablet TAKE 1 TABLET BY MOUTH EVERY DAY 
  Lipid Panel    rosuvastatin (CRESTOR) 40 MG tablet        4. Former smoker.  Congratulations!   Continue to good work.   Literature provided.    5 COPD Continue maintenance inhaler.   Diet and exercise discussed.    6.     Eczema: Exacerbation. Advised continue topical steroid.   Add vaseline at night.   Prescribe medrol dose pack as current regimen not effective in stabilizing inflammation.   Change steroid cream to topicort.   Water hydration, steroid cream, diet discussed.   Dermatology f/u.    7.   Atrial fibrillation: treat with Eliquis/YAMILETH zurita ( rate control ).         Plan:  See plans above.

## 2024-10-21 ENCOUNTER — CARE COORDINATION (OUTPATIENT)
Dept: CASE MANAGEMENT | Age: 79
End: 2024-10-21

## 2024-10-21 DIAGNOSIS — I48.91 ATRIAL FIBRILLATION, NEW ONSET (HCC): ICD-10-CM

## 2024-10-21 RX ORDER — METOPROLOL SUCCINATE 25 MG/1
25 TABLET, EXTENDED RELEASE ORAL DAILY
Qty: 90 TABLET | Refills: 3 | Status: SHIPPED | OUTPATIENT
Start: 2024-10-21

## 2024-10-21 NOTE — CARE COORDINATION
OLY POLO spoke briefly with Bandar. States he is unsure why orders weren't completely processed. He was out of town and someone was filling in for him. He is able to pull information from patient's chart & will f/u on referral.

## 2024-10-21 NOTE — CARE COORDINATION
Care Transitions Note    Follow Up Call     Patient Current Location:  Home: [address] [place] [address] [address] [place] [id number]    N Care Coordinator contacted the patient by telephone. Verified name and  as identifiers.    Additional needs identified to be addressed with provider   Standard priority: HHC was ordered for patient at d/c, but this was never established. If HHC is still applicable, please make a new HHC order for him.                  Method of communication with provider: phone.    Care Summary Note: LPN CC spoke with patient. States he is doing good. Denies pain, swelling, redness, fever/chills, N/V/D. Had a sl bleed yesterday that was brief. Notes a scab or sore got snagged. States BP & BG are stable. Energy levels improving. Activity tolerance improving. Appetite good. Denies problems with bowels or bladder. Has not heard from HHC. LPN CC left VM with [facility] & will route note to PCP. Denies medication changes. Family helps to support. Cardio 10/23, ID . Son to provide transportation. Denies needs.     Thank you,   Jennifer Barboza, OLY Care Coordinator   [facility]  Remote Patient Monitoring, Care Transitions, Ambulatory Care Management  [phone]    Plan of care updates since last contact:  Review of patient management of conditions/medications:         Advance Care Planning:   Does patient have an Advance Directive: deferred at this time, will discuss on future follow up. .    Medication Review:  No changes since last call.     Remote Patient Monitoring:  Offered patient enrollment in the Remote Patient Monitoring (RPM) program for in-home monitoring: Yes, but did not enroll at this time: already monitoring with home equipment.    Assessments:  Care Transitions Subsequent and Final Call    Subsequent and Final Calls  Care Transitions Interventions  Other Interventions:              Follow Up Appointment:   Reviewed upcoming appointment(s).  Future Appointments         Provider

## 2024-10-23 ENCOUNTER — OFFICE VISIT (OUTPATIENT)
Dept: CARDIOLOGY CLINIC | Age: 79
End: 2024-10-23

## 2024-10-23 VITALS
SYSTOLIC BLOOD PRESSURE: 130 MMHG | DIASTOLIC BLOOD PRESSURE: 80 MMHG | BODY MASS INDEX: 28.89 KG/M2 | HEART RATE: 67 BPM | WEIGHT: 213 LBS | OXYGEN SATURATION: 97 %

## 2024-10-23 DIAGNOSIS — Z13.6 SCREENING FOR HEART DISEASE: ICD-10-CM

## 2024-10-23 DIAGNOSIS — I48.91 ATRIAL FIBRILLATION, UNSPECIFIED TYPE (HCC): Primary | ICD-10-CM

## 2024-10-23 NOTE — PROGRESS NOTES
[facility]     Outpatient Cardiology         Patient Name:  Delano Daigle  Primary Care Physician: Jason Dawn MD  10/23/24     Assessment & Plan    Assessment / Plan:     Possible paroxysmal A-fib-had artifact in prior EKG.  Irregular rhythm.-currently back in sinus rhythm with isolated PVC.  Clinically stable.  Advised to reduce caffeine intake.  Continue Eliquis.  Continue Toprol-XL.  Echo showed normal LV function.  Normal left atrial size.  Results reviewed with patient.  If patient maintains sinus rhythm, will consider event monitor in the next office visit and consider coming off the anticoagulation.  Essential hypertension-continue Cozaar, Toprol-XL and Norvasc.  If patient is to come down on the number of medications, could consider increasing Cozaar and stopping the Norvasc.  Hyperlipidemia-continue Zetia and Crestor.  Cardiac risk evaluation-has multiple cardiac risk factors.  Recommend CT coronary calcium score.  If calcium score is more than 400, will proceed with stress test.  All questions answered.  Follow-up in 6 months.              Chief Complaint:     Chief Complaint   Patient presents with    Atrial Fibrillation    New Patient       History of Present Illness:       HPI     Delano Daigle is a 79 y.o. male with PMH of HTN, HLD, former smoker and DM referred by Jason Dawn MD for new onset Afib.       Patient denies any chest pain, shortness of breath, palpitations, presyncope or syncope. No TIA. No claudication. No recent hospitalizations    Reviewed medications, tests and labs      PMH  Past Medical History:   Diagnosis Date    Allergic rhinitis     Eczema     Hearing loss     Hyperlipidemia     Hypertension     Type 2 diabetes mellitus without complication (HCC)        PSH  Past Surgical History:   Procedure Laterality Date    COLONOSCOPY      COLONOSCOPY N/A 11/10/2021    COLONOSCOPY POLYPECTOMY SNARE/COLD BIOPSY performed by Justine aHq MD at [facility]

## 2024-10-23 NOTE — PATIENT INSTRUCTIONS
Schedule CT Calcium Score test  Cut down on caffeine            Thank you for choosing [facility] for your cardiac care.    During your visit today, we reviewed and confirmed your cardiac medications along with  medication prescribed by your other healthcare team members. Please be sure to discuss any  changes to medication with your providers.    Please bring a list of ALL medications (or the bottles) with you to EVERY appointment.  Also include vitamins and over-the-counter medications.    If you need refills for any cardiac medications, please call your pharmacy and they will reach out to us electronically.    Did your provider order testing today? If yes, then you will receive your results in three  possible ways. You can receive a [employer] message, a phone call, or letter in the mail. Please  note, if you are an active [employer] user, some of your testing will be available within 1-2 days.    Finally, please know that it is good for your heart to exercise and follow a healthy, low-fat diet  as advised by your physician and health care providers.    If you are experiencing a medical emergency, please call 911 immediately.    It's easy to register for a [employer] account if you don't already have one. With a [employer]  account you can manage your health record, view test results, schedule appointments and  more.     Dr. Blakely's clinical staff can be reached at the following phone number: [phone]    If any cardiac testing is ordered, please contact central scheduling at [phone] to get your test scheduled.

## 2024-10-24 ENCOUNTER — CARE COORDINATION (OUTPATIENT)
Dept: CASE MANAGEMENT | Age: 79
End: 2024-10-24

## 2024-10-24 NOTE — CARE COORDINATION
Care Transitions Note    Follow Up Call     Patient Current Location:  Home: [address] [place] [address] [address] [place] [phone]    Care Transition Nurse contacted the patient by telephone. Verified name and  as identifiers.    Additional needs identified to be addressed with provider   No needs identified                 Method of communication with provider: none.    Care Summary Note: CTN spoke with patient this afternoon for follow up CTN call.  Patient states he is doing a little better, states he is still slightly weak and fatigued, has not heard from [facility].  Patient states he is not sure he even needs [facility] services anymore, mainly wanting help with things around his home, like aid services.  CTN will forward a referral to  with CTN team.  Patient denies having any fever, chills, nausea, vomiting, chest pain, SOB or cough.   Patient with no congestion, pain, difficulty emptying bladder, LE edema, feeling lightheaded, dizziness, and heart palpitations.  Patient has also had HFU with Cardiologist and PCP.    Plan of care updates since last contact:  Education: patient instructed to continue to monitor for any worsening weakness or fatigue, any other issues or concerns, reporting to MD immediately.    Review of patient management of conditions/medications: rest as needed, take all medications as prescribed.       Advance Care Planning:   Does patient have an Advance Directive: reviewed during previous call, see note. .    Medication Review:  No changes since last call.     Assessments:  Care Transitions Subsequent and Final Call    Schedule Follow Up Appointment with PCP: Declined  Subsequent and Final Calls  Do you have any ongoing symptoms?: Yes  Patient-reported symptoms: Weakness, Fatigue  Have your medications changed?: No  Do you have any questions related to your medications?: No  Do you currently have any active services?: No  Do you have any needs or concerns that I can assist you with?:

## 2024-10-28 ENCOUNTER — CARE COORDINATION (OUTPATIENT)
Dept: CARE COORDINATION | Age: 79
End: 2024-10-28

## 2024-10-28 NOTE — CARE COORDINATION
Call to patient to address SW referral for in home assistance. Patient reported that he needed in home assistance for homemaking. Explained to patient that programs through [employer] required that he needs assistance with ADLs as well. Assess ADL needs, patient is independent. He indicated that he does not need assistance with meals or transportation either, patient drives occasionally. He stated that he was told previously that he did not qualify due to \"not being sick enough\". Patient confirmed that he did not have a CM through [employer], indicating that he went through that process before and was unsuccessful.     Unfortunately, patients needing homemaker services would also need to meet ADL requirements. This patient is independent with ADLs.

## 2024-10-29 ENCOUNTER — CARE COORDINATION (OUTPATIENT)
Dept: CASE MANAGEMENT | Age: 79
End: 2024-10-29

## 2024-10-29 NOTE — CARE COORDINATION
Care Transitions Note    Follow Up Call     Attempted to reach patient for transitions of care follow up.  Unable to reach patient.      Outreach Attempts:   1ST CTC attempt to reach Pt regarding recent hospital discharge.  CTC unable to leave voice recording with call back number requesting a call back, no mailbox option, phone saying disconnected.  CTN has contacted this number for patient, whole episode.  Will attempt to reach patient again.    SW with CTN following patient for assistance.  [facility] agency reached out to this CTN, stated they were unable to reach patient, so case was not opened.    Follow Up Appointment:   Future Appointments         Provider Specialty Dept Phone    11/4/2024 3:00 PM Mirna Corea MD Infectious Diseases [phone]    12/19/2024 10:00 AM Jason Dawn MD Primary Care [phone]    4/30/2025 2:40 PM Doyle Blakely MD Cardiology [phone]            Plan for follow-up call in 6-10 days based on severity of symptoms and risk factors. Plan for next call:  any ongoing needs, s/s, that may have arise.    Thank You,    Agueda Bonilla RN  Care Transition Coordinator  Contact Number:[phone]

## 2024-11-04 ENCOUNTER — OFFICE VISIT (OUTPATIENT)
Dept: INFECTIOUS DISEASES | Age: 79
End: 2024-11-04
Payer: MEDICARE

## 2024-11-04 ENCOUNTER — CARE COORDINATION (OUTPATIENT)
Dept: CASE MANAGEMENT | Age: 79
End: 2024-11-04

## 2024-11-04 VITALS
BODY MASS INDEX: 29.92 KG/M2 | HEIGHT: 70 IN | OXYGEN SATURATION: 94 % | HEART RATE: 79 BPM | SYSTOLIC BLOOD PRESSURE: 102 MMHG | WEIGHT: 209 LBS | DIASTOLIC BLOOD PRESSURE: 62 MMHG | TEMPERATURE: 97.9 F

## 2024-11-04 DIAGNOSIS — L30.9 ECZEMA, UNSPECIFIED TYPE: ICD-10-CM

## 2024-11-04 DIAGNOSIS — L02.91 ABSCESS OF MULTIPLE SITES: Primary | ICD-10-CM

## 2024-11-04 PROCEDURE — 3078F DIAST BP <80 MM HG: CPT | Performed by: INTERNAL MEDICINE

## 2024-11-04 PROCEDURE — 99214 OFFICE O/P EST MOD 30 MIN: CPT | Performed by: INTERNAL MEDICINE

## 2024-11-04 PROCEDURE — G8417 CALC BMI ABV UP PARAM F/U: HCPCS | Performed by: INTERNAL MEDICINE

## 2024-11-04 PROCEDURE — 1159F MED LIST DOCD IN RCRD: CPT | Performed by: INTERNAL MEDICINE

## 2024-11-04 PROCEDURE — 1123F ACP DISCUSS/DSCN MKR DOCD: CPT | Performed by: INTERNAL MEDICINE

## 2024-11-04 PROCEDURE — 4004F PT TOBACCO SCREEN RCVD TLK: CPT | Performed by: INTERNAL MEDICINE

## 2024-11-04 PROCEDURE — G8427 DOCREV CUR MEDS BY ELIG CLIN: HCPCS | Performed by: INTERNAL MEDICINE

## 2024-11-04 PROCEDURE — G8484 FLU IMMUNIZE NO ADMIN: HCPCS | Performed by: INTERNAL MEDICINE

## 2024-11-04 PROCEDURE — 3074F SYST BP LT 130 MM HG: CPT | Performed by: INTERNAL MEDICINE

## 2024-11-04 RX ORDER — MUPIROCIN 20 MG/G
OINTMENT TOPICAL
Qty: 15 G | Refills: 0 | Status: SHIPPED | OUTPATIENT
Start: 2024-11-04 | End: 2024-11-11

## 2024-11-04 RX ORDER — CHLORHEXIDINE GLUCONATE 2% 2 G/100ML
SOLUTION TOPICAL
Qty: 473 ML | Refills: 1 | Status: SHIPPED | OUTPATIENT
Start: 2024-11-04

## 2024-11-04 ASSESSMENT — ENCOUNTER SYMPTOMS
SHORTNESS OF BREATH: 0
COUGH: 0
SORE THROAT: 0
ABDOMINAL DISTENTION: 0
WHEEZING: 0
NAUSEA: 0
ABDOMINAL PAIN: 0
BACK PAIN: 0
PHOTOPHOBIA: 0
RHINORRHEA: 0
SINUS PAIN: 0
VOMITING: 0
DIARRHEA: 0

## 2024-11-04 NOTE — PROGRESS NOTES
Infectious Diseases Oupatient Follow-up Note          Primary Care Physician:  Jason Dawn MD  History Obtained From:   Patient , Medical Records     CHIEF COMPLAINT:    Chief Complaint   Patient presents with    Follow-up       INTERVAL HISTORY: Delano Daigle is a 79 y.o. pleasant male patient, who had an outpatient visit with me today for follow-up.    79-year-old -American male with history of COPD, HTN, DM 2, HLD, nicotine abuse and eczema that was recently seen at the [facility] on 10/2/2024 for complaints of multiple abscesses and draining boils throughout his body.  This was his second admission with the same issue.  Swab of the lesions drainage grew MRSA and strep pyogenes.  The patient was evaluated by general surgery who believed that he had hidradenitis suppurativa.  He was transitioned to p.o. doxycycline and Keflex for 2-week course which she has since completed.  He reports resolution of most of these boils.  Currently there is very small subcentimeter indurations around the bilateral axillary region.  His skin remains very dry and he states this is worsened in the wintertime.  He denies any new boils anywhere else.  He lives alone and does not have any pets.      Past Medical History:   Past medical and surgical history was reviewed by me during this visit in detail.    Past Medical History:   Diagnosis Date    Allergic rhinitis     Eczema     Hearing loss     Hyperlipidemia     Hypertension     Type 2 diabetes mellitus without complication (HCC)        Past Surgical History:    Past Surgical History:   Procedure Laterality Date    COLONOSCOPY      COLONOSCOPY N/A 11/10/2021    COLONOSCOPY POLYPECTOMY SNARE/COLD BIOPSY performed by Justine Haq MD at [facility] ENDOSCOPY    SKIN GRAFT         Current Medications:   All medications were reviewed by me during this visit  Current Outpatient Medications   Medication Sig Dispense Refill    Chlorhexidine Gluconate 2 % SOLN Apply from head

## 2024-11-04 NOTE — CARE COORDINATION
Care Transitions Note    Final Call     Patient Current Location:  Home: [address] [place] [address] [address] [place] [id number]    Care Transition Nurse contacted the patient by telephone. Verified name and  as identifiers.    Patient graduated from the Care Transitions program on 2024.  Patient/family has the ability to self manage at this time. progressing towards self management. .      Advance Care Planning:   Does patient have an Advance Directive: reviewed and current and   Primary Decision Maker: Carlos Daigle - Child - [phone] .    Handoff:   Patient was not referred to the ACM team due to no additional needs identified.       Care Summary Note: CTN spoke with patient this afternoon for final follow up CTN call.  Patient states he is doing much better.  No reports of any fever, chills, nausea, vomiting, chest pain, SOB or cough.   Patient with no congestion, pain, difficulty emptying bladder, LE edema, feeling lightheaded, dizziness, and heart palpitations.  No other issues or concerns at this time.   Patient has had HFU appointments with Cardiologist, and PCP.  Going into ID appointment at time of CTN call.      Assessments:  Care Transitions Subsequent and Final Call    Schedule Follow Up Appointment with PCP: Declined  Subsequent and Final Calls  Do you have any ongoing symptoms?: No  Have your medications changed?: No  Do you have any questions related to your medications?: No  Do you currently have any active services?: No  Do you have any needs or concerns that I can assist you with?: No  Identified Barriers: None  Care Transitions Interventions  No Identified Needs  Other Interventions:              Upcoming Appointments:    Future Appointments         Provider Specialty Dept Phone    2024 10:00 AM Jason Dawn MD Primary Care [phone]    2025 2:40 PM Doyle Blakely MD Cardiology [phone]            Patient has agreed to contact primary care provider and/or specialist

## 2024-11-07 PROBLEM — R21 RASH AND OTHER NONSPECIFIC SKIN ERUPTION: Status: ACTIVE | Noted: 2024-11-07

## 2024-11-09 ASSESSMENT — ENCOUNTER SYMPTOMS
EYES NEGATIVE: 1
GASTROINTESTINAL NEGATIVE: 1

## 2024-12-06 ENCOUNTER — CARE COORDINATION (OUTPATIENT)
Dept: CARE COORDINATION | Age: 79
End: 2024-12-06

## 2024-12-12 ENCOUNTER — CARE COORDINATION (OUTPATIENT)
Dept: CARE COORDINATION | Age: 79
End: 2024-12-12

## 2024-12-12 NOTE — CARE COORDINATION
PCP/Specialist follow up:   Future Appointments         Provider Specialty Dept Phone    12/19/2024 10:00 AM Jason Dawn MD Primary Care [phone]    4/30/2025 2:40 PM Doyle Blakely MD Cardiology [phone]            Follow Up:   Plan for next ACM outreach in approximately 2 weeks to complete:  - disease specific assessments  - follow up appointment with SAE  - shower chair .   Patient  is agreeable to this plan.

## 2024-12-16 ENCOUNTER — CARE COORDINATION (OUTPATIENT)
Dept: CARE COORDINATION | Age: 79
End: 2024-12-16

## 2024-12-16 NOTE — CARE COORDINATION
Email from Brenda peterson program, she has shower chair, call today by 3 and she can start transfer to [place] , or if canpick up and wants sooner everything is at [facility].    Call to patient provided number and ext, and name of coordinator, Brenda    He will call to arrange.

## 2024-12-19 ENCOUNTER — OFFICE VISIT (OUTPATIENT)
Dept: INTERNAL MEDICINE CLINIC | Age: 79
End: 2024-12-19

## 2024-12-19 VITALS
DIASTOLIC BLOOD PRESSURE: 62 MMHG | HEART RATE: 70 BPM | WEIGHT: 219 LBS | OXYGEN SATURATION: 93 % | BODY MASS INDEX: 31.42 KG/M2 | SYSTOLIC BLOOD PRESSURE: 128 MMHG

## 2024-12-19 DIAGNOSIS — I48.91 ATRIAL FIBRILLATION, UNSPECIFIED TYPE (HCC): ICD-10-CM

## 2024-12-19 DIAGNOSIS — E66.811 CLASS 1 OBESITY: ICD-10-CM

## 2024-12-19 DIAGNOSIS — I10 PRIMARY HYPERTENSION: ICD-10-CM

## 2024-12-19 DIAGNOSIS — E78.2 MIXED HYPERLIPIDEMIA: ICD-10-CM

## 2024-12-19 DIAGNOSIS — L20.9 ATOPIC DERMATITIS, UNSPECIFIED TYPE: ICD-10-CM

## 2024-12-19 DIAGNOSIS — I42.8 OTHER CARDIOMYOPATHY (HCC): ICD-10-CM

## 2024-12-19 DIAGNOSIS — Z22.322 MRSA COLONIZATION: ICD-10-CM

## 2024-12-19 DIAGNOSIS — Z87.891 FORMER SMOKER: ICD-10-CM

## 2024-12-19 DIAGNOSIS — E11.8 TYPE 2 DIABETES MELLITUS WITH COMPLICATION, WITHOUT LONG-TERM CURRENT USE OF INSULIN (HCC): ICD-10-CM

## 2024-12-19 LAB
CHP ED QC CHECK: NORMAL
GLUCOSE BLD-MCNC: 115 MG/DL
HBA1C MFR BLD: 5.3 %

## 2024-12-19 NOTE — PROGRESS NOTES
Patient: Delano Daigle is a 79 y.o. male who presents today with the following Chief Complaint(s):    Chief Complaint   Patient presents with    Follow-up    Diabetes         HPIfeels ok, just peeling skin, atopic derm. Derm referral. Used topical lotion.   Has help in the home. Getting shower seat etc, cleaning house.   Had flu and covid vaccine at [facility].   Current Outpatient Medications   Medication Sig Dispense Refill    Chlorhexidine Gluconate 2 % SOLN Apply from head to toe after each shower, let dry for 5 minutes then rinse with water and pat dry.  Repeat daily for 2 weeks 473 mL 1    metoprolol succinate (TOPROL XL) 25 MG extended release tablet TAKE 1 TABLET BY MOUTH EVERY DAY 90 tablet 3    apixaban (ELIQUIS) 5 MG TABS tablet Take 1 tablet by mouth 2 times daily 60 tablet 2    clobetasol (TEMOVATE) 0.05 % cream Apply topically 2 times daily. 120 g 1    ammonium lactate (LAC-HYDRIN) 12 % lotion Apply topically as needed. 400 mL 1    losartan (COZAAR) 25 MG tablet TAKE 2 TABLETS BY MOUTH EVERY  tablet 3    desoximetasone (TOPICORT) 0.25 % cream Apply topically 2 times daily. 100 g 0    cetirizine (ZYRTEC) 10 MG tablet TAKE 1 TABLET BY MOUTH DAILY FOR ITCHING. 90 tablet 1    fluticasone furoate-vilanterol (BREO ELLIPTA) 100-25 MCG/ACT inhaler INHALE 1 PUFF INTO THE LUNGS DAILY 180 each 3    rosuvastatin (CRESTOR) 40 MG tablet TAKE 1 TABLET BY MOUTH DAILY FOR HIGH CHOLESTEROL 90 tablet 3    pioglitazone (ACTOS) 15 MG tablet TAKE 1 TABLET BY MOUTH EVERY DAY 90 tablet 3    ezetimibe (ZETIA) 10 MG tablet TAKE 1 TABLET BY MOUTH DAILY FOR HIGH CHOLESTEROL 90 tablet 3    triamcinolone (KENALOG) 0.1 % ointment APPLY TO AFFECTED AREAS TWICE DAILY FOR UP TO 2 WEEKS OR UNTIL IMPROVED. 80 g 1    amLODIPine (NORVASC) 5 MG tablet TAKE 1 TABLET BY MOUTH EVERY DAY 90 tablet 3    tamsulosin (FLOMAX) 0.4 MG capsule Take 1 capsule by mouth daily 10 capsule 0    JANUVIA 100 MG tablet TAKE 1 TABLET BY MOUTH EVERY DAY 90

## 2024-12-30 ENCOUNTER — CARE COORDINATION (OUTPATIENT)
Dept: CARE COORDINATION | Age: 79
End: 2024-12-30

## 2024-12-30 NOTE — CARE COORDINATION
Ambulatory Care Coordination Note     12/30/2024 1:58 PM     patient outreach attempt by this ACM today to perform care management follow up . AC was unable to reach the patient by telephone today;   left voice message requesting a return phone call to this ACM.     Patient graduated from the High Risk Care Management program on 12/30/2024.  Patient verbalizes confidence in the ability to self-manage at this time..  Care management goals have been completed. No further Ambulatory Care Manager follow up scheduled.

## 2025-01-10 DIAGNOSIS — L30.9 ECZEMA, UNSPECIFIED TYPE: ICD-10-CM

## 2025-01-10 DIAGNOSIS — E11.8 TYPE 2 DIABETES MELLITUS WITH COMPLICATION, WITHOUT LONG-TERM CURRENT USE OF INSULIN (HCC): ICD-10-CM

## 2025-01-10 DIAGNOSIS — I48.91 ATRIAL FIBRILLATION, NEW ONSET (HCC): ICD-10-CM

## 2025-01-10 DIAGNOSIS — E78.2 MIXED HYPERLIPIDEMIA: ICD-10-CM

## 2025-01-13 NOTE — TELEPHONE ENCOUNTER
Medication:   Requested Prescriptions     Pending Prescriptions Disp Refills    mupirocin (BACTROBAN) 2 % ointment [Pharmacy Med Name: MUPIROCIN 2% OI 15GM 2 Ointment] 15 g 10     Sig: APPLY TO EXTERNAL NARIS TWICE DAILY FOR 2 WEEKS *NEW PRESCRIPTION REQUEST*    rosuvastatin (CRESTOR) 40 MG tablet [Pharmacy Med Name: ROSUVASTATIN 40MG TAB 40 Tablet] 30 tablet 10     Sig: TAKE 1 TABLET BY MOUTH DAILY FOR HIGH CHOLESTEROL *NEW PRESCRIPTION REQUEST*    pioglitazone (ACTOS) 15 MG tablet [Pharmacy Med Name: PIOGLITAZONE 15 MG TABS 15 Tablet] 30 tablet 10     Sig: TAKE 1 TABLET BY MOUTH EVERY DAY *NEW PRESCRIPTION REQUEST*    ezetimibe (ZETIA) 10 MG tablet [Pharmacy Med Name: EZETIMIBE 10 MG TABS 10 Tablet] 30 tablet 10     Sig: TAKE 1 TABLET BY MOUTH DAILY FOR HIGH CHOLESTEROL *NEW PRESCRIPTION REQUEST*    losartan (COZAAR) 25 MG tablet [Pharmacy Med Name: LOSARTAN 25MG TABLET 25 Tablet] 30 tablet 10     Sig: TAKE 1 TABLET(S) BY MOUTH 1 TIMES PER DAY *NEW PRESCRIPTION REQUEST*    amLODIPine (NORVASC) 5 MG tablet [Pharmacy Med Name: AMLODIPINE 5MG TAB 5 Tablet] 30 tablet 10     Sig: TAKE 1 TABLET BY MOUTH EVERY DAY *NEW PRESCRIPTION REQUEST*    ammonium lactate (LAC-HYDRIN) 12 % lotion [Pharmacy Med Name: AMMONIUM LAC 12% 225GM LOT 12 Lotion] 225 g 10     Sig: APPLY TO AFFECTED AREA EVERY DAY AS NEEDED *NEW PRESCRIPTION REQUEST*    metoprolol succinate (TOPROL XL) 25 MG extended release tablet [Pharmacy Med Name: METOPROLOL SUC ER 25MG TAB 25 Tablet] 30 tablet 10     Sig: TAKE 1 TABLET(S) BY MOUTH 1 TIMES PER DAY *NEW PRESCRIPTION REQUEST*        Last Filled:      Patient Phone Number: [phone] (home)     Last appt: 12/19/2024   Next appt: 3/19/2025    Last OARRS:        No data to display

## 2025-01-14 ENCOUNTER — TELEPHONE (OUTPATIENT)
Dept: INTERNAL MEDICINE CLINIC | Age: 80
End: 2025-01-14

## 2025-01-14 RX ORDER — EZETIMIBE 10 MG/1
TABLET ORAL
Qty: 30 TABLET | Refills: 10 | Status: SHIPPED | OUTPATIENT
Start: 2025-01-14

## 2025-01-14 RX ORDER — PIOGLITAZONE 15 MG/1
TABLET ORAL
Qty: 30 TABLET | Refills: 10 | Status: SHIPPED | OUTPATIENT
Start: 2025-01-14

## 2025-01-14 RX ORDER — METOPROLOL SUCCINATE 25 MG/1
TABLET, EXTENDED RELEASE ORAL
Qty: 30 TABLET | Refills: 10 | Status: SHIPPED | OUTPATIENT
Start: 2025-01-14

## 2025-01-14 RX ORDER — LOSARTAN POTASSIUM 25 MG/1
TABLET ORAL
Qty: 30 TABLET | Refills: 10 | Status: SHIPPED | OUTPATIENT
Start: 2025-01-14

## 2025-01-14 RX ORDER — AMLODIPINE BESYLATE 5 MG/1
TABLET ORAL
Qty: 30 TABLET | Refills: 10 | Status: SHIPPED | OUTPATIENT
Start: 2025-01-14

## 2025-01-14 RX ORDER — MUPIROCIN 20 MG/G
OINTMENT TOPICAL
Qty: 15 G | Refills: 10 | Status: SHIPPED | OUTPATIENT
Start: 2025-01-14

## 2025-01-14 RX ORDER — ROSUVASTATIN CALCIUM 40 MG/1
TABLET, COATED ORAL
Qty: 30 TABLET | Refills: 10 | Status: SHIPPED | OUTPATIENT
Start: 2025-01-14

## 2025-01-14 RX ORDER — AMMONIUM LACTATE 12 G/100G
LOTION TOPICAL
Qty: 225 G | Refills: 10 | Status: SHIPPED | OUTPATIENT
Start: 2025-01-14

## 2025-01-30 ENCOUNTER — TELEPHONE (OUTPATIENT)
Dept: INTERNAL MEDICINE CLINIC | Age: 80
End: 2025-01-30

## 2025-01-30 NOTE — TELEPHONE ENCOUNTER
Patient is needing prescription for lift chair and shower chair with a 12 to 18 wide foot print due to the size of patient tube and raised toilet seat, 3 grab bars and toilet safety rail faxed to [phone] makenna please advise.

## 2025-02-07 DIAGNOSIS — J43.9 PULMONARY EMPHYSEMA, UNSPECIFIED EMPHYSEMA TYPE (HCC): ICD-10-CM

## 2025-02-07 DIAGNOSIS — M25.551 RIGHT HIP PAIN: Primary | ICD-10-CM

## 2025-02-07 DIAGNOSIS — I48.20 ATRIAL FIBRILLATION, CHRONIC (HCC): ICD-10-CM

## 2025-03-03 ENCOUNTER — APPOINTMENT (OUTPATIENT)
Dept: GENERAL RADIOLOGY | Age: 80
End: 2025-03-03
Payer: MEDICARE

## 2025-03-03 ENCOUNTER — APPOINTMENT (OUTPATIENT)
Dept: CT IMAGING | Age: 80
End: 2025-03-03
Payer: MEDICARE

## 2025-03-03 ENCOUNTER — HOSPITAL ENCOUNTER (INPATIENT)
Age: 80
LOS: 4 days | Discharge: HOME | End: 2025-03-07
Attending: EMERGENCY MEDICINE | Admitting: INTERNAL MEDICINE
Payer: MEDICARE

## 2025-03-03 DIAGNOSIS — I95.9 HYPOTENSION, UNSPECIFIED HYPOTENSION TYPE: ICD-10-CM

## 2025-03-03 DIAGNOSIS — A41.9 SEVERE SEPSIS (HCC): Primary | ICD-10-CM

## 2025-03-03 DIAGNOSIS — I48.91 ATRIAL FIBRILLATION, UNSPECIFIED TYPE (HCC): ICD-10-CM

## 2025-03-03 DIAGNOSIS — R65.20 SEVERE SEPSIS (HCC): Primary | ICD-10-CM

## 2025-03-03 DIAGNOSIS — R06.02 SHORTNESS OF BREATH: ICD-10-CM

## 2025-03-03 PROBLEM — R53.83 FATIGUE, UNSPECIFIED TYPE: Status: ACTIVE | Noted: 2025-03-03

## 2025-03-03 LAB
ALBUMIN SERPL-MCNC: 2.1 G/DL (ref 3.4–5)
ALBUMIN/GLOB SERPL: 0.6 {RATIO} (ref 1.1–2.2)
ALP SERPL-CCNC: 101 U/L (ref 40–129)
ALT SERPL-CCNC: 31 U/L (ref 10–40)
ALT SERPL-CCNC: ABNORMAL U/L (ref 10–40)
ANION GAP SERPL CALCULATED.3IONS-SCNC: 6 MMOL/L (ref 3–16)
AST SERPL-CCNC: 93 U/L (ref 15–37)
BASE EXCESS BLDV CALC-SCNC: 3 MMOL/L (ref -2–3)
BASOPHILS # BLD: 0 K/UL (ref 0–0.2)
BASOPHILS NFR BLD: 0.3 %
BILIRUB SERPL-MCNC: <0.2 MG/DL (ref 0–1)
BUN SERPL-MCNC: 19 MG/DL (ref 7–20)
CALCIUM SERPL-MCNC: 7.7 MG/DL (ref 8.3–10.6)
CHLORIDE SERPL-SCNC: 110 MMOL/L (ref 99–110)
CHP ED QC CHECK: YES
CO2 BLDV-SCNC: 33 MMOL/L
CO2 SERPL-SCNC: 26 MMOL/L (ref 21–32)
COHGB MFR BLDV: 5.6 % (ref 0–1.5)
CORTIS SERPL-MCNC: 7.9 UG/DL
CREAT SERPL-MCNC: 1.2 MG/DL (ref 0.8–1.3)
DEPRECATED RDW RBC AUTO: 15.3 % (ref 12.4–15.4)
DO-HGB MFR BLDV: 47.3 %
EKG DIAGNOSIS: NORMAL
EKG Q-T INTERVAL: 458 MS
EKG QRS DURATION: 120 MS
EKG QTC CALCULATION (BAZETT): 511 MS
EKG R AXIS: 15 DEGREES
EKG T AXIS: 66 DEGREES
EKG VENTRICULAR RATE: 75 BPM
EOSINOPHIL # BLD: 0.2 K/UL (ref 0–0.6)
EOSINOPHIL NFR BLD: 3.7 %
FLUAV RNA RESP QL NAA+PROBE: NOT DETECTED
FLUBV RNA RESP QL NAA+PROBE: NOT DETECTED
GFR SERPLBLD CREATININE-BSD FMLA CKD-EPI: 62 ML/MIN/{1.73_M2}
GLUCOSE BLD-MCNC: 130 MG/DL
GLUCOSE BLD-MCNC: 130 MG/DL (ref 70–99)
GLUCOSE SERPL-MCNC: 124 MG/DL (ref 70–99)
HCO3 BLDV-SCNC: 30.7 MMOL/L (ref 24–28)
HCT VFR BLD AUTO: 35.7 % (ref 40.5–52.5)
HGB BLD-MCNC: 11.5 G/DL (ref 13.5–17.5)
LACTATE BLDV-SCNC: 2.4 MMOL/L (ref 0.4–1.9)
LACTATE BLDV-SCNC: 3.2 MMOL/L (ref 0.4–1.9)
LIPASE SERPL-CCNC: 34 U/L (ref 13–60)
LYMPHOCYTES # BLD: 0.9 K/UL (ref 1–5.1)
LYMPHOCYTES NFR BLD: 15.7 %
MCH RBC QN AUTO: 30.9 PG (ref 26–34)
MCHC RBC AUTO-ENTMCNC: 32.2 G/DL (ref 31–36)
MCV RBC AUTO: 96.1 FL (ref 80–100)
METHGB MFR BLDV: 0 % (ref 0–1.5)
MONOCYTES # BLD: 0.5 K/UL (ref 0–1.3)
MONOCYTES NFR BLD: 8.1 %
NEUTROPHILS # BLD: 4.3 K/UL (ref 1.7–7.7)
NEUTROPHILS NFR BLD: 72.2 %
NT-PROBNP SERPL-MCNC: 2111 PG/ML (ref 0–449)
PCO2 BLDV: 62.4 MMHG (ref 41–51)
PERFORMED ON: ABNORMAL
PH BLDV: 7.3 [PH] (ref 7.35–7.45)
PLATELET # BLD AUTO: 107 K/UL (ref 135–450)
PMV BLD AUTO: 9.7 FL (ref 5–10.5)
PO2 BLDV: <30 MMHG (ref 25–40)
POTASSIUM SERPL-SCNC: 3.7 MMOL/L (ref 3.5–5.1)
POTASSIUM SERPL-SCNC: ABNORMAL MMOL/L (ref 3.5–5.1)
PROCALCITONIN SERPL IA-MCNC: 0.03 NG/ML (ref 0–0.15)
PROT SERPL-MCNC: 5.7 G/DL (ref 6.4–8.2)
RBC # BLD AUTO: 3.71 M/UL (ref 4.2–5.9)
REASON FOR REJECTION: NORMAL
REJECTED TEST: NORMAL
SAO2 % BLDV: 50 %
SARS-COV-2 RNA RESP QL NAA+PROBE: NOT DETECTED
SODIUM SERPL-SCNC: 142 MMOL/L (ref 136–145)
TROPONIN, HIGH SENSITIVITY: 15 NG/L (ref 0–22)
TROPONIN, HIGH SENSITIVITY: 16 NG/L (ref 0–22)
TSH SERPL DL<=0.005 MIU/L-ACNC: 3.64 UIU/ML (ref 0.27–4.2)
WBC # BLD AUTO: 6 K/UL (ref 4–11)

## 2025-03-03 PROCEDURE — 71275 CT ANGIOGRAPHY CHEST: CPT

## 2025-03-03 PROCEDURE — 82024 ASSAY OF ACTH: CPT

## 2025-03-03 PROCEDURE — 36415 COLL VENOUS BLD VENIPUNCTURE: CPT

## 2025-03-03 PROCEDURE — 2580000003 HC RX 258

## 2025-03-03 PROCEDURE — 82803 BLOOD GASES ANY COMBINATION: CPT

## 2025-03-03 PROCEDURE — 96365 THER/PROPH/DIAG IV INF INIT: CPT

## 2025-03-03 PROCEDURE — 82533 TOTAL CORTISOL: CPT

## 2025-03-03 PROCEDURE — 87636 SARSCOV2 & INF A&B AMP PRB: CPT

## 2025-03-03 PROCEDURE — 83880 ASSAY OF NATRIURETIC PEPTIDE: CPT

## 2025-03-03 PROCEDURE — 84145 PROCALCITONIN (PCT): CPT

## 2025-03-03 PROCEDURE — 2580000003 HC RX 258: Performed by: INTERNAL MEDICINE

## 2025-03-03 PROCEDURE — 83605 ASSAY OF LACTIC ACID: CPT

## 2025-03-03 PROCEDURE — 93005 ELECTROCARDIOGRAM TRACING: CPT | Performed by: EMERGENCY MEDICINE

## 2025-03-03 PROCEDURE — 2000000000 HC ICU R&B

## 2025-03-03 PROCEDURE — 87040 BLOOD CULTURE FOR BACTERIA: CPT

## 2025-03-03 PROCEDURE — 84460 ALANINE AMINO (ALT) (SGPT): CPT

## 2025-03-03 PROCEDURE — 6360000002 HC RX W HCPCS

## 2025-03-03 PROCEDURE — 99285 EMERGENCY DEPT VISIT HI MDM: CPT

## 2025-03-03 PROCEDURE — 6360000004 HC RX CONTRAST MEDICATION

## 2025-03-03 PROCEDURE — 83690 ASSAY OF LIPASE: CPT

## 2025-03-03 PROCEDURE — 72125 CT NECK SPINE W/O DYE: CPT

## 2025-03-03 PROCEDURE — 71045 X-RAY EXAM CHEST 1 VIEW: CPT

## 2025-03-03 PROCEDURE — 96367 TX/PROPH/DG ADDL SEQ IV INF: CPT

## 2025-03-03 PROCEDURE — 82088 ASSAY OF ALDOSTERONE: CPT

## 2025-03-03 PROCEDURE — 74177 CT ABD & PELVIS W/CONTRAST: CPT

## 2025-03-03 PROCEDURE — 96366 THER/PROPH/DIAG IV INF ADDON: CPT

## 2025-03-03 PROCEDURE — 84443 ASSAY THYROID STIM HORMONE: CPT

## 2025-03-03 PROCEDURE — 84132 ASSAY OF SERUM POTASSIUM: CPT

## 2025-03-03 PROCEDURE — 80053 COMPREHEN METABOLIC PANEL: CPT

## 2025-03-03 PROCEDURE — 85025 COMPLETE CBC W/AUTO DIFF WBC: CPT

## 2025-03-03 PROCEDURE — 0202U NFCT DS 22 TRGT SARS-COV-2: CPT

## 2025-03-03 PROCEDURE — 87641 MR-STAPH DNA AMP PROBE: CPT

## 2025-03-03 PROCEDURE — 84244 ASSAY OF RENIN: CPT

## 2025-03-03 PROCEDURE — 84484 ASSAY OF TROPONIN QUANT: CPT

## 2025-03-03 PROCEDURE — 70450 CT HEAD/BRAIN W/O DYE: CPT

## 2025-03-03 PROCEDURE — 80307 DRUG TEST PRSMV CHEM ANLYZR: CPT

## 2025-03-03 RX ORDER — TAMSULOSIN HYDROCHLORIDE 0.4 MG/1
0.4 CAPSULE ORAL DAILY
Status: DISCONTINUED | OUTPATIENT
Start: 2025-03-04 | End: 2025-03-04 | Stop reason: ALTCHOICE

## 2025-03-03 RX ORDER — SODIUM CHLORIDE, SODIUM LACTATE, POTASSIUM CHLORIDE, AND CALCIUM CHLORIDE .6; .31; .03; .02 G/100ML; G/100ML; G/100ML; G/100ML
30 INJECTION, SOLUTION INTRAVENOUS ONCE
Status: COMPLETED | OUTPATIENT
Start: 2025-03-03 | End: 2025-03-03

## 2025-03-03 RX ORDER — POLYETHYLENE GLYCOL 3350 17 G/17G
17 POWDER, FOR SOLUTION ORAL DAILY PRN
Status: DISCONTINUED | OUTPATIENT
Start: 2025-03-03 | End: 2025-03-07 | Stop reason: HOSPADM

## 2025-03-03 RX ORDER — ALBUTEROL SULFATE 0.83 MG/ML
2.5 SOLUTION RESPIRATORY (INHALATION) EVERY 6 HOURS PRN
Status: DISCONTINUED | OUTPATIENT
Start: 2025-03-03 | End: 2025-03-07 | Stop reason: HOSPADM

## 2025-03-03 RX ORDER — BETAMETHASONE DIPROPIONATE 0.5 MG/G
CREAM TOPICAL 2 TIMES DAILY
Status: DISCONTINUED | OUTPATIENT
Start: 2025-03-04 | End: 2025-03-03

## 2025-03-03 RX ORDER — AMMONIUM LACTATE 12 G/100G
LOTION TOPICAL PRN
Status: DISCONTINUED | OUTPATIENT
Start: 2025-03-03 | End: 2025-03-04 | Stop reason: ALTCHOICE

## 2025-03-03 RX ORDER — METOPROLOL SUCCINATE 25 MG/1
25 TABLET, EXTENDED RELEASE ORAL DAILY
Status: DISCONTINUED | OUTPATIENT
Start: 2025-03-04 | End: 2025-03-05

## 2025-03-03 RX ORDER — ACETAMINOPHEN 650 MG/1
650 SUPPOSITORY RECTAL EVERY 6 HOURS PRN
Status: DISCONTINUED | OUTPATIENT
Start: 2025-03-03 | End: 2025-03-07 | Stop reason: HOSPADM

## 2025-03-03 RX ORDER — ONDANSETRON 4 MG/1
4 TABLET, ORALLY DISINTEGRATING ORAL EVERY 8 HOURS PRN
Status: DISCONTINUED | OUTPATIENT
Start: 2025-03-03 | End: 2025-03-07 | Stop reason: HOSPADM

## 2025-03-03 RX ORDER — ONDANSETRON 2 MG/ML
4 INJECTION INTRAMUSCULAR; INTRAVENOUS EVERY 6 HOURS PRN
Status: DISCONTINUED | OUTPATIENT
Start: 2025-03-03 | End: 2025-03-07 | Stop reason: HOSPADM

## 2025-03-03 RX ORDER — FUROSEMIDE 10 MG/ML
20 INJECTION INTRAMUSCULAR; INTRAVENOUS 2 TIMES DAILY
Status: DISCONTINUED | OUTPATIENT
Start: 2025-03-04 | End: 2025-03-07 | Stop reason: HOSPADM

## 2025-03-03 RX ORDER — SODIUM CHLORIDE 0.9 % (FLUSH) 0.9 %
5-40 SYRINGE (ML) INJECTION PRN
Status: DISCONTINUED | OUTPATIENT
Start: 2025-03-03 | End: 2025-03-07 | Stop reason: HOSPADM

## 2025-03-03 RX ORDER — ACETAMINOPHEN 325 MG/1
650 TABLET ORAL EVERY 6 HOURS PRN
Status: DISCONTINUED | OUTPATIENT
Start: 2025-03-03 | End: 2025-03-07 | Stop reason: HOSPADM

## 2025-03-03 RX ORDER — SODIUM CHLORIDE 9 MG/ML
INJECTION, SOLUTION INTRAVENOUS PRN
Status: DISCONTINUED | OUTPATIENT
Start: 2025-03-03 | End: 2025-03-07 | Stop reason: HOSPADM

## 2025-03-03 RX ORDER — LOSARTAN POTASSIUM 25 MG/1
25 TABLET ORAL DAILY
Status: DISCONTINUED | OUTPATIENT
Start: 2025-03-04 | End: 2025-03-05

## 2025-03-03 RX ORDER — SODIUM CHLORIDE 9 MG/ML
INJECTION, SOLUTION INTRAVENOUS CONTINUOUS
Status: DISCONTINUED | OUTPATIENT
Start: 2025-03-03 | End: 2025-03-03

## 2025-03-03 RX ORDER — AMLODIPINE BESYLATE 5 MG/1
5 TABLET ORAL DAILY
Status: DISCONTINUED | OUTPATIENT
Start: 2025-03-04 | End: 2025-03-05

## 2025-03-03 RX ORDER — EZETIMIBE 10 MG/1
10 TABLET ORAL NIGHTLY
Status: DISCONTINUED | OUTPATIENT
Start: 2025-03-04 | End: 2025-03-07 | Stop reason: HOSPADM

## 2025-03-03 RX ORDER — IOPAMIDOL 755 MG/ML
75 INJECTION, SOLUTION INTRAVASCULAR
Status: COMPLETED | OUTPATIENT
Start: 2025-03-03 | End: 2025-03-03

## 2025-03-03 RX ORDER — HYDROCORTISONE SODIUM SUCCINATE 100 MG/2ML
100 INJECTION INTRAMUSCULAR; INTRAVENOUS ONCE
Status: DISCONTINUED | OUTPATIENT
Start: 2025-03-03 | End: 2025-03-03

## 2025-03-03 RX ORDER — ASPIRIN 81 MG/1
81 TABLET ORAL DAILY
Status: DISCONTINUED | OUTPATIENT
Start: 2025-03-04 | End: 2025-03-07 | Stop reason: HOSPADM

## 2025-03-03 RX ORDER — CLOBETASOL PROPIONATE 0.5 MG/G
CREAM TOPICAL 2 TIMES DAILY
Status: DISCONTINUED | OUTPATIENT
Start: 2025-03-04 | End: 2025-03-03

## 2025-03-03 RX ORDER — FUROSEMIDE 10 MG/ML
40 INJECTION INTRAMUSCULAR; INTRAVENOUS ONCE
Status: COMPLETED | OUTPATIENT
Start: 2025-03-03 | End: 2025-03-03

## 2025-03-03 RX ORDER — ROSUVASTATIN CALCIUM 20 MG/1
40 TABLET, COATED ORAL NIGHTLY
Status: DISCONTINUED | OUTPATIENT
Start: 2025-03-04 | End: 2025-03-07 | Stop reason: HOSPADM

## 2025-03-03 RX ORDER — SODIUM CHLORIDE 0.9 % (FLUSH) 0.9 %
5-40 SYRINGE (ML) INJECTION EVERY 12 HOURS SCHEDULED
Status: DISCONTINUED | OUTPATIENT
Start: 2025-03-04 | End: 2025-03-07 | Stop reason: HOSPADM

## 2025-03-03 RX ORDER — NICOTINE 21 MG/24HR
1 PATCH, TRANSDERMAL 24 HOURS TRANSDERMAL DAILY
Status: DISCONTINUED | OUTPATIENT
Start: 2025-03-04 | End: 2025-03-07 | Stop reason: HOSPADM

## 2025-03-03 RX ADMIN — SODIUM CHLORIDE, SODIUM LACTATE, POTASSIUM CHLORIDE, AND CALCIUM CHLORIDE 2793 ML: .6; .31; .03; .02 INJECTION, SOLUTION INTRAVENOUS at 13:36

## 2025-03-03 RX ADMIN — IOPAMIDOL 75 ML: 755 INJECTION, SOLUTION INTRAVENOUS at 14:39

## 2025-03-03 RX ADMIN — PIPERACILLIN AND TAZOBACTAM 4500 MG: 4; .5 INJECTION, POWDER, LYOPHILIZED, FOR SOLUTION INTRAVENOUS at 14:08

## 2025-03-03 RX ADMIN — VANCOMYCIN HYDROCHLORIDE 2250 MG: 1 INJECTION, POWDER, LYOPHILIZED, FOR SOLUTION INTRAVENOUS at 15:01

## 2025-03-03 RX ADMIN — FUROSEMIDE 40 MG: 10 INJECTION, SOLUTION INTRAMUSCULAR; INTRAVENOUS at 21:56

## 2025-03-03 RX ADMIN — SODIUM CHLORIDE: 9 INJECTION, SOLUTION INTRAVENOUS at 18:28

## 2025-03-03 ASSESSMENT — PAIN DESCRIPTION - ORIENTATION
ORIENTATION: RIGHT;LEFT
ORIENTATION: RIGHT;LEFT

## 2025-03-03 ASSESSMENT — ENCOUNTER SYMPTOMS
CHEST TIGHTNESS: 0
SHORTNESS OF BREATH: 0
ABDOMINAL PAIN: 0

## 2025-03-03 ASSESSMENT — PAIN DESCRIPTION - PAIN TYPE
TYPE: ACUTE PAIN
TYPE: ACUTE PAIN

## 2025-03-03 ASSESSMENT — PAIN SCALES - GENERAL
PAINLEVEL_OUTOF10: 4
PAINLEVEL_OUTOF10: 5

## 2025-03-03 ASSESSMENT — PAIN DESCRIPTION - DESCRIPTORS: DESCRIPTORS: BURNING

## 2025-03-03 ASSESSMENT — PAIN DESCRIPTION - LOCATION
LOCATION: LEG
LOCATION: LEG

## 2025-03-03 ASSESSMENT — PAIN - FUNCTIONAL ASSESSMENT
PAIN_FUNCTIONAL_ASSESSMENT: 0-10
PAIN_FUNCTIONAL_ASSESSMENT: ACTIVITIES ARE NOT PREVENTED

## 2025-03-03 NOTE — ED NOTES
MD notified of pt continued low blood pressures and low temperature. Pt alert and oriented x4.      Lolis Osuna, RN  03/03/25 2085

## 2025-03-03 NOTE — H&P
ICU HISTORY AND PHYSICAL       Admit Date:  3/3/2025                            Hospital Day: 0  ICU Day: Patient does not have an ICU stay during this admission.      CC: fatigue    History obtained from:  chart review and the patient    SUBJECTIVE   HPI:    Mr. Delano Daigle is a 79 y.o. male with a medical hx significant for HTN, HLD, DM2, eczema, hidradenitis suppurativa (MRSA), Afib (on Eliquis), COPD, cardiomyopathy, otherwise as listed in the MHx table below, who presented from home to the ED on 03/03/2025 with fatigue for 2 months, worsening over the past few days. He describes feeling tremulous and lightheaded after standing and doing any sort of activity. He also notes that he constantly feels cold at home, in spite of keeping the thermostat in his apartment set at 80. He also notes that he sleeps upright in a chair at night as laying flat is too uncomfortable. He states he fell yesterday and hit the right side of his chest, now endorsing some pain in that area. He denies any associated shortness of breath, cough, fevers, abdominal pain, nausea, vomiting, or urinary symptoms.      ED Course:  On arrival to the ED, he was found to be hypothermic, hypotensive  Labs were significant for: lactic acid 3.2, pH 7.30, BNP 2,111  Imaging: CXR showing CHF with fluid overload, CT chest with hazy nodular opacities in bilateral lower lobes, CT abdomen/pelvis with right flank contusion, CT head/neck without acute findings.  While in the ED, he received sepsis bolus 30 mL/kg, and was started on vancomycin and Zosyn.      Past Medical History:   Diagnosis Date    Allergic rhinitis     Eczema     Hearing loss     Hyperlipidemia     Hypertension     Type 2 diabetes mellitus without complication (HCC)        Past Surgical History:   Procedure Laterality Date    COLONOSCOPY      COLONOSCOPY N/A 11/10/2021    COLONOSCOPY POLYPECTOMY SNARE/COLD BIOPSY performed by Justine Haq MD at [facility] ENDOSCOPY    SKIN GRAFT    is a multiplex Real-Time Reverse  Transcriptase Polymerase Chain Reaction (RT-PCR)-based in vitro  diagnostic test intended for the qualitative detection of nucleic  acids from SARS-CoV-2, influenza A, and influenza B in nasopharyngeal  and nasal swab specimens for use under the FDA’s Emergency Use  Authorization (EUA) only.    Patient Fact Sheet:  https://www.fda.gov/media/575151/download  Provider Fact Sheet: https://www.fda.gov/media/281167/download  EUA: https://www.fda.gov/media/007021/download  IFU: https://www.fda.gov/media/786785/download    Methodology:  RT-PCR          Influenza A NOT DETECTED     Influenza B NOT DETECTED    Blood Culture 1 [[id number]] Collected: 03/03/25 1327    Order Status: Sent Specimen: Blood     Blood Culture 2 [[id number]] Collected: 03/03/25 1327    Order Status: Sent Specimen: Blood              RADIOLOGY:  CT Head W/O Contrast   Final Result      1.  No acute traumatic findings.   2.  Multilevel degenerative disease of the cervical spine.         Electronically signed by Walker MICHAEL SuppreMolblanca      CT C-Spine W/O Contrast   Final Result      1.  No acute traumatic findings.   2.  Multilevel degenerative disease of the cervical spine.         Electronically signed by MoPalsbj      CT ABDOMEN PELVIS W IV CONTRAST Additional Contrast? Radiologist Recommendation   Final Result      CHEST:      1. No pulmonary emboli.   2. Acute infectious or inflammatory process of the lower lobes bilaterally.   3. 27 mm left inferior thyroid nodule. Recommend nonemergent ultrasound for   further evaluation.      ABDOMEN/PELVIS:      1. Mild bladder wall thickening versus nondistention. Consider correlation with   urinalysis.    2. Right flank contusion.   3. Stable appearance of previously described indeterminate liver lesion.   Recommend nonemergent MRI without and with contrast for further evaluation.      Electronically signed by TNC MICHAEL SuppreMolblanca      CTA Chest W/ Contrast R/O PE   Final Result

## 2025-03-03 NOTE — ED PROVIDER NOTES
[facility]  EMERGENCY DEPARTMENT ENCOUNTER          EM RESIDENT NOTE     Date of Evaluation: 3/3/2025    Chief Complaint     Fatigue (Patient with chief complaint of fatigue/generalized weakness that has been occurring for the past couple days. Hx of a fib)    History of Present Illness     Delano Daigle is a 79 y.o. male with PMHx as below who presents with fatigue.  Reports fatigue x 2 months, worsening over the last several days.  States he had a fall yesterday in which she fell onto the right side of his chest.  Did not strike his head or lose consciousness.  Reports some associated pain in the left chest wall which he attributes to this fall.  Denies shortness of breath, cough, fevers, abdominal pain, nausea, vomiting, urinary symptoms, any other symptoms.  He does note that he has eczema and his skin is quite dry, and that he frequently scratches his legs and develops wounds there.     Review of Systems     ROS: A comprehensive review of systems was performed and negative except as noted previously in the HPI.     Past Medical, Surgical, Family, and Social History     He has a past medical history of Allergic rhinitis, Eczema, Hearing loss, Hyperlipidemia, Hypertension, and Type 2 diabetes mellitus without complication (HCC).  He has a past surgical history that includes Skin graft; Colonoscopy; and Colonoscopy (N/A, 11/10/2021).  Hisfamily history includes Diabetes in his father and mother.  He reports that he has been smoking cigarettes. He has a 59 pack-year smoking history. He has never used smokeless tobacco. He reports current alcohol use of about 2.0 standard drinks of alcohol per week. He reports that he does not use drugs.  Medications     Previous Medications    ALBUTEROL SULFATE HFA (PROVENTIL HFA) 108 (90 BASE) MCG/ACT INHALER    Inhale 2 puffs into the lungs every 6 hours as needed for Wheezing    AMLODIPINE (NORVASC) 5 MG TABLET    TAKE 1 TABLET BY MOUTH EVERY DAY *NEW PRESCRIPTION

## 2025-03-03 NOTE — ED PROVIDER NOTES
ED Attending Attestation Note     Date of evaluation: 3/3/2025    This patient was seen by the resident.  I have seen and examined the patient, agree with the workup, evaluation, management and diagnosis. The care plan has been discussed.  I have reviewed the ECG and concur with the resident's interpretation.      Briefly, Delano Daigle is a 79 y.o. male with a PMH inclusive of DM, COPD, A fib on anticoagulation who presents for evaluation of fatigue ongoing for the last couple of days. No fevers, cough, CP, SOB, abd pain, urinary symptoms.     Notable exam findings include hypotensive, hypothermic, mentating normally    Assessment/ Medical Decision Making:     Patient given broad-spectrum antibiotics as well as sepsis bolus fluids for hypotension and hypothermia.  He underwent CTs with both his acute illness and for evaluation of fall on anticoagulation.  CT chest with what looks like infection versus inflammation of the lower lobes bilaterally.  Patient remained persistently hypotensive after fluids so ICU consulted and accepted.  Labs sent for workup of refractory hypotension including cortisol levels, TSH.     Critical Care:  Due to the immediate potential for life-threatening deterioration due to sepsis, I spent 41 minutes providing critical care.  This time excludes time spent performing procedures but includes time spent on direct patient care, history retrieval, review of the chart, and discussions with patient, family, and consultant(s).       Xena Gutierrez MD  03/04/25 7931

## 2025-03-03 NOTE — ED NOTES
ICU MD bedside and aware of pt downtrending BP. MD placing orders.     Lolis Osuna, RN  03/03/25 6589

## 2025-03-04 ENCOUNTER — APPOINTMENT (OUTPATIENT)
Age: 80
End: 2025-03-04
Payer: MEDICARE

## 2025-03-04 PROBLEM — R65.20 SEVERE SEPSIS (HCC): Status: ACTIVE | Noted: 2024-08-03

## 2025-03-04 LAB
ALBUMIN SERPL-MCNC: 1.9 G/DL (ref 3.4–5)
ALBUMIN SERPL-MCNC: 2 G/DL (ref 3.4–5)
ALP SERPL-CCNC: 98 U/L (ref 40–129)
ALT SERPL-CCNC: 36 U/L (ref 10–40)
ANION GAP SERPL CALCULATED.3IONS-SCNC: 7 MMOL/L (ref 3–16)
ANION GAP SERPL CALCULATED.3IONS-SCNC: 8 MMOL/L (ref 3–16)
AST SERPL-CCNC: 112 U/L (ref 15–37)
BASOPHILS # BLD: 0.1 K/UL (ref 0–0.2)
BASOPHILS NFR BLD: 1.2 %
BILIRUB DIRECT SERPL-MCNC: <0.1 MG/DL (ref 0–0.3)
BILIRUB INDIRECT SERPL-MCNC: ABNORMAL MG/DL (ref 0–1)
BILIRUB SERPL-MCNC: <0.2 MG/DL (ref 0–1)
BILIRUB UR QL STRIP.AUTO: NEGATIVE
BUN SERPL-MCNC: 18 MG/DL (ref 7–20)
BUN SERPL-MCNC: 18 MG/DL (ref 7–20)
CALCIUM SERPL-MCNC: 7.5 MG/DL (ref 8.3–10.6)
CALCIUM SERPL-MCNC: 7.6 MG/DL (ref 8.3–10.6)
CHLORIDE SERPL-SCNC: 111 MMOL/L (ref 99–110)
CHLORIDE SERPL-SCNC: 112 MMOL/L (ref 99–110)
CHOLEST SERPL-MCNC: 93 MG/DL (ref 0–199)
CLARITY UR: CLEAR
CO2 SERPL-SCNC: 24 MMOL/L (ref 21–32)
CO2 SERPL-SCNC: 26 MMOL/L (ref 21–32)
COLOR UR: YELLOW
CORTIS SERPL-MCNC: 6.9 UG/DL
CREAT SERPL-MCNC: 1.2 MG/DL (ref 0.8–1.3)
CREAT SERPL-MCNC: 1.3 MG/DL (ref 0.8–1.3)
DEPRECATED RDW RBC AUTO: 15.4 % (ref 12.4–15.4)
ECHO AO ASC DIAM: 3.3 CM
ECHO AO ASCENDING AORTA INDEX: 1.56 CM/M2
ECHO AO ROOT DIAM: 4 CM
ECHO AO ROOT INDEX: 1.9 CM/M2
ECHO BSA: 2.14 M2
ECHO EST RA PRESSURE: 3 MMHG
ECHO LA AREA 4C: 28.8 CM2
ECHO LA DIAMETER INDEX: 1.42 CM/M2
ECHO LA DIAMETER: 3 CM
ECHO LA MAJOR AXIS: 7.5 CM
ECHO LA TO AORTIC ROOT RATIO: 0.75
ECHO LA VOL MOD A4C: 83 ML (ref 18–58)
ECHO LA VOLUME INDEX MOD A4C: 39 ML/M2 (ref 16–34)
ECHO LV EDV A2C: 146 ML
ECHO LV EDV A4C: 139 ML
ECHO LV EDV INDEX A4C: 66 ML/M2
ECHO LV EDV NDEX A2C: 69 ML/M2
ECHO LV EF PHYSICIAN: 55 %
ECHO LV EJECTION FRACTION A2C: 35 %
ECHO LV EJECTION FRACTION A4C: 45 %
ECHO LV ESV A2C: 94 ML
ECHO LV ESV A4C: 77 ML
ECHO LV ESV INDEX A2C: 45 ML/M2
ECHO LV ESV INDEX A4C: 36 ML/M2
ECHO LV FRACTIONAL SHORTENING: 27 % (ref 28–44)
ECHO LV INTERNAL DIMENSION DIASTOLE INDEX: 2.32 CM/M2
ECHO LV INTERNAL DIMENSION DIASTOLIC: 4.9 CM (ref 4.2–5.9)
ECHO LV INTERNAL DIMENSION SYSTOLIC INDEX: 1.71 CM/M2
ECHO LV INTERNAL DIMENSION SYSTOLIC: 3.6 CM
ECHO LV IVSD: 1.1 CM (ref 0.6–1)
ECHO LV MASS 2D: 200.5 G (ref 88–224)
ECHO LV MASS INDEX 2D: 95 G/M2 (ref 49–115)
ECHO LV POSTERIOR WALL DIASTOLIC: 1.1 CM (ref 0.6–1)
ECHO LV RELATIVE WALL THICKNESS RATIO: 0.45
ECHO LVOT AREA: 4.2 CM2
ECHO LVOT DIAM: 2.3 CM
ECHO RA AREA 4C: 30.3 CM2
ECHO RA END SYSTOLIC VOLUME APICAL 4 CHAMBER INDEX BSA: 48 ML/M2
ECHO RA VOLUME: 102 ML
ECHO RIGHT VENTRICULAR SYSTOLIC PRESSURE (RVSP): 24 MMHG
ECHO RV TAPSE: 1.3 CM (ref 1.7–?)
ECHO TV REGURGITANT MAX VELOCITY: 2.29 M/S
ECHO TV REGURGITANT PEAK GRADIENT: 21 MMHG
EOSINOPHIL # BLD: 0.3 K/UL (ref 0–0.6)
EOSINOPHIL NFR BLD: 6 %
EPI CELLS #/AREA URNS HPF: ABNORMAL /HPF (ref 0–5)
FERRITIN SERPL IA-MCNC: 241 NG/ML (ref 30–400)
GFR SERPLBLD CREATININE-BSD FMLA CKD-EPI: 56 ML/MIN/{1.73_M2}
GFR SERPLBLD CREATININE-BSD FMLA CKD-EPI: 62 ML/MIN/{1.73_M2}
GLUCOSE BLD-MCNC: 130 MG/DL (ref 70–99)
GLUCOSE BLD-MCNC: 143 MG/DL (ref 70–99)
GLUCOSE BLD-MCNC: 231 MG/DL (ref 70–99)
GLUCOSE BLD-MCNC: 85 MG/DL (ref 70–99)
GLUCOSE BLD-MCNC: 99 MG/DL (ref 70–99)
GLUCOSE SERPL-MCNC: 63 MG/DL (ref 70–99)
GLUCOSE SERPL-MCNC: 73 MG/DL (ref 70–99)
GLUCOSE UR STRIP.AUTO-MCNC: NEGATIVE MG/DL
HCT VFR BLD AUTO: 32.7 % (ref 40.5–52.5)
HDLC SERPL-MCNC: 40 MG/DL (ref 40–60)
HGB BLD-MCNC: 10.5 G/DL (ref 13.5–17.5)
HGB UR QL STRIP.AUTO: NEGATIVE
IRON SATN MFR SERPL: 41 % (ref 20–50)
IRON SERPL-MCNC: 56 UG/DL (ref 59–158)
KETONES UR STRIP.AUTO-MCNC: NEGATIVE MG/DL
LACTATE BLDV-SCNC: 1.8 MMOL/L (ref 0.4–2)
LACTATE BLDV-SCNC: 2 MMOL/L (ref 0.4–2)
LACTATE BLDV-SCNC: 2.2 MMOL/L (ref 0.4–2)
LACTATE BLDV-SCNC: 2.2 MMOL/L (ref 0.4–2)
LACTATE BLDV-SCNC: 2.7 MMOL/L (ref 0.4–2)
LDLC SERPL CALC-MCNC: 41 MG/DL
LEUKOCYTE ESTERASE UR QL STRIP.AUTO: ABNORMAL
LYMPHOCYTES # BLD: 1.2 K/UL (ref 1–5.1)
LYMPHOCYTES NFR BLD: 23.8 %
MAGNESIUM SERPL-MCNC: 1.93 MG/DL (ref 1.8–2.4)
MCH RBC QN AUTO: 31 PG (ref 26–34)
MCHC RBC AUTO-ENTMCNC: 32.2 G/DL (ref 31–36)
MCV RBC AUTO: 96.1 FL (ref 80–100)
MONOCYTES # BLD: 0.5 K/UL (ref 0–1.3)
MONOCYTES NFR BLD: 9.6 %
MRSA DNA SPEC QL NAA+PROBE: ABNORMAL
NEUTROPHILS # BLD: 2.9 K/UL (ref 1.7–7.7)
NEUTROPHILS NFR BLD: 59.4 %
NITRITE UR QL STRIP.AUTO: NEGATIVE
ORGANISM: ABNORMAL
PERFORMED ON: ABNORMAL
PERFORMED ON: NORMAL
PERFORMED ON: NORMAL
PH UR STRIP.AUTO: 6 [PH] (ref 5–8)
PHOSPHATE SERPL-MCNC: 3.5 MG/DL (ref 2.5–4.9)
PHOSPHATE SERPL-MCNC: 3.5 MG/DL (ref 2.5–4.9)
PLATELET # BLD AUTO: 85 K/UL (ref 135–450)
PMV BLD AUTO: 10.3 FL (ref 5–10.5)
POTASSIUM SERPL-SCNC: 3.9 MMOL/L (ref 3.5–5.1)
POTASSIUM SERPL-SCNC: 4.3 MMOL/L (ref 3.5–5.1)
PROT SERPL-MCNC: 5.3 G/DL (ref 6.4–8.2)
PROT UR STRIP.AUTO-MCNC: NEGATIVE MG/DL
RBC # BLD AUTO: 3.4 M/UL (ref 4.2–5.9)
RBC #/AREA URNS HPF: ABNORMAL /HPF (ref 0–4)
REPORT: NORMAL
RESP PATH DNA+RNA PNL NPH NAA+NON-PROBE: NORMAL
SODIUM SERPL-SCNC: 144 MMOL/L (ref 136–145)
SODIUM SERPL-SCNC: 144 MMOL/L (ref 136–145)
SP GR UR STRIP.AUTO: 1.01 (ref 1–1.03)
TIBC SERPL-MCNC: 137 UG/DL (ref 260–445)
TRICHOMONAS #/AREA URNS HPF: PRESENT /HPF
TRIGL SERPL-MCNC: 61 MG/DL (ref 0–150)
UA COMPLETE W REFLEX CULTURE PNL UR: ABNORMAL
UA DIPSTICK W REFLEX MICRO PNL UR: YES
URN SPEC COLLECT METH UR: ABNORMAL
UROBILINOGEN UR STRIP-ACNC: 0.2 E.U./DL
VLDLC SERPL CALC-MCNC: 12 MG/DL
WBC # BLD AUTO: 4.9 K/UL (ref 4–11)
WBC #/AREA URNS HPF: ABNORMAL /HPF (ref 0–5)

## 2025-03-04 PROCEDURE — 93308 TTE F-UP OR LMTD: CPT | Performed by: INTERNAL MEDICINE

## 2025-03-04 PROCEDURE — 99223 1ST HOSP IP/OBS HIGH 75: CPT | Performed by: INTERNAL MEDICINE

## 2025-03-04 PROCEDURE — 83735 ASSAY OF MAGNESIUM: CPT

## 2025-03-04 PROCEDURE — 2500000003 HC RX 250 WO HCPCS

## 2025-03-04 PROCEDURE — 6370000000 HC RX 637 (ALT 250 FOR IP)

## 2025-03-04 PROCEDURE — 83550 IRON BINDING TEST: CPT

## 2025-03-04 PROCEDURE — 6360000002 HC RX W HCPCS

## 2025-03-04 PROCEDURE — 93325 DOPPLER ECHO COLOR FLOW MAPG: CPT | Performed by: INTERNAL MEDICINE

## 2025-03-04 PROCEDURE — C8924 2D TTE W OR W/O FOL W/CON,FU: HCPCS

## 2025-03-04 PROCEDURE — 93321 DOPPLER ECHO F-UP/LMTD STD: CPT | Performed by: INTERNAL MEDICINE

## 2025-03-04 PROCEDURE — 6360000004 HC RX CONTRAST MEDICATION: Performed by: INTERNAL MEDICINE

## 2025-03-04 PROCEDURE — 80076 HEPATIC FUNCTION PANEL: CPT

## 2025-03-04 PROCEDURE — 80069 RENAL FUNCTION PANEL: CPT

## 2025-03-04 PROCEDURE — 81001 URINALYSIS AUTO W/SCOPE: CPT

## 2025-03-04 PROCEDURE — 82728 ASSAY OF FERRITIN: CPT

## 2025-03-04 PROCEDURE — 80061 LIPID PANEL: CPT

## 2025-03-04 PROCEDURE — 82607 VITAMIN B-12: CPT

## 2025-03-04 PROCEDURE — 2000000000 HC ICU R&B

## 2025-03-04 PROCEDURE — 83605 ASSAY OF LACTIC ACID: CPT

## 2025-03-04 PROCEDURE — 82746 ASSAY OF FOLIC ACID SERUM: CPT

## 2025-03-04 PROCEDURE — 83540 ASSAY OF IRON: CPT

## 2025-03-04 PROCEDURE — 6370000000 HC RX 637 (ALT 250 FOR IP): Performed by: INTERNAL MEDICINE

## 2025-03-04 PROCEDURE — 85025 COMPLETE CBC W/AUTO DIFF WBC: CPT

## 2025-03-04 PROCEDURE — 36415 COLL VENOUS BLD VENIPUNCTURE: CPT

## 2025-03-04 PROCEDURE — 82533 TOTAL CORTISOL: CPT

## 2025-03-04 PROCEDURE — 94640 AIRWAY INHALATION TREATMENT: CPT

## 2025-03-04 RX ORDER — HYDROCORTISONE SODIUM SUCCINATE 100 MG/2ML
100 INJECTION INTRAMUSCULAR; INTRAVENOUS ONCE
Status: COMPLETED | OUTPATIENT
Start: 2025-03-04 | End: 2025-03-04

## 2025-03-04 RX ORDER — HYDROCORTISONE SODIUM SUCCINATE 100 MG/2ML
50 INJECTION INTRAMUSCULAR; INTRAVENOUS EVERY 6 HOURS
Status: DISCONTINUED | OUTPATIENT
Start: 2025-03-04 | End: 2025-03-06

## 2025-03-04 RX ORDER — MIDODRINE HYDROCHLORIDE 5 MG/1
5 TABLET ORAL
Status: DISCONTINUED | OUTPATIENT
Start: 2025-03-04 | End: 2025-03-05

## 2025-03-04 RX ORDER — METRONIDAZOLE 500 MG/1
2000 TABLET ORAL ONCE
Status: COMPLETED | OUTPATIENT
Start: 2025-03-04 | End: 2025-03-04

## 2025-03-04 RX ORDER — PETROLATUM,WHITE
OINTMENT IN PACKET (GRAM) TOPICAL 2 TIMES DAILY
Status: ON HOLD | COMMUNITY
End: 2025-03-07 | Stop reason: HOSPADM

## 2025-03-04 RX ORDER — GLUCAGON 1 MG/ML
1 KIT INJECTION PRN
Status: DISCONTINUED | OUTPATIENT
Start: 2025-03-04 | End: 2025-03-07 | Stop reason: HOSPADM

## 2025-03-04 RX ORDER — FUROSEMIDE 10 MG/ML
20 INJECTION INTRAMUSCULAR; INTRAVENOUS 2 TIMES DAILY
Status: CANCELLED | OUTPATIENT
Start: 2025-03-04

## 2025-03-04 RX ORDER — DEXTROSE MONOHYDRATE 100 MG/ML
INJECTION, SOLUTION INTRAVENOUS CONTINUOUS PRN
Status: DISCONTINUED | OUTPATIENT
Start: 2025-03-04 | End: 2025-03-07 | Stop reason: HOSPADM

## 2025-03-04 RX ORDER — MINERAL OIL/HYDROPHIL PETROLAT
OINTMENT (GRAM) TOPICAL 2 TIMES DAILY
Status: DISCONTINUED | OUTPATIENT
Start: 2025-03-04 | End: 2025-03-07 | Stop reason: HOSPADM

## 2025-03-04 RX ORDER — INSULIN LISPRO 100 [IU]/ML
0-4 INJECTION, SOLUTION INTRAVENOUS; SUBCUTANEOUS
Status: DISCONTINUED | OUTPATIENT
Start: 2025-03-04 | End: 2025-03-07 | Stop reason: HOSPADM

## 2025-03-04 RX ADMIN — SULFUR HEXAFLUORIDE 2 ML: 60.7; .19; .19 INJECTION, POWDER, LYOPHILIZED, FOR SUSPENSION INTRAVENOUS; INTRAVESICAL at 09:41

## 2025-03-04 RX ADMIN — Medication: at 12:35

## 2025-03-04 RX ADMIN — HYDROCORTISONE SODIUM SUCCINATE 50 MG: 100 INJECTION, POWDER, FOR SOLUTION INTRAMUSCULAR; INTRAVENOUS at 21:19

## 2025-03-04 RX ADMIN — SODIUM CHLORIDE, PRESERVATIVE FREE 10 ML: 5 INJECTION INTRAVENOUS at 21:19

## 2025-03-04 RX ADMIN — Medication: at 21:19

## 2025-03-04 RX ADMIN — MIDODRINE HYDROCHLORIDE 5 MG: 5 TABLET ORAL at 17:23

## 2025-03-04 RX ADMIN — FUROSEMIDE 20 MG: 10 INJECTION, SOLUTION INTRAMUSCULAR; INTRAVENOUS at 17:23

## 2025-03-04 RX ADMIN — HYDROCORTISONE SODIUM SUCCINATE 50 MG: 100 INJECTION, POWDER, FOR SOLUTION INTRAMUSCULAR; INTRAVENOUS at 07:47

## 2025-03-04 RX ADMIN — APIXABAN 5 MG: 5 TABLET, FILM COATED ORAL at 21:19

## 2025-03-04 RX ADMIN — SODIUM CHLORIDE, PRESERVATIVE FREE 10 ML: 5 INJECTION INTRAVENOUS at 07:52

## 2025-03-04 RX ADMIN — METRONIDAZOLE 2000 MG: 500 TABLET ORAL at 01:29

## 2025-03-04 RX ADMIN — HYDROCORTISONE SODIUM SUCCINATE 50 MG: 100 INJECTION, POWDER, FOR SOLUTION INTRAMUSCULAR; INTRAVENOUS at 15:04

## 2025-03-04 RX ADMIN — ASPIRIN 81 MG: 81 TABLET, COATED ORAL at 07:51

## 2025-03-04 RX ADMIN — EZETIMIBE 10 MG: 10 TABLET ORAL at 21:20

## 2025-03-04 RX ADMIN — APIXABAN 5 MG: 5 TABLET, FILM COATED ORAL at 07:51

## 2025-03-04 RX ADMIN — APIXABAN 5 MG: 5 TABLET, FILM COATED ORAL at 00:10

## 2025-03-04 RX ADMIN — MIDODRINE HYDROCHLORIDE 5 MG: 5 TABLET ORAL at 11:47

## 2025-03-04 RX ADMIN — HYDROCORTISONE SODIUM SUCCINATE 100 MG: 100 INJECTION, POWDER, FOR SOLUTION INTRAMUSCULAR; INTRAVENOUS at 03:12

## 2025-03-04 RX ADMIN — ROSUVASTATIN CALCIUM 40 MG: 20 TABLET, FILM COATED ORAL at 21:19

## 2025-03-04 RX ADMIN — ARFORMOTEROL TARTRATE: 15 SOLUTION RESPIRATORY (INHALATION) at 10:14

## 2025-03-04 ASSESSMENT — ENCOUNTER SYMPTOMS
COUGH: 0
ABDOMINAL PAIN: 0
SHORTNESS OF BREATH: 0
CONSTIPATION: 0
DIARRHEA: 0

## 2025-03-04 NOTE — PROGRESS NOTES
Pt admitted into room 4510. A&ox4, room air, BP soft. Residents made aware. Pt noted to having open/ cracking areas scattered and both lower extremities. Wound care consult placed.

## 2025-03-04 NOTE — PLAN OF CARE
Problem: Chronic Conditions and Co-morbidities  Goal: Patient's chronic conditions and co-morbidity symptoms are monitored and maintained or improved  Outcome: Progressing  Flowsheets (Taken 3/3/2025 2045)  Care Plan - Patient's Chronic Conditions and Co-Morbidity Symptoms are Monitored and Maintained or Improved:   Monitor and assess patient's chronic conditions and comorbid symptoms for stability, deterioration, or improvement   Collaborate with multidisciplinary team to address chronic and comorbid conditions and prevent exacerbation or deterioration   Update acute care plan with appropriate goals if chronic or comorbid symptoms are exacerbated and prevent overall improvement and discharge     Problem: Discharge Planning  Goal: Discharge to home or other facility with appropriate resources  Outcome: Progressing  Flowsheets (Taken 3/3/2025 2045)  Discharge to home or other facility with appropriate resources:   Identify barriers to discharge with patient and caregiver   Arrange for needed discharge resources and transportation as appropriate   Identify discharge learning needs (meds, wound care, etc)   Refer to discharge planning if patient needs post-hospital services based on physician order or complex needs related to functional status, cognitive ability or social support system   Arrange for interpreters to assist at discharge as needed     Problem: Pain  Goal: Verbalizes/displays adequate comfort level or baseline comfort level  Outcome: Progressing     Problem: Safety - Adult  Goal: Free from fall injury  Outcome: Progressing     Problem: Skin/Tissue Integrity  Goal: Skin integrity remains intact  Description: 1.  Monitor for areas of redness and/or skin breakdown  2.  Assess vascular access sites hourly  3.  Every 4-6 hours minimum:  Change oxygen saturation probe site  4.  Every 4-6 hours:  If on nasal continuous positive airway pressure, respiratory therapy assess nares and determine need for appliance

## 2025-03-04 NOTE — PROGRESS NOTES
Pt remains hypotensive, residents aware and brought to bedside. Stat labs drawn and cardiology consult made.   Pt alert and oriented x4 and in good spirits.

## 2025-03-04 NOTE — PROGRESS NOTES
[facility] Medicine Progress Note  V 1.6      Date of Admission: 3/3/2025    Hospital Day: 2      Chief Admission Complaint:  fatigue     Subjective:  Patient seen and examined at bedside. No acute events overnight. Patient reports feeling much better today. Not feeling cold anymore. Laying flat in bed and feels okay as well. Denies any acute concerns at this time.     Presenting Admission History:       Mr. Delano Daigle is a 79 y.o. male with a medical hx significant for HTN, HLD, DM2, eczema, hidradenitis suppurativa (MRSA), Afib (on Eliquis), COPD, cardiomyopathy, otherwise as listed in the MHx table below, who presented from home to the ED on 03/03/2025 with fatigue for 2 months, worsening over the past few days. He describes feeling tremulous and lightheaded after standing and doing any sort of activity. He also notes that he constantly feels cold at home, in spite of keeping the thermostat in his apartment set at 80. He also notes that he sleeps upright in a chair at night as laying flat is too uncomfortable. He states he fell yesterday and hit the right side of his chest, now endorsing some pain in that area. He denies any associated shortness of breath, cough, fevers, abdominal pain, nausea, vomiting, or urinary symptoms.        ED Course:  On arrival to the ED, he was found to be hypothermic, hypotensive  Labs were significant for: lactic acid 3.2, pH 7.30, BNP 2,111  Imaging: CXR showing CHF with fluid overload, CT chest with hazy nodular opacities in bilateral lower lobes, CT abdomen/pelvis with right flank contusion, CT head/neck without acute findings.  While in the ED, he received sepsis bolus 30 mL/kg, and was started on vancomycin and Zosyn.    Assessment/Plan:      Current Principal Problem:  Fatigue, unspecified type    Shock, unknown etiology  Possible adrenal insufficiency   Concern for acute on chronic heart failure exacerbation  Hypotension  Hypothermia - improving  - Presented with

## 2025-03-04 NOTE — CARE COORDINATION
Case Management Assessment  Initial Evaluation    Date/Time of Evaluation: 3/4/2025 11:35 AM  Assessment Completed by: Karl Stearns RN    If patient is discharged prior to next notation, then this note serves as note for discharge by case management.    Patient Name: Delano Daigle                   YOB: 1945  Diagnosis: Shortness of breath [R06.02]  Severe sepsis (HCC) [A41.9, R65.20]  Hypotension, unspecified hypotension type [I95.9]  Fatigue, unspecified type [R53.83]                   Date / Time: 3/3/2025 12:42 PM    Patient Admission Status: Inpatient   Readmission Risk (Low < 19, Mod (19-27), High > 27): Readmission Risk Score: 18.8    Current PCP: Jason Dawn MD  PCP verified by CM? Yes    Chart Reviewed: Yes      History Provided by: Patient, Medical Record  Patient Orientation: Alert and Oriented    Patient Cognition: Alert    Hospitalization in the last 30 days (Readmission):  No    If yes, Readmission Assessment in CM Navigator will be completed and shown below.          Advance Directives:      Code Status: Full Code   Patient's Primary Decision Maker is: Legal Next of Kin    Primary Decision Maker: Carlos Daigle - Child - [phone]    Discharge Planning:    Patient lives with: Alone Type of Home: Apartment  Primary Care Giver: Self  Patient Support Systems include: Children, Family Members   Current Financial resources: Medicare  Current community resources:    Current services prior to admission: None            Current DME:              Type of Home Care services:  None    ADLS  Prior functional level: Independent in ADLs/IADLs  Current functional level: Other (see comment) (tbd)    PT AM-PAC:   /24  OT AM-PAC:   /24    Family can provide assistance at DC: No  Would you like Case Management to discuss the discharge plan with any other family members/significant others, and if so, who? No  Plans to Return to Present Housing: Unknown at present  Other Identified Issues/Barriers

## 2025-03-04 NOTE — PROGRESS NOTES
Comprehensive Nutrition Assessment    RECOMMENDATIONS:  PO Diet: Continue Regular; 3-4 g Na diet  Nutrition Supplement: No indication given current intakes  Nutrition Education: Education/Counseling initiated     NUTRITION ASSESSMENT:   Nutritional summary & status: Positive screen for weight loss: Pt has had an 8% weight loss over the past 7 months, not significant for malnutrition however concerning for decline. Pt reports that his appetite is great however he gets dizzy when trying to stand for prolonged periods of time making it difficult to cook. He has an aide that comes to help for 4 hours every/week and receives Meals on Wheels, but reports not liking the food provided as they are the same meals every week. Discussed different frozen meals for patient and will provide him with a handout to help when he grocery shops (he still shops on his own). Upset about not being able to have salt packets, explained to pt the reason for his restriction and he was understanding.   Admission // PMH: Fatigue//COPD, DM, HTN/HLD, cardiomyopathy    MALNUTRITION ASSESSMENT  Context of Malnutrition: Chronic Illness   Malnutrition Status: At risk for malnutrition  Findings of the 6 clinical characteristics of malnutrition (Minimum of 2 out of 6 clinical characteristics is required to make the diagnosis of moderate or severe Protein Calorie Malnutrition based on AND/ASPEN Guidelines):  Energy Intake:  Mild decrease in energy intake  Weight Loss:  Mild weight loss (8% in 7 month period)     Body Fat Loss:  No body fat loss     Muscle Mass Loss:  No muscle mass loss    Fluid Accumulation:  No fluid accumulation      NUTRITION DIAGNOSIS   Inadequate oral intake related to lack of self-feeding ability, psychological cause or life stress as evidenced by poor intake prior to admission    Nutrition Monitoring and Evaluation:   Food/Nutrient Intake Outcomes:  Food and Nutrient Intake  Physical Signs/Symptoms Outcomes:  Biochemical Data,  Nutrition Focused Physical Findings, Weight, Hemodynamic Status     OBJECTIVE DATA: Significant to nutrition assessment  Nutrition Related Findings: Labs reviewed.  Wounds: None  Nutrition Goals: PO intake 75% or greater, within 2 days     CURRENT NUTRITION THERAPIES  ADULT DIET; Regular; No Added Salt (3-4 gm)  PO Intake: 51-75%, %   PO Supplement Intake:None Ordered  Additional Sources of Calories/IVF:N/A     COMPARATIVE STANDARDS  Energy (kcal):  0419-7846 (20-23 kcal/kg CBW)     Protein (g):   (1.0-1.2 g/kg CBW)       Fluid (ml/day):  1 mL/kcal    ANTHROPOMETRICS  Current Height: 177.8 cm (5' 10\")  Current Weight - Scale: 93.2 kg (205 lb 7.5 oz)    Admission weight: 93.1 kg (205 lb 4.8 oz)    The patient will be monitored per nutrition standards of care. Consult dietitian if additional nutrition interventions are needed prior to RD reassessment.     Bhavana Card, NADIR, LD  Rasheed:  [phone]

## 2025-03-04 NOTE — CONSULTS
ICU CONSULT       PCP:  Jason Dawn MD          Admit Date:  3/3/2025                            Hospital Day:  ICU Day:       CC: fatigue  History obtained from:  chart review and the patient    SUBJECTIVE   Interval Hx:  - Overnight, patient's BG 63 (asymptomatic). After OJ and turkey sandwich, BG 85. Patient received IV solu-cortef 100mg x1.   - UA positive for trichomonas, patient denies  sx. S/p 2g flagyl.   - S/p IV lasix 40mg, charted UOP 750mL.  - Patient reports several falls recently, most but not all a/w standing up from a seated or crouched position. He reports feeling dizzy and that the \"room is closing in\" prior to falls. Denies hitting his head.     HPI:  Mr. Delano Daigle is a 79 y.o. male with a medical hx significant for HTN, HLD, DM2, eczema, hidradenitis suppurative (MRSA, 10/2024), Afib (dx 10/2024, on Eliquis), COPD, cardiomyopathy, otherwise as listed in the MHx table below, who presented from home to the ED on 3/3/25 with fatigue x2 months, worsening over the past few days.     He describes feeling tremulous and lightheaded after standing and doing any sort of activity. He also notes that he constantly feels cold at home for the past 6 months, despite keeping the thermostat in his apartment set at 80. He also sleeps upright in a chair at night, though he reports this is more of a preference, denying orthopnea and shortness of breath. He states he fell yesterday and hit the right side of his chest, now endorsing some pain in that area. He denies any associated cough, fevers, abdominal pain, nausea, vomiting, or urinary symptoms.     ED Course:  On arrival to the ED, he was found to be hypothermic, hypotensive   Labs were significant for: lactic acid 3.2, VBG 7.3/62.4/<30/30.7, BNP 2,111  Imaging: CXR showing CHF with fluid overload, CT chest with hazy nodular opacities in bilateral lower lobes, CT abdomen/pelvis with right flank contusion, CT head/neck without acute  discharge:  TBD      Joann ARREOLA Burt, MS4  [facility]    Patient discussed with ICU attending physician, Dr. Crook.        Patient was seen, examined and discussed with Dr. Kam. I agree with the history of present illness, past medical/surgical histories, family history, social history, medication list and allergies as listed. The review of systems is as noted above. My physical exam confirms the findings listed   Chart was reviewed including labs, CXR, CT scan, EKG and medical records confirm the findings noted     Hypothermia   Hypotension   Hypoglycemia - once episode last night.    Thrombocytopenia      CT chest reviewed.  Dependent ground glass opacities suggestive of edema rather than infectious process.    Procal is low.  Pro BNP is high. No leukocytosis.      He presented with vague symptoms going on for 2 months with hypothermia and mild lactic acidemia.    Hypotension is borderline  did not require pressors   Random cortisol is within normal.    Echo with normal EF and right ventricular dysfunction.      ?cor pulmonale ?zach's disease     Continue hydrocortisone   Continue lasix   Reji hugger   Discussed with cardiology

## 2025-03-04 NOTE — PROGRESS NOTES
Clinical Pharmacy Progress Note  Medication History     Admit Date: 3/3/2025    List of of current medications patient is taking is complete. Home Medication list in EPIC updated to reflect changes noted below.    Source of information: interview at bedside, pharmacy fills    Patient's home pharmacy: [facility], as well as [facility]     Changes made to medication list:   Medications removed:   Cetirizine - finished in June 2024  Chlorhexadine gluconate rinse - pt never received this  Lac Hydrin lotion - pt does not tolerate as this burns his skin  Clobetasol cream  Cetirizine - for itching, doesn't work per pt  Dexoimetasone cream  Breo Ellipta inh - last in May 2024  Albuterol MDI prn   Sitagliptan 100mg daily - last in May 2024  Tamsulosin 0.4mg daily - last in Oct 2023  Mupirocin nares x 2 weeks - finished  Nicotrol Inhaler - from 2021  Triamcinolone oint - last May 2024  Medications added:   Vaseline OTC - pt applies topically to dry skin  Medication doses adjusted:   None  Other notes:   Will discuss with Dr Sin, ICU team.    Current Outpatient Medications   Medication Instructions    amLODIPine (NORVASC) 5 MG tablet TAKE 1 TABLET BY MOUTH EVERY DAY *NEW PRESCRIPTION REQUEST*    apixaban (ELIQUIS) 5 mg, Oral, 2 TIMES DAILY    aspirin 81 mg, Oral, DAILY,      ezetimibe (ZETIA) 10 MG tablet TAKE 1 TABLET BY MOUTH DAILY FOR HIGH CHOLESTEROL *NEW PRESCRIPTION REQUEST*    Lift Chair MISC Does not apply, 12 to 18 wide foot print    losartan (COZAAR) 25 MG tablet TAKE 1 TABLET(S) BY MOUTH 1 TIMES PER DAY *NEW PRESCRIPTION REQUEST*    metoprolol succinate (TOPROL XL) 25 MG extended release tablet TAKE 1 TABLET(S) BY MOUTH 1 TIMES PER DAY *NEW PRESCRIPTION REQUEST*    Misc. Devices (BATH/SHOWER SEAT) MISC 1 Units, Does not apply, DAILY    Misc. Devices (RAISED TOILET SEAT) MISC 1 Units, Does not apply, DAILY    Misc. Devices (WALL GRAB BAR) MISC 1 Units, Does not apply, DAILY    pioglitazone (ACTOS) 15 MG tablet TAKE 1

## 2025-03-04 NOTE — PROGRESS NOTES
4 Eyes Skin Assessment     NAME:  Delano Daigle  YOB: 1945  MEDICAL RECORD NUMBER:  [id number]    The patient is being assessed for  Admission    I agree that at least one RN has performed a thorough Head to Toe Skin Assessment on the patient. ALL assessment sites listed below have been assessed.      Areas assessed by both nurses:    Head, Face, Ears, Shoulders, Back, Chest, Arms, Elbows, Hands, Sacrum. Buttock, Coccyx, Ischium, Legs. Feet and Heels, Under Medical Devices , and Other ***        Does the Patient have a Wound? No noted wound(s)     - cracking/open, dry areas of skin in lower extremities and scattered  Shubham Prevention initiated by RN: Yes  Wound Care Orders initiated by RN: Yes    Pressure Injury (Stage 3,4, Unstageable, DTI, NWPT, and Complex wounds) if present, place Wound referral order by RN under : No    New Ostomies, if present place, Ostomy referral order under : No     Nurse 1 eSignature: Electronically signed by Chrissie Avendaño RN on 3/3/25 at 10:23 PM EST    **SHARE this note so that the co-signing nurse can place an eSignature**    Nurse 2 eSignature: {Esignature:[id number]}

## 2025-03-04 NOTE — CONSULTS
[facility]  Cardiology Inpatient Consult Service                                                                                          Pt Name: Delano Daigle  Age: 79 y.o.  Sex: male  : 1945  Location: [address] [place] [address]    Referring Physician: Laureano Sin DO  Primary cardiologist : Fatigue    Reason for Consult:     Reason for Consultation/Chief Complaint: Fatigue    HPI:      Delano Daigle is a 79 y.o. male with a past medical history of paroxysmal A-fib, hypertension, hyperlipidemia, eczema, diabetes mellitus who presented to the hospital with worsening fatigue over the last 2 months.  States that he had a fall.  No bleeding.  Had left chest discomfort after the fall which he contributes to mechanical injury.  No worsening leg swelling.  No seizures.  No anginal chest pains.  No recent travel      Histories     Past Medical History:   has a past medical history of Allergic rhinitis, Eczema, Hearing loss, Hyperlipidemia, Hypertension, and Type 2 diabetes mellitus without complication (HCC).    Surgical History:   has a past surgical history that includes Skin graft; Colonoscopy; and Colonoscopy (N/A, 11/10/2021).     Social History:   reports that he has been smoking cigarettes. He has a 59 pack-year smoking history. He has never used smokeless tobacco. He reports current alcohol use of about 2.0 standard drinks of alcohol per week. He reports that he does not use drugs.     Family History:  Noncontributory      Medications:       Home Medications  Were reviewed and are listed in nursing record. and/or listed below  Prior to Admission medications    Medication Sig Start Date End Date Taking? Authorizing Provider   Lift Chair MISC by Does not apply route 12 to 18 wide foot print 25  Yes Gina Morejon APRN   Misc. Devices (BATH/SHOWER SEAT) MISC 1 Units by Does not apply route daily 25  Yes Gina Morejon APRN Misc. Devices (WALL GRAB BAR) MISC 1 Units by

## 2025-03-04 NOTE — PLAN OF CARE
Problem: Chronic Conditions and Co-morbidities  Goal: Patient's chronic conditions and co-morbidity symptoms are monitored and maintained or improved  3/4/2025 1004 by Chiquis Wade RN  Outcome: Progressing  Flowsheets (Taken 3/4/2025 0800)  Care Plan - Patient's Chronic Conditions and Co-Morbidity Symptoms are Monitored and Maintained or Improved:   Monitor and assess patient's chronic conditions and comorbid symptoms for stability, deterioration, or improvement   Collaborate with multidisciplinary team to address chronic and comorbid conditions and prevent exacerbation or deterioration  3/3/2025 2129 by Chrissie Avendaño, RN  Outcome: Progressing  Flowsheets (Taken 3/3/2025 2045)  Care Plan - Patient's Chronic Conditions and Co-Morbidity Symptoms are Monitored and Maintained or Improved:   Monitor and assess patient's chronic conditions and comorbid symptoms for stability, deterioration, or improvement   Collaborate with multidisciplinary team to address chronic and comorbid conditions and prevent exacerbation or deterioration   Update acute care plan with appropriate goals if chronic or comorbid symptoms are exacerbated and prevent overall improvement and discharge     Problem: Discharge Planning  Goal: Discharge to home or other facility with appropriate resources  3/4/2025 1004 by Chiquis Wade RN  Outcome: Progressing  Flowsheets (Taken 3/4/2025 0800)  Discharge to home or other facility with appropriate resources:   Identify barriers to discharge with patient and caregiver   Arrange for needed discharge resources and transportation as appropriate   Identify discharge learning needs (meds, wound care, etc)  3/3/2025 2129 by Chrissie Avendaño RN  Outcome: Progressing  Flowsheets (Taken 3/3/2025 2045)  Discharge to home or other facility with appropriate resources:   Identify barriers to discharge with patient and caregiver   Arrange for needed discharge resources and transportation as appropriate    Identify discharge learning needs (meds, wound care, etc)   Refer to discharge planning if patient needs post-hospital services based on physician order or complex needs related to functional status, cognitive ability or social support system   Arrange for interpreters to assist at discharge as needed     Problem: Pain  Goal: Verbalizes/displays adequate comfort level or baseline comfort level  3/4/2025 1004 by Chiquis Wade RN  Outcome: Progressing  3/3/2025 2129 by Chrissie Avendaño RN  Outcome: Progressing     Problem: Safety - Adult  Goal: Free from fall injury  3/4/2025 1004 by Chiquis Wade RN  Outcome: Progressing  3/3/2025 2129 by Chrissie Avendaño RN  Outcome: Progressing     Problem: Skin/Tissue Integrity  Goal: Skin integrity remains intact  Description: 1.  Monitor for areas of redness and/or skin breakdown  2.  Assess vascular access sites hourly  3.  Every 4-6 hours minimum:  Change oxygen saturation probe site  4.  Every 4-6 hours:  If on nasal continuous positive airway pressure, respiratory therapy assess nares and determine need for appliance change or resting period  3/4/2025 1004 by Chiquis Wade RN  Outcome: Progressing  Flowsheets (Taken 3/4/2025 0800)  Skin Integrity Remains Intact:   Monitor for areas of redness and/or skin breakdown   Assess vascular access sites hourly  3/3/2025 2129 by Chrissie Avendaño RN  Outcome: Progressing     Problem: ABCDS Injury Assessment  Goal: Absence of physical injury  3/3/2025 2129 by Chrissie Avendaño RN  Outcome: Progressing

## 2025-03-04 NOTE — PROGRESS NOTES
Consulted for bilateral lower extremities. Pt has extremely dry skin all over his body but especially lower extremities. Per pt sometimes it itches terribly. Pt has scratch martha all over his lower extremities. Continue applying Aquaphor to pt's whole body to help alleviate itching and moisturize his skin.

## 2025-03-05 LAB
ACTH PLAS-MCNC: 4 PG/ML (ref 7–63)
ALBUMIN SERPL-MCNC: 1.7 G/DL (ref 3.4–5)
AMPHETAMINES SERPL QL SCN: NEGATIVE NG/ML
ANION GAP SERPL CALCULATED.3IONS-SCNC: 7 MMOL/L (ref 3–16)
ANNOTATION COMMENT IMP: NORMAL
BARBITURATES SERPL QL SCN: NEGATIVE NG/ML
BASOPHILS # BLD: 0 K/UL (ref 0–0.2)
BASOPHILS NFR BLD: 0.1 %
BENZODIAZ SERPL QL SCN: NEGATIVE NG/ML
BUN SERPL-MCNC: 20 MG/DL (ref 7–20)
BUPRENORPHINE SERPL-MCNC: NEGATIVE NG/ML
CALCIUM SERPL-MCNC: 7.6 MG/DL (ref 8.3–10.6)
CANNABINOIDS SERPL QL SCN: NEGATIVE NG/ML
CHLORIDE SERPL-SCNC: 108 MMOL/L (ref 99–110)
CO2 SERPL-SCNC: 26 MMOL/L (ref 21–32)
COCAINE SERPL QL SCN: NEGATIVE NG/ML
CREAT SERPL-MCNC: 1.2 MG/DL (ref 0.8–1.3)
DEPRECATED RDW RBC AUTO: 15.3 % (ref 12.4–15.4)
ECHO BSA: 2.14 M2
EOSINOPHIL # BLD: 0 K/UL (ref 0–0.6)
EOSINOPHIL NFR BLD: 0.1 %
FOLATE SERPL-MCNC: 2.86 NG/ML (ref 4.78–24.2)
GFR SERPLBLD CREATININE-BSD FMLA CKD-EPI: 62 ML/MIN/{1.73_M2}
GLUCOSE BLD-MCNC: 195 MG/DL (ref 70–99)
GLUCOSE BLD-MCNC: 241 MG/DL (ref 70–99)
GLUCOSE SERPL-MCNC: 123 MG/DL (ref 70–99)
HAPTOGLOB SERPL-MCNC: 13.9 MG/DL (ref 30–200)
HCT VFR BLD AUTO: 29.3 % (ref 40.5–52.5)
HGB BLD-MCNC: 9.7 G/DL (ref 13.5–17.5)
LACTATE BLDV-SCNC: 1.9 MMOL/L (ref 0.4–2)
LACTATE BLDV-SCNC: 2 MMOL/L (ref 0.4–2)
LDH SERPL L TO P-CCNC: 331 U/L (ref 100–190)
LYMPHOCYTES # BLD: 0.7 K/UL (ref 1–5.1)
LYMPHOCYTES NFR BLD: 9.6 %
MAGNESIUM SERPL-MCNC: 1.92 MG/DL (ref 1.8–2.4)
MCH RBC QN AUTO: 31.6 PG (ref 26–34)
MCHC RBC AUTO-ENTMCNC: 33 G/DL (ref 31–36)
MCV RBC AUTO: 95.7 FL (ref 80–100)
METHADONE SERPL QL SCN: NEGATIVE NG/ML
METHAMPHET SERPL QL: NEGATIVE NG/ML
MONOCYTES # BLD: 0.3 K/UL (ref 0–1.3)
MONOCYTES NFR BLD: 4.4 %
NEUTROPHILS # BLD: 6.1 K/UL (ref 1.7–7.7)
NEUTROPHILS NFR BLD: 85.8 %
NT-PROBNP SERPL-MCNC: 2943 PG/ML (ref 0–449)
OPIATES SERPL QL SCN: NEGATIVE NG/ML
OXYCODONE SERPL QL: NEGATIVE NG/ML
PATH INTERP BLD-IMP: NORMAL
PCP SERPL QL SCN: NEGATIVE NG/ML
PERFORMED ON: ABNORMAL
PERFORMED ON: ABNORMAL
PHOSPHATE SERPL-MCNC: 4.2 MG/DL (ref 2.5–4.9)
PLATELET # BLD AUTO: 79 K/UL (ref 135–450)
PMV BLD AUTO: 10.6 FL (ref 5–10.5)
POTASSIUM SERPL-SCNC: 4 MMOL/L (ref 3.5–5.1)
RBC # BLD AUTO: 3.06 M/UL (ref 4.2–5.9)
REASON FOR REJECTION: NORMAL
REJECTED TEST: NORMAL
SODIUM SERPL-SCNC: 141 MMOL/L (ref 136–145)
VIT B12 SERPL-MCNC: 1055 PG/ML (ref 211–911)
WBC # BLD AUTO: 7.1 K/UL (ref 4–11)

## 2025-03-05 PROCEDURE — 2000000000 HC ICU R&B

## 2025-03-05 PROCEDURE — 83010 ASSAY OF HAPTOGLOBIN QUANT: CPT

## 2025-03-05 PROCEDURE — 80069 RENAL FUNCTION PANEL: CPT

## 2025-03-05 PROCEDURE — 85025 COMPLETE CBC W/AUTO DIFF WBC: CPT

## 2025-03-05 PROCEDURE — 6360000002 HC RX W HCPCS: Performed by: INTERNAL MEDICINE

## 2025-03-05 PROCEDURE — 93451 RIGHT HEART CATH: CPT | Performed by: INTERNAL MEDICINE

## 2025-03-05 PROCEDURE — 83880 ASSAY OF NATRIURETIC PEPTIDE: CPT

## 2025-03-05 PROCEDURE — 6370000000 HC RX 637 (ALT 250 FOR IP): Performed by: INTERNAL MEDICINE

## 2025-03-05 PROCEDURE — 6360000002 HC RX W HCPCS

## 2025-03-05 PROCEDURE — 99233 SBSQ HOSP IP/OBS HIGH 50: CPT | Performed by: INTERNAL MEDICINE

## 2025-03-05 PROCEDURE — 99152 MOD SED SAME PHYS/QHP 5/>YRS: CPT | Performed by: INTERNAL MEDICINE

## 2025-03-05 PROCEDURE — 94761 N-INVAS EAR/PLS OXIMETRY MLT: CPT

## 2025-03-05 PROCEDURE — 94640 AIRWAY INHALATION TREATMENT: CPT

## 2025-03-05 PROCEDURE — 6370000000 HC RX 637 (ALT 250 FOR IP)

## 2025-03-05 PROCEDURE — C1751 CATH, INF, PER/CENT/MIDLINE: HCPCS | Performed by: INTERNAL MEDICINE

## 2025-03-05 PROCEDURE — 99153 MOD SED SAME PHYS/QHP EA: CPT | Performed by: INTERNAL MEDICINE

## 2025-03-05 PROCEDURE — 2709999900 HC NON-CHARGEABLE SUPPLY: Performed by: INTERNAL MEDICINE

## 2025-03-05 PROCEDURE — 83605 ASSAY OF LACTIC ACID: CPT

## 2025-03-05 PROCEDURE — 83615 LACTATE (LD) (LDH) ENZYME: CPT

## 2025-03-05 PROCEDURE — 2500000003 HC RX 250 WO HCPCS

## 2025-03-05 PROCEDURE — 4A023N6 MEASUREMENT OF CARDIAC SAMPLING AND PRESSURE, RIGHT HEART, PERCUTANEOUS APPROACH: ICD-10-PCS | Performed by: INTERNAL MEDICINE

## 2025-03-05 PROCEDURE — 36415 COLL VENOUS BLD VENIPUNCTURE: CPT

## 2025-03-05 PROCEDURE — C1894 INTRO/SHEATH, NON-LASER: HCPCS | Performed by: INTERNAL MEDICINE

## 2025-03-05 PROCEDURE — 83735 ASSAY OF MAGNESIUM: CPT

## 2025-03-05 RX ORDER — LIDOCAINE HYDROCHLORIDE 10 MG/ML
INJECTION, SOLUTION EPIDURAL; INFILTRATION; INTRACAUDAL; PERINEURAL PRN
Status: DISCONTINUED | OUTPATIENT
Start: 2025-03-05 | End: 2025-03-05 | Stop reason: HOSPADM

## 2025-03-05 RX ORDER — MIDODRINE HYDROCHLORIDE 5 MG/1
10 TABLET ORAL ONCE
Status: COMPLETED | OUTPATIENT
Start: 2025-03-05 | End: 2025-03-05

## 2025-03-05 RX ORDER — MIDODRINE HYDROCHLORIDE 5 MG/1
10 TABLET ORAL
Status: DISCONTINUED | OUTPATIENT
Start: 2025-03-05 | End: 2025-03-07 | Stop reason: HOSPADM

## 2025-03-05 RX ADMIN — HYDROCORTISONE SODIUM SUCCINATE 50 MG: 100 INJECTION, POWDER, FOR SOLUTION INTRAMUSCULAR; INTRAVENOUS at 02:20

## 2025-03-05 RX ADMIN — Medication: at 20:34

## 2025-03-05 RX ADMIN — Medication: at 09:22

## 2025-03-05 RX ADMIN — MIDODRINE HYDROCHLORIDE 10 MG: 5 TABLET ORAL at 09:21

## 2025-03-05 RX ADMIN — MIDODRINE HYDROCHLORIDE 10 MG: 5 TABLET ORAL at 14:47

## 2025-03-05 RX ADMIN — HYDROCORTISONE SODIUM SUCCINATE 50 MG: 100 INJECTION, POWDER, FOR SOLUTION INTRAMUSCULAR; INTRAVENOUS at 10:40

## 2025-03-05 RX ADMIN — EZETIMIBE 10 MG: 10 TABLET ORAL at 20:35

## 2025-03-05 RX ADMIN — INSULIN LISPRO 1 UNITS: 100 INJECTION, SOLUTION INTRAVENOUS; SUBCUTANEOUS at 17:12

## 2025-03-05 RX ADMIN — SODIUM CHLORIDE, PRESERVATIVE FREE 10 ML: 5 INJECTION INTRAVENOUS at 10:42

## 2025-03-05 RX ADMIN — INSULIN LISPRO 1 UNITS: 100 INJECTION, SOLUTION INTRAVENOUS; SUBCUTANEOUS at 20:34

## 2025-03-05 RX ADMIN — SODIUM CHLORIDE, PRESERVATIVE FREE 10 ML: 5 INJECTION INTRAVENOUS at 20:34

## 2025-03-05 RX ADMIN — HYDROCORTISONE SODIUM SUCCINATE 50 MG: 100 INJECTION, POWDER, FOR SOLUTION INTRAMUSCULAR; INTRAVENOUS at 15:17

## 2025-03-05 RX ADMIN — MIDODRINE HYDROCHLORIDE 10 MG: 5 TABLET ORAL at 17:07

## 2025-03-05 RX ADMIN — ASPIRIN 81 MG: 81 TABLET, COATED ORAL at 09:10

## 2025-03-05 RX ADMIN — HYDROCORTISONE SODIUM SUCCINATE 50 MG: 100 INJECTION, POWDER, FOR SOLUTION INTRAMUSCULAR; INTRAVENOUS at 20:34

## 2025-03-05 RX ADMIN — ARFORMOTEROL TARTRATE: 15 SOLUTION RESPIRATORY (INHALATION) at 10:36

## 2025-03-05 RX ADMIN — ROSUVASTATIN CALCIUM 40 MG: 20 TABLET, FILM COATED ORAL at 20:34

## 2025-03-05 ASSESSMENT — ENCOUNTER SYMPTOMS
DIARRHEA: 0
ABDOMINAL PAIN: 0
SHORTNESS OF BREATH: 0
CONSTIPATION: 0
NAUSEA: 0

## 2025-03-05 ASSESSMENT — PAIN SCALES - GENERAL: PAINLEVEL_OUTOF10: 0

## 2025-03-05 NOTE — PROGRESS NOTES
ICU Progress Note    Admit Date: 3/3/2025  Day: 3  Vent Day: N/A  IV Access: Peripheral  IV Fluids: None  Vasopressors: None                Antibiotics: None  Diet: ADULT DIET; Regular; No Added Salt (3-4 gm)    CC: fatigue    Interval history:   - feeling better this AM, denies chills or dizziness  - able to eat and drink yesterday without GI discomfort  - MAPs remain in the 50s not on pressors      HPI:   \"Mr. Delano Daigle is a 79 y.o. male with a medical hx significant for HTN, HLD, DM2, eczema, hidradenitis suppurativa (MRSA, 10/2024), Afib (dx 10/2024, on Eliquis), COPD, cardiomyopathy, otherwise as listed in the MHx table below, who presented from home to the ED on 3/3/25 with fatigue x2 months, worsening over the past few days.      He describes feeling tremulous and lightheaded after standing and doing any sort of activity. He has also constantly felt cold at home for the past 6 months, despite keeping the thermostat in his apartment set at 80. He notes he sleeps upright in a chair at night, though this appears to be more of a preference as patient denies orthopnea and shortness of breath. He states he fell yesterday and hit the right side of his chest, now endorsing some pain in that area. Patient denies any associated cough, fevers, abdominal pain, nausea, vomiting, or urinary symptoms.\"    Medications:     Scheduled Meds:   hydrocortisone sodium succinate PF  50 mg IntraVENous Q6H    midodrine  5 mg Oral TID WC    mineral oil-hydrophilic petrolatum   Topical BID    insulin lispro  0-4 Units SubCUTAneous 4x Daily AC & HS    [Held by provider] amLODIPine  5 mg Oral Daily    apixaban  5 mg Oral BID    aspirin  81 mg Oral Daily    ezetimibe  10 mg Oral Nightly    [Held by provider] metoprolol succinate  25 mg Oral Daily    [Held by provider] losartan  25 mg Oral Daily    rosuvastatin  40 mg Oral Nightly    sodium chloride flush  5-40 mL IntraVENous 2 times per day    nicotine  1 patch TransDERmal    Thrombocytopenia    Mild pulmonary edema after receiving IVF    He presented with vague symptoms going on for 2 months with hypothermia borderline hypotension and mild lactic acidemia.    Hypotension is borderline  did not require pressors   Random cortisol is within normal.    Echo with normal EF and right ventricular dysfunction.       ?cor pulmonale ?zach's disease      Continue hydrocortisone.  Unable to do stem test as he is on HC   Continue lasix   Discussed with cardiology.  Plan for [facility] today

## 2025-03-05 NOTE — CARE COORDINATION
Case Management Assessment           Daily Note                 Date/ Time of Note: 3/5/2025 1:44 PM         Note completed by: Karl Stearns RN    Patient Name: Delano Daigle  YOB: 1945    Diagnosis:Shortness of breath [R06.02]  Severe sepsis (HCC) [A41.9, R65.20]  Hypotension, unspecified hypotension type [I95.9]  Fatigue, unspecified type [R53.83]  Patient Admission Status: Inpatient  Date of Admission:3/3/2025 12:42 PM    Length of Stay: 2 GLOS: GMLOS: 3.5 Readmission Risk Score: Readmission Risk Score: 18.7    Current Plan of Care: plan for [facility] today.     ________________________________________________________________________________________  Discharge Plan: To Be Determined DUE TO: medical course; home??  Pre-cert required for SNF: YES  COVID Result:    Lab Results   Component Value Date/Time    COVID19 NOT DETECTED 03/03/2025 01:45 PM       Transportation PLAN for discharge:  tbd    Tentative discharge date: tbd    Potential assistance Purchasing Medications: Potential Assistance Purchasing Medications: No  Does Patient want to participate in local refill/ meds to beds program?: Yes    Current barriers to discharge: Medical complications    Referrals completed:     Resources/ information provided:   ________________________________________________________________________________________  Case Management Notes:     Delano and his family were provided with choice of provider; he and his family are in agreement with the discharge plan.    Emergency Contacts:  Extended Emergency Contact Information  Primary Emergency Contact: Carlos Daigle  [facility]  Home Phone: [phone]  Relation: Child    Care Transition Patient: Yes    IMM Status:      Karl Stearns RN  [facility]  Case Management Department  Ph: [phone]  Fax: [phone]

## 2025-03-05 NOTE — PLAN OF CARE
Problem: Chronic Conditions and Co-morbidities  Goal: Patient's chronic conditions and co-morbidity symptoms are monitored and maintained or improved  3/5/2025 1812 by Park Otero RN  Outcome: Progressing     Problem: Discharge Planning  Goal: Discharge to home or other facility with appropriate resources  Outcome: Progressing       Problem: Pain  Goal: Verbalizes/displays adequate comfort level or baseline comfort level  3/5/2025 1812 by Park Otero RN  Outcome: Progressing     Problem: Safety - Adult  Goal: Free from fall injury  3/5/2025 1812 by Park Otero RN  Outcome: Progressing     Problem: Skin/Tissue Integrity  Goal: Skin integrity remains intact  Description: 1.  Monitor for areas of redness and/or skin breakdown  2.  Assess vascular access sites hourly  3.  Every 4-6 hours minimum:  Change oxygen saturation probe site  4.  Every 4-6 hours:  If on nasal continuous positive airway pressure, respiratory therapy assess nares and determine need for appliance change or resting period  3/5/2025 1812 by Park Otero RN  Outcome: Progressing     Problem: ABCDS Injury Assessment  Goal: Absence of physical injury  3/5/2025 1812 by Park Otero RN  Outcome: Progressing     Problem: Nutrition Deficit:  Goal: Optimize nutritional status  3/5/2025 1812 by Park Otero RN  Outcome: Progressing     Problem: Skin/Tissue Integrity  Goal: Skin integrity remains intact  Description: 1.  Monitor for areas of redness and/or skin breakdown  2.  Assess vascular access sites hourly  3.  Every 4-6 hours minimum:  Change oxygen saturation probe site  4.  Every 4-6 hours:  If on nasal continuous positive airway pressure, respiratory therapy assess nares and determine need for appliance change or resting period  3/5/2025 1812 by Park Otero RN  Outcome: Progressing  3/5/2025 1809 by Park Otero RN  Outcome: Not Progressing

## 2025-03-05 NOTE — PROGRESS NOTES
79*     Recent Labs     03/04/25  0005 03/04/25  0428 03/05/25  0435    144 141   K 3.9 4.3 4.0   * 111* 108   CO2 24 26 26   BUN 18 18 20   CREATININE 1.2 1.3 1.2   CALCIUM 7.5* 7.6* 7.6*   MG  --  1.93 1.92   PHOS 3.5 3.5 4.2     Recent Labs     03/03/25  1316 03/03/25  1412 03/05/25  0435   PROBNP 2,111*  --  2,943*   TROPHS 16 15  --      No results for input(s): \"LABA1C\" in the last 72 hours.  Recent Labs     03/03/25  1316 03/03/25  1412 03/04/25  0428   AST 93*  --  112*   ALT see below 31 36   BILIDIR  --   --  <0.1   BILITOT <0.2  --  <0.2   ALKPHOS 101  --  98     Recent Labs     03/04/25  1518 03/04/25  2220 03/05/25  0436   LACTA 2.7* 2.2* 2.0       Urine Cultures:   Lab Results   Component Value Date/Time    LABURIN No growth at 18-36 hours 04/19/2018 10:57 AM     Blood Cultures:   Lab Results   Component Value Date/Time    BC  03/03/2025 01:16 PM     No Growth to date.  Any change in status will be called.     Lab Results   Component Value Date/Time    BLOODCULT2  03/03/2025 01:16 PM     No Growth to date.  Any change in status will be called.     Organism:   Lab Results   Component Value Date/Time    ORG Staph aureus MRSA 03/03/2025 07:28 PM         Laureano Sin DO

## 2025-03-05 NOTE — PLAN OF CARE
Problem: Skin/Tissue Integrity  Goal: Skin integrity remains intact  Description: 1.  Monitor for areas of redness and/or skin breakdown  2.  Assess vascular access sites hourly  3.  Every 4-6 hours minimum:  Change oxygen saturation probe site  4.  Every 4-6 hours:  If on nasal continuous positive airway pressure, respiratory therapy assess nares and determine need for appliance change or resting period  Outcome: Not Progressing  Flowsheets  Taken 3/5/2025 1251 by Carol Parker RN  Skin Integrity Remains Intact:   Monitor for areas of redness and/or skin breakdown   Every 4-6 hours minimum: Change oxygen saturation probe site  Taken 3/5/2025 0800 by Carol Parker RN  Skin Integrity Remains Intact:   Monitor for areas of redness and/or skin breakdown   Every 4-6 hours minimum: Change oxygen saturation probe site       Problem: Chronic Conditions and Co-morbidities  Goal: Patient's chronic conditions and co-morbidity symptoms are monitored and maintained or improved  Outcome: Progressing  Flowsheets (Taken 3/5/2025 0800 by Carol Parker RN)  Care Plan - Patient's Chronic Conditions and Co-Morbidity Symptoms are Monitored and Maintained or Improved:   Monitor and assess patient's chronic conditions and comorbid symptoms for stability, deterioration, or improvement   Collaborate with multidisciplinary team to address chronic and comorbid conditions and prevent exacerbation or deterioration     Problem: Discharge Planning  Goal: Discharge to home or other facility with appropriate resources  Outcome: Progressing  Flowsheets (Taken 3/5/2025 0800 by Carol Parker RN)  Discharge to home or other facility with appropriate resources:   Identify barriers to discharge with patient and caregiver   Arrange for needed discharge resources and transportation as appropriate     Problem: Pain  Goal: Verbalizes/displays adequate comfort level or baseline comfort level  Outcome: Progressing     Problem: Safety -  Adult  Goal: Free from fall injury  Outcome: Progressing  Flowsheets (Taken 3/5/2025 1251 by Carol Parker, RN)  Free From Fall Injury: Instruct family/caregiver on patient safety     Problem: ABCDS Injury Assessment  Goal: Absence of physical injury  Outcome: Progressing  Flowsheets (Taken 3/5/2025 1251 by Carol Parker, RN)  Absence of Physical Injury: Implement safety measures based on patient assessment     Problem: Nutrition Deficit:  Goal: Optimize nutritional status  Outcome: Progressing

## 2025-03-05 NOTE — PROCEDURES
PROCEDURE NOTE  Date: 3/5/2025   Name: Delano Daigle  YOB: 1945    Procedures  Asked by Dr. Blakely to perform right heart catheterization to assess right ventricular function and pulmonary resistance.    Right heart cardiac catheterization  Right brachial vein access with ultrasound assist  Thermodilution cardiac outputs  O2 sats from various right heart chambers    Complications none  Any and all anesthesia was administered by myself directly or by my staff with me present in the room  Estimated blood loss minimal    We used ultrasound assist to access the right brachial vein.  A 7 [place] sheath was placed through then we placed a Colebrook-Rachelle catheter into the pulmonary artery pressure area.  After passing through the heart we were able to obtain a wedge pressure.  The wedge waveform suggested Ozzy weight CV wave suggesting some degree of mitral valve insufficiency.  The pressure average was 11 mm on the wedge.  PA pressure was 32/8.  RV pressure 36/3.  Right atrial mean was 9.    As far as the O2 sats by arterial sat was 100%.  PA sat was 68.5.  The right atrial sat was 68.2.  The wedge sat was 83.6.    Cardiac output by Elton was 7.37.  Cardiac index by Elton was 3.5.  Cardiac output by thermodilution was 5.46.  Cardiac index by thermodilution was 2.6.    Pression fairly unremarkable right heart pressures and O2 sat distributions.  I did speak with Dr. Blakely and he will continue to treat the patient in follow-up in regards.  There were no complications.  The patient's sheath is removed again without complications.    Bonifacio Houston MD, FACC

## 2025-03-05 NOTE — PROGRESS NOTES
[facility]  Cardiology Inpatient Consult Service  Daily Progress Note        Admit Date:  3/3/2025    Referring Physician: Laureano Sin DO      Assessment & Plan:     Hypotension-etiology unclear.  Patient was on 3 antihypertensive medications prior to admission.  Currently off the amlodipine, Cozaar and Toprol.  Blood pressure still running low.  Was started on midodrine yesterday.  Will increase dose today.  Clinically patient is mentating okay.  Renal function is preserved.  Lactic acid level improved.  Patient has cor pulmonale, RV dysfunction which can contribute to low blood pressure.  Has not had a dramatic response to empiric steroids given for possible adrenal insufficiency.  Currently clinically does not appear to be septic.  No other findings or concerns for amyloidosis  Has hypoalbuminemia and thrombocytopenia.    Patient remains in A-fib.  Hold Eliquis  Possible right heart cath today to assess etiology of shock.  Will discuss with critical care team  Defer to primary team to consider evaluation for antiplatelet antibodies    Subjective:   Interval history:  Feeling better.  States that legs are sore.    Medications:   midodrine  10 mg Oral TID WC    hydrocortisone sodium succinate PF  50 mg IntraVENous Q6H    mineral oil-hydrophilic petrolatum   Topical BID    insulin lispro  0-4 Units SubCUTAneous 4x Daily AC & HS    [Held by provider] amLODIPine  5 mg Oral Daily    [Held by provider] apixaban  5 mg Oral BID    aspirin  81 mg Oral Daily    ezetimibe  10 mg Oral Nightly    [Held by provider] metoprolol succinate  25 mg Oral Daily    [Held by provider] losartan  25 mg Oral Daily    rosuvastatin  40 mg Oral Nightly    sodium chloride flush  5-40 mL IntraVENous 2 times per day    nicotine  1 patch TransDERmal Daily    arformoterol 15 mcg-budesonide 0.25 mg neb solution   Nebulization BID RT    furosemide  20 mg IntraVENous BID       IV drips:   dextrose      sodium

## 2025-03-05 NOTE — PLAN OF CARE
Problem: Chronic Conditions and Co-morbidities  Goal: Patient's chronic conditions and co-morbidity symptoms are monitored and maintained or improved  3/4/2025 1936 by Chrissie Avendaño RN  Outcome: Progressing  3/4/2025 1004 by Chiquis Wade RN  Outcome: Progressing  Flowsheets (Taken 3/4/2025 0800)  Care Plan - Patient's Chronic Conditions and Co-Morbidity Symptoms are Monitored and Maintained or Improved:   Monitor and assess patient's chronic conditions and comorbid symptoms for stability, deterioration, or improvement   Collaborate with multidisciplinary team to address chronic and comorbid conditions and prevent exacerbation or deterioration     Problem: Discharge Planning  Goal: Discharge to home or other facility with appropriate resources  3/4/2025 1936 by Chrissie Avendaño RN  Outcome: Progressing  3/4/2025 1004 by Chiquis Wade RN  Outcome: Progressing  Flowsheets (Taken 3/4/2025 0800)  Discharge to home or other facility with appropriate resources:   Identify barriers to discharge with patient and caregiver   Arrange for needed discharge resources and transportation as appropriate   Identify discharge learning needs (meds, wound care, etc)     Problem: Pain  Goal: Verbalizes/displays adequate comfort level or baseline comfort level  3/4/2025 1936 by Chrissie Avendaño RN  Outcome: Progressing  3/4/2025 1004 by Chiquis Wade RN  Outcome: Progressing     Problem: Safety - Adult  Goal: Free from fall injury  3/4/2025 1936 by Chrissie Avendaño RN  Outcome: Progressing  Flowsheets (Taken 3/4/2025 1934)  Free From Fall Injury:   Instruct family/caregiver on patient safety   Based on caregiver fall risk screen, instruct family/caregiver to ask for assistance with transferring infant if caregiver noted to have fall risk factors  3/4/2025 1004 by Chiquis Wade RN  Outcome: Progressing     Problem: Skin/Tissue Integrity  Goal: Skin integrity remains intact  Description: 1.  Monitor for areas  of redness and/or skin breakdown  2.  Assess vascular access sites hourly  3.  Every 4-6 hours minimum:  Change oxygen saturation probe site  4.  Every 4-6 hours:  If on nasal continuous positive airway pressure, respiratory therapy assess nares and determine need for appliance change or resting period  3/4/2025 1936 by Chrissie Avendaño, RN  Outcome: Progressing  Flowsheets (Taken 3/4/2025 1934)  Skin Integrity Remains Intact:   Monitor for areas of redness and/or skin breakdown   Every 4-6 hours minimum: Change oxygen saturation probe site   Assess vascular access sites hourly   Every 4-6 hours: If on nasal continuous positive airway pressure, respiratory therapy assesses nares and determine need for appliance change or resting period  3/4/2025 1004 by Chiquis Wade, RN  Outcome: Progressing  Flowsheets (Taken 3/4/2025 0800)  Skin Integrity Remains Intact:   Monitor for areas of redness and/or skin breakdown   Assess vascular access sites hourly     Problem: ABCDS Injury Assessment  Goal: Absence of physical injury  Outcome: Progressing  Flowsheets (Taken 3/4/2025 1934)  Absence of Physical Injury: Implement safety measures based on patient assessment     Problem: Nutrition Deficit:  Goal: Optimize nutritional status  Outcome: Progressing

## 2025-03-05 NOTE — PROGRESS NOTES
Pt returned from cath lab without any complications. Cath lab accessed right brachial vein and dressing and clean, dry, and intact. Pt's temperature has been continuously low. Bear hugger set at max, and temperature has started to increase. Pt continues to have somewhat loose stools he describes as \"leaking\". BP has been stable, some instances of AFIB. Aquaphor administered to BLE at 1700. Bed locked in lowest position, call light within reach, and gripper socks are on.

## 2025-03-05 NOTE — DISCHARGE INSTRUCTIONS
Extra Heart Failure Education/ Tools/ Resources:     https://Petbrosia.com/publication/?a=249935   --- this is American Heart Association interactive Healthier Living with Heart Failure guidebook.  Please click hyperlink or copy / paste link into search bar. The QR Code is also available below. Use your mouse to scroll through the pages.  Lots of information about weight monitoring, diet tips, activity, meds, etc    Heart Failure Tools and Resources QR Code is below. It includes multiple resources to include symptom tracker, med tracker, further HF info, and access to a HF Support Network online Community    HF [place] Zahida  -- this is a free smart phone zahida available for iPhone and Android download.  Use your phone to track sodium / fluid intake, zone tool symptom tracking, weights, medications, etc. Click on this hyperlink  HF [place] Zahida   for QR code for easy download or the link is also found in the below HF Tools and Resources.      DASH (Dietary Approach to Stop Hypertension) diet --  https://www.nhlbi.nih.gov/education/dash-eating-plan -- this diet is a flexible eating plan that promotes heart healthy eating style.  Click on hyperlink or copy / paste link into search bar.  Lots of low sodium recipes and tips.    https://www.[employer]/recipes  -- more free recipes

## 2025-03-06 LAB
ALBUMIN SERPL-MCNC: 1.9 G/DL (ref 3.4–5)
ALDOST SERPL-MCNC: <3 NG/DL
ALDOST/RENIN PLAS-RTO: <1 RATIO
ANION GAP SERPL CALCULATED.3IONS-SCNC: 6 MMOL/L (ref 3–16)
APTT BLD: 29.4 SEC (ref 22.1–36.4)
BASOPHILS # BLD: 0 K/UL (ref 0–0.2)
BASOPHILS NFR BLD: 0.1 %
BUN SERPL-MCNC: 21 MG/DL (ref 7–20)
CALCIUM SERPL-MCNC: 7.7 MG/DL (ref 8.3–10.6)
CHLORIDE SERPL-SCNC: 106 MMOL/L (ref 99–110)
CO2 SERPL-SCNC: 28 MMOL/L (ref 21–32)
CREAT SERPL-MCNC: 1.2 MG/DL (ref 0.8–1.3)
DEPRECATED RDW RBC AUTO: 15.7 % (ref 12.4–15.4)
EOSINOPHIL # BLD: 0 K/UL (ref 0–0.6)
EOSINOPHIL NFR BLD: 0 %
FIBRINOGEN PPP-MCNC: 301 MG/DL (ref 227–534)
GFR SERPLBLD CREATININE-BSD FMLA CKD-EPI: 62 ML/MIN/{1.73_M2}
GLUCOSE BLD-MCNC: 172 MG/DL (ref 70–99)
GLUCOSE BLD-MCNC: 175 MG/DL (ref 70–99)
GLUCOSE BLD-MCNC: 180 MG/DL (ref 70–99)
GLUCOSE SERPL-MCNC: 114 MG/DL (ref 70–99)
HCT VFR BLD AUTO: 30 % (ref 40.5–52.5)
HGB BLD-MCNC: 9.8 G/DL (ref 13.5–17.5)
INR PPP: 1.16 (ref 0.85–1.15)
LYMPHOCYTES # BLD: 0.7 K/UL (ref 1–5.1)
LYMPHOCYTES NFR BLD: 6.2 %
MAGNESIUM SERPL-MCNC: 1.93 MG/DL (ref 1.8–2.4)
MCH RBC QN AUTO: 31.2 PG (ref 26–34)
MCHC RBC AUTO-ENTMCNC: 32.6 G/DL (ref 31–36)
MCV RBC AUTO: 95.9 FL (ref 80–100)
MONOCYTES # BLD: 0.5 K/UL (ref 0–1.3)
MONOCYTES NFR BLD: 4.9 %
NEUTROPHILS # BLD: 9.4 K/UL (ref 1.7–7.7)
NEUTROPHILS NFR BLD: 88.8 %
PERFORMED ON: ABNORMAL
PHOSPHATE SERPL-MCNC: 3.4 MG/DL (ref 2.5–4.9)
PLATELET # BLD AUTO: 90 K/UL (ref 135–450)
PMV BLD AUTO: 10.2 FL (ref 5–10.5)
POTASSIUM SERPL-SCNC: 4.1 MMOL/L (ref 3.5–5.1)
PROTHROMBIN TIME: 15 SEC (ref 11.9–14.9)
RBC # BLD AUTO: 3.13 M/UL (ref 4.2–5.9)
RENIN PLAS-CCNC: 2.9 NG/ML/HR
SODIUM SERPL-SCNC: 140 MMOL/L (ref 136–145)
WBC # BLD AUTO: 10.6 K/UL (ref 4–11)

## 2025-03-06 PROCEDURE — 85025 COMPLETE CBC W/AUTO DIFF WBC: CPT

## 2025-03-06 PROCEDURE — 2000000000 HC ICU R&B

## 2025-03-06 PROCEDURE — 6360000002 HC RX W HCPCS

## 2025-03-06 PROCEDURE — 85384 FIBRINOGEN ACTIVITY: CPT

## 2025-03-06 PROCEDURE — 94640 AIRWAY INHALATION TREATMENT: CPT

## 2025-03-06 PROCEDURE — 85610 PROTHROMBIN TIME: CPT

## 2025-03-06 PROCEDURE — 83735 ASSAY OF MAGNESIUM: CPT

## 2025-03-06 PROCEDURE — 6370000000 HC RX 637 (ALT 250 FOR IP)

## 2025-03-06 PROCEDURE — 80069 RENAL FUNCTION PANEL: CPT

## 2025-03-06 PROCEDURE — 99232 SBSQ HOSP IP/OBS MODERATE 35: CPT | Performed by: INTERNAL MEDICINE

## 2025-03-06 PROCEDURE — 36415 COLL VENOUS BLD VENIPUNCTURE: CPT

## 2025-03-06 PROCEDURE — 2500000003 HC RX 250 WO HCPCS

## 2025-03-06 PROCEDURE — 6370000000 HC RX 637 (ALT 250 FOR IP): Performed by: INTERNAL MEDICINE

## 2025-03-06 PROCEDURE — 85730 THROMBOPLASTIN TIME PARTIAL: CPT

## 2025-03-06 RX ORDER — DEXAMETHASONE 4 MG/1
4 TABLET ORAL EVERY 12 HOURS SCHEDULED
Status: DISCONTINUED | OUTPATIENT
Start: 2025-03-06 | End: 2025-03-07

## 2025-03-06 RX ADMIN — ARFORMOTEROL TARTRATE: 15 SOLUTION RESPIRATORY (INHALATION) at 21:10

## 2025-03-06 RX ADMIN — SODIUM CHLORIDE, PRESERVATIVE FREE 10 ML: 5 INJECTION INTRAVENOUS at 08:39

## 2025-03-06 RX ADMIN — MIDODRINE HYDROCHLORIDE 10 MG: 5 TABLET ORAL at 08:41

## 2025-03-06 RX ADMIN — HYDROCORTISONE SODIUM SUCCINATE 50 MG: 100 INJECTION, POWDER, FOR SOLUTION INTRAMUSCULAR; INTRAVENOUS at 08:37

## 2025-03-06 RX ADMIN — INSULIN LISPRO 1 UNITS: 100 INJECTION, SOLUTION INTRAVENOUS; SUBCUTANEOUS at 18:08

## 2025-03-06 RX ADMIN — HYDROCORTISONE SODIUM SUCCINATE 50 MG: 100 INJECTION, POWDER, FOR SOLUTION INTRAMUSCULAR; INTRAVENOUS at 03:59

## 2025-03-06 RX ADMIN — ROSUVASTATIN CALCIUM 40 MG: 20 TABLET, FILM COATED ORAL at 20:21

## 2025-03-06 RX ADMIN — MIDODRINE HYDROCHLORIDE 10 MG: 5 TABLET ORAL at 14:05

## 2025-03-06 RX ADMIN — ASPIRIN 81 MG: 81 TABLET, COATED ORAL at 08:40

## 2025-03-06 RX ADMIN — Medication: at 20:46

## 2025-03-06 RX ADMIN — DEXAMETHASONE 4 MG: 4 TABLET ORAL at 20:21

## 2025-03-06 RX ADMIN — Medication: at 08:40

## 2025-03-06 RX ADMIN — SODIUM CHLORIDE, PRESERVATIVE FREE 10 ML: 5 INJECTION INTRAVENOUS at 20:22

## 2025-03-06 RX ADMIN — APIXABAN 5 MG: 5 TABLET, FILM COATED ORAL at 20:30

## 2025-03-06 RX ADMIN — EZETIMIBE 10 MG: 10 TABLET ORAL at 20:22

## 2025-03-06 ASSESSMENT — PAIN SCALES - GENERAL: PAINLEVEL_OUTOF10: 0

## 2025-03-06 NOTE — PROGRESS NOTES
[facility] Medicine Progress Note  V 1.6      Date of Admission: 3/3/2025    Hospital Day: 4      Chief Admission Complaint:  fatigue     Subjective:  Patient seen and examined at bedside. No acute events overnight. Reports feeling well today. Denies any acute concerns at this time.     Presenting Admission History:       Mr. Delano Daigle is a 79 y.o. male with a medical hx significant for HTN, HLD, DM2, eczema, hidradenitis suppurativa (MRSA), Afib (on Eliquis), COPD, cardiomyopathy, otherwise as listed in the MHx table below, who presented from home to the ED on 03/03/2025 with fatigue for 2 months, worsening over the past few days. He describes feeling tremulous and lightheaded after standing and doing any sort of activity. He also notes that he constantly feels cold at home, in spite of keeping the thermostat in his apartment set at 80. He also notes that he sleeps upright in a chair at night as laying flat is too uncomfortable. He states he fell yesterday and hit the right side of his chest, now endorsing some pain in that area. He denies any associated shortness of breath, cough, fevers, abdominal pain, nausea, vomiting, or urinary symptoms.        ED Course:  On arrival to the ED, he was found to be hypothermic, hypotensive  Labs were significant for: lactic acid 3.2, pH 7.30, BNP 2,111  Imaging: CXR showing CHF with fluid overload, CT chest with hazy nodular opacities in bilateral lower lobes, CT abdomen/pelvis with right flank contusion, CT head/neck without acute findings.  While in the ED, he received sepsis bolus 30 mL/kg, and was started on vancomycin and Zosyn.    Assessment/Plan:      Current Principal Problem:  Fatigue, unspecified type    Shock, unknown etiology  Possible adrenal insufficiency   Concern for acute on chronic heart failure exacerbation  Hypotension  Hypothermia - improving  - Presented with worsening fatigue, ongoing for ~2 months with some worsening over past few days. Notes  Recommendations for:   []Home independently  []Home w/ assist  []HHC  []SNF  []Acute Rehab    Multi-Disciplinary Rounds with Case Management completed on 3/6/2025 with the following recommendations:  Anticipated Discharge Location: []Home w/ []HHC vs []SNF  []Acute Rehab  []LTAC  []Hospice  [x]Other -  tbd  Anticipated Discharge Day/Date:  3-5 days  Barriers to Discharge: hypotension   --------------------------------------------------    MDM (any 2 required for High level billing)    A. Problems (any 1)  [x] Acute/Chronic Illness/injury posing ongoing threat to life and/or bodily function without ongoing treatment    [] Severe exacerbation of chronic illness    --------------------------------------------------  B. Risk of Treatment (any 1)    [] Drugs/treatments that require intensive monitoring for toxicity    [] IV ABX (Vancomycin, Aminoglycosides, etc)     [] Post-Cath/Contrast study requiring serial monitoring    [] IV Narcotic analgesia    [] Aggressive IV diuresis    [] Hypertonic Saline    [] Critical electrolyte abnormalities requiring IV replacement    [] Insulin - Scheduled/SSI or Insulin gtt    [] Anticoagulation (Heparin gtt or Coumadin - other anticoagulants in special circumstances)    [] Cardiac Medications (IV Amiodarone/Diltiazem, Tikosyn, etc)    [] Hemodialysis    [] Other -    [] Change in code status    [] Decision to escalate care    [] Major surgery/procedure with associated risk factors    --------------------------------------------------  C. Data (any 2)    [x] Data Review (any 3)    [x] Consultant notes from yesterday/today    [x] All available current labs reviewed interpreted for clinical significance    [x] Appropriate follow-up labs were ordered  [] Collateral history obtained     [x] Independent Interpretation of tests (any 1)    [x] Telemetry (Rhythm Strip) personally reviewed and interpreted        [] Imaging personally reviewed and interpreted     [x] Discussion (any 1)  [x]

## 2025-03-06 NOTE — PROGRESS NOTES
labs, radiological studies, cardiac studies including ECG's and telemetry, current and old medical records.     I would like to thank you for providing me the opportunity to participate in the care of your patient. If you have any questions, please do not hesitate to contact me.     Doyle Blakely MD,  [facility] [address] [address]  Ph: [phone]  Fax: [phone]

## 2025-03-06 NOTE — PLAN OF CARE
Problem: Chronic Conditions and Co-morbidities  Goal: Patient's chronic conditions and co-morbidity symptoms are monitored and maintained or improved  3/5/2025 1913 by Chrissie Avendaño RN  Outcome: Progressing  3/5/2025 1812 by Park Otero RN  Outcome: Progressing  3/5/2025 1809 by Park Otero RN  Outcome: Progressing  Flowsheets (Taken 3/5/2025 0800 by Carol Parker RN)  Care Plan - Patient's Chronic Conditions and Co-Morbidity Symptoms are Monitored and Maintained or Improved:   Monitor and assess patient's chronic conditions and comorbid symptoms for stability, deterioration, or improvement   Collaborate with multidisciplinary team to address chronic and comorbid conditions and prevent exacerbation or deterioration     Problem: Discharge Planning  Goal: Discharge to home or other facility with appropriate resources  3/5/2025 1913 by Chrissie Avendaño RN  Outcome: Progressing  3/5/2025 1809 by Park Otero RN  Outcome: Progressing  Flowsheets (Taken 3/5/2025 0800 by Carol Parker RN)  Discharge to home or other facility with appropriate resources:   Identify barriers to discharge with patient and caregiver   Arrange for needed discharge resources and transportation as appropriate     Problem: Pain  Goal: Verbalizes/displays adequate comfort level or baseline comfort level  3/5/2025 1913 by Chrissie Avendaño RN  Outcome: Progressing  3/5/2025 1812 by Park Otero RN  Outcome: Progressing  3/5/2025 1809 by Park Otero RN  Outcome: Progressing     Problem: Safety - Adult  Goal: Free from fall injury  3/5/2025 1913 by Chrissie Avendaño RN  Outcome: Progressing  Flowsheets (Taken 3/5/2025 1911)  Free From Fall Injury:   Instruct family/caregiver on patient safety   Based on caregiver fall risk screen, instruct family/caregiver to ask for assistance with transferring infant if caregiver noted to have fall risk factors  3/5/2025 1812 by Park Otero RN  Outcome:  Carol Parker, RN  Skin Integrity Remains Intact:   Monitor for areas of redness and/or skin breakdown   Every 4-6 hours minimum: Change oxygen saturation probe site  Taken 3/5/2025 0800 by Carol Parker RN  Skin Integrity Remains Intact:   Monitor for areas of redness and/or skin breakdown   Every 4-6 hours minimum: Change oxygen saturation probe site

## 2025-03-06 NOTE — PLAN OF CARE
Problem: Chronic Conditions and Co-morbidities  Goal: Patient's chronic conditions and co-morbidity symptoms are monitored and maintained or improved  Outcome: Progressing     Problem: Discharge Planning  Goal: Discharge to home or other facility with appropriate resources  Outcome: Progressing     Problem: Pain  Goal: Verbalizes/displays adequate comfort level or baseline comfort level  Outcome: Progressing     Problem: Safety - Adult  Goal: Free from fall injury  Outcome: Progressing     Problem: Skin/Tissue Integrity  Goal: Skin integrity remains intact  Description: 1.  Monitor for areas of redness and/or skin breakdown  2.  Assess vascular access sites hourly  3.  Every 4-6 hours minimum:  Change oxygen saturation probe site  4.  Every 4-6 hours:  If on nasal continuous positive airway pressure, respiratory therapy assess nares and determine need for appliance change or resting period  Outcome: Progressing     Problem: ABCDS Injury Assessment  Goal: Absence of physical injury  Outcome: Progressing     Problem: Nutrition Deficit:  Goal: Optimize nutritional status  Outcome: Progressing     Problem: Respiratory - Adult  Goal: Achieves optimal ventilation and oxygenation  Outcome: Progressing     Problem: Cardiovascular - Adult  Goal: Maintains optimal cardiac output and hemodynamic stability  Outcome: Progressing

## 2025-03-07 VITALS
WEIGHT: 209.44 LBS | SYSTOLIC BLOOD PRESSURE: 123 MMHG | RESPIRATION RATE: 17 BRPM | BODY MASS INDEX: 29.98 KG/M2 | TEMPERATURE: 96.5 F | HEIGHT: 70 IN | OXYGEN SATURATION: 94 % | DIASTOLIC BLOOD PRESSURE: 72 MMHG | HEART RATE: 83 BPM

## 2025-03-07 DIAGNOSIS — E78.2 MIXED HYPERLIPIDEMIA: ICD-10-CM

## 2025-03-07 DIAGNOSIS — E11.8 TYPE 2 DIABETES MELLITUS WITH COMPLICATION, WITHOUT LONG-TERM CURRENT USE OF INSULIN (HCC): ICD-10-CM

## 2025-03-07 LAB
ALBUMIN SERPL-MCNC: 2 G/DL (ref 3.4–5)
ANION GAP SERPL CALCULATED.3IONS-SCNC: 6 MMOL/L (ref 3–16)
BACTERIA BLD CULT ORG #2: NORMAL
BACTERIA BLD CULT: NORMAL
BASOPHILS # BLD: 0 K/UL (ref 0–0.2)
BASOPHILS NFR BLD: 0.1 %
BUN SERPL-MCNC: 20 MG/DL (ref 7–20)
CALCIUM SERPL-MCNC: 7.6 MG/DL (ref 8.3–10.6)
CHLORIDE SERPL-SCNC: 104 MMOL/L (ref 99–110)
CO2 SERPL-SCNC: 27 MMOL/L (ref 21–32)
CREAT SERPL-MCNC: 1 MG/DL (ref 0.8–1.3)
DEPRECATED RDW RBC AUTO: 15.6 % (ref 12.4–15.4)
EOSINOPHIL # BLD: 0 K/UL (ref 0–0.6)
EOSINOPHIL NFR BLD: 0 %
GFR SERPLBLD CREATININE-BSD FMLA CKD-EPI: 77 ML/MIN/{1.73_M2}
GLUCOSE BLD-MCNC: 145 MG/DL (ref 70–99)
GLUCOSE BLD-MCNC: 180 MG/DL (ref 70–99)
GLUCOSE SERPL-MCNC: 212 MG/DL (ref 70–99)
HCT VFR BLD AUTO: 29.5 % (ref 40.5–52.5)
HGB BLD-MCNC: 9.6 G/DL (ref 13.5–17.5)
LYMPHOCYTES # BLD: 0.7 K/UL (ref 1–5.1)
LYMPHOCYTES NFR BLD: 7 %
MAGNESIUM SERPL-MCNC: 1.99 MG/DL (ref 1.8–2.4)
MCH RBC QN AUTO: 31.3 PG (ref 26–34)
MCHC RBC AUTO-ENTMCNC: 32.7 G/DL (ref 31–36)
MCV RBC AUTO: 96 FL (ref 80–100)
MONOCYTES # BLD: 0.5 K/UL (ref 0–1.3)
MONOCYTES NFR BLD: 5.3 %
NEUTROPHILS # BLD: 8.3 K/UL (ref 1.7–7.7)
NEUTROPHILS NFR BLD: 87.6 %
NT-PROBNP SERPL-MCNC: 5469 PG/ML (ref 0–449)
PERFORMED ON: ABNORMAL
PERFORMED ON: ABNORMAL
PHOSPHATE SERPL-MCNC: 2.4 MG/DL (ref 2.5–4.9)
PLATELET # BLD AUTO: 90 K/UL (ref 135–450)
PMV BLD AUTO: 9.9 FL (ref 5–10.5)
POTASSIUM SERPL-SCNC: 3.8 MMOL/L (ref 3.5–5.1)
RBC # BLD AUTO: 3.07 M/UL (ref 4.2–5.9)
SODIUM SERPL-SCNC: 137 MMOL/L (ref 136–145)
WBC # BLD AUTO: 9.4 K/UL (ref 4–11)

## 2025-03-07 PROCEDURE — 83880 ASSAY OF NATRIURETIC PEPTIDE: CPT

## 2025-03-07 PROCEDURE — 6360000002 HC RX W HCPCS

## 2025-03-07 PROCEDURE — 83735 ASSAY OF MAGNESIUM: CPT

## 2025-03-07 PROCEDURE — 97535 SELF CARE MNGMENT TRAINING: CPT

## 2025-03-07 PROCEDURE — 36415 COLL VENOUS BLD VENIPUNCTURE: CPT

## 2025-03-07 PROCEDURE — 80069 RENAL FUNCTION PANEL: CPT

## 2025-03-07 PROCEDURE — 6370000000 HC RX 637 (ALT 250 FOR IP)

## 2025-03-07 PROCEDURE — 97530 THERAPEUTIC ACTIVITIES: CPT

## 2025-03-07 PROCEDURE — 2700000000 HC OXYGEN THERAPY PER DAY

## 2025-03-07 PROCEDURE — 2500000003 HC RX 250 WO HCPCS

## 2025-03-07 PROCEDURE — 97162 PT EVAL MOD COMPLEX 30 MIN: CPT

## 2025-03-07 PROCEDURE — 85025 COMPLETE CBC W/AUTO DIFF WBC: CPT

## 2025-03-07 PROCEDURE — 94640 AIRWAY INHALATION TREATMENT: CPT

## 2025-03-07 PROCEDURE — 6370000000 HC RX 637 (ALT 250 FOR IP): Performed by: INTERNAL MEDICINE

## 2025-03-07 PROCEDURE — 97166 OT EVAL MOD COMPLEX 45 MIN: CPT

## 2025-03-07 PROCEDURE — 94761 N-INVAS EAR/PLS OXIMETRY MLT: CPT

## 2025-03-07 PROCEDURE — 97116 GAIT TRAINING THERAPY: CPT

## 2025-03-07 RX ORDER — AMLODIPINE BESYLATE 5 MG/1
5 TABLET ORAL DAILY
Qty: 90 TABLET | Refills: 3 | OUTPATIENT
Start: 2025-03-07

## 2025-03-07 RX ORDER — EZETIMIBE 10 MG/1
TABLET ORAL
Qty: 90 TABLET | Refills: 3 | Status: SHIPPED | OUTPATIENT
Start: 2025-03-07

## 2025-03-07 RX ORDER — PIOGLITAZONE 15 MG/1
15 TABLET ORAL DAILY
Qty: 90 TABLET | Refills: 3 | Status: SHIPPED | OUTPATIENT
Start: 2025-03-07

## 2025-03-07 RX ORDER — ROSUVASTATIN CALCIUM 40 MG/1
TABLET, COATED ORAL
Qty: 90 TABLET | Refills: 3 | Status: SHIPPED | OUTPATIENT
Start: 2025-03-07

## 2025-03-07 RX ORDER — MIDODRINE HYDROCHLORIDE 10 MG/1
10 TABLET ORAL
Qty: 90 TABLET | Refills: 3 | Status: SHIPPED | OUTPATIENT
Start: 2025-03-07

## 2025-03-07 RX ORDER — NICOTINE 21 MG/24HR
1 PATCH, TRANSDERMAL 24 HOURS TRANSDERMAL DAILY
Qty: 30 PATCH | Refills: 0 | Status: SHIPPED | OUTPATIENT
Start: 2025-03-07 | End: 2025-04-06

## 2025-03-07 RX ADMIN — MIDODRINE HYDROCHLORIDE 10 MG: 5 TABLET ORAL at 12:20

## 2025-03-07 RX ADMIN — INSULIN LISPRO 1 UNITS: 100 INJECTION, SOLUTION INTRAVENOUS; SUBCUTANEOUS at 05:33

## 2025-03-07 RX ADMIN — INSULIN LISPRO 1 UNITS: 100 INJECTION, SOLUTION INTRAVENOUS; SUBCUTANEOUS at 12:20

## 2025-03-07 RX ADMIN — Medication: at 08:41

## 2025-03-07 RX ADMIN — ASPIRIN 81 MG: 81 TABLET, COATED ORAL at 08:40

## 2025-03-07 RX ADMIN — ARFORMOTEROL TARTRATE: 15 SOLUTION RESPIRATORY (INHALATION) at 10:08

## 2025-03-07 RX ADMIN — SODIUM CHLORIDE, PRESERVATIVE FREE 10 ML: 5 INJECTION INTRAVENOUS at 08:41

## 2025-03-07 RX ADMIN — APIXABAN 5 MG: 5 TABLET, FILM COATED ORAL at 08:40

## 2025-03-07 RX ADMIN — MIDODRINE HYDROCHLORIDE 10 MG: 5 TABLET ORAL at 08:40

## 2025-03-07 ASSESSMENT — PAIN SCALES - GENERAL
PAINLEVEL_OUTOF10: 0

## 2025-03-07 NOTE — DISCHARGE SUMMARY
V2.0  Discharge Summary    Name:  Delano Daigle /Age/Sex: 1945 (79 y.o. male)   Admit Date: 3/3/2025  Discharge Date: 3/7/25    MRN & CSN:  [id number] & [id number] Encounter Date and Time 3/7/25 10:29 AM EST    Attending:  Bird Mccurdy MD Discharging Provider: Bird Mccurdy MD       Discharge Diagnosess:     Shock, unknown etiology  Possible adrenal insufficiency   Concern for acute on chronic heart failure exacerbation  Hypotension  Hypothermia - improving      Admission HPI, hospital course, and consultants:     Admission HPI:  \"Mr. Delano Daigle is a 79 y.o. male with a medical hx significant for HTN, HLD, DM2, eczema, hidradenitis suppurativa (MRSA), Afib (on Eliquis), COPD, cardiomyopathy, otherwise as listed in the MHx table below, who presented from home to the ED on 2025 with fatigue for 2 months, worsening over the past few days. He describes feeling tremulous and lightheaded after standing and doing any sort of activity. He also notes that he constantly feels cold at home, in spite of keeping the thermostat in his apartment set at 80. He also notes that he sleeps upright in a chair at night as laying flat is too uncomfortable. He states he fell yesterday and hit the right side of his chest, now endorsing some pain in that area. He denies any associated shortness of breath, cough, fevers, abdominal pain, nausea, vomiting, or urinary symptoms.    ED Course:  On arrival to the ED, he was found to be hypothermic, hypotensive  Labs were significant for: lactic acid 3.2, pH 7.30, BNP 2,111  Imaging: CXR showing CHF with fluid overload, CT chest with hazy nodular opacities in bilateral lower lobes, CT abdomen/pelvis with right flank contusion, CT head/neck without acute findings.  While in the ED, he received sepsis bolus 30 mL/kg, and was started on vancomycin and Zosyn. \"    Hospital course:  Presented with worsening fatigue, ongoing for ~2 months with some worsening over past few days.  inflammatory process of the lower lobes bilaterally. 3. 27 mm left inferior thyroid nodule. Recommend nonemergent ultrasound for further evaluation. ABDOMEN/PELVIS: 1. Mild bladder wall thickening versus nondistention. Consider correlation with urinalysis. 2. Right flank contusion. 3. Stable appearance of previously described indeterminate liver lesion. Recommend nonemergent MRI without and with contrast for further evaluation. Electronically signed by Walker Hair    XR CHEST PORTABLE    Result Date: 3/3/2025  XR CHEST PORTABLE TECHNIQUE:  XR CHEST PORTABLE CLINICAL  INDICATION: weakness COMPARISON: 8/2/2024 FINDINGS: The heart is enlarged.  There is pulmonary vascular fullness and hazy interstitial opacities. No visible pneumothorax.     CHF with fluid overload.  Underlying infiltrate not excluded. Electronically signed by Davis Diaz      CBC:   Recent Labs     03/05/25 0435 03/06/25 0409 03/07/25  0428   WBC 7.1 10.6 9.4   HGB 9.7* 9.8* 9.6*   PLT 79* 90* 90*     BMP:    Recent Labs     03/05/25 0435 03/06/25 0409 03/07/25  0428    140 137   K 4.0 4.1 3.8    106 104   CO2 26 28 27   BUN 20 21* 20   CREATININE 1.2 1.2 1.0   GLUCOSE 123* 114* 212*     Hepatic: No results for input(s): \"AST\", \"ALT\", \"BILITOT\", \"ALKPHOS\" in the last 72 hours.    Invalid input(s): \"ALB\"  Lipids:   Lab Results   Component Value Date/Time    CHOL 93 03/04/2025 04:28 AM    HDL 40 03/04/2025 04:28 AM    HDL 39 05/10/2012 09:43 AM    TRIG 61 03/04/2025 04:28 AM     Hemoglobin A1C:   Lab Results   Component Value Date/Time    LABA1C 5.3 12/19/2024 10:13 AM     TSH:   Lab Results   Component Value Date/Time    TSH 1.35 02/15/2024 01:38 PM     Troponin: No results found for: \"TROPONINT\"  Lactic Acid:   Recent Labs     03/04/25  2220 03/05/25  0436 03/05/25  0821   LACTA 2.2* 2.0 1.9     BNP:   Recent Labs     03/05/25 0435 03/07/25  0428   PROBNP 2,943* 5,469*     UA:  Lab Results   Component Value Date/Time    NITRU

## 2025-03-07 NOTE — TELEPHONE ENCOUNTER
Medication:   Requested Prescriptions     Pending Prescriptions Disp Refills    amLODIPine (NORVASC) 5 MG tablet [Pharmacy Med Name: AMLODIPINE BESYLATE 5 MG TAB] 90 tablet 3     Sig: TAKE 1 TABLET BY MOUTH EVERY DAY    pioglitazone (ACTOS) 15 MG tablet [Pharmacy Med Name: PIOGLITAZONE HCL 15 MG TABLET] 90 tablet 3     Sig: TAKE 1 TABLET BY MOUTH EVERY DAY    ezetimibe (ZETIA) 10 MG tablet [Pharmacy Med Name: EZETIMIBE 10 MG TABLET] 90 tablet 3     Sig: TAKE 1 TABLET BY MOUTH DAILY FOR HIGH CHOLESTEROL    rosuvastatin (CRESTOR) 40 MG tablet [Pharmacy Med Name: ROSUVASTATIN CALCIUM 40 MG TAB] 90 tablet 3     Sig: TAKE 1 TABLET BY MOUTH DAILY FOR HIGH CHOLESTEROL        Last Filled:      Patient Phone Number: [phone] (home)     Last appt: 12/19/2024   Next appt: 3/19/2025    Last OARRS:        No data to display

## 2025-03-07 NOTE — PROGRESS NOTES
Physical Therapy  Facility/Department: [facility] ICU  Physical Therapy Initial Assessment    Name: Delano Daigle  : 1945  MRN: [id number]  Date of Service: 3/7/2025    Discharge Recommendations:  24 hour supervision or assist, Home with Home health PT   PT Equipment Recommendations  Equipment Needed: No        Assessment  Assessment: Pt from home, admitted for fatigue.  Pt demonstrates transfers and gait slightly below fucntional baseline due to acute illness and inactivity over the past 4 days of admission.  Pt initially unsteady with ambulation, but improved with time.  Pt feels he is near functional baseline and voices no concerns returning home.  Feel pt would most benefit from initial 24hr assist, but pt declines.  Pt is agreeable for home PT.  No new DME needs.  Will continue to follow for PT per POC if d/c home is delayed.  Treatment Diagnosis: Decreased gait associated with fatigue.  Decision Making: Medium Complexity  Requires PT Follow-Up: Yes    Plan  General Plan:  (2-5)  Current Treatment Recommendations: Strengthening, Transfer training, Functional mobility training, Gait training, Stair training, Safety education & training, Patient/Caregiver education & training    Safety Devices  Type of Devices: Left in chair, Chair alarm in place, Call light within reach, Nurse notified    Restrictions  Other Position/Activity Restrictions: Up as tolerated     Subjective  Chart Reviewed: Yes  Additional Pertinent Hx: Pt to ED 3/3 with fatigue x2 months.  Imaging: CXR showing CHF with fluid overload, CT chest with hazy nodular opacities in bilateral lower lobes, CT abdomen/pelvis with right flank contusion, CT head/neck without acute findings. Admit to ICU for hypotension on pressors.  Cardiology consult.  Cath Lab 3/5.  PMH:  HTN, DM    Diagnosis: Fatigue    Subjective  Subjective: Pt found supine in bed.  \"I haven't been out of this bed in 4 days!\"  Pt agreeable for PT.  Happy to be getting out of bed.  Pt  denies pain.  \"I feel great, now.\"    Social/Functional History  Lives With: Alone  Type of Home: Apartment (3rd floor apartment)  Home Layout: One level  Home Access: Stairs to enter with rails (3 flights)  Bathroom Shower/Tub: Tub/Shower unit  Bathroom Toilet: Standard (sink for leverage)  Bathroom Equipment: None  Home Equipment: Alert button, Cane, Walker - Rolling  Has the patient had two or more falls in the past year or any fall with injury in the past year?: Yes  Prior Level of Assist for ADLs: Independent  Prior Level of Assist for Homemaking: Independent (struggling at home to manage, COA recently started a HHA 4hrs on Thursdays)  Prior Level of Assist for Ambulation: Independent household ambulator, with or without device (using cane)  Prior Level of Assist for Transfers: Independent  Active : Yes  Occupation: Retired (construction)  Additional Comments: Recently got a HHA for homemaking 4hrs on Thursdays, pt reports aide does nothing to help.    Vision/Hearing  Vision: Within Functional Limits  Hearing: Within functional limits      Cognition   Orientation  Overall Orientation Status: Within Functional Limits    Objective  Gross Assessment  AROM: Within functional limits  Strength: Within functional limits    Bed mobility  Supine to Sit: Supervision (flat bed)    Transfers  Sit to Stand: Contact guard assistance (to rolling walker, various surface heights, cues for safety)  Stand to Sit: Contact guard assistance (decreased control to sit)    Ambulation  Device: Rolling Walker  Assistance: Minimal assistance;Contact guard assistance  Quality of Gait: unsteady gait ambulating into bathroom, but increased to consistent CGA with ongoing activity and ambulation in room.  Gait Deviations: Slow Dee Dee;Decreased step length;Decreased step height  Distance: 10ft + 20ft + 25ft    Balance  Sitting - Static: Good  Sitting - Dynamic: Good  Standing - Static: Fair  Standing - Dynamic: Fair (CGA with rolling

## 2025-03-07 NOTE — CARE COORDINATION
2:54 PM  Katy from [facility] to follow up with patient on Monday . Patient aware    Electronically signed by Karl Stearns RN, CM on 3/7/2025 at 2:54 PM.  Phone: [phone]  Fax: [phone]           Case Management Assessment            Discharge Note                    Date / Time of Note: 3/7/2025 9:28 AM                  Discharge Note Completed by: Karl Stearns RN    Patient Name: Delano Daigle   YOB: 1945  Diagnosis: Shortness of breath [R06.02]  Severe sepsis (HCC) [A41.9, R65.20]  Hypotension, unspecified hypotension type [I95.9]  Fatigue, unspecified type [R53.83]   Date / Time: 3/3/2025 12:42 PM    Current PCP: Jason Dawn MD  Clinic patient: No    Hospitalization in the last 30 days: No       Advance Directives:  Code Status: Full Code  [place] DNR form completed and on chart: Not Indicated    Financial:  Payor: [facility] MEDICARE / Plan: UNITEDHEALTHCARE DUAL COMPLETE / Product Type: *No Product type* /      Pharmacy:    [facility]/pharmacy #[id number] - [place], [place] - [address] [facility] [facility] - P [phone] - F [phone]  [address] [place] [address]  Phone: [phone] Fax: [phone]    [facility] [id number] [facility] -  [phone] - F [phone]  [address] [place] [address]  Phone: [phone] Fax: [phone]      Assistance purchasing medications?: Potential Assistance Purchasing Medications: No  Assistance provided by Case Management: None at this time    Does patient want to participate in local refill/ meds to beds program?: Yes    Meds To Beds General Rules:  1. Can ONLY be done Monday- Friday between 8:30am-5pm  2. Prescription(s) must be in pharmacy by 3pm to be filled same day  3.Copy of patient's insurance/ prescription drug card and patient face sheet must be sent along with the prescription(s)  4. Cost of Rx cannot be added to hospital bill. If financial assistance is needed, please contact unit  or ;

## 2025-03-07 NOTE — PROGRESS NOTES
03/07/25 1024   Encounter Summary   Encounter Overview/Reason Initial Encounter   Service Provided For Patient   Referral/Consult From Rounding   Support System Children   Last Encounter  03/07/25  (GRD N/N)   Complexity of Encounter Low   Begin Time 1015   End Time  1020   Total Time Calculated 5 min   Assessment/Intervention/Outcome   Assessment Calm   Intervention Active listening   Outcome Refused/Declined   Plan and Referrals   Plan/Referrals No future visits requested     Spiritual Health History and Assessment/Progress Note  [facility]    Initial Encounter,  ,  ,      Name: Delano Daigle MRN: [id number]    Age: 79 y.o.     Sex: male   Language: English   Latter day: [employer]   Fatigue, unspecified type     Date: 3/7/2025            Total Time Calculated: 5 min              Spiritual Assessment began in [facility] ICU        Referral/Consult From: Rounding   Encounter Overview/Reason: Initial Encounter  Service Provided For: Patient    Alysha, Belief, Meaning:   Patient has beliefs or practices that help with coping during difficult times  Family/Friends No family/friends present      Importance and Influence:  Patient has no beliefs influential to healthcare decision-making identified during this visit  Family/Friends No family/friends present    Community:  Patient feels well-supported. Support system includes: Children  Family/Friends No family/friends present    Assessment and Plan of Care:     Patient Interventions include: Facilitated expression of thoughts and feelings and Affirmed coping skills/support systems  Family/Friends Interventions include: No family/friends present    Patient Plan of Care: No spiritual needs identified for follow-up and Spiritual Care available upon further referral  Family/Friends Plan of Care: No family/friends present    Electronically signed by Chaplain Marsha on 3/7/2025 at 10:24 AM

## 2025-03-07 NOTE — PLAN OF CARE
Problem: Chronic Conditions and Co-morbidities  Goal: Patient's chronic conditions and co-morbidity symptoms are monitored and maintained or improved  3/6/2025 2319 by Jessica Mari, RN  Outcome: Progressing  Flowsheets (Taken 3/6/2025 2000)  Care Plan - Patient's Chronic Conditions and Co-Morbidity Symptoms are Monitored and Maintained or Improved:   Monitor and assess patient's chronic conditions and comorbid symptoms for stability, deterioration, or improvement   Collaborate with multidisciplinary team to address chronic and comorbid conditions and prevent exacerbation or deterioration   Update acute care plan with appropriate goals if chronic or comorbid symptoms are exacerbated and prevent overall improvement and discharge     Problem: Discharge Planning  Goal: Discharge to home or other facility with appropriate resources  3/6/2025 2319 by Jessica Mari, RN  Outcome: Progressing  Flowsheets (Taken 3/6/2025 2000)  Discharge to home or other facility with appropriate resources:   Identify barriers to discharge with patient and caregiver   Arrange for needed discharge resources and transportation as appropriate   Identify discharge learning needs (meds, wound care, etc)   Refer to discharge planning if patient needs post-hospital services based on physician order or complex needs related to functional status, cognitive ability or social support system     Problem: Pain  Goal: Verbalizes/displays adequate comfort level or baseline comfort level  3/6/2025 2319 by Jessica Mari, RN  Outcome: Progressing  Flowsheets (Taken 3/6/2025 2000)  Verbalizes/displays adequate comfort level or baseline comfort level:   Encourage patient to monitor pain and request assistance   Assess pain using appropriate pain scale   Administer analgesics based on type and severity of pain and evaluate response   Implement non-pharmacological measures as appropriate and evaluate response   Notify Licensed Independent  behavior   Oxygen supplementation based on oxygen saturation or arterial blood gases     Problem: Cardiovascular - Adult  Goal: Maintains optimal cardiac output and hemodynamic stability  3/6/2025 2319 by Jessica Mari, RN  Outcome: Progressing  Flowsheets (Taken 3/6/2025 2000)  Maintains optimal cardiac output and hemodynamic stability:   Monitor blood pressure and heart rate   Monitor urine output and notify Licensed Independent Practitioner for values outside of normal range   Assess for signs of decreased cardiac output     Problem: Cardiovascular - Adult  Goal: Absence of cardiac dysrhythmias or at baseline  Outcome: Progressing  Flowsheets (Taken 3/6/2025 2000)  Absence of cardiac dysrhythmias or at baseline:   Monitor cardiac rate and rhythm   Assess for signs of decreased cardiac output     Problem: Metabolic/Fluid and Electrolytes - Adult  Goal: Electrolytes maintained within normal limits  Outcome: Progressing  Flowsheets (Taken 3/6/2025 2000)  Electrolytes maintained within normal limits:   Monitor labs and assess patient for signs and symptoms of electrolyte imbalances   Administer electrolyte replacement as ordered   Monitor response to electrolyte replacements, including repeat lab results as appropriate     Problem: Metabolic/Fluid and Electrolytes - Adult  Goal: Hemodynamic stability and optimal renal function maintained  Outcome: Progressing  Flowsheets (Taken 3/6/2025 2000)  Hemodynamic stability and optimal renal function maintained:   Monitor labs and assess for signs and symptoms of volume excess or deficit   Monitor intake, output and patient weight

## 2025-03-07 NOTE — PROGRESS NOTES
Occupational Therapy  Facility/Department: [facility] ICU  Occupational Therapy Initial Assessment/tx    Name: Delano Daigle  : 1945  MRN: [id number]  Date of Service: 3/7/2025    Discharge Recommendations:  24 hour supervision or assist, Home with Home health OT (initial 24 hr A)          Patient Diagnosis(es): The primary encounter diagnosis was Severe sepsis (HCC). Diagnoses of Hypotension, unspecified hypotension type, Shortness of breath, and Atrial fibrillation, unspecified type (HCC) were also pertinent to this visit.  Past Medical History:  has a past medical history of Allergic rhinitis, Eczema, Hearing loss, Hyperlipidemia, Hypertension, and Type 2 diabetes mellitus without complication (HCC).  Past Surgical History:  has a past surgical history that includes Skin graft; Colonoscopy; Colonoscopy (N/A, 11/10/2021); and Cardiac procedure (N/A, 3/5/2025).    Treatment Diagnosis: impaired ADLs and mobility      Assessment  Performance deficits / Impairments: Decreased functional mobility ;Decreased ADL status  Assessment: PT admitted with fatigue, dx of severe sepsis, hypotension, CHF exacerbation.  He requires SBA for LE ADLs, CGA for functional transfers with rolling walker.  Prior to admission, pt resided in [facility] alone and was indep with ADLs, mobility with cane, and simple home mgt.  Recommend discharge to home with initial 24 hr A, home OT.  Treatment Diagnosis: impaired ADLs and mobility  Prognosis: Good  Decision Making: Medium Complexity  REQUIRES OT FOLLOW-UP: Yes  Activity Tolerance  Activity Tolerance: Patient Tolerated treatment well     Plan  Occupational Therapy Plan  Times Per Week: 2-5  Current Treatment Recommendations: Balance training, Functional mobility training, Endurance training, Safety education & training, Self-Care / ADL    Restrictions  Position Activity Restriction  Other Position/Activity Restrictions: Up as tolerated    Subjective  General  Chart Reviewed: Yes  Additional  Individual Concurrent Group Co-treatment   Time In 1011         Time Out 1104         Minutes 53             Timed Code Treatment Minutes:   38    Total Treatment Minutes:   53    Neena Holman, OTR/L [id number]

## 2025-03-10 ENCOUNTER — CARE COORDINATION (OUTPATIENT)
Dept: CASE MANAGEMENT | Age: 80
End: 2025-03-10

## 2025-03-10 NOTE — CARE COORDINATION
Care Transitions Note    Initial Call - Call within 2 business days of discharge: Yes    Attempted to reach patient for transitions of care follow up. Unable to reach patient.    Outreach Attempts:   1ST CTC attempt to reach Pt regarding recent hospital discharge.  CTC left voice recording with call back number requesting a call back. Will attempt to reach patient again.    CTN confirmed with intake department with [facility], referral was received.    Patient: Delano Daigle    Patient : 1945   MRN: [id number]     Reason for Admission: Shock, Possible adrenal insufficiency, Acute on chronic heart failure exacerbation  Discharge Date: 3/7/25    RURS: Readmission Risk Score: 17.4    Last Discharge Facility       Date Complaint Diagnosis Description Type Department Provider    3/3/25 Fatigue Severe sepsis (HCC) ... ED to Hosp-Admission (Discharged) (ADMITTED) [facility] ICU Bird Mccurdy MD; Xena Gutierrez.            Was this an external facility discharge? No    Follow Up Appointment:   Patient does not have a follow up appointment scheduled at time of call.  Unable to reach patient  Future Appointments         Provider Specialty Dept Phone    3/19/2025 8:20 AM Jason Dawn MD Primary Care [phone]    4/3/2025 1:30 PM Rebecca Mari, APRN - CNP Cardiology [phone]    2025 2:30 PM Doyle Blakely MD Cardiology [phone]            Plan for follow-up on next business day.      Thank You,    Agueda Bonilla RN  Care Transition Coordinator  Contact Number:[phone]

## 2025-03-11 ENCOUNTER — TELEPHONE (OUTPATIENT)
Dept: INTERNAL MEDICINE CLINIC | Age: 80
End: 2025-03-11

## 2025-03-11 ENCOUNTER — CARE COORDINATION (OUTPATIENT)
Dept: CASE MANAGEMENT | Age: 80
End: 2025-03-11

## 2025-03-11 NOTE — CARE COORDINATION
Care Transitions Note    Initial Call - Call within 2 business days of discharge: Yes    Attempted to reach patient for transitions of care follow up. Unable to reach patient.    Outreach Attempts:   2ND CTC attempt to reach Pt regarding recent hospital discharge.  CTC left voice recording with call back number requesting a call back.  CTN will close out CTN episode at this time.    Patient: Delano Daigle    Patient : 1945   MRN: [id number]     Reason for Admission: Shock, Possible adrenal insufficiency, Acute on chronic heart failure exacerbation   Discharge Date: 3/7/25    RURS: Readmission Risk Score: 17.4    Last Discharge Facility       Date Complaint Diagnosis Description Type Department Provider    3/3/25 Fatigue Severe sepsis (HCC) ... ED to Hosp-Admission (Discharged) (ADMITTED) [facility] ICU Bird Mccurdy MD; Xena Gutierrez.            Was this an external facility discharge? No    Follow Up Appointment:   Patient has hospital follow up appointment scheduled within 7 days of discharge.    Future Appointments         Provider Specialty Dept Phone    3/19/2025 8:20 AM Jason Dawn MD Primary Care [phone]    4/3/2025 1:30 PM Rebecca Mari, APRN - CNP Cardiology [phone]    2025 2:30 PM Doyle Blakely MD Cardiology [phone]            No further follow-up call indicated     Thank You,    Agueda Bonilla RN  Care Transition Coordinator  Contact Number:[phone]

## 2025-03-11 NOTE — TELEPHONE ENCOUNTER
[facility] Transitions Initial Follow Up Call    Outreach made within 2 business days of discharge: Yes    Patient: Delano Daigle Patient : 1945   MRN: [id number]  Reason for Admission: unknown  Discharge Date: 3/7/25       Spoke with: no one    Discharge department/facility: [facility]        Scheduled appointment with PCP within 7-14 days    Follow Up  Future Appointments   Date Time Provider Department Center   3/19/2025  8:20 AM Jason Dawn MD [facility]   4/3/2025  1:30 PM Rebecca Mari APRN - [facility]ino[facility]   2025  2:30 PM Doyle Blakely MD [place] [facility]       Alma Pineda MA

## 2025-03-17 DIAGNOSIS — I48.91 ATRIAL FIBRILLATION, NEW ONSET (HCC): ICD-10-CM

## 2025-03-17 NOTE — TELEPHONE ENCOUNTER
Medication:   Requested Prescriptions     Pending Prescriptions Disp Refills    ELIQUIS 5 MG TABS tablet [Pharmacy Med Name: ELIQUIS 5 MG TABLET 5 Tablet] 60 tablet 10     Sig: TAKE 1 TABLET BY MOUTH TWICE DAILY        Last Filled:      Patient Phone Number: [phone] (home)     Last appt: 12/19/2024   Next appt: 3/19/2025    Last OARRS:        No data to display

## 2025-03-19 ENCOUNTER — OFFICE VISIT (OUTPATIENT)
Dept: INTERNAL MEDICINE CLINIC | Age: 80
End: 2025-03-19

## 2025-03-19 VITALS
WEIGHT: 216 LBS | OXYGEN SATURATION: 97 % | HEART RATE: 96 BPM | DIASTOLIC BLOOD PRESSURE: 60 MMHG | HEIGHT: 70 IN | BODY MASS INDEX: 30.92 KG/M2 | SYSTOLIC BLOOD PRESSURE: 120 MMHG

## 2025-03-19 DIAGNOSIS — E27.40 ADRENAL INSUFFICIENCY: ICD-10-CM

## 2025-03-19 DIAGNOSIS — I10 PRIMARY HYPERTENSION: ICD-10-CM

## 2025-03-19 DIAGNOSIS — L30.9 ECZEMA OF LOWER EXTREMITY: ICD-10-CM

## 2025-03-19 DIAGNOSIS — Z09 HOSPITAL DISCHARGE FOLLOW-UP: Primary | ICD-10-CM

## 2025-03-19 DIAGNOSIS — L97.901 LEG ULCER, UNSPECIFIED LATERALITY, LIMITED TO BREAKDOWN OF SKIN (HCC): ICD-10-CM

## 2025-03-19 DIAGNOSIS — I50.9 ACUTE CONGESTIVE HEART FAILURE, UNSPECIFIED HEART FAILURE TYPE (HCC): ICD-10-CM

## 2025-03-19 DIAGNOSIS — J43.9 PULMONARY EMPHYSEMA, UNSPECIFIED EMPHYSEMA TYPE (HCC): ICD-10-CM

## 2025-03-19 DIAGNOSIS — N50.89 SCROTUM SWELLING: ICD-10-CM

## 2025-03-19 RX ORDER — APIXABAN 5 MG/1
5 TABLET, FILM COATED ORAL 2 TIMES DAILY
Qty: 60 TABLET | Refills: 10 | Status: SHIPPED | OUTPATIENT
Start: 2025-03-19

## 2025-03-19 RX ORDER — AMLODIPINE BESYLATE 5 MG/1
TABLET ORAL
COMMUNITY
Start: 2025-03-17

## 2025-03-19 RX ORDER — METOPROLOL SUCCINATE 25 MG/1
25 TABLET, EXTENDED RELEASE ORAL DAILY
COMMUNITY
Start: 2025-03-17

## 2025-03-19 RX ORDER — LOSARTAN POTASSIUM 25 MG/1
25 TABLET ORAL DAILY
COMMUNITY
Start: 2025-03-17

## 2025-03-19 ASSESSMENT — PATIENT HEALTH QUESTIONNAIRE - PHQ9
6. FEELING BAD ABOUT YOURSELF - OR THAT YOU ARE A FAILURE OR HAVE LET YOURSELF OR YOUR FAMILY DOWN: MORE THAN HALF THE DAYS
1. LITTLE INTEREST OR PLEASURE IN DOING THINGS: NEARLY EVERY DAY
4. FEELING TIRED OR HAVING LITTLE ENERGY: NEARLY EVERY DAY
8. MOVING OR SPEAKING SO SLOWLY THAT OTHER PEOPLE COULD HAVE NOTICED. OR THE OPPOSITE, BEING SO FIGETY OR RESTLESS THAT YOU HAVE BEEN MOVING AROUND A LOT MORE THAN USUAL: NOT AT ALL
SUM OF ALL RESPONSES TO PHQ QUESTIONS 1-9: 20
7. TROUBLE CONCENTRATING ON THINGS, SUCH AS READING THE NEWSPAPER OR WATCHING TELEVISION: NEARLY EVERY DAY
9. THOUGHTS THAT YOU WOULD BE BETTER OFF DEAD, OR OF HURTING YOURSELF: NOT AT ALL
SUM OF ALL RESPONSES TO PHQ QUESTIONS 1-9: 20
10. IF YOU CHECKED OFF ANY PROBLEMS, HOW DIFFICULT HAVE THESE PROBLEMS MADE IT FOR YOU TO DO YOUR WORK, TAKE CARE OF THINGS AT HOME, OR GET ALONG WITH OTHER PEOPLE: VERY DIFFICULT
3. TROUBLE FALLING OR STAYING ASLEEP: NEARLY EVERY DAY
SUM OF ALL RESPONSES TO PHQ QUESTIONS 1-9: 20
2. FEELING DOWN, DEPRESSED OR HOPELESS: NEARLY EVERY DAY
SUM OF ALL RESPONSES TO PHQ QUESTIONS 1-9: 20
5. POOR APPETITE OR OVEREATING: NEARLY EVERY DAY

## 2025-03-19 ASSESSMENT — COLUMBIA-SUICIDE SEVERITY RATING SCALE - C-SSRS
6. HAVE YOU EVER DONE ANYTHING, STARTED TO DO ANYTHING, OR PREPARED TO DO ANYTHING TO END YOUR LIFE?: NO
1. WITHIN THE PAST MONTH, HAVE YOU WISHED YOU WERE DEAD OR WISHED YOU COULD GO TO SLEEP AND NOT WAKE UP?: NO
2. HAVE YOU ACTUALLY HAD ANY THOUGHTS OF KILLING YOURSELF?: NO

## 2025-03-19 NOTE — PROGRESS NOTES
Post-Discharge Transitional Care  Follow Up      Delano Daigle   YOB: 1945    Date of Office Visit:  3/19/2025  Date of Hospital Admission: 3/3/25  Date of Hospital Discharge: 3/7/25  Risk of hospital readmission (high >=14%. Medium >=10%) :Readmission Risk Score: 17.4      Care management risk score Rising risk (score 2-5) and Complex Care (Scores >=6): No Risk Score On File     Non face to face  following discharge, date last encounter closed (first attempt may have been earlier): 03/11/2025    Call initiated 2 business days of discharge: Yes    ASSESSMENT/PLAN:    Diagnosis Orders   1. Hospital discharge follow-up  [place] DISCHARGE MEDS RECONCILED W/ CURRENT OUTPATIENT MED LIST      2. Adrenal insufficiency  Rubio Lemus MD, Endocrinology, [facility]      3. Eczema of lower extremity  Non [facility] - External Referral To Dermatology    [facility]      4. Acute congestive heart failure, unspecified heart failure type ([facility])  D/C pioglitazone.   Low sodium diet.   Daily weights  Diuretic  Losartan.   B blker.       5. Pulmonary emphysema, unspecified emphysema type ([facility])  Stable.   Former cigarette smoker.       6. Leg ulcer, unspecified laterality, limited to breakdown of skin ([facility])  [facility]      7. Scrotum swelling  DORIS - Saturnino Cedeño MD, Urology, [facility]  Diuretic.        8. Primary hypertension  amLODIPine (NORVASC) 5 MG tablet    losartan (COZAAR) 25 MG tablet    metoprolol succinate (TOPROL XL) 25 MG extended release tablet          Medical Decision Making: high complexity  No follow-ups on file.           Subjective:   HPI:  Follow up of Hospital problems/diagnosis(es):   Hosp with hypotension, question sepsis, adrenal insufficiency. Better now see note has swollen since been home. Including scrotum which had not swollen before.   Has not taken Eliquis. Just taken medication from hospital stay.   Lower extremity swelling with

## 2025-04-07 ENCOUNTER — TELEPHONE (OUTPATIENT)
Dept: INTERNAL MEDICINE CLINIC | Age: 80
End: 2025-04-07

## 2025-04-07 NOTE — TELEPHONE ENCOUNTER
Patient is having some swelling and weeping in lower leg a lot of fluid and looks a little bit bloody. Patient is scheduled for tomorrow.matilda

## 2025-04-08 ENCOUNTER — OFFICE VISIT (OUTPATIENT)
Dept: INTERNAL MEDICINE CLINIC | Age: 80
End: 2025-04-08
Payer: MEDICARE

## 2025-04-08 VITALS
HEART RATE: 62 BPM | OXYGEN SATURATION: 97 % | BODY MASS INDEX: 29.7 KG/M2 | DIASTOLIC BLOOD PRESSURE: 78 MMHG | SYSTOLIC BLOOD PRESSURE: 134 MMHG | WEIGHT: 207 LBS

## 2025-04-08 DIAGNOSIS — I48.20 ATRIAL FIBRILLATION, CHRONIC (HCC): ICD-10-CM

## 2025-04-08 DIAGNOSIS — E11.8 TYPE 2 DIABETES MELLITUS WITH COMPLICATION, WITHOUT LONG-TERM CURRENT USE OF INSULIN (HCC): Primary | ICD-10-CM

## 2025-04-08 DIAGNOSIS — K52.9 CHRONIC DIARRHEA: ICD-10-CM

## 2025-04-08 DIAGNOSIS — L30.9 ECZEMA, UNSPECIFIED TYPE: ICD-10-CM

## 2025-04-08 DIAGNOSIS — I10 PRIMARY HYPERTENSION: ICD-10-CM

## 2025-04-08 DIAGNOSIS — E78.2 MIXED HYPERLIPIDEMIA: ICD-10-CM

## 2025-04-08 LAB
CHP ED QC CHECK: NORMAL
GLUCOSE BLD-MCNC: 109 MG/DL
HBA1C MFR BLD: 5 %

## 2025-04-08 PROCEDURE — 3078F DIAST BP <80 MM HG: CPT | Performed by: INTERNAL MEDICINE

## 2025-04-08 PROCEDURE — 99214 OFFICE O/P EST MOD 30 MIN: CPT | Performed by: INTERNAL MEDICINE

## 2025-04-08 PROCEDURE — G8427 DOCREV CUR MEDS BY ELIG CLIN: HCPCS | Performed by: INTERNAL MEDICINE

## 2025-04-08 PROCEDURE — 83036 HEMOGLOBIN GLYCOSYLATED A1C: CPT | Performed by: INTERNAL MEDICINE

## 2025-04-08 PROCEDURE — 1123F ACP DISCUSS/DSCN MKR DOCD: CPT | Performed by: INTERNAL MEDICINE

## 2025-04-08 PROCEDURE — 3075F SYST BP GE 130 - 139MM HG: CPT | Performed by: INTERNAL MEDICINE

## 2025-04-08 PROCEDURE — 3044F HG A1C LEVEL LT 7.0%: CPT | Performed by: INTERNAL MEDICINE

## 2025-04-08 PROCEDURE — 82962 GLUCOSE BLOOD TEST: CPT | Performed by: INTERNAL MEDICINE

## 2025-04-08 PROCEDURE — 4004F PT TOBACCO SCREEN RCVD TLK: CPT | Performed by: INTERNAL MEDICINE

## 2025-04-08 PROCEDURE — 1159F MED LIST DOCD IN RCRD: CPT | Performed by: INTERNAL MEDICINE

## 2025-04-08 PROCEDURE — G8417 CALC BMI ABV UP PARAM F/U: HCPCS | Performed by: INTERNAL MEDICINE

## 2025-04-08 PROCEDURE — G2211 COMPLEX E/M VISIT ADD ON: HCPCS | Performed by: INTERNAL MEDICINE

## 2025-04-08 RX ORDER — PETROLATUM,WHITE
OINTMENT IN PACKET (GRAM) TOPICAL
Qty: 368 G | Refills: 3 | Status: SHIPPED | OUTPATIENT
Start: 2025-04-08

## 2025-04-08 RX ORDER — METHYLPREDNISOLONE 4 MG/1
TABLET ORAL
Qty: 21 TABLET | Refills: 0 | Status: SHIPPED | OUTPATIENT
Start: 2025-04-08 | End: 2025-04-14

## 2025-04-08 RX ORDER — INCONTINENCE PAD,LINER,DISP
EACH MISCELLANEOUS
Qty: 64 EACH | Refills: 5 | Status: SHIPPED | OUTPATIENT
Start: 2025-04-08

## 2025-04-08 NOTE — PROGRESS NOTES
Patient: Delano Daigle is a 79 y.o. male who presents today with the following Chief Complaint(s):    Chief Complaint   Patient presents with    Leg Swelling         HPIlegs leaking and edematous. Actually improved from last visit. Scrotal swelling has resolved. No appts were captured by patient. See prior progress note.   Legs demonstrate thickened skin, abraded areas from scratching. Broken skin from scratching, erythematous areas where skin is broken. No purulence noted.   Stool is watery and sprays out.   BM after eating. Right after and at times while eating. No warning. Just comes.   Current Outpatient Medications   Medication Sig Dispense Refill    ELIQUIS 5 MG TABS tablet TAKE 1 TABLET BY MOUTH TWICE DAILY 60 tablet 10    amLODIPine (NORVASC) 5 MG tablet       losartan (COZAAR) 25 MG tablet Take 1 tablet by mouth daily      ezetimibe (ZETIA) 10 MG tablet TAKE 1 TABLET BY MOUTH DAILY FOR HIGH CHOLESTEROL 90 tablet 3    rosuvastatin (CRESTOR) 40 MG tablet TAKE 1 TABLET BY MOUTH DAILY FOR HIGH CHOLESTEROL 90 tablet 3    Lift Chair MISC by Does not apply route 12 to 18 wide foot print 1 each 0    Misc. Devices (BATH/SHOWER SEAT) MISC 1 Units by Does not apply route daily 1 each 0    Misc. Devices (WALL GRAB BAR) MISC 1 Units by Does not apply route daily 4 each 0    Misc. Devices (RAISED TOILET SEAT) MISC 1 Units by Does not apply route daily 1 each 0    metoprolol succinate (TOPROL XL) 25 MG extended release tablet Take 1 tablet by mouth daily (Patient not taking: Reported on 3/19/2025)      pioglitazone (ACTOS) 15 MG tablet TAKE 1 TABLET BY MOUTH EVERY DAY (Patient not taking: Reported on 3/19/2025) 90 tablet 3    midodrine (PROAMATINE) 10 MG tablet Take 1 tablet by mouth 3 times daily (with meals) (Patient not taking: Reported on 3/19/2025) 90 tablet 3    nicotine (NICODERM CQ) 21 MG/24HR Place 1 patch onto the skin daily (Patient not taking: Reported on 3/19/2025) 30 patch 0    aspirin 81 MG EC tablet

## 2025-04-10 DIAGNOSIS — K52.9 CHRONIC DIARRHEA: ICD-10-CM

## 2025-04-10 LAB — C DIFF TOX A+B STL QL IA: NORMAL

## 2025-04-25 NOTE — PROGRESS NOTES
[facility]     Outpatient Cardiology         Patient Name:  Delano Daigle  Primary Care Physician: Jason Dawn MD  04/30/25     Assessment & Plan    Assessment / Plan:     Cardiac arrhythmia-there was suspicion of A-fib in the past.  EKG had a lot of artifact.  Currently in sinus rhythm.  Has normal LV function.  Will stop Eliquis.  Extensive pruritus, eczema-advised to follow-up with dermatology.  Essential hypertension-well-controlled.  Stop midodrine.  Stop Norvasc.  Continue Cozaar and Toprol-XL for now.  Hyperlipidemia-continue Zetia and Crestor.  Coronary calcification on CT-images personally reviewed and interpreted.  Has had a CT PE protocol which shows multivessel coronary calcification.  No angina.  LDL at goal.  Continue aspirin  Has mild leg swelling right more than left.  If leg swelling gets worse, may have to stop Actos.  Follow-up in 6 months            Chief Complaint:     Chief Complaint   Patient presents with    Follow-up visit       History of Present Illness:       HPI     Delano Daigle is a 79 y.o. male with PMH of HTN, HLD, former smoker paroxysmal Afib and DM here for a follow up.  He was recently discharged from the hospital after being treated for possible adrenal insufficiency, exacerbation of chronic heart failure and hypotension.    Today he reports he has skin issues, he keeps scratching and has open sores on his body.  Patient denies any chest pain, shortness of breath, palpitations, presyncope or syncope. No TIA. No claudication. No recent hospitalizations    Reviewed medications, tests and labs      PMH  Past Medical History:   Diagnosis Date    Allergic rhinitis     Atrial fibrillation (HCC)     Eczema     Hearing loss     Hyperlipidemia     Hypertension     Right-sided heart failure (HCC) 03/2025    mild RV dysfunction/cor pulmonale with associated low blood pressure    Type 2 diabetes mellitus without complication (HCC)        PSH  Past

## 2025-04-25 NOTE — PATIENT INSTRUCTIONS
Stop midodrine  Stop Norvasc  Stop Eliquis      Thank you for choosing [facility] for your cardiac care.    During your visit today, we reviewed and confirmed your cardiac medications along with  medication prescribed by your other healthcare team members. Please be sure to discuss any  changes to medication with your providers.    Please bring a list of ALL medications (or the bottles) with you to EVERY appointment.  Also include vitamins and over-the-counter medications.    If you need refills for any cardiac medications, please call your pharmacy and they will reach out to us electronically.    Did your provider order testing today? If yes, then you will receive your results in three  possible ways. You can receive a [facility] [employer] message, a phone call, or letter in the mail. Please  note, if you are an active [employer] user, some of your testing will be available within 1-2 days.    Finally, please know that it is good for your heart to exercise and follow a healthy, low-fat diet  as advised by your physician and health care providers.    If you are experiencing a medical emergency, please call 911 immediately.    It's easy to register for a [facility] [employer] account if you don't already have one. With a [facility] [employer]  account you can manage your health record, view test results, schedule appointments and  more.     Dr. Blakely's clinical staff can be reached at the following phone number: [phone]    If any cardiac testing is ordered, please contact central scheduling at [phone] to get your test scheduled.

## 2025-04-29 ASSESSMENT — ENCOUNTER SYMPTOMS
GASTROINTESTINAL NEGATIVE: 1
ROS SKIN COMMENTS: SEE HPI.
EYES NEGATIVE: 1
RESPIRATORY NEGATIVE: 1

## 2025-04-30 ENCOUNTER — OFFICE VISIT (OUTPATIENT)
Dept: CARDIOLOGY CLINIC | Age: 80
End: 2025-04-30
Payer: MEDICARE

## 2025-04-30 VITALS
OXYGEN SATURATION: 99 % | HEART RATE: 82 BPM | WEIGHT: 200 LBS | DIASTOLIC BLOOD PRESSURE: 80 MMHG | BODY MASS INDEX: 28.7 KG/M2 | SYSTOLIC BLOOD PRESSURE: 130 MMHG

## 2025-04-30 DIAGNOSIS — E11.59 HYPERTENSION ASSOCIATED WITH DIABETES (HCC): ICD-10-CM

## 2025-04-30 DIAGNOSIS — I48.91 ATRIAL FIBRILLATION, UNSPECIFIED TYPE (HCC): Primary | ICD-10-CM

## 2025-04-30 DIAGNOSIS — I15.2 HYPERTENSION ASSOCIATED WITH DIABETES (HCC): ICD-10-CM

## 2025-04-30 LAB
ALBUMIN SERPL-MCNC: 1.9 G/DL (ref 3.4–5)
ALBUMIN/GLOB SERPL: 0.5 {RATIO} (ref 1.1–2.2)
ALP SERPL-CCNC: 136 U/L (ref 40–129)
ALT SERPL-CCNC: 12 U/L (ref 10–40)
ANION GAP SERPL CALCULATED.3IONS-SCNC: 7 MMOL/L (ref 3–16)
AST SERPL-CCNC: 27 U/L (ref 15–37)
BASOPHILS # BLD: 0 K/UL (ref 0–0.2)
BASOPHILS NFR BLD: 0.5 %
BILIRUB SERPL-MCNC: 0.5 MG/DL (ref 0–1)
BUN SERPL-MCNC: 12 MG/DL (ref 7–20)
CALCIUM SERPL-MCNC: 7.9 MG/DL (ref 8.3–10.6)
CHLORIDE SERPL-SCNC: 105 MMOL/L (ref 99–110)
CO2 SERPL-SCNC: 28 MMOL/L (ref 21–32)
CREAT SERPL-MCNC: 1.1 MG/DL (ref 0.8–1.3)
DEPRECATED RDW RBC AUTO: 18.2 % (ref 12.4–15.4)
EOSINOPHIL # BLD: 0.3 K/UL (ref 0–0.6)
EOSINOPHIL NFR BLD: 3.2 %
GFR SERPLBLD CREATININE-BSD FMLA CKD-EPI: 68 ML/MIN/{1.73_M2}
GLUCOSE SERPL-MCNC: 96 MG/DL (ref 70–99)
HCT VFR BLD AUTO: 34.5 % (ref 40.5–52.5)
HGB BLD-MCNC: 11.2 G/DL (ref 13.5–17.5)
LYMPHOCYTES # BLD: 1.3 K/UL (ref 1–5.1)
LYMPHOCYTES NFR BLD: 15.8 %
MCH RBC QN AUTO: 31.9 PG (ref 26–34)
MCHC RBC AUTO-ENTMCNC: 32.4 G/DL (ref 31–36)
MCV RBC AUTO: 98.3 FL (ref 80–100)
MONOCYTES # BLD: 0.8 K/UL (ref 0–1.3)
MONOCYTES NFR BLD: 10.1 %
NEUTROPHILS # BLD: 5.6 K/UL (ref 1.7–7.7)
NEUTROPHILS NFR BLD: 70.4 %
PLATELET # BLD AUTO: 191 K/UL (ref 135–450)
PMV BLD AUTO: 9 FL (ref 5–10.5)
POTASSIUM SERPL-SCNC: 4.9 MMOL/L (ref 3.5–5.1)
PROT SERPL-MCNC: 5.5 G/DL (ref 6.4–8.2)
RBC # BLD AUTO: 3.51 M/UL (ref 4.2–5.9)
SODIUM SERPL-SCNC: 140 MMOL/L (ref 136–145)
WBC # BLD AUTO: 8 K/UL (ref 4–11)

## 2025-04-30 PROCEDURE — 1160F RVW MEDS BY RX/DR IN RCRD: CPT | Performed by: INTERNAL MEDICINE

## 2025-04-30 PROCEDURE — 3079F DIAST BP 80-89 MM HG: CPT | Performed by: INTERNAL MEDICINE

## 2025-04-30 PROCEDURE — 3044F HG A1C LEVEL LT 7.0%: CPT | Performed by: INTERNAL MEDICINE

## 2025-04-30 PROCEDURE — 93000 ELECTROCARDIOGRAM COMPLETE: CPT | Performed by: INTERNAL MEDICINE

## 2025-04-30 PROCEDURE — 1159F MED LIST DOCD IN RCRD: CPT | Performed by: INTERNAL MEDICINE

## 2025-04-30 PROCEDURE — 99214 OFFICE O/P EST MOD 30 MIN: CPT | Performed by: INTERNAL MEDICINE

## 2025-04-30 PROCEDURE — 3075F SYST BP GE 130 - 139MM HG: CPT | Performed by: INTERNAL MEDICINE

## 2025-04-30 PROCEDURE — 1123F ACP DISCUSS/DSCN MKR DOCD: CPT | Performed by: INTERNAL MEDICINE

## 2025-05-01 ENCOUNTER — RESULTS FOLLOW-UP (OUTPATIENT)
Dept: CARDIOLOGY | Age: 80
End: 2025-05-01

## 2025-06-12 DIAGNOSIS — I10 PRIMARY HYPERTENSION: ICD-10-CM

## 2025-06-12 RX ORDER — LOSARTAN POTASSIUM 25 MG/1
25 TABLET ORAL DAILY
Qty: 30 TABLET | Refills: 5 | Status: SHIPPED | OUTPATIENT
Start: 2025-06-12

## 2025-06-12 NOTE — TELEPHONE ENCOUNTER
Medication:   Requested Prescriptions     Pending Prescriptions Disp Refills    losartan (COZAAR) 25 MG tablet 30 tablet      Sig: Take 1 tablet by mouth daily        Last Filled:      Patient Phone Number: [phone] (home)     Last appt: 4/8/2025   Next appt: Visit date not found    Last OARRS:        No data to display

## 2025-06-23 ENCOUNTER — APPOINTMENT (OUTPATIENT)
Dept: GENERAL RADIOLOGY | Age: 80
DRG: 682 | End: 2025-06-23
Payer: MEDICARE

## 2025-06-23 ENCOUNTER — HOSPITAL ENCOUNTER (INPATIENT)
Age: 80
LOS: 21 days | Discharge: NURSING, REHAB OR LONG-TERM CARE | DRG: 682 | End: 2025-07-14
Attending: EMERGENCY MEDICINE | Admitting: INTERNAL MEDICINE
Payer: MEDICARE

## 2025-06-23 DIAGNOSIS — R53.1 GENERALIZED WEAKNESS: Primary | ICD-10-CM

## 2025-06-23 DIAGNOSIS — R78.81 BACTEREMIA: ICD-10-CM

## 2025-06-23 DIAGNOSIS — R19.7 DIARRHEA, UNSPECIFIED TYPE: ICD-10-CM

## 2025-06-23 DIAGNOSIS — E86.0 DEHYDRATION: ICD-10-CM

## 2025-06-23 DIAGNOSIS — L02.91 ABSCESS OF MULTIPLE SITES: ICD-10-CM

## 2025-06-23 PROBLEM — N17.9 ARF (ACUTE RENAL FAILURE): Status: ACTIVE | Noted: 2025-06-23

## 2025-06-23 LAB
ALBUMIN SERPL-MCNC: 1.2 G/DL (ref 3.4–5)
ALBUMIN/GLOB SERPL: 0.3 {RATIO} (ref 1.1–2.2)
ALP SERPL-CCNC: 95 U/L (ref 40–129)
ALT SERPL-CCNC: 11 U/L (ref 10–40)
ANION GAP SERPL CALCULATED.3IONS-SCNC: 11 MMOL/L (ref 3–16)
AST SERPL-CCNC: 28 U/L (ref 15–37)
BASE EXCESS BLDV CALC-SCNC: 1.2 MMOL/L (ref -2–3)
BASOPHILS # BLD: 0.1 K/UL (ref 0–0.2)
BASOPHILS NFR BLD: 0.7 %
BILIRUB SERPL-MCNC: 0.3 MG/DL (ref 0–1)
BUN SERPL-MCNC: 16 MG/DL (ref 7–20)
CALCIUM SERPL-MCNC: 6.8 MG/DL (ref 8.3–10.6)
CHLORIDE SERPL-SCNC: 106 MMOL/L (ref 99–110)
CHP ED QC CHECK: YES
CO2 BLDV-SCNC: 27 MMOL/L
CO2 SERPL-SCNC: 20 MMOL/L (ref 21–32)
COHGB MFR BLDV: 2.7 % (ref 0–1.5)
CREAT SERPL-MCNC: 1.4 MG/DL (ref 0.8–1.3)
DEPRECATED RDW RBC AUTO: 16 % (ref 12.4–15.4)
DO-HGB MFR BLDV: 6.7 %
EOSINOPHIL # BLD: 0.1 K/UL (ref 0–0.6)
EOSINOPHIL NFR BLD: 1 %
GFR SERPLBLD CREATININE-BSD FMLA CKD-EPI: 51 ML/MIN/{1.73_M2}
GLUCOSE SERPL-MCNC: 93 MG/DL (ref 70–99)
HCO3 BLDV-SCNC: 25.5 MMOL/L (ref 24–28)
HCT VFR BLD AUTO: 25.3 % (ref 40.5–52.5)
HEMOCCULT STL QL: NEGATIVE
HGB BLD-MCNC: 8.5 G/DL (ref 13.5–17.5)
LACTATE BLDV-SCNC: 3.5 MMOL/L (ref 0.4–1.9)
LYMPHOCYTES # BLD: 1.2 K/UL (ref 1–5.1)
LYMPHOCYTES NFR BLD: 14.7 %
MCH RBC QN AUTO: 32.4 PG (ref 26–34)
MCHC RBC AUTO-ENTMCNC: 33.7 G/DL (ref 31–36)
MCV RBC AUTO: 96.3 FL (ref 80–100)
METHGB MFR BLDV: 0.1 % (ref 0–1.5)
MONOCYTES # BLD: 0.8 K/UL (ref 0–1.3)
MONOCYTES NFR BLD: 9.3 %
NEUTROPHILS # BLD: 6.2 K/UL (ref 1.7–7.7)
NEUTROPHILS NFR BLD: 74.3 %
PCO2 BLDV: 38.3 MMHG (ref 41–51)
PH BLDV: 7.43 [PH] (ref 7.35–7.45)
PLATELET # BLD AUTO: 123 K/UL (ref 135–450)
PMV BLD AUTO: 7.6 FL (ref 5–10.5)
PO2 BLDV: 63.8 MMHG (ref 25–40)
POTASSIUM SERPL-SCNC: 3.8 MMOL/L (ref 3.5–5.1)
PROT SERPL-MCNC: 4.9 G/DL (ref 6.4–8.2)
RBC # BLD AUTO: 2.63 M/UL (ref 4.2–5.9)
SAO2 % BLDV: 93 %
SODIUM SERPL-SCNC: 137 MMOL/L (ref 136–145)
WBC # BLD AUTO: 8.3 K/UL (ref 4–11)

## 2025-06-23 PROCEDURE — 71045 X-RAY EXAM CHEST 1 VIEW: CPT

## 2025-06-23 PROCEDURE — 83605 ASSAY OF LACTIC ACID: CPT

## 2025-06-23 PROCEDURE — 36415 COLL VENOUS BLD VENIPUNCTURE: CPT

## 2025-06-23 PROCEDURE — 2580000003 HC RX 258: Performed by: EMERGENCY MEDICINE

## 2025-06-23 PROCEDURE — 1200000000 HC SEMI PRIVATE

## 2025-06-23 PROCEDURE — 99285 EMERGENCY DEPT VISIT HI MDM: CPT

## 2025-06-23 PROCEDURE — 82803 BLOOD GASES ANY COMBINATION: CPT

## 2025-06-23 PROCEDURE — 6360000002 HC RX W HCPCS: Performed by: EMERGENCY MEDICINE

## 2025-06-23 PROCEDURE — 80053 COMPREHEN METABOLIC PANEL: CPT

## 2025-06-23 PROCEDURE — 93005 ELECTROCARDIOGRAM TRACING: CPT | Performed by: EMERGENCY MEDICINE

## 2025-06-23 PROCEDURE — 85025 COMPLETE CBC W/AUTO DIFF WBC: CPT

## 2025-06-23 RX ORDER — ACETAMINOPHEN 325 MG/1
650 TABLET ORAL EVERY 6 HOURS PRN
Status: DISCONTINUED | OUTPATIENT
Start: 2025-06-23 | End: 2025-07-14 | Stop reason: HOSPADM

## 2025-06-23 RX ORDER — OXYCODONE HYDROCHLORIDE 5 MG/1
10 TABLET ORAL EVERY 4 HOURS PRN
Refills: 0 | Status: DISCONTINUED | OUTPATIENT
Start: 2025-06-23 | End: 2025-07-14 | Stop reason: HOSPADM

## 2025-06-23 RX ORDER — POTASSIUM CHLORIDE 7.45 MG/ML
10 INJECTION INTRAVENOUS PRN
Status: DISCONTINUED | OUTPATIENT
Start: 2025-06-23 | End: 2025-06-26

## 2025-06-23 RX ORDER — ONDANSETRON 2 MG/ML
4 INJECTION INTRAMUSCULAR; INTRAVENOUS ONCE
Status: COMPLETED | OUTPATIENT
Start: 2025-06-23 | End: 2025-06-23

## 2025-06-23 RX ORDER — SODIUM CHLORIDE 0.9 % (FLUSH) 0.9 %
5-40 SYRINGE (ML) INJECTION EVERY 12 HOURS SCHEDULED
Status: DISCONTINUED | OUTPATIENT
Start: 2025-06-23 | End: 2025-07-14 | Stop reason: HOSPADM

## 2025-06-23 RX ORDER — MAGNESIUM SULFATE IN WATER 40 MG/ML
2000 INJECTION, SOLUTION INTRAVENOUS PRN
Status: DISCONTINUED | OUTPATIENT
Start: 2025-06-23 | End: 2025-06-26

## 2025-06-23 RX ORDER — HYDROMORPHONE HYDROCHLORIDE 1 MG/ML
0.5 INJECTION, SOLUTION INTRAMUSCULAR; INTRAVENOUS; SUBCUTANEOUS
Refills: 0 | Status: DISCONTINUED | OUTPATIENT
Start: 2025-06-23 | End: 2025-06-24

## 2025-06-23 RX ORDER — POLYETHYLENE GLYCOL 3350 17 G/17G
17 POWDER, FOR SOLUTION ORAL DAILY PRN
Status: DISCONTINUED | OUTPATIENT
Start: 2025-06-23 | End: 2025-07-14 | Stop reason: HOSPADM

## 2025-06-23 RX ORDER — SODIUM CHLORIDE, SODIUM LACTATE, POTASSIUM CHLORIDE, AND CALCIUM CHLORIDE .6; .31; .03; .02 G/100ML; G/100ML; G/100ML; G/100ML
1000 INJECTION, SOLUTION INTRAVENOUS ONCE
Status: COMPLETED | OUTPATIENT
Start: 2025-06-23 | End: 2025-06-23

## 2025-06-23 RX ORDER — POTASSIUM CHLORIDE 7.45 MG/ML
10 INJECTION INTRAVENOUS PRN
Status: DISCONTINUED | OUTPATIENT
Start: 2025-06-23 | End: 2025-06-23 | Stop reason: SDUPTHER

## 2025-06-23 RX ORDER — OXYCODONE HYDROCHLORIDE 5 MG/1
5 TABLET ORAL EVERY 4 HOURS PRN
Refills: 0 | Status: DISCONTINUED | OUTPATIENT
Start: 2025-06-23 | End: 2025-07-14 | Stop reason: HOSPADM

## 2025-06-23 RX ORDER — ONDANSETRON 2 MG/ML
4 INJECTION INTRAMUSCULAR; INTRAVENOUS EVERY 6 HOURS PRN
Status: DISCONTINUED | OUTPATIENT
Start: 2025-06-23 | End: 2025-07-14 | Stop reason: HOSPADM

## 2025-06-23 RX ORDER — HYDROMORPHONE HYDROCHLORIDE 1 MG/ML
0.25 INJECTION, SOLUTION INTRAMUSCULAR; INTRAVENOUS; SUBCUTANEOUS
Refills: 0 | Status: DISCONTINUED | OUTPATIENT
Start: 2025-06-23 | End: 2025-06-24

## 2025-06-23 RX ORDER — SODIUM CHLORIDE 9 MG/ML
INJECTION, SOLUTION INTRAVENOUS PRN
Status: DISCONTINUED | OUTPATIENT
Start: 2025-06-23 | End: 2025-07-14 | Stop reason: HOSPADM

## 2025-06-23 RX ORDER — SODIUM CHLORIDE 9 MG/ML
INJECTION, SOLUTION INTRAVENOUS CONTINUOUS
Status: ACTIVE | OUTPATIENT
Start: 2025-06-23 | End: 2025-06-24

## 2025-06-23 RX ORDER — ONDANSETRON 4 MG/1
4 TABLET, ORALLY DISINTEGRATING ORAL EVERY 8 HOURS PRN
Status: DISCONTINUED | OUTPATIENT
Start: 2025-06-23 | End: 2025-07-14 | Stop reason: HOSPADM

## 2025-06-23 RX ORDER — SODIUM CHLORIDE 0.9 % (FLUSH) 0.9 %
5-40 SYRINGE (ML) INJECTION PRN
Status: DISCONTINUED | OUTPATIENT
Start: 2025-06-23 | End: 2025-07-14 | Stop reason: HOSPADM

## 2025-06-23 RX ORDER — POTASSIUM CHLORIDE 1500 MG/1
40 TABLET, EXTENDED RELEASE ORAL PRN
Status: DISCONTINUED | OUTPATIENT
Start: 2025-06-23 | End: 2025-06-26

## 2025-06-23 RX ORDER — ACETAMINOPHEN 650 MG/1
650 SUPPOSITORY RECTAL EVERY 6 HOURS PRN
Status: DISCONTINUED | OUTPATIENT
Start: 2025-06-23 | End: 2025-07-14 | Stop reason: HOSPADM

## 2025-06-23 RX ADMIN — ONDANSETRON 4 MG: 2 INJECTION, SOLUTION INTRAMUSCULAR; INTRAVENOUS at 22:35

## 2025-06-23 RX ADMIN — SODIUM CHLORIDE, SODIUM LACTATE, POTASSIUM CHLORIDE, AND CALCIUM CHLORIDE 1000 ML: .6; .31; .03; .02 INJECTION, SOLUTION INTRAVENOUS at 21:47

## 2025-06-23 ASSESSMENT — PAIN DESCRIPTION - LOCATION: LOCATION: GENERALIZED

## 2025-06-23 ASSESSMENT — PAIN SCALES - GENERAL: PAINLEVEL_OUTOF10: 10

## 2025-06-23 ASSESSMENT — PAIN - FUNCTIONAL ASSESSMENT: PAIN_FUNCTIONAL_ASSESSMENT: 0-10

## 2025-06-23 ASSESSMENT — PAIN DESCRIPTION - DESCRIPTORS: DESCRIPTORS: ACHING

## 2025-06-24 PROBLEM — E43 SEVERE MALNUTRITION: Chronic | Status: ACTIVE | Noted: 2025-06-24

## 2025-06-24 LAB
ANION GAP SERPL CALCULATED.3IONS-SCNC: 10 MMOL/L (ref 3–16)
BACTERIA URNS QL MICRO: ABNORMAL /HPF
BASOPHILS # BLD: 0.1 K/UL (ref 0–0.2)
BASOPHILS NFR BLD: 0.6 %
BILIRUB UR QL STRIP.AUTO: ABNORMAL
BUN SERPL-MCNC: 15 MG/DL (ref 7–20)
CALCIUM SERPL-MCNC: 7.1 MG/DL (ref 8.3–10.6)
CHLORIDE SERPL-SCNC: 106 MMOL/L (ref 99–110)
CLARITY UR: ABNORMAL
CO2 SERPL-SCNC: 22 MMOL/L (ref 21–32)
COLOR UR: YELLOW
CORTIS 1H P 250 UG ACTH RIV SERPL-MCNC: 29.3 UG/DL
CORTIS 30M P 250 UG ACTH RIV SERPL-MCNC: 26.6 UG/DL
CORTIS SERPL-MCNC: 21.5 UG/DL
CREAT SERPL-MCNC: 1.2 MG/DL (ref 0.8–1.3)
CRP SERPL-MCNC: 46.4 MG/L (ref 0–5.1)
DEPRECATED RDW RBC AUTO: 15.8 % (ref 12.4–15.4)
EKG ATRIAL RATE: 91 BPM
EKG DIAGNOSIS: NORMAL
EKG P AXIS: 73 DEGREES
EKG P-R INTERVAL: 164 MS
EKG Q-T INTERVAL: 362 MS
EKG QRS DURATION: 82 MS
EKG QTC CALCULATION (BAZETT): 445 MS
EKG R AXIS: -41 DEGREES
EKG T AXIS: 38 DEGREES
EKG VENTRICULAR RATE: 91 BPM
EOSINOPHIL # BLD: 0.1 K/UL (ref 0–0.6)
EOSINOPHIL NFR BLD: 1 %
FSH SERPL-ACNC: 4.9 MIU/ML
GFR SERPLBLD CREATININE-BSD FMLA CKD-EPI: 62 ML/MIN/{1.73_M2}
GLUCOSE BLD-MCNC: 103 MG/DL (ref 70–99)
GLUCOSE BLD-MCNC: 162 MG/DL (ref 70–99)
GLUCOSE BLD-MCNC: 94 MG/DL (ref 70–99)
GLUCOSE BLD-MCNC: 96 MG/DL (ref 70–99)
GLUCOSE SERPL-MCNC: 87 MG/DL (ref 70–99)
GLUCOSE UR STRIP.AUTO-MCNC: NEGATIVE MG/DL
HCT VFR BLD AUTO: 29 % (ref 40.5–52.5)
HGB BLD-MCNC: 9.8 G/DL (ref 13.5–17.5)
HGB UR QL STRIP.AUTO: ABNORMAL
KETONES UR STRIP.AUTO-MCNC: NEGATIVE MG/DL
LACTATE BLDV-SCNC: 1.2 MMOL/L (ref 0.4–2)
LACTATE BLDV-SCNC: 3.5 MMOL/L (ref 0.4–1.9)
LEUKOCYTE ESTERASE UR QL STRIP.AUTO: ABNORMAL
LH SERPL-ACNC: 1.9 MIU/ML
LYMPHOCYTES # BLD: 1 K/UL (ref 1–5.1)
LYMPHOCYTES NFR BLD: 11.1 %
MCH RBC QN AUTO: 32.4 PG (ref 26–34)
MCHC RBC AUTO-ENTMCNC: 33.7 G/DL (ref 31–36)
MCV RBC AUTO: 96.2 FL (ref 80–100)
MONOCYTES # BLD: 0.9 K/UL (ref 0–1.3)
MONOCYTES NFR BLD: 9.3 %
NEUTROPHILS # BLD: 7.2 K/UL (ref 1.7–7.7)
NEUTROPHILS NFR BLD: 78 %
NITRITE UR QL STRIP.AUTO: NEGATIVE
PERFORMED ON: ABNORMAL
PERFORMED ON: ABNORMAL
PERFORMED ON: NORMAL
PERFORMED ON: NORMAL
PH UR STRIP.AUTO: 6 [PH] (ref 5–8)
PLATELET # BLD AUTO: 132 K/UL (ref 135–450)
PMV BLD AUTO: 7.6 FL (ref 5–10.5)
POTASSIUM SERPL-SCNC: 3.6 MMOL/L (ref 3.5–5.1)
PROCALCITONIN SERPL IA-MCNC: 0.15 NG/ML (ref 0–0.15)
PROLACTIN SERPL IA-MCNC: 4.9 NG/ML
PROT UR STRIP.AUTO-MCNC: ABNORMAL MG/DL
RBC # BLD AUTO: 3.02 M/UL (ref 4.2–5.9)
RBC #/AREA URNS HPF: ABNORMAL /HPF (ref 0–4)
SODIUM SERPL-SCNC: 138 MMOL/L (ref 136–145)
SP GR UR STRIP.AUTO: >=1.03 (ref 1–1.03)
TSH SERPL DL<=0.005 MIU/L-ACNC: 1.62 UIU/ML (ref 0.27–4.2)
UA COMPLETE W REFLEX CULTURE PNL UR: YES
UA DIPSTICK W REFLEX MICRO PNL UR: YES
URN SPEC COLLECT METH UR: ABNORMAL
UROBILINOGEN UR STRIP-ACNC: 0.2 E.U./DL
WBC # BLD AUTO: 9.3 K/UL (ref 4–11)
WBC #/AREA URNS HPF: ABNORMAL /HPF (ref 0–5)

## 2025-06-24 PROCEDURE — 97535 SELF CARE MNGMENT TRAINING: CPT

## 2025-06-24 PROCEDURE — 87150 DNA/RNA AMPLIFIED PROBE: CPT

## 2025-06-24 PROCEDURE — 87040 BLOOD CULTURE FOR BACTERIA: CPT

## 2025-06-24 PROCEDURE — 97530 THERAPEUTIC ACTIVITIES: CPT

## 2025-06-24 PROCEDURE — 83605 ASSAY OF LACTIC ACID: CPT

## 2025-06-24 PROCEDURE — 97162 PT EVAL MOD COMPLEX 30 MIN: CPT

## 2025-06-24 PROCEDURE — 86140 C-REACTIVE PROTEIN: CPT

## 2025-06-24 PROCEDURE — 80400 ACTH STIMULATION PANEL: CPT

## 2025-06-24 PROCEDURE — 87086 URINE CULTURE/COLONY COUNT: CPT

## 2025-06-24 PROCEDURE — 6370000000 HC RX 637 (ALT 250 FOR IP): Performed by: HOSPITALIST

## 2025-06-24 PROCEDURE — 2500000003 HC RX 250 WO HCPCS: Performed by: INTERNAL MEDICINE

## 2025-06-24 PROCEDURE — 84146 ASSAY OF PROLACTIN: CPT

## 2025-06-24 PROCEDURE — 6360000002 HC RX W HCPCS: Performed by: INTERNAL MEDICINE

## 2025-06-24 PROCEDURE — 2580000003 HC RX 258: Performed by: INTERNAL MEDICINE

## 2025-06-24 PROCEDURE — 82088 ASSAY OF ALDOSTERONE: CPT

## 2025-06-24 PROCEDURE — 84244 ASSAY OF RENIN: CPT

## 2025-06-24 PROCEDURE — 84443 ASSAY THYROID STIM HORMONE: CPT

## 2025-06-24 PROCEDURE — 97116 GAIT TRAINING THERAPY: CPT

## 2025-06-24 PROCEDURE — 36415 COLL VENOUS BLD VENIPUNCTURE: CPT

## 2025-06-24 PROCEDURE — 83001 ASSAY OF GONADOTROPIN (FSH): CPT

## 2025-06-24 PROCEDURE — 84145 PROCALCITONIN (PCT): CPT

## 2025-06-24 PROCEDURE — 87077 CULTURE AEROBIC IDENTIFY: CPT

## 2025-06-24 PROCEDURE — 2060000000 HC ICU INTERMEDIATE R&B

## 2025-06-24 PROCEDURE — 97166 OT EVAL MOD COMPLEX 45 MIN: CPT

## 2025-06-24 PROCEDURE — 83002 ASSAY OF GONADOTROPIN (LH): CPT

## 2025-06-24 PROCEDURE — 85025 COMPLETE CBC W/AUTO DIFF WBC: CPT

## 2025-06-24 PROCEDURE — 84305 ASSAY OF SOMATOMEDIN: CPT

## 2025-06-24 PROCEDURE — 6360000002 HC RX W HCPCS: Performed by: HOSPITALIST

## 2025-06-24 PROCEDURE — 81001 URINALYSIS AUTO W/SCOPE: CPT

## 2025-06-24 PROCEDURE — 87186 SC STD MICRODIL/AGAR DIL: CPT

## 2025-06-24 PROCEDURE — 6370000000 HC RX 637 (ALT 250 FOR IP): Performed by: INTERNAL MEDICINE

## 2025-06-24 PROCEDURE — 80048 BASIC METABOLIC PNL TOTAL CA: CPT

## 2025-06-24 RX ORDER — MUPIROCIN 2 %
1 OINTMENT (GRAM) TOPICAL 2 TIMES DAILY
Status: ON HOLD | COMMUNITY
Start: 2025-06-10 | End: 2025-07-14 | Stop reason: HOSPADM

## 2025-06-24 RX ORDER — AMLODIPINE BESYLATE 5 MG/1
5 TABLET ORAL DAILY
Status: ON HOLD | COMMUNITY
End: 2025-07-14 | Stop reason: HOSPADM

## 2025-06-24 RX ORDER — GLUCAGON 1 MG/ML
1 KIT INJECTION PRN
Status: DISCONTINUED | OUTPATIENT
Start: 2025-06-24 | End: 2025-07-14 | Stop reason: HOSPADM

## 2025-06-24 RX ORDER — DEXTROSE MONOHYDRATE 100 MG/ML
INJECTION, SOLUTION INTRAVENOUS CONTINUOUS PRN
Status: DISCONTINUED | OUTPATIENT
Start: 2025-06-24 | End: 2025-07-14 | Stop reason: HOSPADM

## 2025-06-24 RX ORDER — MIRTAZAPINE 15 MG/1
15 TABLET, FILM COATED ORAL NIGHTLY
Status: DISCONTINUED | OUTPATIENT
Start: 2025-06-24 | End: 2025-07-09

## 2025-06-24 RX ORDER — FLUTICASONE FUROATE AND VILANTEROL 100; 25 UG/1; UG/1
1 POWDER RESPIRATORY (INHALATION) DAILY
Status: ON HOLD | COMMUNITY
End: 2025-06-24

## 2025-06-24 RX ORDER — INSULIN LISPRO 100 [IU]/ML
0-8 INJECTION, SOLUTION INTRAVENOUS; SUBCUTANEOUS
Status: DISCONTINUED | OUTPATIENT
Start: 2025-06-24 | End: 2025-07-14 | Stop reason: HOSPADM

## 2025-06-24 RX ORDER — AMMONIUM LACTATE 12 G/100G
1 LOTION TOPICAL PRN
Status: ON HOLD | COMMUNITY
End: 2025-07-14 | Stop reason: HOSPADM

## 2025-06-24 RX ORDER — ROSUVASTATIN CALCIUM 20 MG/1
40 TABLET, COATED ORAL NIGHTLY
Status: DISCONTINUED | OUTPATIENT
Start: 2025-06-24 | End: 2025-07-06

## 2025-06-24 RX ORDER — COSYNTROPIN 0.25 MG/ML
250 INJECTION, POWDER, FOR SOLUTION INTRAMUSCULAR; INTRAVENOUS ONCE
Status: COMPLETED | OUTPATIENT
Start: 2025-06-24 | End: 2025-06-24

## 2025-06-24 RX ADMIN — SODIUM CHLORIDE, PRESERVATIVE FREE 10 ML: 5 INJECTION INTRAVENOUS at 09:31

## 2025-06-24 RX ADMIN — HYDROMORPHONE HYDROCHLORIDE 0.5 MG: 1 INJECTION, SOLUTION INTRAMUSCULAR; INTRAVENOUS; SUBCUTANEOUS at 09:26

## 2025-06-24 RX ADMIN — SODIUM CHLORIDE: 0.9 INJECTION, SOLUTION INTRAVENOUS at 00:13

## 2025-06-24 RX ADMIN — COSYNTROPIN 250 MCG: 0.25 INJECTION, POWDER, LYOPHILIZED, FOR SOLUTION INTRAMUSCULAR; INTRAVENOUS at 10:00

## 2025-06-24 RX ADMIN — ONDANSETRON 4 MG: 2 INJECTION, SOLUTION INTRAMUSCULAR; INTRAVENOUS at 06:17

## 2025-06-24 RX ADMIN — MIRTAZAPINE 15 MG: 15 TABLET, FILM COATED ORAL at 21:44

## 2025-06-24 RX ADMIN — ACETAMINOPHEN 650 MG: 325 TABLET ORAL at 21:44

## 2025-06-24 RX ADMIN — ROSUVASTATIN CALCIUM 40 MG: 20 TABLET, FILM COATED ORAL at 21:43

## 2025-06-24 RX ADMIN — SODIUM CHLORIDE: 0.9 INJECTION, SOLUTION INTRAVENOUS at 16:30

## 2025-06-24 ASSESSMENT — PAIN SCALES - GENERAL
PAINLEVEL_OUTOF10: 7
PAINLEVEL_OUTOF10: 6
PAINLEVEL_OUTOF10: 0
PAINLEVEL_OUTOF10: 0
PAINLEVEL_OUTOF10: 7
PAINLEVEL_OUTOF10: 0

## 2025-06-24 ASSESSMENT — PAIN DESCRIPTION - ORIENTATION
ORIENTATION: RIGHT;LEFT
ORIENTATION: OUTER

## 2025-06-24 ASSESSMENT — PAIN DESCRIPTION - DESCRIPTORS
DESCRIPTORS: ACHING;DISCOMFORT
DESCRIPTORS: ACHING

## 2025-06-24 ASSESSMENT — PAIN DESCRIPTION - LOCATION
LOCATION: GENERALIZED
LOCATION: OTHER (COMMENT);LEG

## 2025-06-24 ASSESSMENT — PAIN SCALES - WONG BAKER: WONGBAKER_NUMERICALRESPONSE: NO HURT

## 2025-06-24 ASSESSMENT — PAIN DESCRIPTION - PAIN TYPE: TYPE: ACUTE PAIN

## 2025-06-24 NOTE — H&P
V2.0  History and Physical      Name:  Delano Daigle /Age/Sex: 1945  (79 y.o. male)   MRN & CSN:  [id number] & [id number] Encounter Date/Time: 2025 4:42 AM EDT   Location:  6327/6327-01 PCP: Jason Dawn MD       Hospital Day: 2    Assessment and Plan:   Delano Daigle is a 79 y.o. male with significant past medical history of type 2 diabetes, hypertension and hyperlipidemia-admitted in 2025 for septic shock and there was a concern for adrenal insufficiency-he was supposed to follow-up with endocrinologist for his adrenal insufficiency-Per notes he also has chronic diarrhea-he presented to [facility] ER this evening with 24-hour history of weakness,, poor p.o. intake in the setting of chronic diarrhea     #1 poor p.o. intake, diarrhea which is chronic, acute renal failure due to prerenal LAMBERTO present on admission and generalized weakness-concerns for adrenal insufficiency was raised in the previous hospitalization in 2025-patient is not on any treatment for his chronic diarrhea and may be a candidate for cholestyramine    Hold all nephrotoxic medications  fluid resuscitation  Patient is hypotensive and has lactic acidosis  Repeat lactic acid in the morning  There was a concern of adrenal insufficiency in the past and that can be addressed by the primary hospitalist in the morning    #2 acute blood loss anemia-present on admission-normocytic    He has dropped his hemoglobin from 11-8.5 in a space of 2 months  His Eliquis was discontinued by his cardiologist he was found to be in sinus rhythm and normal LVEF  His guaiac study was negative  Consult GI for acute blood loss anemia  Last colonoscopy was  showed hyperplastic polyps  Patient may very benefit from bidirectional scopes- ? Etiology of chronic diarrhea is not clear- ? Microscopic colitis  He has chronic thrombocytopenia with platelet count in about 120s range    3.  Essential hypertension-chronic condition    Patient is

## 2025-06-24 NOTE — FLOWSHEET NOTE
Pt temp 94.9.MD Notifie.  pt alert and oriented x4. Pt denied of chest pain/sob. Plan of care going.   06/24/25 1428   Vitals   Temp (!) 94.9 °F (34.9 °C)   Temp Source Rectal

## 2025-06-24 NOTE — ED PROVIDER NOTES
[facility]  EMERGENCY DEPARTMENT ENCOUNTER          ATTENDING PHYSICIAN NOTE       Date of evaluation: 6/23/2025    Chief Complaint     Fatigue (Pt. Presents to ED via EMS from home with c/o generalized weakness since this AM. )      History of Present Illness     Delano Daigle is a 79 y.o. male with a history of COPD, hyperlipidemia, hypertension, and type 2 diabetes who is presenting today with complaints of generalized fatigue and weakness that began this morning.  The patient states he was fine yesterday.  He also reports that he has been having diarrhea for about a week and his p.o. intake has been next to nothing for the last 4 days.  He denies other symptoms such as nausea or vomiting, chest pain, abdominal pain, or shortness of breath..  No reported fever or urinary symptoms other than frequency..  He does report he has been drinking water.    ASSESSMENT / PLAN  (MEDICAL DECISION MAKING)     INITIAL VITALS: BP: 106/62, Temp: 98.2 °F (36.8 °C), Pulse: 96, Respirations: 17, SpO2: 99 %      Delano Daigle is a 79 y.o. male with a history of COPD, hyperlipidemia, hypertension, and type 2 diabetes here with diarrhea for 7 days and no p.o. intake for the last 4 days, now complaining of generalized weakness and fatigue to the point where he cannot do anything at home.  The patient is generally weak on exam but nothing focal neurologically.  Labs demonstrated hemoglobin of 8.5 with a normal white blood cell count.  Baseline hemoglobin appears to be in the mid 9 range but he is down about 3 g from his most recent hemoglobin in April of this year which was 11.2.  Fecal occult blood testing was negative VBG was unremarkable, lactic acid 3.5.  Creatinine is bumped from baseline and is 1.4.  IV fluids were initiated.  With the ongoing diarrhea and poor p.o. intake as well as the LAMBERTO, it was felt that he would need to be brought into the hospital overnight for IV fluids likely in observation status and

## 2025-06-24 NOTE — CARE COORDINATION
Case Management Assessment  Initial Evaluation    Date/Time of Evaluation: 6/24/2025 11:19 AM  Assessment Completed by: CARLOS MILLS    If patient is discharged prior to next notation, then this note serves as note for discharge by case management.    Patient Name: Delano Daigle                   YOB: 1945  Diagnosis: Dehydration [E86.0]  ARF (acute renal failure) [N17.9]  Generalized weakness [R53.1]  Diarrhea, unspecified type [R19.7]                   Date / Time: 6/23/2025  7:23 PM    Patient Admission Status: Inpatient   Readmission Risk (Low < 19, Mod (19-27), High > 27): Readmission Risk Score: 18.3    Current PCP: Jason Dawn MD  PCP verified by CM? Yes    Chart Reviewed: Yes      History Provided by: Patient  Patient Orientation: Alert and Oriented    Patient Cognition: Alert    Hospitalization in the last 30 days (Readmission):  No    If yes, Readmission Assessment in CM Navigator will be completed.    Advance Directives:      Code Status: Full Code   Patient's Primary Decision Maker is: Legal Next of Kin    Primary Decision Maker: Carlos Daigle - [facility], [place] - [phone]    Discharge Planning:    Patient lives with: Alone Type of Home: Apartment  Primary Care Giver: Self  Patient Support Systems include: Family Members   Current Financial resources: Medicare  Current community resources:    Current services prior to admission: Durable Medical Equipment            Current DME: Cane, Walker, Other (Comment) (life chair and alert button)            Type of Home Care services:  Aide Services    ADLS  Prior functional level: Assistance with the following:, Shopping, Housework  Current functional level: Assistance with the following:, Housework, Shopping, Mobility    PT AM-PAC: 15 /24  OT AM-PAC: 16 /24    Family can provide assistance at DC: No  Would you like Case Management to discuss the discharge plan with any other family members/significant others, and if so, who? No  Plans to Return

## 2025-06-24 NOTE — FLOWSHEET NOTE
RN took pt axillary temp and it was 94.5 .Pt wanted to warm him up before checking his rectal temp. checked his rectal temp few min later and it wa 94. frankie Dodd MD notified. wrapped pt with alot of heating pad/blanket right now. MD aware. Plan of care ongoing.   06/24/25 1152   Vitals   Temp (!) 94 °F (34.4 °C)   Temp Source Rectal   Pulse 65   Heart Rate Source Monitor   Respirations 16   BP (!) 111/57   MAP (Calculated) 75   BP Location Left upper arm   BP Upper/Lower Upper   BP Method Automatic   Patient Position Semi fowlers   Pain Assessment   Pain Assessment None - Denies Pain   Opioid-Induced Sedation   POSS Score 1   RASS   Weathers Agitation Sedation Scale (RASS) 0   Oxygen Therapy   SpO2 94 %   O2 Device None (Room air)

## 2025-06-24 NOTE — ED NOTES
Patient Name: Delano Daigle  : 1945 79 y.o.  MRN: [id number]  ED Room #: B22/B22-22     Chief complaint:   Chief Complaint   Patient presents with    Fatigue     Pt. Presents to ED via EMS from home with c/o generalized weakness since this AM.      Hospital Problem/Diagnosis:   Hospital Problems           Last Modified POA    * (Principal) ARF (acute renal failure) 2025 Yes         O2 Flow Rate:O2 Device: None (Room air)   (if applicable)  Cardiac Rhythm:   (if applicable)  Active LDA's:   Peripheral IV 25 Right Hand (Active)            How does patient ambulate? Unknown, did not assess in the Emergency Department. Pt. Required two RNs to stand at bedside.     2. How does patient take pills? Unknown, no oral medications were given in the Emergency Department    3. Is patient alert? Alert    4. Is patient oriented? To Person, To Place, To Time, To Situation, and Follows Commands    5.   Patient arrived from:  home  Facility Name: ___________________________________________    6. If patient is disoriented or from a Skill Nursing Facility has family been notified of admission? No    7. Patient belongings? Did not inventory pt. Belongings in ED    Disposition of belongings? Kept with Patient     8. Any specific patient or family belongings/needs/dynamics?   a.     9. Miscellaneous comments/pending orders?  a.       If there are any additional questions please reach out to the Emergency Department.   #[phone]     Karl Cottrell, RN  [phone]

## 2025-06-25 ENCOUNTER — APPOINTMENT (OUTPATIENT)
Dept: CT IMAGING | Age: 80
DRG: 682 | End: 2025-06-25
Payer: MEDICARE

## 2025-06-25 ENCOUNTER — APPOINTMENT (OUTPATIENT)
Dept: GENERAL RADIOLOGY | Age: 80
DRG: 682 | End: 2025-06-25
Payer: MEDICARE

## 2025-06-25 LAB
AMMONIA PLAS-SCNC: 42 UMOL/L (ref 16–60)
ANION GAP SERPL CALCULATED.3IONS-SCNC: 7 MMOL/L (ref 3–16)
BASE EXCESS BLDV CALC-SCNC: 2.1 MMOL/L (ref -2–3)
BASOPHILS # BLD: 0.1 K/UL (ref 0–0.2)
BASOPHILS NFR BLD: 0.6 %
BUN SERPL-MCNC: 14 MG/DL (ref 7–20)
CALCIUM SERPL-MCNC: 6.9 MG/DL (ref 8.3–10.6)
CHLORIDE SERPL-SCNC: 110 MMOL/L (ref 99–110)
CO2 BLDV-SCNC: 28 MMOL/L
CO2 SERPL-SCNC: 22 MMOL/L (ref 21–32)
COHGB MFR BLDV: 1.3 % (ref 0–1.5)
CREAT SERPL-MCNC: 1.2 MG/DL (ref 0.8–1.3)
DEPRECATED RDW RBC AUTO: 15.8 % (ref 12.4–15.4)
DO-HGB MFR BLDV: 9.1 %
EOSINOPHIL # BLD: 0.1 K/UL (ref 0–0.6)
EOSINOPHIL NFR BLD: 1.1 %
FERRITIN SERPL IA-MCNC: 410 NG/ML (ref 30–400)
GFR SERPLBLD CREATININE-BSD FMLA CKD-EPI: 62 ML/MIN/{1.73_M2}
GLUCOSE BLD-MCNC: 117 MG/DL (ref 70–99)
GLUCOSE BLD-MCNC: 127 MG/DL (ref 70–99)
GLUCOSE BLD-MCNC: 57 MG/DL (ref 70–99)
GLUCOSE BLD-MCNC: 67 MG/DL (ref 70–99)
GLUCOSE BLD-MCNC: 89 MG/DL (ref 70–99)
GLUCOSE BLD-MCNC: 93 MG/DL (ref 70–99)
GLUCOSE BLD-MCNC: 99 MG/DL (ref 70–99)
GLUCOSE SERPL-MCNC: 75 MG/DL (ref 70–99)
HCO3 BLDV-SCNC: 26.6 MMOL/L (ref 24–28)
HCT VFR BLD AUTO: 25.9 % (ref 40.5–52.5)
HCT VFR BLD AUTO: 26.7 % (ref 40.5–52.5)
HGB BLD-MCNC: 8.7 G/DL (ref 13.5–17.5)
IMM RETICS NFR: 0.27 % (ref 0.21–0.37)
IRON SATN MFR SERPL: 55 % (ref 20–50)
IRON SERPL-MCNC: 31 UG/DL (ref 59–158)
LYMPHOCYTES # BLD: 1.3 K/UL (ref 1–5.1)
LYMPHOCYTES NFR BLD: 13.8 %
MAGNESIUM SERPL-MCNC: 1.72 MG/DL (ref 1.8–2.4)
MCH RBC QN AUTO: 32 PG (ref 26–34)
MCHC RBC AUTO-ENTMCNC: 33.6 G/DL (ref 31–36)
MCV RBC AUTO: 95.3 FL (ref 80–100)
METHGB MFR BLDV: 0.3 % (ref 0–1.5)
MONOCYTES # BLD: 0.8 K/UL (ref 0–1.3)
MONOCYTES NFR BLD: 9.2 %
NEUTROPHILS # BLD: 6.9 K/UL (ref 1.7–7.7)
NEUTROPHILS NFR BLD: 75.3 %
PCO2 BLDV: 40.4 MMHG (ref 41–51)
PERFORMED ON: ABNORMAL
PERFORMED ON: NORMAL
PH BLDV: 7.43 [PH] (ref 7.35–7.45)
PLATELET # BLD AUTO: 127 K/UL (ref 135–450)
PMV BLD AUTO: 7.5 FL (ref 5–10.5)
PO2 BLDV: 57.5 MMHG (ref 25–40)
POTASSIUM SERPL-SCNC: 3.5 MMOL/L (ref 3.5–5.1)
RBC # BLD AUTO: 2.72 M/UL (ref 4.2–5.9)
RETICS # AUTO: 0.01 M/UL
RETICS/RBC NFR AUTO: 0.55 % (ref 0.5–2.18)
SAO2 % BLDV: 91 %
SODIUM SERPL-SCNC: 139 MMOL/L (ref 136–145)
TIBC SERPL-MCNC: 56 UG/DL (ref 260–445)
WBC # BLD AUTO: 9.1 K/UL (ref 4–11)

## 2025-06-25 PROCEDURE — 83735 ASSAY OF MAGNESIUM: CPT

## 2025-06-25 PROCEDURE — 82728 ASSAY OF FERRITIN: CPT

## 2025-06-25 PROCEDURE — 71260 CT THORAX DX C+: CPT

## 2025-06-25 PROCEDURE — 84270 ASSAY OF SEX HORMONE GLOBUL: CPT

## 2025-06-25 PROCEDURE — 6360000004 HC RX CONTRAST MEDICATION: Performed by: HOSPITALIST

## 2025-06-25 PROCEDURE — 80048 BASIC METABOLIC PNL TOTAL CA: CPT

## 2025-06-25 PROCEDURE — 6370000000 HC RX 637 (ALT 250 FOR IP): Performed by: NURSE PRACTITIONER

## 2025-06-25 PROCEDURE — 6360000002 HC RX W HCPCS: Performed by: INTERNAL MEDICINE

## 2025-06-25 PROCEDURE — 85045 AUTOMATED RETICULOCYTE COUNT: CPT

## 2025-06-25 PROCEDURE — 85025 COMPLETE CBC W/AUTO DIFF WBC: CPT

## 2025-06-25 PROCEDURE — 89051 BODY FLUID CELL COUNT: CPT

## 2025-06-25 PROCEDURE — 82803 BLOOD GASES ANY COMBINATION: CPT

## 2025-06-25 PROCEDURE — 83540 ASSAY OF IRON: CPT

## 2025-06-25 PROCEDURE — 97535 SELF CARE MNGMENT TRAINING: CPT

## 2025-06-25 PROCEDURE — 2060000000 HC ICU INTERMEDIATE R&B

## 2025-06-25 PROCEDURE — 83550 IRON BINDING TEST: CPT

## 2025-06-25 PROCEDURE — 6360000002 HC RX W HCPCS: Performed by: HOSPITALIST

## 2025-06-25 PROCEDURE — 6370000000 HC RX 637 (ALT 250 FOR IP): Performed by: INTERNAL MEDICINE

## 2025-06-25 PROCEDURE — 82607 VITAMIN B-12: CPT

## 2025-06-25 PROCEDURE — 2580000003 HC RX 258: Performed by: HOSPITALIST

## 2025-06-25 PROCEDURE — 93005 ELECTROCARDIOGRAM TRACING: CPT | Performed by: HOSPITALIST

## 2025-06-25 PROCEDURE — 6370000000 HC RX 637 (ALT 250 FOR IP): Performed by: HOSPITALIST

## 2025-06-25 PROCEDURE — 70450 CT HEAD/BRAIN W/O DYE: CPT

## 2025-06-25 PROCEDURE — 2500000003 HC RX 250 WO HCPCS: Performed by: INTERNAL MEDICINE

## 2025-06-25 PROCEDURE — 82746 ASSAY OF FOLIC ACID SERUM: CPT

## 2025-06-25 PROCEDURE — 2500000003 HC RX 250 WO HCPCS: Performed by: HOSPITALIST

## 2025-06-25 PROCEDURE — 36415 COLL VENOUS BLD VENIPUNCTURE: CPT

## 2025-06-25 PROCEDURE — 82140 ASSAY OF AMMONIA: CPT

## 2025-06-25 PROCEDURE — 84403 ASSAY OF TOTAL TESTOSTERONE: CPT

## 2025-06-25 PROCEDURE — 71045 X-RAY EXAM CHEST 1 VIEW: CPT

## 2025-06-25 RX ORDER — DIPHENHYDRAMINE HYDROCHLORIDE, ZINC ACETATE 2; .1 G/100G; G/100G
CREAM TOPICAL 3 TIMES DAILY PRN
Status: DISCONTINUED | OUTPATIENT
Start: 2025-06-25 | End: 2025-07-14 | Stop reason: HOSPADM

## 2025-06-25 RX ORDER — MIDODRINE HYDROCHLORIDE 5 MG/1
5 TABLET ORAL
Status: DISCONTINUED | OUTPATIENT
Start: 2025-06-25 | End: 2025-07-04

## 2025-06-25 RX ORDER — CALAMINE 8% AND ZINC OXIDE 8% 160 MG/ML
LOTION TOPICAL 3 TIMES DAILY
Status: DISCONTINUED | OUTPATIENT
Start: 2025-06-25 | End: 2025-07-14 | Stop reason: HOSPADM

## 2025-06-25 RX ORDER — DEXTROSE MONOHYDRATE AND SODIUM CHLORIDE 5; .45 G/100ML; G/100ML
INJECTION, SOLUTION INTRAVENOUS CONTINUOUS
Status: ACTIVE | OUTPATIENT
Start: 2025-06-25 | End: 2025-06-26

## 2025-06-25 RX ORDER — TRIAMCINOLONE ACETONIDE 1 MG/G
CREAM TOPICAL 2 TIMES DAILY
Status: DISCONTINUED | OUTPATIENT
Start: 2025-06-25 | End: 2025-07-14 | Stop reason: HOSPADM

## 2025-06-25 RX ORDER — GUAIFENESIN 600 MG/1
600 TABLET, EXTENDED RELEASE ORAL 2 TIMES DAILY
Status: DISCONTINUED | OUTPATIENT
Start: 2025-06-25 | End: 2025-07-14 | Stop reason: HOSPADM

## 2025-06-25 RX ORDER — IOPAMIDOL 755 MG/ML
75 INJECTION, SOLUTION INTRAVASCULAR
Status: COMPLETED | OUTPATIENT
Start: 2025-06-25 | End: 2025-06-25

## 2025-06-25 RX ADMIN — Medication 16 G: at 08:31

## 2025-06-25 RX ADMIN — MIDODRINE HYDROCHLORIDE 5 MG: 5 TABLET ORAL at 17:08

## 2025-06-25 RX ADMIN — DEXTROSE MONOHYDRATE AND SODIUM CHLORIDE: 5; .45 INJECTION, SOLUTION INTRAVENOUS at 10:09

## 2025-06-25 RX ADMIN — DIPHENHYDRAMINE HYDROCHLORIDE, ZINC ACETATE: 2; .1 CREAM TOPICAL at 21:47

## 2025-06-25 RX ADMIN — ROSUVASTATIN CALCIUM 40 MG: 20 TABLET, FILM COATED ORAL at 21:46

## 2025-06-25 RX ADMIN — GUAIFENESIN 600 MG: 600 TABLET, MULTILAYER, EXTENDED RELEASE ORAL at 21:46

## 2025-06-25 RX ADMIN — ONDANSETRON 4 MG: 2 INJECTION, SOLUTION INTRAMUSCULAR; INTRAVENOUS at 11:15

## 2025-06-25 RX ADMIN — FERRIC OXIDE RED: 8; 8 LOTION TOPICAL at 08:28

## 2025-06-25 RX ADMIN — SODIUM CHLORIDE, PRESERVATIVE FREE 10 ML: 5 INJECTION INTRAVENOUS at 21:48

## 2025-06-25 RX ADMIN — SODIUM CHLORIDE, PRESERVATIVE FREE 10 ML: 5 INJECTION INTRAVENOUS at 10:09

## 2025-06-25 RX ADMIN — TRIAMCINOLONE ACETONIDE: 1 CREAM TOPICAL at 08:45

## 2025-06-25 RX ADMIN — GUAIFENESIN 600 MG: 600 TABLET, MULTILAYER, EXTENDED RELEASE ORAL at 08:32

## 2025-06-25 RX ADMIN — TRIAMCINOLONE ACETONIDE: 1 CREAM TOPICAL at 21:48

## 2025-06-25 RX ADMIN — FERRIC OXIDE RED: 8; 8 LOTION TOPICAL at 14:36

## 2025-06-25 RX ADMIN — WATER 1000 MG: 1 INJECTION INTRAMUSCULAR; INTRAVENOUS; SUBCUTANEOUS at 18:30

## 2025-06-25 RX ADMIN — FERRIC OXIDE RED: 8; 8 LOTION TOPICAL at 21:54

## 2025-06-25 RX ADMIN — IOPAMIDOL 75 ML: 755 INJECTION, SOLUTION INTRAVENOUS at 09:22

## 2025-06-25 RX ADMIN — GUAIFENESIN 600 MG: 600 TABLET, MULTILAYER, EXTENDED RELEASE ORAL at 01:21

## 2025-06-25 RX ADMIN — DEXTROSE MONOHYDRATE AND SODIUM CHLORIDE: 5; .45 INJECTION, SOLUTION INTRAVENOUS at 23:31

## 2025-06-25 RX ADMIN — MIRTAZAPINE 15 MG: 15 TABLET, FILM COATED ORAL at 21:47

## 2025-06-25 RX ADMIN — DIPHENHYDRAMINE HYDROCHLORIDE, ZINC ACETATE: 2; .1 CREAM TOPICAL at 08:29

## 2025-06-25 NOTE — CARE COORDINATION
Discharge planning update:    Patient is from a third story apartment alone. He has 3 flights of stairs to go up. He is active with [facility] with aide services (4hrs/week) and MOW (7 meals/week). He has a cane, walker, chair lift and alert button. He is not an active , his friends help with transportation needs. He has a PCP that he is active with (Dr. Jason Dawn).     Pt accepted to [facility] on discharge.  Pre-cert started yesterday. HENs not needed / was already completed.

## 2025-06-26 ENCOUNTER — APPOINTMENT (OUTPATIENT)
Age: 80
DRG: 682 | End: 2025-06-26
Attending: INTERNAL MEDICINE
Payer: MEDICARE

## 2025-06-26 ENCOUNTER — APPOINTMENT (OUTPATIENT)
Dept: ULTRASOUND IMAGING | Age: 80
DRG: 682 | End: 2025-06-26
Payer: MEDICARE

## 2025-06-26 LAB
ALBUMIN FLD-MCNC: <0.2 G/DL
AMYLASE FLD-CCNC: 11 U/L
ANION GAP SERPL CALCULATED.3IONS-SCNC: 6 MMOL/L (ref 3–16)
APPEARANCE FLUID: NORMAL
BACTERIA UR CULT: ABNORMAL
BASOPHILS # BLD: 0 K/UL (ref 0–0.2)
BASOPHILS NFR BLD: 0.4 %
BDY FLUID QUALITY: NORMAL
BUN SERPL-MCNC: 11 MG/DL (ref 7–20)
CALCIUM SERPL-MCNC: 7.3 MG/DL (ref 8.3–10.6)
CELL COUNT FLUID TYPE: NORMAL
CHLORIDE SERPL-SCNC: 110 MMOL/L (ref 99–110)
CO2 SERPL-SCNC: 24 MMOL/L (ref 21–32)
COLOR FLUID: NORMAL
CREAT SERPL-MCNC: 1.1 MG/DL (ref 0.8–1.3)
DEPRECATED RDW RBC AUTO: 16.1 % (ref 12.4–15.4)
ECHO BSA: 2.09 M2
ECHO LV EDV A2C: 110 ML
ECHO LV EDV A4C: 113 ML
ECHO LV EDV INDEX A4C: 54 ML/M2
ECHO LV EDV NDEX A2C: 53 ML/M2
ECHO LV EF PHYSICIAN: 55 %
ECHO LV EJECTION FRACTION A2C: 51 %
ECHO LV EJECTION FRACTION A4C: 53 %
ECHO LV ESV A2C: 54 ML
ECHO LV ESV A4C: 53 ML
ECHO LV ESV INDEX A2C: 26 ML/M2
ECHO LV ESV INDEX A4C: 25 ML/M2
ECHO LV FRACTIONAL SHORTENING: 28 % (ref 28–44)
ECHO LV INTERNAL DIMENSION DIASTOLE INDEX: 2.55 CM/M2
ECHO LV INTERNAL DIMENSION DIASTOLIC: 5.3 CM (ref 4.2–5.9)
ECHO LV INTERNAL DIMENSION SYSTOLIC INDEX: 1.83 CM/M2
ECHO LV INTERNAL DIMENSION SYSTOLIC: 3.8 CM
ECHO LV IVSD: 0.9 CM (ref 0.6–1)
ECHO LV MASS 2D: 187.3 G (ref 88–224)
ECHO LV MASS INDEX 2D: 90 G/M2 (ref 49–115)
ECHO LV POSTERIOR WALL DIASTOLIC: 1 CM (ref 0.6–1)
ECHO LV RELATIVE WALL THICKNESS RATIO: 0.38
ECHO MV REGURGITANT PEAK GRADIENT: 71 MMHG
ECHO MV REGURGITANT PEAK VELOCITY: 4.2 M/S
EKG ATRIAL RATE: 80 BPM
EKG DIAGNOSIS: NORMAL
EKG P AXIS: 35 DEGREES
EKG P-R INTERVAL: 126 MS
EKG Q-T INTERVAL: 416 MS
EKG QRS DURATION: 102 MS
EKG QTC CALCULATION (BAZETT): 479 MS
EKG R AXIS: 47 DEGREES
EKG T AXIS: 66 DEGREES
EKG VENTRICULAR RATE: 80 BPM
EOSINOPHIL # BLD: 0.1 K/UL (ref 0–0.6)
EOSINOPHIL NFR BLD: 1.2 %
FOLATE SERPL-MCNC: 2.49 NG/ML (ref 4.78–24.2)
GFR SERPLBLD CREATININE-BSD FMLA CKD-EPI: 68 ML/MIN/{1.73_M2}
GLUCOSE BLD-MCNC: 103 MG/DL (ref 70–99)
GLUCOSE BLD-MCNC: 147 MG/DL (ref 70–99)
GLUCOSE BLD-MCNC: 155 MG/DL (ref 70–99)
GLUCOSE BLD-MCNC: 81 MG/DL (ref 70–99)
GLUCOSE SERPL-MCNC: 83 MG/DL (ref 70–99)
HCT VFR BLD AUTO: 28.8 % (ref 40.5–52.5)
HGB BLD-MCNC: 9.6 G/DL (ref 13.5–17.5)
INR PPP: 1.6 (ref 0.86–1.14)
LDH FLD L TO P-CCNC: 58 U/L
LYMPHOCYTES # BLD: 0.4 K/UL (ref 1–5.1)
LYMPHOCYTES NFR BLD: 5.4 %
LYMPHOCYTES NFR FLD: 6 %
MACROPHAGES # FLD: 17 %
MAGNESIUM SERPL-MCNC: 1.7 MG/DL (ref 1.8–2.4)
MCH RBC QN AUTO: 31.9 PG (ref 26–34)
MCHC RBC AUTO-ENTMCNC: 33.4 G/DL (ref 31–36)
MCV RBC AUTO: 95.6 FL (ref 80–100)
MESOTHL CELL NFR FLD: 27 %
MONOCYTES # BLD: 0.2 K/UL (ref 0–1.3)
MONOCYTES NFR BLD: 2.9 %
NEUTROPHIL, FLUID: 50 %
NEUTROPHILS # BLD: 7.3 K/UL (ref 1.7–7.7)
NEUTROPHILS NFR BLD: 90.1 %
NUC CELL # FLD: 76 /CUMM
ORGANISM: ABNORMAL
PERFORMED ON: ABNORMAL
PERFORMED ON: NORMAL
PLATELET # BLD AUTO: 120 K/UL (ref 135–450)
PMV BLD AUTO: 7.3 FL (ref 5–10.5)
POTASSIUM SERPL-SCNC: 3.5 MMOL/L (ref 3.5–5.1)
PROT FLD-MCNC: 0.7 G/DL
PROTHROMBIN TIME: 19.2 SEC (ref 12.1–14.9)
RBC # BLD AUTO: 3.01 M/UL (ref 4.2–5.9)
RBC FLUID: <1000 /CUMM
REPORT: NORMAL
SODIUM SERPL-SCNC: 140 MMOL/L (ref 136–145)
SPECIMEN SOURCE FLD: NORMAL
TOTAL CELLS COUNTED FLD: 100
VIT B12 SERPL-MCNC: 1087 PG/ML (ref 211–911)
WBC # BLD AUTO: 8.1 K/UL (ref 4–11)

## 2025-06-26 PROCEDURE — 97535 SELF CARE MNGMENT TRAINING: CPT

## 2025-06-26 PROCEDURE — 6370000000 HC RX 637 (ALT 250 FOR IP): Performed by: NURSE PRACTITIONER

## 2025-06-26 PROCEDURE — 88305 TISSUE EXAM BY PATHOLOGIST: CPT

## 2025-06-26 PROCEDURE — 82150 ASSAY OF AMYLASE: CPT

## 2025-06-26 PROCEDURE — 6360000002 HC RX W HCPCS: Performed by: NURSE PRACTITIONER

## 2025-06-26 PROCEDURE — 49083 ABD PARACENTESIS W/IMAGING: CPT

## 2025-06-26 PROCEDURE — 83615 LACTATE (LD) (LDH) ENZYME: CPT

## 2025-06-26 PROCEDURE — 85025 COMPLETE CBC W/AUTO DIFF WBC: CPT

## 2025-06-26 PROCEDURE — 85610 PROTHROMBIN TIME: CPT

## 2025-06-26 PROCEDURE — 87205 SMEAR GRAM STAIN: CPT

## 2025-06-26 PROCEDURE — 0W9G3ZZ DRAINAGE OF PERITONEAL CAVITY, PERCUTANEOUS APPROACH: ICD-10-PCS | Performed by: HOSPITALIST

## 2025-06-26 PROCEDURE — 93308 TTE F-UP OR LMTD: CPT

## 2025-06-26 PROCEDURE — 87040 BLOOD CULTURE FOR BACTERIA: CPT

## 2025-06-26 PROCEDURE — 87070 CULTURE OTHR SPECIMN AEROBIC: CPT

## 2025-06-26 PROCEDURE — 93308 TTE F-UP OR LMTD: CPT | Performed by: INTERNAL MEDICINE

## 2025-06-26 PROCEDURE — 93325 DOPPLER ECHO COLOR FLOW MAPG: CPT | Performed by: INTERNAL MEDICINE

## 2025-06-26 PROCEDURE — 36415 COLL VENOUS BLD VENIPUNCTURE: CPT

## 2025-06-26 PROCEDURE — 93010 ELECTROCARDIOGRAM REPORT: CPT | Performed by: INTERNAL MEDICINE

## 2025-06-26 PROCEDURE — 83735 ASSAY OF MAGNESIUM: CPT

## 2025-06-26 PROCEDURE — 2060000000 HC ICU INTERMEDIATE R&B

## 2025-06-26 PROCEDURE — 6360000002 HC RX W HCPCS: Performed by: STUDENT IN AN ORGANIZED HEALTH CARE EDUCATION/TRAINING PROGRAM

## 2025-06-26 PROCEDURE — 6370000000 HC RX 637 (ALT 250 FOR IP): Performed by: STUDENT IN AN ORGANIZED HEALTH CARE EDUCATION/TRAINING PROGRAM

## 2025-06-26 PROCEDURE — 82042 OTHER SOURCE ALBUMIN QUAN EA: CPT

## 2025-06-26 PROCEDURE — 80048 BASIC METABOLIC PNL TOTAL CA: CPT

## 2025-06-26 PROCEDURE — 84157 ASSAY OF PROTEIN OTHER: CPT

## 2025-06-26 PROCEDURE — 2580000003 HC RX 258: Performed by: NURSE PRACTITIONER

## 2025-06-26 PROCEDURE — 99223 1ST HOSP IP/OBS HIGH 75: CPT | Performed by: INTERNAL MEDICINE

## 2025-06-26 PROCEDURE — 6370000000 HC RX 637 (ALT 250 FOR IP): Performed by: HOSPITALIST

## 2025-06-26 PROCEDURE — 6360000002 HC RX W HCPCS

## 2025-06-26 PROCEDURE — 2500000003 HC RX 250 WO HCPCS: Performed by: STUDENT IN AN ORGANIZED HEALTH CARE EDUCATION/TRAINING PROGRAM

## 2025-06-26 PROCEDURE — 82465 ASSAY BLD/SERUM CHOLESTEROL: CPT

## 2025-06-26 PROCEDURE — 2709999900 US GALLBLADDER RUQ

## 2025-06-26 PROCEDURE — 88112 CYTOPATH CELL ENHANCE TECH: CPT

## 2025-06-26 PROCEDURE — 97530 THERAPEUTIC ACTIVITIES: CPT

## 2025-06-26 PROCEDURE — 93321 DOPPLER ECHO F-UP/LMTD STD: CPT | Performed by: INTERNAL MEDICINE

## 2025-06-26 RX ORDER — LIDOCAINE HYDROCHLORIDE 10 MG/ML
INJECTION, SOLUTION EPIDURAL; INFILTRATION; INTRACAUDAL; PERINEURAL PRN
Status: COMPLETED | OUTPATIENT
Start: 2025-06-26 | End: 2025-06-26

## 2025-06-26 RX ORDER — MAGNESIUM SULFATE IN WATER 40 MG/ML
2000 INJECTION, SOLUTION INTRAVENOUS ONCE
Status: COMPLETED | OUTPATIENT
Start: 2025-06-26 | End: 2025-06-26

## 2025-06-26 RX ORDER — POTASSIUM CHLORIDE 1500 MG/1
20 TABLET, EXTENDED RELEASE ORAL ONCE
Status: COMPLETED | OUTPATIENT
Start: 2025-06-26 | End: 2025-06-26

## 2025-06-26 RX ADMIN — FERRIC OXIDE RED: 8; 8 LOTION TOPICAL at 21:44

## 2025-06-26 RX ADMIN — MIDODRINE HYDROCHLORIDE 5 MG: 5 TABLET ORAL at 17:30

## 2025-06-26 RX ADMIN — TRIAMCINOLONE ACETONIDE: 1 CREAM TOPICAL at 12:30

## 2025-06-26 RX ADMIN — LIDOCAINE HYDROCHLORIDE 10 ML: 10 INJECTION, SOLUTION EPIDURAL; INFILTRATION; INTRACAUDAL; PERINEURAL at 10:54

## 2025-06-26 RX ADMIN — TRIAMCINOLONE ACETONIDE: 1 CREAM TOPICAL at 21:43

## 2025-06-26 RX ADMIN — POTASSIUM CHLORIDE 20 MEQ: 1500 TABLET, EXTENDED RELEASE ORAL at 08:54

## 2025-06-26 RX ADMIN — MIDODRINE HYDROCHLORIDE 5 MG: 5 TABLET ORAL at 07:57

## 2025-06-26 RX ADMIN — GUAIFENESIN 600 MG: 600 TABLET, MULTILAYER, EXTENDED RELEASE ORAL at 08:54

## 2025-06-26 RX ADMIN — VANCOMYCIN HYDROCHLORIDE 2000 MG: 1 INJECTION, POWDER, LYOPHILIZED, FOR SOLUTION INTRAVENOUS at 03:09

## 2025-06-26 RX ADMIN — FERRIC OXIDE RED: 8; 8 LOTION TOPICAL at 15:34

## 2025-06-26 RX ADMIN — MIRTAZAPINE 15 MG: 15 TABLET, FILM COATED ORAL at 21:43

## 2025-06-26 RX ADMIN — WATER 1000 MG: 1 INJECTION INTRAMUSCULAR; INTRAVENOUS; SUBCUTANEOUS at 08:56

## 2025-06-26 RX ADMIN — MIDODRINE HYDROCHLORIDE 5 MG: 5 TABLET ORAL at 12:27

## 2025-06-26 RX ADMIN — FERRIC OXIDE RED: 8; 8 LOTION TOPICAL at 12:29

## 2025-06-26 RX ADMIN — ROSUVASTATIN CALCIUM 40 MG: 20 TABLET, FILM COATED ORAL at 21:43

## 2025-06-26 RX ADMIN — MAGNESIUM SULFATE HEPTAHYDRATE 2000 MG: 40 INJECTION, SOLUTION INTRAVENOUS at 09:04

## 2025-06-26 RX ADMIN — GUAIFENESIN 600 MG: 600 TABLET, MULTILAYER, EXTENDED RELEASE ORAL at 21:43

## 2025-06-26 NOTE — CARE COORDINATION
Discharge planning update:    Physician update:  CT scan with concerning findings of possible abscess in the kidney.  IV antibiotics: Vancomycin plus Rocephin    Insurance Pre cert back from [facility] when ready for discharge .

## 2025-06-27 ENCOUNTER — APPOINTMENT (OUTPATIENT)
Dept: MRI IMAGING | Age: 80
DRG: 682 | End: 2025-06-27
Payer: MEDICARE

## 2025-06-27 LAB
ALBUMIN SERPL-MCNC: 1.1 G/DL (ref 3.4–5)
ALBUMIN SERPL-MCNC: 1.2 G/DL (ref 3.4–5)
ALBUMIN/GLOB SERPL: 0.3 {RATIO} (ref 1.1–2.2)
ALDOST SERPL-MCNC: 6.9 NG/DL
ALP SERPL-CCNC: 109 U/L (ref 40–129)
ALP SERPL-CCNC: 116 U/L (ref 40–129)
ALT SERPL-CCNC: 11 U/L (ref 10–40)
ALT SERPL-CCNC: 9 U/L (ref 10–40)
ANION GAP SERPL CALCULATED.3IONS-SCNC: 5 MMOL/L (ref 3–16)
AST SERPL-CCNC: 19 U/L (ref 15–37)
AST SERPL-CCNC: 19 U/L (ref 15–37)
BASOPHILS # BLD: 0.1 K/UL (ref 0–0.2)
BASOPHILS NFR BLD: 0.8 %
BILIRUB DIRECT SERPL-MCNC: <0.1 MG/DL (ref 0–0.3)
BILIRUB INDIRECT SERPL-MCNC: ABNORMAL MG/DL (ref 0–1)
BILIRUB SERPL-MCNC: <0.2 MG/DL (ref 0–1)
BILIRUB SERPL-MCNC: <0.2 MG/DL (ref 0–1)
BUN SERPL-MCNC: 9 MG/DL (ref 7–20)
CALCIUM SERPL-MCNC: 6.9 MG/DL (ref 8.3–10.6)
CHLORIDE SERPL-SCNC: 111 MMOL/L (ref 99–110)
CO2 SERPL-SCNC: 24 MMOL/L (ref 21–32)
CREAT SERPL-MCNC: 1 MG/DL (ref 0.8–1.3)
DEPRECATED RDW RBC AUTO: 15.9 % (ref 12.4–15.4)
EOSINOPHIL # BLD: 0.2 K/UL (ref 0–0.6)
EOSINOPHIL NFR BLD: 2.9 %
GFR SERPLBLD CREATININE-BSD FMLA CKD-EPI: 77 ML/MIN/{1.73_M2}
GLUCOSE BLD-MCNC: 76 MG/DL (ref 70–99)
GLUCOSE BLD-MCNC: 82 MG/DL (ref 70–99)
GLUCOSE BLD-MCNC: 83 MG/DL (ref 70–99)
GLUCOSE BLD-MCNC: 92 MG/DL (ref 70–99)
GLUCOSE BLD-MCNC: 93 MG/DL (ref 70–99)
GLUCOSE SERPL-MCNC: 65 MG/DL (ref 70–99)
HAV IGM SERPL QL IA: NORMAL
HBV CORE IGM SERPL QL IA: NORMAL
HBV SURFACE AG SERPL QL IA: NORMAL
HCT VFR BLD AUTO: 26.2 % (ref 40.5–52.5)
HCV AB SERPL QL IA: NORMAL
HGB BLD-MCNC: 8.8 G/DL (ref 13.5–17.5)
IGF-I SERPL-MCNC: 21 NG/ML (ref 20–196)
IGF-I Z-SCORE SERPL: -2.9
LYMPHOCYTES # BLD: 1 K/UL (ref 1–5.1)
LYMPHOCYTES NFR BLD: 14.8 %
MCH RBC QN AUTO: 32.2 PG (ref 26–34)
MCHC RBC AUTO-ENTMCNC: 33.7 G/DL (ref 31–36)
MCV RBC AUTO: 95.5 FL (ref 80–100)
MONOCYTES # BLD: 0.8 K/UL (ref 0–1.3)
MONOCYTES NFR BLD: 12.1 %
NEUTROPHILS # BLD: 4.8 K/UL (ref 1.7–7.7)
NEUTROPHILS NFR BLD: 69.4 %
PERFORMED ON: NORMAL
PLATELET # BLD AUTO: 108 K/UL (ref 135–450)
PMV BLD AUTO: 7.8 FL (ref 5–10.5)
POTASSIUM SERPL-SCNC: 3.6 MMOL/L (ref 3.5–5.1)
PROT SERPL-MCNC: 4.7 G/DL (ref 6.4–8.2)
PROT SERPL-MCNC: 4.8 G/DL (ref 6.4–8.2)
RBC # BLD AUTO: 2.75 M/UL (ref 4.2–5.9)
RENIN PLAS-CCNC: 2.2 NG/ML/HR
SHBG SERPL-SCNC: 66 NMOL/L (ref 19–76)
SODIUM SERPL-SCNC: 140 MMOL/L (ref 136–145)
TESTOST FREE SERPL-MCNC: 2.5 PG/ML (ref 47–244)
TESTOST SERPL-MCNC: 22 NG/DL (ref 193–740)
TRIGL SERPL-MCNC: 52 MG/DL (ref 0–150)
VANCOMYCIN SERPL-MCNC: 20.9 UG/ML
WBC # BLD AUTO: 7 K/UL (ref 4–11)

## 2025-06-27 PROCEDURE — 6370000000 HC RX 637 (ALT 250 FOR IP): Performed by: HOSPITALIST

## 2025-06-27 PROCEDURE — 36415 COLL VENOUS BLD VENIPUNCTURE: CPT

## 2025-06-27 PROCEDURE — 6370000000 HC RX 637 (ALT 250 FOR IP): Performed by: NURSE PRACTITIONER

## 2025-06-27 PROCEDURE — 6360000002 HC RX W HCPCS: Performed by: STUDENT IN AN ORGANIZED HEALTH CARE EDUCATION/TRAINING PROGRAM

## 2025-06-27 PROCEDURE — 85025 COMPLETE CBC W/AUTO DIFF WBC: CPT

## 2025-06-27 PROCEDURE — 74183 MRI ABD W/O CNTR FLWD CNTR: CPT

## 2025-06-27 PROCEDURE — A9576 INJ PROHANCE MULTIPACK: HCPCS | Performed by: INTERNAL MEDICINE

## 2025-06-27 PROCEDURE — 93005 ELECTROCARDIOGRAM TRACING: CPT | Performed by: NURSE PRACTITIONER

## 2025-06-27 PROCEDURE — 80053 COMPREHEN METABOLIC PANEL: CPT

## 2025-06-27 PROCEDURE — 97116 GAIT TRAINING THERAPY: CPT

## 2025-06-27 PROCEDURE — 99233 SBSQ HOSP IP/OBS HIGH 50: CPT | Performed by: INTERNAL MEDICINE

## 2025-06-27 PROCEDURE — 84478 ASSAY OF TRIGLYCERIDES: CPT

## 2025-06-27 PROCEDURE — 2580000003 HC RX 258: Performed by: STUDENT IN AN ORGANIZED HEALTH CARE EDUCATION/TRAINING PROGRAM

## 2025-06-27 PROCEDURE — 2500000003 HC RX 250 WO HCPCS: Performed by: STUDENT IN AN ORGANIZED HEALTH CARE EDUCATION/TRAINING PROGRAM

## 2025-06-27 PROCEDURE — 80074 ACUTE HEPATITIS PANEL: CPT

## 2025-06-27 PROCEDURE — 6360000004 HC RX CONTRAST MEDICATION: Performed by: INTERNAL MEDICINE

## 2025-06-27 PROCEDURE — 80202 ASSAY OF VANCOMYCIN: CPT

## 2025-06-27 PROCEDURE — 2500000003 HC RX 250 WO HCPCS: Performed by: INTERNAL MEDICINE

## 2025-06-27 PROCEDURE — 2060000000 HC ICU INTERMEDIATE R&B

## 2025-06-27 PROCEDURE — 97530 THERAPEUTIC ACTIVITIES: CPT

## 2025-06-27 RX ORDER — GADOTERIDOL 279.3 MG/ML
18 INJECTION INTRAVENOUS
Status: COMPLETED | OUTPATIENT
Start: 2025-06-27 | End: 2025-06-27

## 2025-06-27 RX ADMIN — FERRIC OXIDE RED: 8; 8 LOTION TOPICAL at 15:51

## 2025-06-27 RX ADMIN — MIRTAZAPINE 15 MG: 15 TABLET, FILM COATED ORAL at 20:04

## 2025-06-27 RX ADMIN — TRIAMCINOLONE ACETONIDE: 1 CREAM TOPICAL at 09:30

## 2025-06-27 RX ADMIN — MIDODRINE HYDROCHLORIDE 5 MG: 5 TABLET ORAL at 17:42

## 2025-06-27 RX ADMIN — FERRIC OXIDE RED: 8; 8 LOTION TOPICAL at 20:05

## 2025-06-27 RX ADMIN — FERRIC OXIDE RED: 8; 8 LOTION TOPICAL at 09:30

## 2025-06-27 RX ADMIN — GUAIFENESIN 600 MG: 600 TABLET, MULTILAYER, EXTENDED RELEASE ORAL at 08:34

## 2025-06-27 RX ADMIN — PHENOL 1 SPRAY: 1.5 LIQUID ORAL at 17:43

## 2025-06-27 RX ADMIN — SODIUM CHLORIDE 1500 MG: 0.9 INJECTION, SOLUTION INTRAVENOUS at 03:42

## 2025-06-27 RX ADMIN — ROSUVASTATIN CALCIUM 40 MG: 20 TABLET, FILM COATED ORAL at 20:04

## 2025-06-27 RX ADMIN — WATER 1000 MG: 1 INJECTION INTRAMUSCULAR; INTRAVENOUS; SUBCUTANEOUS at 08:37

## 2025-06-27 RX ADMIN — SODIUM CHLORIDE, PRESERVATIVE FREE 5 ML: 5 INJECTION INTRAVENOUS at 09:10

## 2025-06-27 RX ADMIN — MIDODRINE HYDROCHLORIDE 5 MG: 5 TABLET ORAL at 08:34

## 2025-06-27 RX ADMIN — GADOTERIDOL 18 ML: 279.3 INJECTION, SOLUTION INTRAVENOUS at 15:52

## 2025-06-27 RX ADMIN — GUAIFENESIN 600 MG: 600 TABLET, MULTILAYER, EXTENDED RELEASE ORAL at 20:04

## 2025-06-27 RX ADMIN — MIDODRINE HYDROCHLORIDE 5 MG: 5 TABLET ORAL at 11:20

## 2025-06-27 RX ADMIN — TRIAMCINOLONE ACETONIDE: 1 CREAM TOPICAL at 20:05

## 2025-06-27 NOTE — CARE COORDINATION
Discharge planning update:    Pt with new ascites, pt is s/p paracentesis 6/26 and no growth to date,  though reported to be \"milky\" appearing. No abd pain, less concern for SBP. Gen surg to eval    [facility] - Pt can go if discharged over weekend.

## 2025-06-28 LAB
ALBUMIN SERPL-MCNC: 1.2 G/DL (ref 3.4–5)
ALBUMIN/GLOB SERPL: 0.4 {RATIO} (ref 1.1–2.2)
ALP SERPL-CCNC: 101 U/L (ref 40–129)
ALT SERPL-CCNC: 10 U/L (ref 10–40)
ANION GAP SERPL CALCULATED.3IONS-SCNC: 2 MMOL/L (ref 3–16)
AST SERPL-CCNC: 18 U/L (ref 15–37)
BASOPHILS # BLD: 0.1 K/UL (ref 0–0.2)
BASOPHILS NFR BLD: 0.9 %
BILIRUB SERPL-MCNC: <0.2 MG/DL (ref 0–1)
BUN SERPL-MCNC: 9 MG/DL (ref 7–20)
CALCIUM SERPL-MCNC: 6.7 MG/DL (ref 8.3–10.6)
CHLORIDE SERPL-SCNC: 113 MMOL/L (ref 99–110)
CO2 SERPL-SCNC: 27 MMOL/L (ref 21–32)
CREAT SERPL-MCNC: 0.9 MG/DL (ref 0.8–1.3)
DEPRECATED RDW RBC AUTO: 16 % (ref 12.4–15.4)
EOSINOPHIL # BLD: 0.2 K/UL (ref 0–0.6)
EOSINOPHIL NFR BLD: 2.9 %
GFR SERPLBLD CREATININE-BSD FMLA CKD-EPI: 87 ML/MIN/{1.73_M2}
GLUCOSE BLD-MCNC: 114 MG/DL (ref 70–99)
GLUCOSE BLD-MCNC: 68 MG/DL (ref 70–99)
GLUCOSE BLD-MCNC: 71 MG/DL (ref 70–99)
GLUCOSE BLD-MCNC: 82 MG/DL (ref 70–99)
GLUCOSE BLD-MCNC: 83 MG/DL (ref 70–99)
GLUCOSE SERPL-MCNC: 62 MG/DL (ref 70–99)
HCT VFR BLD AUTO: 26.6 % (ref 40.5–52.5)
HGB BLD-MCNC: 8.9 G/DL (ref 13.5–17.5)
LYMPHOCYTES # BLD: 1.2 K/UL (ref 1–5.1)
LYMPHOCYTES NFR BLD: 19.4 %
MAGNESIUM SERPL-MCNC: 1.94 MG/DL (ref 1.8–2.4)
MCH RBC QN AUTO: 32 PG (ref 26–34)
MCHC RBC AUTO-ENTMCNC: 33.3 G/DL (ref 31–36)
MCV RBC AUTO: 96 FL (ref 80–100)
MONOCYTES # BLD: 0.8 K/UL (ref 0–1.3)
MONOCYTES NFR BLD: 12.9 %
NEUTROPHILS # BLD: 4 K/UL (ref 1.7–7.7)
NEUTROPHILS NFR BLD: 63.9 %
PERFORMED ON: ABNORMAL
PERFORMED ON: ABNORMAL
PERFORMED ON: NORMAL
PLATELET # BLD AUTO: 100 K/UL (ref 135–450)
PMV BLD AUTO: 7.8 FL (ref 5–10.5)
POTASSIUM SERPL-SCNC: 3.5 MMOL/L (ref 3.5–5.1)
PROT SERPL-MCNC: 4.6 G/DL (ref 6.4–8.2)
RBC # BLD AUTO: 2.77 M/UL (ref 4.2–5.9)
SODIUM SERPL-SCNC: 142 MMOL/L (ref 136–145)
WBC # BLD AUTO: 6.3 K/UL (ref 4–11)

## 2025-06-28 PROCEDURE — 6370000000 HC RX 637 (ALT 250 FOR IP): Performed by: NURSE PRACTITIONER

## 2025-06-28 PROCEDURE — 2500000003 HC RX 250 WO HCPCS: Performed by: STUDENT IN AN ORGANIZED HEALTH CARE EDUCATION/TRAINING PROGRAM

## 2025-06-28 PROCEDURE — 85025 COMPLETE CBC W/AUTO DIFF WBC: CPT

## 2025-06-28 PROCEDURE — 6360000002 HC RX W HCPCS: Performed by: STUDENT IN AN ORGANIZED HEALTH CARE EDUCATION/TRAINING PROGRAM

## 2025-06-28 PROCEDURE — 83735 ASSAY OF MAGNESIUM: CPT

## 2025-06-28 PROCEDURE — 6370000000 HC RX 637 (ALT 250 FOR IP): Performed by: STUDENT IN AN ORGANIZED HEALTH CARE EDUCATION/TRAINING PROGRAM

## 2025-06-28 PROCEDURE — 2580000003 HC RX 258: Performed by: STUDENT IN AN ORGANIZED HEALTH CARE EDUCATION/TRAINING PROGRAM

## 2025-06-28 PROCEDURE — 6370000000 HC RX 637 (ALT 250 FOR IP): Performed by: HOSPITALIST

## 2025-06-28 PROCEDURE — 80053 COMPREHEN METABOLIC PANEL: CPT

## 2025-06-28 PROCEDURE — 2500000003 HC RX 250 WO HCPCS: Performed by: INTERNAL MEDICINE

## 2025-06-28 PROCEDURE — 36415 COLL VENOUS BLD VENIPUNCTURE: CPT

## 2025-06-28 PROCEDURE — 2060000000 HC ICU INTERMEDIATE R&B

## 2025-06-28 RX ORDER — CASTOR OIL AND BALSAM, PERU 788; 87 MG/G; MG/G
OINTMENT TOPICAL 2 TIMES DAILY
Status: DISCONTINUED | OUTPATIENT
Start: 2025-06-28 | End: 2025-07-14 | Stop reason: HOSPADM

## 2025-06-28 RX ADMIN — Medication: at 15:32

## 2025-06-28 RX ADMIN — SODIUM CHLORIDE, PRESERVATIVE FREE 10 ML: 5 INJECTION INTRAVENOUS at 19:56

## 2025-06-28 RX ADMIN — FERRIC OXIDE RED: 8; 8 LOTION TOPICAL at 15:32

## 2025-06-28 RX ADMIN — TRIAMCINOLONE ACETONIDE: 1 CREAM TOPICAL at 20:02

## 2025-06-28 RX ADMIN — MIDODRINE HYDROCHLORIDE 5 MG: 5 TABLET ORAL at 17:19

## 2025-06-28 RX ADMIN — MIDODRINE HYDROCHLORIDE 5 MG: 5 TABLET ORAL at 08:38

## 2025-06-28 RX ADMIN — SODIUM CHLORIDE, PRESERVATIVE FREE 10 ML: 5 INJECTION INTRAVENOUS at 08:40

## 2025-06-28 RX ADMIN — GUAIFENESIN 600 MG: 600 TABLET, MULTILAYER, EXTENDED RELEASE ORAL at 20:02

## 2025-06-28 RX ADMIN — MIRTAZAPINE 15 MG: 15 TABLET, FILM COATED ORAL at 20:02

## 2025-06-28 RX ADMIN — MIDODRINE HYDROCHLORIDE 5 MG: 5 TABLET ORAL at 12:43

## 2025-06-28 RX ADMIN — ROSUVASTATIN CALCIUM 40 MG: 20 TABLET, FILM COATED ORAL at 20:02

## 2025-06-28 RX ADMIN — FERRIC OXIDE RED: 8; 8 LOTION TOPICAL at 19:55

## 2025-06-28 RX ADMIN — SODIUM CHLORIDE 1500 MG: 0.9 INJECTION, SOLUTION INTRAVENOUS at 03:20

## 2025-06-28 RX ADMIN — WATER 1000 MG: 1 INJECTION INTRAMUSCULAR; INTRAVENOUS; SUBCUTANEOUS at 08:41

## 2025-06-28 RX ADMIN — TRIAMCINOLONE ACETONIDE: 1 CREAM TOPICAL at 08:46

## 2025-06-28 RX ADMIN — Medication: at 19:56

## 2025-06-28 RX ADMIN — GUAIFENESIN 600 MG: 600 TABLET, MULTILAYER, EXTENDED RELEASE ORAL at 08:38

## 2025-06-28 RX ADMIN — FERRIC OXIDE RED: 8; 8 LOTION TOPICAL at 08:46

## 2025-06-29 LAB
ANION GAP SERPL CALCULATED.3IONS-SCNC: 6 MMOL/L (ref 3–16)
BACTERIA BLD CULT ORG #2: ABNORMAL
BACTERIA BLD CULT: ABNORMAL
BACTERIA FLD AEROBE CULT: NORMAL
BASOPHILS # BLD: 0.1 K/UL (ref 0–0.2)
BASOPHILS NFR BLD: 1.1 %
BUN SERPL-MCNC: 9 MG/DL (ref 7–20)
CALCIUM SERPL-MCNC: 6.9 MG/DL (ref 8.3–10.6)
CHLORIDE SERPL-SCNC: 112 MMOL/L (ref 99–110)
CO2 SERPL-SCNC: 26 MMOL/L (ref 21–32)
CREAT SERPL-MCNC: 1 MG/DL (ref 0.8–1.3)
DEPRECATED RDW RBC AUTO: 16.2 % (ref 12.4–15.4)
EKG ATRIAL RATE: 61 BPM
EKG DIAGNOSIS: NORMAL
EKG P AXIS: 73 DEGREES
EKG P-R INTERVAL: 154 MS
EKG Q-T INTERVAL: 470 MS
EKG QRS DURATION: 88 MS
EKG QTC CALCULATION (BAZETT): 473 MS
EKG R AXIS: -33 DEGREES
EKG T AXIS: 53 DEGREES
EKG VENTRICULAR RATE: 61 BPM
EOSINOPHIL # BLD: 0.2 K/UL (ref 0–0.6)
EOSINOPHIL NFR BLD: 2.5 %
GFR SERPLBLD CREATININE-BSD FMLA CKD-EPI: 77 ML/MIN/{1.73_M2}
GLUCOSE BLD-MCNC: 109 MG/DL (ref 70–99)
GLUCOSE BLD-MCNC: 112 MG/DL (ref 70–99)
GLUCOSE BLD-MCNC: 81 MG/DL (ref 70–99)
GLUCOSE BLD-MCNC: 91 MG/DL (ref 70–99)
GLUCOSE SERPL-MCNC: 66 MG/DL (ref 70–99)
GRAM STN SPEC: NORMAL
HCT VFR BLD AUTO: 31.9 % (ref 40.5–52.5)
HGB BLD-MCNC: 10.6 G/DL (ref 13.5–17.5)
INR PPP: 1.36 (ref 0.86–1.14)
LYMPHOCYTES # BLD: 2 K/UL (ref 1–5.1)
LYMPHOCYTES NFR BLD: 27.8 %
MCH RBC QN AUTO: 32 PG (ref 26–34)
MCHC RBC AUTO-ENTMCNC: 33.2 G/DL (ref 31–36)
MCV RBC AUTO: 96.5 FL (ref 80–100)
MONOCYTES # BLD: 0.7 K/UL (ref 0–1.3)
MONOCYTES NFR BLD: 9.4 %
NEUTROPHILS # BLD: 4.2 K/UL (ref 1.7–7.7)
NEUTROPHILS NFR BLD: 59.2 %
ORGANISM: ABNORMAL
PERFORMED ON: ABNORMAL
PERFORMED ON: ABNORMAL
PERFORMED ON: NORMAL
PERFORMED ON: NORMAL
PLATELET # BLD AUTO: 121 K/UL (ref 135–450)
PMV BLD AUTO: 8 FL (ref 5–10.5)
POTASSIUM SERPL-SCNC: 3.8 MMOL/L (ref 3.5–5.1)
PROTHROMBIN TIME: 16.9 SEC (ref 12.1–14.9)
RBC # BLD AUTO: 3.3 M/UL (ref 4.2–5.9)
SODIUM SERPL-SCNC: 144 MMOL/L (ref 136–145)
WBC # BLD AUTO: 7.1 K/UL (ref 4–11)

## 2025-06-29 PROCEDURE — 80048 BASIC METABOLIC PNL TOTAL CA: CPT

## 2025-06-29 PROCEDURE — 2580000003 HC RX 258: Performed by: INTERNAL MEDICINE

## 2025-06-29 PROCEDURE — 85610 PROTHROMBIN TIME: CPT

## 2025-06-29 PROCEDURE — 87040 BLOOD CULTURE FOR BACTERIA: CPT

## 2025-06-29 PROCEDURE — 99233 SBSQ HOSP IP/OBS HIGH 50: CPT | Performed by: INTERNAL MEDICINE

## 2025-06-29 PROCEDURE — 6360000002 HC RX W HCPCS: Performed by: INTERNAL MEDICINE

## 2025-06-29 PROCEDURE — 6370000000 HC RX 637 (ALT 250 FOR IP): Performed by: NURSE PRACTITIONER

## 2025-06-29 PROCEDURE — 36415 COLL VENOUS BLD VENIPUNCTURE: CPT

## 2025-06-29 PROCEDURE — 2060000000 HC ICU INTERMEDIATE R&B

## 2025-06-29 PROCEDURE — 2500000003 HC RX 250 WO HCPCS: Performed by: INTERNAL MEDICINE

## 2025-06-29 PROCEDURE — 6370000000 HC RX 637 (ALT 250 FOR IP)

## 2025-06-29 PROCEDURE — 93010 ELECTROCARDIOGRAM REPORT: CPT | Performed by: STUDENT IN AN ORGANIZED HEALTH CARE EDUCATION/TRAINING PROGRAM

## 2025-06-29 PROCEDURE — 6370000000 HC RX 637 (ALT 250 FOR IP): Performed by: HOSPITALIST

## 2025-06-29 PROCEDURE — 85025 COMPLETE CBC W/AUTO DIFF WBC: CPT

## 2025-06-29 RX ORDER — CALCIUM CARBONATE 500 MG/1
500 TABLET, CHEWABLE ORAL 3 TIMES DAILY PRN
Status: DISCONTINUED | OUTPATIENT
Start: 2025-06-29 | End: 2025-07-14 | Stop reason: HOSPADM

## 2025-06-29 RX ADMIN — CEFTAROLINE FOSAMIL 600 MG: 600 POWDER, FOR SOLUTION INTRAVENOUS at 18:16

## 2025-06-29 RX ADMIN — GUAIFENESIN 600 MG: 600 TABLET, MULTILAYER, EXTENDED RELEASE ORAL at 08:49

## 2025-06-29 RX ADMIN — FERRIC OXIDE RED: 8; 8 LOTION TOPICAL at 08:55

## 2025-06-29 RX ADMIN — Medication: at 21:27

## 2025-06-29 RX ADMIN — ANTACID TABLETS 500 MG: 500 TABLET, CHEWABLE ORAL at 23:02

## 2025-06-29 RX ADMIN — VANCOMYCIN HYDROCHLORIDE 1750 MG: 10 INJECTION, POWDER, LYOPHILIZED, FOR SOLUTION INTRAVENOUS at 03:59

## 2025-06-29 RX ADMIN — ROSUVASTATIN CALCIUM 40 MG: 20 TABLET, FILM COATED ORAL at 21:27

## 2025-06-29 RX ADMIN — SODIUM CHLORIDE, PRESERVATIVE FREE 10 ML: 5 INJECTION INTRAVENOUS at 08:53

## 2025-06-29 RX ADMIN — MIDODRINE HYDROCHLORIDE 5 MG: 5 TABLET ORAL at 11:28

## 2025-06-29 RX ADMIN — MIDODRINE HYDROCHLORIDE 5 MG: 5 TABLET ORAL at 16:31

## 2025-06-29 RX ADMIN — MIRTAZAPINE 15 MG: 15 TABLET, FILM COATED ORAL at 21:28

## 2025-06-29 RX ADMIN — MIDODRINE HYDROCHLORIDE 5 MG: 5 TABLET ORAL at 08:49

## 2025-06-29 RX ADMIN — Medication: at 08:55

## 2025-06-29 RX ADMIN — GUAIFENESIN 600 MG: 600 TABLET, MULTILAYER, EXTENDED RELEASE ORAL at 21:27

## 2025-06-29 RX ADMIN — FERRIC OXIDE RED: 8; 8 LOTION TOPICAL at 21:27

## 2025-06-29 RX ADMIN — CEFTAROLINE FOSAMIL 600 MG: 600 POWDER, FOR SOLUTION INTRAVENOUS at 11:35

## 2025-06-29 RX ADMIN — TRIAMCINOLONE ACETONIDE: 1 CREAM TOPICAL at 08:55

## 2025-06-29 RX ADMIN — WATER 1000 MG: 1 INJECTION INTRAMUSCULAR; INTRAVENOUS; SUBCUTANEOUS at 08:52

## 2025-06-29 ASSESSMENT — PAIN SCALES - GENERAL
PAINLEVEL_OUTOF10: 0
PAINLEVEL_OUTOF10: 0

## 2025-06-30 ENCOUNTER — HOSPITAL ENCOUNTER (INPATIENT)
Dept: INTERVENTIONAL RADIOLOGY/VASCULAR | Age: 80
Discharge: HOME | DRG: 682 | End: 2025-07-02
Attending: INTERNAL MEDICINE
Payer: MEDICARE

## 2025-06-30 ENCOUNTER — ANESTHESIA EVENT (OUTPATIENT)
Dept: INTERVENTIONAL RADIOLOGY/VASCULAR | Age: 80
End: 2025-06-30
Payer: MEDICARE

## 2025-06-30 ENCOUNTER — ANESTHESIA (OUTPATIENT)
Dept: INTERVENTIONAL RADIOLOGY/VASCULAR | Age: 80
End: 2025-06-30
Payer: MEDICARE

## 2025-06-30 VITALS
SYSTOLIC BLOOD PRESSURE: 114 MMHG | OXYGEN SATURATION: 98 % | HEART RATE: 64 BPM | BODY MASS INDEX: 25.68 KG/M2 | WEIGHT: 189.6 LBS | DIASTOLIC BLOOD PRESSURE: 63 MMHG | TEMPERATURE: 97.2 F | HEIGHT: 72 IN | RESPIRATION RATE: 14 BRPM

## 2025-06-30 PROBLEM — N39.0 UTI DUE TO KLEBSIELLA SPECIES: Status: ACTIVE | Noted: 2025-06-30

## 2025-06-30 PROBLEM — B95.62 MRSA BACTEREMIA: Status: ACTIVE | Noted: 2025-06-30

## 2025-06-30 PROBLEM — B96.89 UTI DUE TO KLEBSIELLA SPECIES: Status: ACTIVE | Noted: 2025-06-30

## 2025-06-30 PROBLEM — N28.1 CYST OF RIGHT KIDNEY: Status: ACTIVE | Noted: 2025-06-30

## 2025-06-30 PROBLEM — L73.2 HIDRADENITIS SUPPURATIVA: Status: ACTIVE | Noted: 2025-06-30

## 2025-06-30 PROBLEM — R78.81 MRSA BACTEREMIA: Status: ACTIVE | Noted: 2025-06-30

## 2025-06-30 LAB
ANION GAP SERPL CALCULATED.3IONS-SCNC: 2 MMOL/L (ref 3–16)
BACTERIA BLD CULT ORG #2: NORMAL
BASOPHILS # BLD: 0.1 K/UL (ref 0–0.2)
BASOPHILS NFR BLD: 0.9 %
BUN SERPL-MCNC: 10 MG/DL (ref 7–20)
CALCIUM SERPL-MCNC: 6.8 MG/DL (ref 8.3–10.6)
CHLORIDE SERPL-SCNC: 114 MMOL/L (ref 99–110)
CHOLEST FLD-MCNC: 29 MG/DL
CO2 SERPL-SCNC: 28 MMOL/L (ref 21–32)
CREAT SERPL-MCNC: 1.1 MG/DL (ref 0.8–1.3)
DEPRECATED RDW RBC AUTO: 16 % (ref 12.4–15.4)
ECHO BSA: 2.09 M2
ECHO LV EF PHYSICIAN: 50 %
EOSINOPHIL # BLD: 0.2 K/UL (ref 0–0.6)
EOSINOPHIL NFR BLD: 3.1 %
GFR SERPLBLD CREATININE-BSD FMLA CKD-EPI: 68 ML/MIN/{1.73_M2}
GLUCOSE BLD-MCNC: 108 MG/DL (ref 70–99)
GLUCOSE BLD-MCNC: 75 MG/DL (ref 70–99)
GLUCOSE BLD-MCNC: 77 MG/DL (ref 70–99)
GLUCOSE BLD-MCNC: 77 MG/DL (ref 70–99)
GLUCOSE BLD-MCNC: 86 MG/DL (ref 70–99)
GLUCOSE SERPL-MCNC: 84 MG/DL (ref 70–99)
HCT VFR BLD AUTO: 30 % (ref 40.5–52.5)
HGB BLD-MCNC: 9.8 G/DL (ref 13.5–17.5)
LYMPHOCYTES # BLD: 1.3 K/UL (ref 1–5.1)
LYMPHOCYTES NFR BLD: 20.7 %
MCH RBC QN AUTO: 31.6 PG (ref 26–34)
MCHC RBC AUTO-ENTMCNC: 32.8 G/DL (ref 31–36)
MCV RBC AUTO: 96.5 FL (ref 80–100)
MONOCYTES # BLD: 0.6 K/UL (ref 0–1.3)
MONOCYTES NFR BLD: 10.7 %
NEUTROPHILS # BLD: 3.9 K/UL (ref 1.7–7.7)
NEUTROPHILS NFR BLD: 64.6 %
PERFORMED ON: ABNORMAL
PERFORMED ON: NORMAL
PLATELET # BLD AUTO: 115 K/UL (ref 135–450)
PMV BLD AUTO: 7.8 FL (ref 5–10.5)
POTASSIUM SERPL-SCNC: 3.8 MMOL/L (ref 3.5–5.1)
RBC # BLD AUTO: 3.11 M/UL (ref 4.2–5.9)
SODIUM SERPL-SCNC: 144 MMOL/L (ref 136–145)
SPECIMEN SOURCE FLD: NORMAL
VANCOMYCIN TROUGH SERPL-MCNC: 19.9 UG/ML (ref 10–20)
WBC # BLD AUTO: 6.1 K/UL (ref 4–11)

## 2025-06-30 PROCEDURE — 2500000003 HC RX 250 WO HCPCS: Performed by: INTERNAL MEDICINE

## 2025-06-30 PROCEDURE — 93320 DOPPLER ECHO COMPLETE: CPT | Performed by: INTERNAL MEDICINE

## 2025-06-30 PROCEDURE — 97535 SELF CARE MNGMENT TRAINING: CPT

## 2025-06-30 PROCEDURE — 97530 THERAPEUTIC ACTIVITIES: CPT

## 2025-06-30 PROCEDURE — 80202 ASSAY OF VANCOMYCIN: CPT

## 2025-06-30 PROCEDURE — 6360000002 HC RX W HCPCS: Performed by: INTERNAL MEDICINE

## 2025-06-30 PROCEDURE — 2580000003 HC RX 258: Performed by: INTERNAL MEDICINE

## 2025-06-30 PROCEDURE — 3700000001 HC ADD 15 MINUTES (ANESTHESIA): Performed by: INTERNAL MEDICINE

## 2025-06-30 PROCEDURE — 6360000002 HC RX W HCPCS

## 2025-06-30 PROCEDURE — 36415 COLL VENOUS BLD VENIPUNCTURE: CPT

## 2025-06-30 PROCEDURE — 99222 1ST HOSP IP/OBS MODERATE 55: CPT | Performed by: INTERNAL MEDICINE

## 2025-06-30 PROCEDURE — 7100000010 HC PHASE II RECOVERY - FIRST 15 MIN: Performed by: INTERNAL MEDICINE

## 2025-06-30 PROCEDURE — 80048 BASIC METABOLIC PNL TOTAL CA: CPT

## 2025-06-30 PROCEDURE — 3700000000 HC ANESTHESIA ATTENDED CARE: Performed by: INTERNAL MEDICINE

## 2025-06-30 PROCEDURE — 97110 THERAPEUTIC EXERCISES: CPT

## 2025-06-30 PROCEDURE — 0TB03ZX EXCISION OF RIGHT KIDNEY, PERCUTANEOUS APPROACH, DIAGNOSTIC: ICD-10-PCS | Performed by: RADIOLOGY

## 2025-06-30 PROCEDURE — 93312 ECHO TRANSESOPHAGEAL: CPT | Performed by: INTERNAL MEDICINE

## 2025-06-30 PROCEDURE — 2580000003 HC RX 258

## 2025-06-30 PROCEDURE — 93312 ECHO TRANSESOPHAGEAL: CPT

## 2025-06-30 PROCEDURE — 2060000000 HC ICU INTERMEDIATE R&B

## 2025-06-30 PROCEDURE — 93325 DOPPLER ECHO COLOR FLOW MAPG: CPT | Performed by: INTERNAL MEDICINE

## 2025-06-30 PROCEDURE — 99233 SBSQ HOSP IP/OBS HIGH 50: CPT | Performed by: INTERNAL MEDICINE

## 2025-06-30 PROCEDURE — 85025 COMPLETE CBC W/AUTO DIFF WBC: CPT

## 2025-06-30 PROCEDURE — 7100000011 HC PHASE II RECOVERY - ADDTL 15 MIN: Performed by: INTERNAL MEDICINE

## 2025-06-30 PROCEDURE — 6370000000 HC RX 637 (ALT 250 FOR IP): Performed by: HOSPITALIST

## 2025-06-30 PROCEDURE — 97116 GAIT TRAINING THERAPY: CPT

## 2025-06-30 PROCEDURE — 6370000000 HC RX 637 (ALT 250 FOR IP): Performed by: NURSE PRACTITIONER

## 2025-06-30 RX ORDER — PROPOFOL 10 MG/ML
INJECTION, EMULSION INTRAVENOUS
Status: DISCONTINUED | OUTPATIENT
Start: 2025-06-30 | End: 2025-06-30 | Stop reason: SDUPTHER

## 2025-06-30 RX ORDER — LIDOCAINE HYDROCHLORIDE 20 MG/ML
INJECTION, SOLUTION INFILTRATION; PERINEURAL
Status: DISCONTINUED | OUTPATIENT
Start: 2025-06-30 | End: 2025-06-30 | Stop reason: SDUPTHER

## 2025-06-30 RX ORDER — SODIUM CHLORIDE, SODIUM LACTATE, POTASSIUM CHLORIDE, CALCIUM CHLORIDE 600; 310; 30; 20 MG/100ML; MG/100ML; MG/100ML; MG/100ML
INJECTION, SOLUTION INTRAVENOUS
Status: DISCONTINUED | OUTPATIENT
Start: 2025-06-30 | End: 2025-06-30 | Stop reason: SDUPTHER

## 2025-06-30 RX ADMIN — MIRTAZAPINE 15 MG: 15 TABLET, FILM COATED ORAL at 21:00

## 2025-06-30 RX ADMIN — CEFTAROLINE FOSAMIL 600 MG: 600 POWDER, FOR SOLUTION INTRAVENOUS at 12:16

## 2025-06-30 RX ADMIN — MIDODRINE HYDROCHLORIDE 5 MG: 5 TABLET ORAL at 12:17

## 2025-06-30 RX ADMIN — LIDOCAINE HYDROCHLORIDE 100 MG: 20 INJECTION, SOLUTION INFILTRATION; PERINEURAL at 10:04

## 2025-06-30 RX ADMIN — TRIAMCINOLONE ACETONIDE: 1 CREAM TOPICAL at 21:27

## 2025-06-30 RX ADMIN — MIDODRINE HYDROCHLORIDE 5 MG: 5 TABLET ORAL at 07:37

## 2025-06-30 RX ADMIN — GUAIFENESIN 600 MG: 600 TABLET, MULTILAYER, EXTENDED RELEASE ORAL at 21:00

## 2025-06-30 RX ADMIN — SODIUM CHLORIDE, PRESERVATIVE FREE 10 ML: 5 INJECTION INTRAVENOUS at 21:28

## 2025-06-30 RX ADMIN — ROSUVASTATIN CALCIUM 40 MG: 20 TABLET, FILM COATED ORAL at 21:00

## 2025-06-30 RX ADMIN — SODIUM CHLORIDE, PRESERVATIVE FREE 5 ML: 5 INJECTION INTRAVENOUS at 09:47

## 2025-06-30 RX ADMIN — SODIUM CHLORIDE, SODIUM LACTATE, POTASSIUM CHLORIDE, AND CALCIUM CHLORIDE: .6; .31; .03; .02 INJECTION, SOLUTION INTRAVENOUS at 09:54

## 2025-06-30 RX ADMIN — PROPOFOL 25 MG: 10 INJECTION, EMULSION INTRAVENOUS at 10:04

## 2025-06-30 RX ADMIN — FERRIC OXIDE RED: 8; 8 LOTION TOPICAL at 13:42

## 2025-06-30 RX ADMIN — CEFTAROLINE FOSAMIL 600 MG: 600 POWDER, FOR SOLUTION INTRAVENOUS at 02:09

## 2025-06-30 RX ADMIN — FERRIC OXIDE RED: 8; 8 LOTION TOPICAL at 09:46

## 2025-06-30 RX ADMIN — VANCOMYCIN HYDROCHLORIDE 1750 MG: 10 INJECTION, POWDER, LYOPHILIZED, FOR SOLUTION INTRAVENOUS at 03:25

## 2025-06-30 RX ADMIN — Medication: at 21:27

## 2025-06-30 RX ADMIN — MIDODRINE HYDROCHLORIDE 5 MG: 5 TABLET ORAL at 18:42

## 2025-06-30 RX ADMIN — GUAIFENESIN 600 MG: 600 TABLET, MULTILAYER, EXTENDED RELEASE ORAL at 12:17

## 2025-06-30 RX ADMIN — TRIAMCINOLONE ACETONIDE: 1 CREAM TOPICAL at 09:47

## 2025-06-30 RX ADMIN — FERRIC OXIDE RED: 8; 8 LOTION TOPICAL at 21:27

## 2025-06-30 RX ADMIN — CEFTAROLINE FOSAMIL 600 MG: 600 POWDER, FOR SOLUTION INTRAVENOUS at 18:47

## 2025-06-30 RX ADMIN — Medication: at 09:46

## 2025-06-30 ASSESSMENT — PAIN SCALES - GENERAL
PAINLEVEL_OUTOF10: 0

## 2025-06-30 NOTE — ANESTHESIA PRE PROCEDURE
Department of Anesthesiology  Preprocedure Note       Name:  Delano Daigle   Age:  79 y.o.  :  1945                                          MRN:  [id number]         Date:  2025      Surgeon: * No surgeons listed *    Procedure: * No procedures listed *    Medications prior to admission:   Prior to Admission medications    Medication Sig Start Date End Date Taking? Authorizing Provider   amLODIPine (NORVASC) 5 MG tablet Take 1 tablet by mouth daily  Patient not taking: Reported on 2025    Jonathan Blue MD   ammonium lactate (LAC-HYDRIN) 12 % lotion Apply 1 g topically as needed for Dry Skin Apply topically as needed.    Jonathan Blue MD   apixaban (ELIQUIS) 5 MG TABS tablet Take 1 tablet by mouth 2 times daily  Patient not taking: Reported on 2025    Jonathan Blue MD   SITagliptin (JANUVIA) 100 MG tablet Take 1 tablet by mouth daily    Jonathan Blue MD   losartan (COZAAR) 25 MG tablet Take 1 tablet by mouth daily 25   Jason Dawn MD   Incontinence Supply Disposable (DEPEND ADJUSTABLE UNDERWEAR LG) MISC Wear daily and change as needed 2 to 3 times daily. 25   Jason Dawn MD   white petrolatum ointment Apply topically as needed. 25   Jason Dawn MD   metoprolol succinate (TOPROL XL) 25 MG extended release tablet Take 1 tablet by mouth daily 3/17/25   Jonathan Blue MD   pioglitazone (ACTOS) 15 MG tablet TAKE 1 TABLET BY MOUTH EVERY DAY 3/7/25   Jason Dawn MD   ezetimibe (ZETIA) 10 MG tablet TAKE 1 TABLET BY MOUTH DAILY FOR HIGH CHOLESTEROL 3/7/25   Jason Dawn MD   rosuvastatin (CRESTOR) 40 MG tablet TAKE 1 TABLET BY MOUTH DAILY FOR HIGH CHOLESTEROL 3/7/25   Jason Dawn MD   nicotine (NICODERM CQ) 21 MG/24HR Place 1 patch onto the skin daily  Patient not taking: Reported on 2025 3/7/25 6/24/25  Bird Mccurdy MD   Lift Chair MISC by Does not apply route 12 to 18 wide foot print 25   Gina Morejon APRN   Misc.

## 2025-06-30 NOTE — ANESTHESIA POSTPROCEDURE EVALUATION
Department of Anesthesiology  Postprocedure Note    Patient: Delano Daigle  MRN: [id number]  YOB: 1945  Date of evaluation: 6/30/2025    Procedure Summary       Date: 06/30/25 Room / Location: [facility] Special Procedures    Anesthesia Start: 0954 Anesthesia Stop: 1011    Procedure: MICHAEL (PRN CONTRAST/BUBBLE/3D) Diagnosis: Bacteremia    Scheduled Providers: Joe Perez MD Responsible Provider: Joe Perez MD    Anesthesia Type: MAC ASA Status: 4            Anesthesia Type: No value filed.    Lissette Phase I: Lissette Score: 10    Lissette Phase II: Lissette Score: 8    Anesthesia Post Evaluation    Patient location during evaluation: PACU  Patient participation: complete - patient participated  Level of consciousness: awake  Airway patency: patent  Nausea & Vomiting: no nausea and no vomiting  Cardiovascular status: blood pressure returned to baseline and hemodynamically stable  Respiratory status: acceptable  Hydration status: euvolemic  Multimodal analgesia pain management approach  Pain management: adequate    No notable events documented.

## 2025-06-30 NOTE — DISCHARGE INSTRUCTIONS
Extra Heart Failure Education/ Tools/ Resources:     https://[employer].com/publication/?l=527062   --- this is American Heart Association interactive Healthier Living with Heart Failure guidebook.  Please click hyperlink or copy / paste link into search bar. The QR Code is also available below. Use your mouse to scroll through the pages.  Lots of information about weight monitoring, diet tips, activity, meds, etc    Heart Failure Tools and Resources QR Code is below. It includes multiple resources to include symptom tracker, med tracker, further HF info, and access to a HF Support Network online Community    HF [place] Zahida  -- this is a free smart phone zahida available for iPhone and Android download.  Use your phone to track sodium / fluid intake, zone tool symptom tracking, weights, medications, etc. Click on this hyperlink  HF [place] Zahida   for QR code for easy download or the link is also found in the below HF Tools and Resources.      DASH (Dietary Approach to Stop Hypertension) diet --  https://www.nhlbi.nih.gov/education/dash-eating-plan -- this diet is a flexible eating plan that promotes heart healthy eating style.  Click on hyperlink or copy / paste link into search bar.  Lots of low sodium recipes and tips.    https://www.[employer] Flow.PurePhoto/recipes  -- more free recipes      Caring for Your Midline  It was a pleasure helping you today.  Call Your Primary Infusion Nurse or Ordering Doctor with any questions. If there are any questions for the Vascular Access Team here at [facility] our number is [phone] leave a message and we will call you back as soon as possible.    We are available Monday through Friday 730 to 5pm.  Thank You    You are going home with a Midline. This small, soft tube has been placed in a vein in your arm. It is often used when treatment requires medications for short term. At home, you need to take care of your Midline to keep it working. And since a Midline line has such a high

## 2025-06-30 NOTE — PROGRESS NOTES
Cath Lab Pre Procedure Flowsheet    Plan of Care:     Hemodynamics and cardiac rhythm will remain stable.   Comfort level will be maintained.   Respiratory function will remain adequate.   Pt/family will verbalize understanding of the procedure.   Procedure will be tolerated without complications.   Patient will recover from procedure without complications.   ID armband on patient and identification verified.   Informed consent obtained.   Non invasive blood pressure cuff applied, monitoring initiated.   EKG pads and pulse oximeter applied, monitoring initiated.   Instructions given. Patient and / or family verbalize understanding.   H&P will be documented by physician in [facility].     Pre-procedure:    NPO Status: Pt has been NPO since midnight. .    Contrast / IV Dye Allergy: None    Pregnancy Test: N/A.    Prep Sites: N/A    Xavier's Test: N/A    Pulses: Bilat Radial 2     Anticoagulants: Eliquis. Last Dose: April 2025.  Any missed doses:  Yes..     Antiplatelets: None.     Chief Complaint:  Possible Endocarditis    Diabetic: Yes, Insulin Treatment    Pre EKG Rhythm: Sinus Rhythm - 60    Pre SBP: 117    IV access: PIV #22 - Infusing    Pre-procedure blood work collected by: Lab    NIH Scale: N/A

## 2025-06-30 NOTE — CARE COORDINATION
DISCHARGE PLANNING:  Chart reviewed.  Patient is from a 3rd floor apartment alone; he has 3 flights of stairs to go up. He is active with [facility] for aide services (4hrs/week) and MOW (7 meals/week). He has a cane, walker, chair lift and alert button.     Current discharge plan is [facility].  Pre-cert was started on 6/24 and is APPROVED.  Admission liaison called me today and stated pre-cert is only good through today; I informed her that we'll likely need to start a new one tomorrow. She stated she is going to ask her team to see if they can get an extension.    MICHAEL today with Cardiology following.  Plan for aspiration vs biopsy of fluid collection on kidney today. Urology and ID following.  Dr. Corea states he will likely need IV ABX at discharge.    CM team will continue to follow.  Debora Waggoner, RN Case Manager  [phone]

## 2025-07-01 ENCOUNTER — APPOINTMENT (OUTPATIENT)
Dept: CT IMAGING | Age: 80
DRG: 682 | End: 2025-07-01
Payer: MEDICARE

## 2025-07-01 LAB
ANION GAP SERPL CALCULATED.3IONS-SCNC: 4 MMOL/L (ref 3–16)
BUN SERPL-MCNC: 10 MG/DL (ref 7–20)
CALCIUM SERPL-MCNC: 6.8 MG/DL (ref 8.3–10.6)
CHLORIDE SERPL-SCNC: 117 MMOL/L (ref 99–110)
CO2 SERPL-SCNC: 24 MMOL/L (ref 21–32)
CREAT SERPL-MCNC: 1.2 MG/DL (ref 0.8–1.3)
GFR SERPLBLD CREATININE-BSD FMLA CKD-EPI: 62 ML/MIN/{1.73_M2}
GLUCOSE BLD-MCNC: 111 MG/DL (ref 70–99)
GLUCOSE BLD-MCNC: 120 MG/DL (ref 70–99)
GLUCOSE BLD-MCNC: 59 MG/DL (ref 70–99)
GLUCOSE BLD-MCNC: 68 MG/DL (ref 70–99)
GLUCOSE BLD-MCNC: 71 MG/DL (ref 70–99)
GLUCOSE BLD-MCNC: 76 MG/DL (ref 70–99)
GLUCOSE BLD-MCNC: 76 MG/DL (ref 70–99)
GLUCOSE BLD-MCNC: 80 MG/DL (ref 70–99)
GLUCOSE SERPL-MCNC: 72 MG/DL (ref 70–99)
PERFORMED ON: ABNORMAL
PERFORMED ON: NORMAL
POTASSIUM SERPL-SCNC: 3.6 MMOL/L (ref 3.5–5.1)
SODIUM SERPL-SCNC: 145 MMOL/L (ref 136–145)

## 2025-07-01 PROCEDURE — 99233 SBSQ HOSP IP/OBS HIGH 50: CPT | Performed by: INTERNAL MEDICINE

## 2025-07-01 PROCEDURE — 99231 SBSQ HOSP IP/OBS SF/LOW 25: CPT | Performed by: INTERNAL MEDICINE

## 2025-07-01 PROCEDURE — 6360000002 HC RX W HCPCS: Performed by: RADIOLOGY

## 2025-07-01 PROCEDURE — 6360000002 HC RX W HCPCS: Performed by: INTERNAL MEDICINE

## 2025-07-01 PROCEDURE — 2709999900 CT BIOPSY RENAL

## 2025-07-01 PROCEDURE — 87186 SC STD MICRODIL/AGAR DIL: CPT

## 2025-07-01 PROCEDURE — 49406 IMAGE CATH FLUID PERI/RETRO: CPT

## 2025-07-01 PROCEDURE — 86403 PARTICLE AGGLUT ANTBDY SCRN: CPT

## 2025-07-01 PROCEDURE — 6360000002 HC RX W HCPCS: Performed by: NURSE PRACTITIONER

## 2025-07-01 PROCEDURE — 80048 BASIC METABOLIC PNL TOTAL CA: CPT

## 2025-07-01 PROCEDURE — 36415 COLL VENOUS BLD VENIPUNCTURE: CPT

## 2025-07-01 PROCEDURE — 87070 CULTURE OTHR SPECIMN AEROBIC: CPT

## 2025-07-01 PROCEDURE — 6370000000 HC RX 637 (ALT 250 FOR IP): Performed by: NURSE PRACTITIONER

## 2025-07-01 PROCEDURE — 2060000000 HC ICU INTERMEDIATE R&B

## 2025-07-01 PROCEDURE — 2500000003 HC RX 250 WO HCPCS: Performed by: INTERNAL MEDICINE

## 2025-07-01 PROCEDURE — 2580000003 HC RX 258: Performed by: NURSE PRACTITIONER

## 2025-07-01 PROCEDURE — 87077 CULTURE AEROBIC IDENTIFY: CPT

## 2025-07-01 PROCEDURE — 2580000003 HC RX 258: Performed by: INTERNAL MEDICINE

## 2025-07-01 PROCEDURE — 87205 SMEAR GRAM STAIN: CPT

## 2025-07-01 PROCEDURE — 6370000000 HC RX 637 (ALT 250 FOR IP): Performed by: HOSPITALIST

## 2025-07-01 RX ORDER — LIDOCAINE HYDROCHLORIDE 10 MG/ML
INJECTION, SOLUTION EPIDURAL; INFILTRATION; INTRACAUDAL; PERINEURAL PRN
Status: COMPLETED | OUTPATIENT
Start: 2025-07-01 | End: 2025-07-01

## 2025-07-01 RX ORDER — FENTANYL CITRATE 50 UG/ML
INJECTION, SOLUTION INTRAMUSCULAR; INTRAVENOUS PRN
Status: COMPLETED | OUTPATIENT
Start: 2025-07-01 | End: 2025-07-01

## 2025-07-01 RX ORDER — MIDAZOLAM HYDROCHLORIDE 1 MG/ML
INJECTION, SOLUTION INTRAMUSCULAR; INTRAVENOUS PRN
Status: COMPLETED | OUTPATIENT
Start: 2025-07-01 | End: 2025-07-01

## 2025-07-01 RX ADMIN — GUAIFENESIN 600 MG: 600 TABLET, MULTILAYER, EXTENDED RELEASE ORAL at 10:59

## 2025-07-01 RX ADMIN — FENTANYL CITRATE 25 MCG: 50 INJECTION, SOLUTION INTRAMUSCULAR; INTRAVENOUS at 09:31

## 2025-07-01 RX ADMIN — DEXTROSE MONOHYDRATE 125 ML: 100 INJECTION, SOLUTION INTRAVENOUS at 05:32

## 2025-07-01 RX ADMIN — FERRIC OXIDE RED: 8; 8 LOTION TOPICAL at 13:44

## 2025-07-01 RX ADMIN — GUAIFENESIN 600 MG: 600 TABLET, MULTILAYER, EXTENDED RELEASE ORAL at 20:23

## 2025-07-01 RX ADMIN — SODIUM CHLORIDE 1250 MG: 0.9 INJECTION, SOLUTION INTRAVENOUS at 03:06

## 2025-07-01 RX ADMIN — MIRTAZAPINE 15 MG: 15 TABLET, FILM COATED ORAL at 20:23

## 2025-07-01 RX ADMIN — MIDODRINE HYDROCHLORIDE 5 MG: 5 TABLET ORAL at 12:20

## 2025-07-01 RX ADMIN — MIDAZOLAM HYDROCHLORIDE 1 MG: 1 INJECTION, SOLUTION INTRAMUSCULAR; INTRAVENOUS at 09:31

## 2025-07-01 RX ADMIN — CEFTAROLINE FOSAMIL 600 MG: 600 POWDER, FOR SOLUTION INTRAVENOUS at 01:43

## 2025-07-01 RX ADMIN — MIDAZOLAM HYDROCHLORIDE 1 MG: 1 INJECTION, SOLUTION INTRAMUSCULAR; INTRAVENOUS at 09:24

## 2025-07-01 RX ADMIN — TRIAMCINOLONE ACETONIDE: 1 CREAM TOPICAL at 20:24

## 2025-07-01 RX ADMIN — LIDOCAINE HYDROCHLORIDE 10 ML: 10 INJECTION, SOLUTION EPIDURAL; INFILTRATION; INTRACAUDAL; PERINEURAL at 09:32

## 2025-07-01 RX ADMIN — FERRIC OXIDE RED: 8; 8 LOTION TOPICAL at 20:23

## 2025-07-01 RX ADMIN — SODIUM CHLORIDE, PRESERVATIVE FREE 10 ML: 5 INJECTION INTRAVENOUS at 20:24

## 2025-07-01 RX ADMIN — CEFTAROLINE FOSAMIL 600 MG: 600 POWDER, FOR SOLUTION INTRAVENOUS at 17:21

## 2025-07-01 RX ADMIN — DEXTROSE MONOHYDRATE 125 ML: 100 INJECTION, SOLUTION INTRAVENOUS at 20:34

## 2025-07-01 RX ADMIN — Medication: at 20:24

## 2025-07-01 RX ADMIN — TRIAMCINOLONE ACETONIDE: 1 CREAM TOPICAL at 08:44

## 2025-07-01 RX ADMIN — SODIUM CHLORIDE, PRESERVATIVE FREE 5 ML: 5 INJECTION INTRAVENOUS at 08:44

## 2025-07-01 RX ADMIN — FENTANYL CITRATE 25 MCG: 50 INJECTION, SOLUTION INTRAMUSCULAR; INTRAVENOUS at 09:24

## 2025-07-01 RX ADMIN — FERRIC OXIDE RED: 8; 8 LOTION TOPICAL at 08:43

## 2025-07-01 RX ADMIN — CEFTAROLINE FOSAMIL 600 MG: 600 POWDER, FOR SOLUTION INTRAVENOUS at 10:58

## 2025-07-01 RX ADMIN — MIDODRINE HYDROCHLORIDE 5 MG: 5 TABLET ORAL at 17:22

## 2025-07-01 RX ADMIN — ROSUVASTATIN CALCIUM 40 MG: 20 TABLET, FILM COATED ORAL at 20:23

## 2025-07-01 RX ADMIN — Medication: at 08:43

## 2025-07-01 ASSESSMENT — PAIN - FUNCTIONAL ASSESSMENT: PAIN_FUNCTIONAL_ASSESSMENT: NONE - DENIES PAIN

## 2025-07-01 ASSESSMENT — PAIN SCALES - GENERAL
PAINLEVEL_OUTOF10: 0

## 2025-07-01 NOTE — SEDATION DOCUMENTATION
IMAGING SERVICES NURSING PROGRESS NOTE    Procedure:  R Renal drain placement  July 1, 2025  Delano Daigle      Allergies:  No Known Allergies    Vitals:    07/01/25 0925   BP: 108/66   Pulse: 71   Resp: 16   Temp:    SpO2: 100%       Recent lab work reviewed with MD: yes   Procedure explained to patient by MD: yes   Informed consent obtained:yes  Family with patient: In pt    Mental Status:  Normal  Readiness to learn:  Yes  Barriers to learning: No    Pain Assessment Pre-Procedure:  Pain Present:  no  Pain Score:  0  Pain Quality/Description:  none    Time out Procedure Verification with:  [x] RN  [x] Physician  [x] Patient  [x] Other: CT Technologist  Procedure site marked, if applicable:  Yes    Note: Patient arrived A & O x 4, denies pain, breathing easily on room air, Spoke to Dr. Maximus Wakefield & Daly Maxwell NP prior to procedure.  Procedural sedation: Put on 2L of O2 via nasal canula  Fentanyl:  50 mcg  Versed:    2 mg  Post Procedureal Note:  Patient tolerated procedure well.  Breathing easily on room air.  Report given to PCU RN.  Patient transported in stable conditon to room 4317.    Pain Assessment Post-Procedure:  Pain Present:  no  Pain Score:  0  Pain Quality/Description:  none    Plan of Care Goals:  Safety measures met:  Yes  Patient understands explanation of procedure:  Yes    Time in:  0925  Time out:  0955  Frida Ferrer RN.  R.N. 7/1/2025

## 2025-07-01 NOTE — CARE COORDINATION
DISCHARGE PLANNIN  Chart reviewed.  Patient is from a 3rd floor apartment alone; he has 3 flights of stairs to go up. He is active with [facility] for aide services (4hrs/week) and MOW (7 meals/week). He has a cane, walker, chair lift and alert button.     Current discharge plan is [facility].   First pre-cert  yesterday. The facility restarted the pre-cert today; currently pending.    Plan for IR guided kidney lesion biopsy today.   He stated she's hopeful she can get a PICC line placed tomorrow and place ABX recommendations; she thinks he'll go on Daptomycin. Nayeli with [facility] made aware.    UPDATE: 1807  I returned call from [employer] representative ([phone]) and left a  with discharge plan.    CM team will continue to follow.  Debora Waggoner RN Case Manager  [phone]

## 2025-07-01 NOTE — PRE SEDATION
PRN  dextrose bolus 10% 125 mL, PRN   Or  dextrose bolus 10% 250 mL, PRN  glucagon injection 1 mg, PRN  dextrose 10 % infusion, Continuous PRN  insulin lispro (HUMALOG,ADMELOG) injection vial 0-8 Units, 4x Daily AC & HS  rosuvastatin (CRESTOR) tablet 40 mg, Nightly  mirtazapine (REMERON) tablet 15 mg, Nightly  sodium chloride flush 0.9 % injection 5-40 mL, 2 times per day  sodium chloride flush 0.9 % injection 5-40 mL, PRN  0.9 % sodium chloride infusion, PRN  ondansetron (ZOFRAN-ODT) disintegrating tablet 4 mg, Q8H PRN   Or  ondansetron (ZOFRAN) injection 4 mg, Q6H PRN  polyethylene glycol (GLYCOLAX) packet 17 g, Daily PRN  acetaminophen (TYLENOL) tablet 650 mg, Q6H PRN   Or  acetaminophen (TYLENOL) suppository 650 mg, Q6H PRN  oxyCODONE (ROXICODONE) immediate release tablet 5 mg, Q4H PRN   Or  oxyCODONE (ROXICODONE) immediate release tablet 10 mg, Q4H PRN          ASA 1 - Normal health patient    II (soft palate, uvula, fauces visible)    Activity:  2 - Able to move 4 extremities voluntarily on command  Respiration:  2 - Able to breathe deeply and cough freely  Circulation:  2 - BP+/- 20mmHg of normal  Consciousness:  2 - Fully awake  Oxygen Saturation (color):  2 - Able to maintain oxygen saturation >92% on room air    Sedation : Moderate sedation planned    HPI / Treatment plan : CT guided biopsy/aspiration, right renal lesion       Electronically signed by Daly Maxwell APRN - CNP on 7/1/25 at 9:05 AM EDT

## 2025-07-02 LAB
ANION GAP SERPL CALCULATED.3IONS-SCNC: 4 MMOL/L (ref 3–16)
BUN SERPL-MCNC: 11 MG/DL (ref 7–20)
CALCIUM SERPL-MCNC: 7 MG/DL (ref 8.3–10.6)
CHLORIDE SERPL-SCNC: 116 MMOL/L (ref 99–110)
CK SERPL-CCNC: 25 U/L (ref 39–308)
CO2 SERPL-SCNC: 25 MMOL/L (ref 21–32)
CREAT SERPL-MCNC: 1.5 MG/DL (ref 0.8–1.3)
GFR SERPLBLD CREATININE-BSD FMLA CKD-EPI: 47 ML/MIN/{1.73_M2}
GLUCOSE BLD-MCNC: 101 MG/DL (ref 70–99)
GLUCOSE BLD-MCNC: 106 MG/DL (ref 70–99)
GLUCOSE BLD-MCNC: 65 MG/DL (ref 70–99)
GLUCOSE BLD-MCNC: 67 MG/DL (ref 70–99)
GLUCOSE BLD-MCNC: 86 MG/DL (ref 70–99)
GLUCOSE BLD-MCNC: 97 MG/DL (ref 70–99)
GLUCOSE SERPL-MCNC: 85 MG/DL (ref 70–99)
PERFORMED ON: ABNORMAL
PERFORMED ON: NORMAL
PERFORMED ON: NORMAL
POTASSIUM SERPL-SCNC: 3.5 MMOL/L (ref 3.5–5.1)
SODIUM SERPL-SCNC: 145 MMOL/L (ref 136–145)
VANCOMYCIN TROUGH SERPL-MCNC: 25.7 UG/ML (ref 10–20)

## 2025-07-02 PROCEDURE — 2500000003 HC RX 250 WO HCPCS: Performed by: INTERNAL MEDICINE

## 2025-07-02 PROCEDURE — 6370000000 HC RX 637 (ALT 250 FOR IP): Performed by: NURSE PRACTITIONER

## 2025-07-02 PROCEDURE — 82550 ASSAY OF CK (CPK): CPT

## 2025-07-02 PROCEDURE — 2580000003 HC RX 258: Performed by: INTERNAL MEDICINE

## 2025-07-02 PROCEDURE — 02HV33Z INSERTION OF INFUSION DEVICE INTO SUPERIOR VENA CAVA, PERCUTANEOUS APPROACH: ICD-10-PCS | Performed by: INTERNAL MEDICINE

## 2025-07-02 PROCEDURE — 2060000000 HC ICU INTERMEDIATE R&B

## 2025-07-02 PROCEDURE — 2580000003 HC RX 258: Performed by: NURSE PRACTITIONER

## 2025-07-02 PROCEDURE — 99233 SBSQ HOSP IP/OBS HIGH 50: CPT | Performed by: INTERNAL MEDICINE

## 2025-07-02 PROCEDURE — C1751 CATH, INF, PER/CENT/MIDLINE: HCPCS

## 2025-07-02 PROCEDURE — 6360000002 HC RX W HCPCS: Performed by: INTERNAL MEDICINE

## 2025-07-02 PROCEDURE — 6370000000 HC RX 637 (ALT 250 FOR IP): Performed by: HOSPITALIST

## 2025-07-02 PROCEDURE — 6360000002 HC RX W HCPCS: Performed by: NURSE PRACTITIONER

## 2025-07-02 PROCEDURE — 97116 GAIT TRAINING THERAPY: CPT

## 2025-07-02 PROCEDURE — 36569 INSJ PICC 5 YR+ W/O IMAGING: CPT

## 2025-07-02 PROCEDURE — 80202 ASSAY OF VANCOMYCIN: CPT

## 2025-07-02 PROCEDURE — 97530 THERAPEUTIC ACTIVITIES: CPT

## 2025-07-02 PROCEDURE — 36415 COLL VENOUS BLD VENIPUNCTURE: CPT

## 2025-07-02 PROCEDURE — 80048 BASIC METABOLIC PNL TOTAL CA: CPT

## 2025-07-02 RX ORDER — SODIUM CHLORIDE 0.9 % (FLUSH) 0.9 %
5-40 SYRINGE (ML) INJECTION PRN
Status: DISCONTINUED | OUTPATIENT
Start: 2025-07-02 | End: 2025-07-14 | Stop reason: HOSPADM

## 2025-07-02 RX ORDER — SODIUM CHLORIDE 0.9 % (FLUSH) 0.9 %
5-40 SYRINGE (ML) INJECTION EVERY 12 HOURS SCHEDULED
Status: DISCONTINUED | OUTPATIENT
Start: 2025-07-02 | End: 2025-07-14 | Stop reason: HOSPADM

## 2025-07-02 RX ORDER — LIDOCAINE HYDROCHLORIDE 10 MG/ML
50 INJECTION, SOLUTION EPIDURAL; INFILTRATION; INTRACAUDAL; PERINEURAL ONCE
Status: COMPLETED | OUTPATIENT
Start: 2025-07-02 | End: 2025-07-02

## 2025-07-02 RX ORDER — SODIUM CHLORIDE 9 MG/ML
INJECTION, SOLUTION INTRAVENOUS PRN
Status: DISCONTINUED | OUTPATIENT
Start: 2025-07-02 | End: 2025-07-14 | Stop reason: HOSPADM

## 2025-07-02 RX ADMIN — SODIUM CHLORIDE, PRESERVATIVE FREE 10 ML: 5 INJECTION INTRAVENOUS at 08:57

## 2025-07-02 RX ADMIN — MIDODRINE HYDROCHLORIDE 5 MG: 5 TABLET ORAL at 17:53

## 2025-07-02 RX ADMIN — FERRIC OXIDE RED: 8; 8 LOTION TOPICAL at 16:00

## 2025-07-02 RX ADMIN — TRIAMCINOLONE ACETONIDE: 1 CREAM TOPICAL at 08:56

## 2025-07-02 RX ADMIN — MIDODRINE HYDROCHLORIDE 5 MG: 5 TABLET ORAL at 08:49

## 2025-07-02 RX ADMIN — MIRTAZAPINE 15 MG: 15 TABLET, FILM COATED ORAL at 20:03

## 2025-07-02 RX ADMIN — PHENOL 1 SPRAY: 1.5 LIQUID ORAL at 08:55

## 2025-07-02 RX ADMIN — FERRIC OXIDE RED: 8; 8 LOTION TOPICAL at 20:04

## 2025-07-02 RX ADMIN — DEXTROSE MONOHYDRATE 125 ML: 100 INJECTION, SOLUTION INTRAVENOUS at 00:57

## 2025-07-02 RX ADMIN — SODIUM CHLORIDE, PRESERVATIVE FREE 10 ML: 5 INJECTION INTRAVENOUS at 20:03

## 2025-07-02 RX ADMIN — CEFTAROLINE FOSAMIL 600 MG: 600 POWDER, FOR SOLUTION INTRAVENOUS at 02:37

## 2025-07-02 RX ADMIN — CEFTAROLINE FOSAMIL 600 MG: 600 POWDER, FOR SOLUTION INTRAVENOUS at 08:53

## 2025-07-02 RX ADMIN — VANCOMYCIN HYDROCHLORIDE 750 MG: 750 INJECTION, POWDER, LYOPHILIZED, FOR SOLUTION INTRAVENOUS at 17:56

## 2025-07-02 RX ADMIN — Medication: at 20:02

## 2025-07-02 RX ADMIN — LIDOCAINE HYDROCHLORIDE ANHYDROUS 50 MG: 10 INJECTION, SOLUTION INFILTRATION at 16:11

## 2025-07-02 RX ADMIN — MIDODRINE HYDROCHLORIDE 5 MG: 5 TABLET ORAL at 12:01

## 2025-07-02 RX ADMIN — Medication: at 08:56

## 2025-07-02 RX ADMIN — DAPTOMYCIN 500 MG: 500 INJECTION, POWDER, LYOPHILIZED, FOR SOLUTION INTRAVENOUS at 12:04

## 2025-07-02 RX ADMIN — TRIAMCINOLONE ACETONIDE: 1 CREAM TOPICAL at 20:02

## 2025-07-02 RX ADMIN — GUAIFENESIN 600 MG: 600 TABLET, MULTILAYER, EXTENDED RELEASE ORAL at 08:49

## 2025-07-02 RX ADMIN — FERRIC OXIDE RED: 8; 8 LOTION TOPICAL at 08:56

## 2025-07-02 RX ADMIN — GUAIFENESIN 600 MG: 600 TABLET, MULTILAYER, EXTENDED RELEASE ORAL at 20:03

## 2025-07-02 ASSESSMENT — PAIN DESCRIPTION - PAIN TYPE: TYPE: ACUTE PAIN

## 2025-07-02 ASSESSMENT — PAIN DESCRIPTION - DESCRIPTORS: DESCRIPTORS: ACHING

## 2025-07-02 ASSESSMENT — PAIN DESCRIPTION - ONSET: ONSET: ON-GOING

## 2025-07-02 ASSESSMENT — PAIN - FUNCTIONAL ASSESSMENT: PAIN_FUNCTIONAL_ASSESSMENT: ACTIVITIES ARE NOT PREVENTED

## 2025-07-02 ASSESSMENT — PAIN SCALES - GENERAL
PAINLEVEL_OUTOF10: 0
PAINLEVEL_OUTOF10: 6
PAINLEVEL_OUTOF10: 0
PAINLEVEL_OUTOF10: 0

## 2025-07-02 ASSESSMENT — PAIN DESCRIPTION - FREQUENCY: FREQUENCY: CONTINUOUS

## 2025-07-02 ASSESSMENT — PAIN DESCRIPTION - LOCATION: LOCATION: GENERALIZED

## 2025-07-02 NOTE — CARE COORDINATION
DISCHARGE PLANNING:  Chart reviewed.  Patient is from a 3rd floor apartment alone; he has 3 flights of stairs to go up. He is active with [facility] for aide services (4hrs/week) and MOW (7 meals/week). He has a cane, walker, chair lift and alert button.     Current discharge plan is [facility].   Pre-cert started 7/1.  Current status is APPROVED through 7/7. Dr. Pires made aware.  Nayeli at [facility] was made aware of the need for IV ABX at discharge.    IR drain placement for renal abscess yesterday.  Rule out C. Diff for diarrhea    CM team will continue to follow.  Debora Waggoner, RN Case Manager  [phone]

## 2025-07-02 NOTE — DISCHARGE INSTR - COC
Continuity of Care Form    Patient Name: Delano Daigle   :  1945  MRN:  [id number]    Admit date:  2025  Discharge date:  ***    Code Status Order: Full Code   Advance Directives:     Admitting Physician:  Shawn Taylor MD  PCP: Jason Dawn MD    Discharging Nurse: ***  Discharging Hospital Unit/Room#: 4317/4317-01  Discharging Unit Phone Number: ***    Emergency Contact:   Extended Emergency Contact Information  Primary Emergency Contact: Carlos Daigle  [facility]  Home Phone: [phone]  Relation: Child    Past Surgical History:  Past Surgical History:   Procedure Laterality Date    CARDIAC PROCEDURE N/A 3/5/2025    Right heart cath performed by Bonifacio Houston MD at [facility] CARDIAC CATH LAB    COLONOSCOPY      COLONOSCOPY N/A 11/10/2021    COLONOSCOPY POLYPECTOMY SNARE/COLD BIOPSY performed by Justine Haq MD at [facility] ENDOSCOPY    CT BIOPSY RENAL  2025    CT BIOPSY RENAL 2025 [facility] CT SCAN    CT PERITONEAL/RETROPERITONEAL PERC DRAIN  2025    CT PERITONEAL/RETROPERITONEAL PERC DRAIN 2025 [facility] CT SCAN    SKIN GRAFT         Immunization History:   Immunization History   Administered Date(s) Administered    COVID-19, PFIZER Bivalent, DO NOT Dilute, (age 12y+), IM, 30 mcg/0.3 mL 2022    COVID-19, PFIZER GRAY top, DO NOT Dilute, (age 12 y+), IM, 30 mcg/0.3 mL 2022    COVID-19, PFIZER PURPLE top, DILUTE for use, (age 12 y+), 30mcg/0.3mL 2021, 2021, 2021, 2022    COVID-19, PFIZER, , (age 12y+), IM, 30mcg/0.3mL 2024    Influenza 2011, 2012    Influenza Virus Vaccine 2012, 2013, 2014, 2015, 09/15/2016    Influenza, FLUAD, (age 65 y+), IM, Quadv, 0.5mL 2020, 10/13/2022, 09/15/2023    Influenza, FLUAD, (age 65 y+), IM, Trivalent PF, 0.5mL 2019    Influenza, FLUZONE High Dose (age 65 y+), IM, Quadv, 0.7mL 2021    Influenza, FLUZONE High Dose, (age 65 y+), IM, Trivalent PF,

## 2025-07-02 NOTE — PROCEDURES
PROCEDURE NOTE  Date: 2025   Name: Delano aDigle  YOB: 1945    Procedures                                                                     SINGLE PICC PROCEDURE NOTE  Chart reviewed for allergies, diagnosis, labs, known contraindications, reason for line placement and planned length of treatment.  Informed consent noted to be signed and on chart.  Insertion procedure discussed with patient/family member.  Three patient identifiers - Patient name,   and MRN -  completed &  confirmed verbally.         Time out performed Hat, mask and eye shield donned.  PICC site cleaned with chlorhexidine wipes then scrubbed with Chloraprep  One-Step applicator for 30 seconds x 1.   Hand Hygiene  performed with 3% Chlorhexidine surgical scrub x1 min prior to  sterile gloves, sterile gown being donned.  Patient draped using maximal sterile barrier technique ( head to toe ).  PICC site scrubbed a 2nd time with Chloraprep One-Step applicator x 30 sec. Modified Seldinger technique/ultrasound assisted and tip locating system utilized for insertion and 1% Lidocaine 5 ml injected intradermal pre-insertion.  PICC tip location in the SVC confirmed by ECG technology.   Positive brisk blood return obtained from lumen.  Valve applied to lumen and flushed with 10 mls  0.9% Sterile Sodium Chloride.  All lumens flush easily with no resistance.  Skin prep applied to site.  Catheter secured with non-sutured locking device per hospital protocol. Bio-patch/CHG impregnated sterile tegaderm dressing applied.  Alcohol Swab Cap applied to valve.  Sterile field maintained during procedure.  PICC insertion, rhythm and positioning wire (utilized prn) accounted for post procedure and disposed of in sharps.  Appearance of site is Clean dry and intact without bleeding or edema. All edges of Tegaderm occlusive.   Site marked with date and initials of RN placing line. Teaching performed to pt/family and noted in education section.   Bed

## 2025-07-03 LAB
ANION GAP SERPL CALCULATED.3IONS-SCNC: 6 MMOL/L (ref 3–16)
BACTERIA BLD CULT ORG #2: NORMAL
BACTERIA BLD CULT: NORMAL
BACTERIA SPEC AEROBE CULT: ABNORMAL
BUN SERPL-MCNC: 11 MG/DL (ref 7–20)
CALCIUM SERPL-MCNC: 7.1 MG/DL (ref 8.3–10.6)
CHLORIDE SERPL-SCNC: 116 MMOL/L (ref 99–110)
CK SERPL-CCNC: 35 U/L (ref 39–308)
CO2 SERPL-SCNC: 25 MMOL/L (ref 21–32)
CORTIS SERPL-MCNC: 9.5 UG/DL
CREAT SERPL-MCNC: 1.7 MG/DL (ref 0.8–1.3)
GFR SERPLBLD CREATININE-BSD FMLA CKD-EPI: 41 ML/MIN/{1.73_M2}
GLUCOSE BLD-MCNC: 113 MG/DL (ref 70–99)
GLUCOSE BLD-MCNC: 69 MG/DL (ref 70–99)
GLUCOSE BLD-MCNC: 75 MG/DL (ref 70–99)
GLUCOSE BLD-MCNC: 84 MG/DL (ref 70–99)
GLUCOSE BLD-MCNC: 85 MG/DL (ref 70–99)
GLUCOSE BLD-MCNC: 96 MG/DL (ref 70–99)
GLUCOSE SERPL-MCNC: 46 MG/DL (ref 70–99)
GRAM STN SPEC: ABNORMAL
ORGANISM: ABNORMAL
PERFORMED ON: ABNORMAL
PERFORMED ON: ABNORMAL
PERFORMED ON: NORMAL
POTASSIUM SERPL-SCNC: 3.7 MMOL/L (ref 3.5–5.1)
SODIUM SERPL-SCNC: 147 MMOL/L (ref 136–145)
VANCOMYCIN SERPL-MCNC: 25.2 UG/ML

## 2025-07-03 PROCEDURE — 2500000003 HC RX 250 WO HCPCS: Performed by: INTERNAL MEDICINE

## 2025-07-03 PROCEDURE — 6360000002 HC RX W HCPCS: Performed by: INTERNAL MEDICINE

## 2025-07-03 PROCEDURE — 2580000003 HC RX 258: Performed by: INTERNAL MEDICINE

## 2025-07-03 PROCEDURE — 2060000000 HC ICU INTERMEDIATE R&B

## 2025-07-03 PROCEDURE — 99233 SBSQ HOSP IP/OBS HIGH 50: CPT | Performed by: INTERNAL MEDICINE

## 2025-07-03 PROCEDURE — 80202 ASSAY OF VANCOMYCIN: CPT

## 2025-07-03 PROCEDURE — 6370000000 HC RX 637 (ALT 250 FOR IP): Performed by: HOSPITALIST

## 2025-07-03 PROCEDURE — 82550 ASSAY OF CK (CPK): CPT

## 2025-07-03 PROCEDURE — 51798 US URINE CAPACITY MEASURE: CPT

## 2025-07-03 PROCEDURE — 6370000000 HC RX 637 (ALT 250 FOR IP): Performed by: NURSE PRACTITIONER

## 2025-07-03 PROCEDURE — 82533 TOTAL CORTISOL: CPT

## 2025-07-03 PROCEDURE — 36592 COLLECT BLOOD FROM PICC: CPT

## 2025-07-03 PROCEDURE — 80048 BASIC METABOLIC PNL TOTAL CA: CPT

## 2025-07-03 PROCEDURE — 97535 SELF CARE MNGMENT TRAINING: CPT

## 2025-07-03 RX ORDER — ENOXAPARIN SODIUM 100 MG/ML
40 INJECTION SUBCUTANEOUS DAILY
Status: DISCONTINUED | OUTPATIENT
Start: 2025-07-03 | End: 2025-07-06

## 2025-07-03 RX ORDER — ENOXAPARIN SODIUM 100 MG/ML
30 INJECTION SUBCUTANEOUS DAILY
Status: DISCONTINUED | OUTPATIENT
Start: 2025-07-03 | End: 2025-07-03 | Stop reason: DRUGHIGH

## 2025-07-03 RX ORDER — ENOXAPARIN SODIUM 100 MG/ML
40 INJECTION SUBCUTANEOUS DAILY
Status: DISCONTINUED | OUTPATIENT
Start: 2025-07-03 | End: 2025-07-03

## 2025-07-03 RX ORDER — SODIUM CHLORIDE 9 MG/ML
INJECTION, SOLUTION INTRAVENOUS CONTINUOUS
Status: DISCONTINUED | OUTPATIENT
Start: 2025-07-03 | End: 2025-07-04

## 2025-07-03 RX ADMIN — SODIUM CHLORIDE, PRESERVATIVE FREE 10 ML: 5 INJECTION INTRAVENOUS at 12:20

## 2025-07-03 RX ADMIN — TRIAMCINOLONE ACETONIDE: 1 CREAM TOPICAL at 20:27

## 2025-07-03 RX ADMIN — Medication: at 12:21

## 2025-07-03 RX ADMIN — SODIUM CHLORIDE: 0.9 INJECTION, SOLUTION INTRAVENOUS at 23:36

## 2025-07-03 RX ADMIN — SODIUM CHLORIDE: 0.9 INJECTION, SOLUTION INTRAVENOUS at 09:55

## 2025-07-03 RX ADMIN — PHENOL 1 SPRAY: 1.5 LIQUID ORAL at 20:10

## 2025-07-03 RX ADMIN — GUAIFENESIN 600 MG: 600 TABLET, MULTILAYER, EXTENDED RELEASE ORAL at 20:27

## 2025-07-03 RX ADMIN — MIRTAZAPINE 15 MG: 15 TABLET, FILM COATED ORAL at 20:27

## 2025-07-03 RX ADMIN — FERRIC OXIDE RED: 8; 8 LOTION TOPICAL at 12:21

## 2025-07-03 RX ADMIN — SODIUM CHLORIDE, PRESERVATIVE FREE 10 ML: 5 INJECTION INTRAVENOUS at 20:29

## 2025-07-03 RX ADMIN — Medication: at 20:28

## 2025-07-03 RX ADMIN — GUAIFENESIN 600 MG: 600 TABLET, MULTILAYER, EXTENDED RELEASE ORAL at 09:50

## 2025-07-03 RX ADMIN — DAPTOMYCIN 500 MG: 500 INJECTION, POWDER, LYOPHILIZED, FOR SOLUTION INTRAVENOUS at 12:20

## 2025-07-03 RX ADMIN — MIDODRINE HYDROCHLORIDE 5 MG: 5 TABLET ORAL at 12:20

## 2025-07-03 RX ADMIN — MIDODRINE HYDROCHLORIDE 5 MG: 5 TABLET ORAL at 09:50

## 2025-07-03 RX ADMIN — FERRIC OXIDE RED: 8; 8 LOTION TOPICAL at 20:27

## 2025-07-03 RX ADMIN — ENOXAPARIN SODIUM 40 MG: 100 INJECTION SUBCUTANEOUS at 12:20

## 2025-07-03 RX ADMIN — TRIAMCINOLONE ACETONIDE: 1 CREAM TOPICAL at 09:50

## 2025-07-03 RX ADMIN — MIDODRINE HYDROCHLORIDE 5 MG: 5 TABLET ORAL at 17:45

## 2025-07-03 ASSESSMENT — PAIN SCALES - GENERAL
PAINLEVEL_OUTOF10: 0
PAINLEVEL_OUTOF10: 1
PAINLEVEL_OUTOF10: 1
PAINLEVEL_OUTOF10: 0

## 2025-07-03 ASSESSMENT — PAIN DESCRIPTION - ORIENTATION
ORIENTATION: UPPER
ORIENTATION: UPPER

## 2025-07-03 ASSESSMENT — PAIN DESCRIPTION - LOCATION
LOCATION: THROAT
LOCATION: THROAT

## 2025-07-03 ASSESSMENT — PAIN DESCRIPTION - DESCRIPTORS
DESCRIPTORS: SORE
DESCRIPTORS: SORE

## 2025-07-03 ASSESSMENT — PAIN - FUNCTIONAL ASSESSMENT: PAIN_FUNCTIONAL_ASSESSMENT: ACTIVITIES ARE NOT PREVENTED

## 2025-07-03 ASSESSMENT — PAIN SCALES - WONG BAKER: WONGBAKER_NUMERICALRESPONSE: NO HURT

## 2025-07-03 NOTE — CARE COORDINATION
DISCHARGE PLANNIN  Chart reviewed.  Patient is from a 3rd floor apartment alone; he has 3 flights of stairs to go up. He is active with [facility] for aide services (4hrs/week) and MOW (7 meals/week). He has a cane, walker, chair lift and alert button.     Current discharge plan is [facility] with IV ABX (Nayeli aware).   Pre-cert started .  Current status is APPROVED through . Dr. Pranay benitez.    C. Diff pending for diarrhea.  Urology monitoring drain output for renal abscess. Plan to rescan in 1-2 days.    UPDATE: 0879  I returned call to Toby supervisor for [employer] ([phone]) and left a  with an update on discharge plan.    CM team will continue to follow.  Debora Waggoner RN Case Manager  [phone]

## 2025-07-04 ENCOUNTER — APPOINTMENT (OUTPATIENT)
Dept: CT IMAGING | Age: 80
DRG: 682 | End: 2025-07-04
Payer: MEDICARE

## 2025-07-04 LAB
AMORPH SED URNS QL MICRO: ABNORMAL /HPF
ANION GAP SERPL CALCULATED.3IONS-SCNC: 4 MMOL/L (ref 3–16)
BACTERIA URNS QL MICRO: ABNORMAL /HPF
BILIRUB UR QL STRIP.AUTO: NEGATIVE
BUN SERPL-MCNC: 12 MG/DL (ref 7–20)
C DIFF TOX A+B STL QL IA: NORMAL
CALCIUM SERPL-MCNC: 7 MG/DL (ref 8.3–10.6)
CHLORIDE SERPL-SCNC: 117 MMOL/L (ref 99–110)
CHLORIDE UR-SCNC: 72 MMOL/L
CK SERPL-CCNC: 28 U/L (ref 39–308)
CLARITY UR: CLEAR
CO2 SERPL-SCNC: 25 MMOL/L (ref 21–32)
COLOR UR: YELLOW
CREAT SERPL-MCNC: 1.9 MG/DL (ref 0.8–1.3)
DEPRECATED RDW RBC AUTO: 16.1 % (ref 12.4–15.4)
EPI CELLS #/AREA URNS HPF: ABNORMAL /HPF (ref 0–5)
GFR SERPLBLD CREATININE-BSD FMLA CKD-EPI: 35 ML/MIN/{1.73_M2}
GLUCOSE BLD-MCNC: 105 MG/DL (ref 70–99)
GLUCOSE BLD-MCNC: 64 MG/DL (ref 70–99)
GLUCOSE BLD-MCNC: 67 MG/DL (ref 70–99)
GLUCOSE BLD-MCNC: 70 MG/DL (ref 70–99)
GLUCOSE BLD-MCNC: 88 MG/DL (ref 70–99)
GLUCOSE BLD-MCNC: 90 MG/DL (ref 70–99)
GLUCOSE SERPL-MCNC: 55 MG/DL (ref 70–99)
GLUCOSE UR STRIP.AUTO-MCNC: NEGATIVE MG/DL
HCT VFR BLD AUTO: 26 % (ref 40.5–52.5)
HGB BLD-MCNC: 8.6 G/DL (ref 13.5–17.5)
HGB UR QL STRIP.AUTO: NEGATIVE
KETONES UR STRIP.AUTO-MCNC: NEGATIVE MG/DL
LEUKOCYTE ESTERASE UR QL STRIP.AUTO: ABNORMAL
MCH RBC QN AUTO: 31.3 PG (ref 26–34)
MCHC RBC AUTO-ENTMCNC: 32.9 G/DL (ref 31–36)
MCV RBC AUTO: 95 FL (ref 80–100)
NITRITE UR QL STRIP.AUTO: NEGATIVE
PERFORMED ON: ABNORMAL
PERFORMED ON: NORMAL
PH UR STRIP.AUTO: 5.5 [PH] (ref 5–8)
PLATELET # BLD AUTO: 91 K/UL (ref 135–450)
PMV BLD AUTO: 8.2 FL (ref 5–10.5)
POTASSIUM SERPL-SCNC: 3.6 MMOL/L (ref 3.5–5.1)
PROT UR STRIP.AUTO-MCNC: ABNORMAL MG/DL
RBC # BLD AUTO: 2.74 M/UL (ref 4.2–5.9)
RBC #/AREA URNS HPF: ABNORMAL /HPF (ref 0–4)
SODIUM SERPL-SCNC: 146 MMOL/L (ref 136–145)
SODIUM UR-SCNC: 69 MMOL/L
SP GR UR STRIP.AUTO: >=1.03 (ref 1–1.03)
UA COMPLETE W REFLEX CULTURE PNL UR: YES
UA DIPSTICK W REFLEX MICRO PNL UR: YES
URN SPEC COLLECT METH UR: ABNORMAL
UROBILINOGEN UR STRIP-ACNC: 0.2 E.U./DL
WBC # BLD AUTO: 6.9 K/UL (ref 4–11)
WBC #/AREA URNS HPF: ABNORMAL /HPF (ref 0–5)

## 2025-07-04 PROCEDURE — 6370000000 HC RX 637 (ALT 250 FOR IP): Performed by: HOSPITALIST

## 2025-07-04 PROCEDURE — 87449 NOS EACH ORGANISM AG IA: CPT

## 2025-07-04 PROCEDURE — 84300 ASSAY OF URINE SODIUM: CPT

## 2025-07-04 PROCEDURE — 2580000003 HC RX 258: Performed by: INTERNAL MEDICINE

## 2025-07-04 PROCEDURE — 2580000003 HC RX 258

## 2025-07-04 PROCEDURE — 2500000003 HC RX 250 WO HCPCS: Performed by: INTERNAL MEDICINE

## 2025-07-04 PROCEDURE — 36415 COLL VENOUS BLD VENIPUNCTURE: CPT

## 2025-07-04 PROCEDURE — 2580000003 HC RX 258: Performed by: STUDENT IN AN ORGANIZED HEALTH CARE EDUCATION/TRAINING PROGRAM

## 2025-07-04 PROCEDURE — 6360000002 HC RX W HCPCS: Performed by: INTERNAL MEDICINE

## 2025-07-04 PROCEDURE — 87324 CLOSTRIDIUM AG IA: CPT

## 2025-07-04 PROCEDURE — 85027 COMPLETE CBC AUTOMATED: CPT

## 2025-07-04 PROCEDURE — 6370000000 HC RX 637 (ALT 250 FOR IP): Performed by: STUDENT IN AN ORGANIZED HEALTH CARE EDUCATION/TRAINING PROGRAM

## 2025-07-04 PROCEDURE — 87077 CULTURE AEROBIC IDENTIFY: CPT

## 2025-07-04 PROCEDURE — 82436 ASSAY OF URINE CHLORIDE: CPT

## 2025-07-04 PROCEDURE — 6370000000 HC RX 637 (ALT 250 FOR IP): Performed by: NURSE PRACTITIONER

## 2025-07-04 PROCEDURE — 80048 BASIC METABOLIC PNL TOTAL CA: CPT

## 2025-07-04 PROCEDURE — 51798 US URINE CAPACITY MEASURE: CPT

## 2025-07-04 PROCEDURE — 87086 URINE CULTURE/COLONY COUNT: CPT

## 2025-07-04 PROCEDURE — 87186 SC STD MICRODIL/AGAR DIL: CPT

## 2025-07-04 PROCEDURE — 82570 ASSAY OF URINE CREATININE: CPT

## 2025-07-04 PROCEDURE — 82550 ASSAY OF CK (CPK): CPT

## 2025-07-04 PROCEDURE — 99232 SBSQ HOSP IP/OBS MODERATE 35: CPT | Performed by: INTERNAL MEDICINE

## 2025-07-04 PROCEDURE — 84156 ASSAY OF PROTEIN URINE: CPT

## 2025-07-04 PROCEDURE — 74176 CT ABD & PELVIS W/O CONTRAST: CPT

## 2025-07-04 PROCEDURE — 81001 URINALYSIS AUTO W/SCOPE: CPT

## 2025-07-04 PROCEDURE — 6370000000 HC RX 637 (ALT 250 FOR IP)

## 2025-07-04 PROCEDURE — 82043 UR ALBUMIN QUANTITATIVE: CPT

## 2025-07-04 PROCEDURE — 2060000000 HC ICU INTERMEDIATE R&B

## 2025-07-04 RX ORDER — HYDROCORTISONE SODIUM SUCCINATE 100 MG/2ML
100 INJECTION INTRAMUSCULAR; INTRAVENOUS EVERY 8 HOURS
Status: DISCONTINUED | OUTPATIENT
Start: 2025-07-04 | End: 2025-07-08

## 2025-07-04 RX ORDER — SODIUM CHLORIDE, SODIUM LACTATE, POTASSIUM CHLORIDE, AND CALCIUM CHLORIDE .6; .31; .03; .02 G/100ML; G/100ML; G/100ML; G/100ML
500 INJECTION, SOLUTION INTRAVENOUS ONCE
Status: COMPLETED | OUTPATIENT
Start: 2025-07-04 | End: 2025-07-04

## 2025-07-04 RX ORDER — SODIUM CHLORIDE, SODIUM LACTATE, POTASSIUM CHLORIDE, CALCIUM CHLORIDE 600; 310; 30; 20 MG/100ML; MG/100ML; MG/100ML; MG/100ML
INJECTION, SOLUTION INTRAVENOUS CONTINUOUS
Status: DISCONTINUED | OUTPATIENT
Start: 2025-07-04 | End: 2025-07-08

## 2025-07-04 RX ORDER — SODIUM CHLORIDE, SODIUM LACTATE, POTASSIUM CHLORIDE, AND CALCIUM CHLORIDE .6; .31; .03; .02 G/100ML; G/100ML; G/100ML; G/100ML
1000 INJECTION, SOLUTION INTRAVENOUS ONCE
Status: COMPLETED | OUTPATIENT
Start: 2025-07-04 | End: 2025-07-04

## 2025-07-04 RX ORDER — LOPERAMIDE HYDROCHLORIDE 2 MG/1
2 CAPSULE ORAL 4 TIMES DAILY PRN
Status: DISCONTINUED | OUTPATIENT
Start: 2025-07-04 | End: 2025-07-14 | Stop reason: HOSPADM

## 2025-07-04 RX ORDER — MIDODRINE HYDROCHLORIDE 5 MG/1
10 TABLET ORAL
Status: DISCONTINUED | OUTPATIENT
Start: 2025-07-04 | End: 2025-07-04

## 2025-07-04 RX ORDER — MIDODRINE HYDROCHLORIDE 5 MG/1
10 TABLET ORAL EVERY 8 HOURS
Status: DISCONTINUED | OUTPATIENT
Start: 2025-07-04 | End: 2025-07-05

## 2025-07-04 RX ADMIN — TRIAMCINOLONE ACETONIDE: 1 CREAM TOPICAL at 20:54

## 2025-07-04 RX ADMIN — MIDODRINE HYDROCHLORIDE 10 MG: 5 TABLET ORAL at 17:38

## 2025-07-04 RX ADMIN — SODIUM CHLORIDE, PRESERVATIVE FREE 10 ML: 5 INJECTION INTRAVENOUS at 20:54

## 2025-07-04 RX ADMIN — MIDODRINE HYDROCHLORIDE 5 MG: 5 TABLET ORAL at 07:55

## 2025-07-04 RX ADMIN — SODIUM CHLORIDE, SODIUM LACTATE, POTASSIUM CHLORIDE, AND CALCIUM CHLORIDE 1000 ML: .6; .31; .03; .02 INJECTION, SOLUTION INTRAVENOUS at 20:59

## 2025-07-04 RX ADMIN — SODIUM CHLORIDE, SODIUM LACTATE, POTASSIUM CHLORIDE, AND CALCIUM CHLORIDE 1000 ML: .6; .31; .03; .02 INJECTION, SOLUTION INTRAVENOUS at 10:19

## 2025-07-04 RX ADMIN — MIRTAZAPINE 15 MG: 15 TABLET, FILM COATED ORAL at 20:42

## 2025-07-04 RX ADMIN — MIDODRINE HYDROCHLORIDE 10 MG: 5 TABLET ORAL at 10:15

## 2025-07-04 RX ADMIN — Medication: at 20:49

## 2025-07-04 RX ADMIN — DAPTOMYCIN 500 MG: 500 INJECTION, POWDER, LYOPHILIZED, FOR SOLUTION INTRAVENOUS at 10:18

## 2025-07-04 RX ADMIN — DEXTROSE MONOHYDRATE 125 ML: 100 INJECTION, SOLUTION INTRAVENOUS at 07:52

## 2025-07-04 RX ADMIN — LOPERAMIDE HYDROCHLORIDE 2 MG: 2 CAPSULE ORAL at 20:58

## 2025-07-04 RX ADMIN — FERRIC OXIDE RED: 8; 8 LOTION TOPICAL at 20:50

## 2025-07-04 RX ADMIN — ENOXAPARIN SODIUM 40 MG: 100 INJECTION SUBCUTANEOUS at 07:55

## 2025-07-04 RX ADMIN — SODIUM CHLORIDE, SODIUM LACTATE, POTASSIUM CHLORIDE, AND CALCIUM CHLORIDE: .6; .31; .03; .02 INJECTION, SOLUTION INTRAVENOUS at 11:21

## 2025-07-04 RX ADMIN — HYDROCORTISONE SODIUM SUCCINATE 100 MG: 100 INJECTION, POWDER, FOR SOLUTION INTRAMUSCULAR; INTRAVENOUS at 17:38

## 2025-07-04 RX ADMIN — HYDROCORTISONE SODIUM SUCCINATE 100 MG: 100 INJECTION, POWDER, FOR SOLUTION INTRAMUSCULAR; INTRAVENOUS at 10:15

## 2025-07-04 RX ADMIN — GUAIFENESIN 600 MG: 600 TABLET, MULTILAYER, EXTENDED RELEASE ORAL at 07:55

## 2025-07-04 RX ADMIN — SODIUM CHLORIDE, SODIUM LACTATE, POTASSIUM CHLORIDE, AND CALCIUM CHLORIDE 500 ML: .6; .31; .03; .02 INJECTION, SOLUTION INTRAVENOUS at 05:15

## 2025-07-04 RX ADMIN — GUAIFENESIN 600 MG: 600 TABLET, MULTILAYER, EXTENDED RELEASE ORAL at 20:42

## 2025-07-04 ASSESSMENT — PAIN SCALES - GENERAL
PAINLEVEL_OUTOF10: 0

## 2025-07-04 ASSESSMENT — PAIN SCALES - WONG BAKER
WONGBAKER_NUMERICALRESPONSE: NO HURT
WONGBAKER_NUMERICALRESPONSE: NO HURT

## 2025-07-05 LAB
ANION GAP SERPL CALCULATED.3IONS-SCNC: 4 MMOL/L (ref 3–16)
BUN SERPL-MCNC: 14 MG/DL (ref 7–20)
CALCIUM SERPL-MCNC: 7.1 MG/DL (ref 8.3–10.6)
CHLORIDE SERPL-SCNC: 117 MMOL/L (ref 99–110)
CO2 SERPL-SCNC: 25 MMOL/L (ref 21–32)
CREAT SERPL-MCNC: 2.1 MG/DL (ref 0.8–1.3)
CREAT UR-MCNC: 164 MG/DL (ref 39–259)
DEPRECATED RDW RBC AUTO: 15.8 % (ref 12.4–15.4)
GFR SERPLBLD CREATININE-BSD FMLA CKD-EPI: 31 ML/MIN/{1.73_M2}
GLUCOSE BLD-MCNC: 114 MG/DL (ref 70–99)
GLUCOSE BLD-MCNC: 131 MG/DL (ref 70–99)
GLUCOSE BLD-MCNC: 315 MG/DL (ref 70–99)
GLUCOSE BLD-MCNC: 85 MG/DL (ref 70–99)
GLUCOSE BLD-MCNC: 90 MG/DL (ref 70–99)
GLUCOSE SERPL-MCNC: 85 MG/DL (ref 70–99)
HCT VFR BLD AUTO: 26.4 % (ref 40.5–52.5)
HGB BLD-MCNC: 8.7 G/DL (ref 13.5–17.5)
MCH RBC QN AUTO: 31.6 PG (ref 26–34)
MCHC RBC AUTO-ENTMCNC: 33.1 G/DL (ref 31–36)
MCV RBC AUTO: 95.5 FL (ref 80–100)
MICROALBUMIN UR DL<=1MG/L-MCNC: 1.23 MG/DL
MICROALBUMIN/CREAT UR: 7.5 MG/G (ref 0–30)
PERFORMED ON: ABNORMAL
PERFORMED ON: NORMAL
PERFORMED ON: NORMAL
PLATELET # BLD AUTO: 105 K/UL (ref 135–450)
PMV BLD AUTO: 8.2 FL (ref 5–10.5)
POTASSIUM SERPL-SCNC: 4.1 MMOL/L (ref 3.5–5.1)
PROT UR-MCNC: 27.7 MG/DL
PROT/CREAT UR-RTO: 0.2 MG/DL
RBC # BLD AUTO: 2.76 M/UL (ref 4.2–5.9)
SODIUM SERPL-SCNC: 146 MMOL/L (ref 136–145)
WBC # BLD AUTO: 7.8 K/UL (ref 4–11)

## 2025-07-05 PROCEDURE — 80048 BASIC METABOLIC PNL TOTAL CA: CPT

## 2025-07-05 PROCEDURE — 6370000000 HC RX 637 (ALT 250 FOR IP): Performed by: NURSE PRACTITIONER

## 2025-07-05 PROCEDURE — 51798 US URINE CAPACITY MEASURE: CPT

## 2025-07-05 PROCEDURE — 2500000003 HC RX 250 WO HCPCS: Performed by: INTERNAL MEDICINE

## 2025-07-05 PROCEDURE — 6370000000 HC RX 637 (ALT 250 FOR IP): Performed by: STUDENT IN AN ORGANIZED HEALTH CARE EDUCATION/TRAINING PROGRAM

## 2025-07-05 PROCEDURE — 2060000000 HC ICU INTERMEDIATE R&B

## 2025-07-05 PROCEDURE — 2580000003 HC RX 258: Performed by: INTERNAL MEDICINE

## 2025-07-05 PROCEDURE — 2580000003 HC RX 258: Performed by: STUDENT IN AN ORGANIZED HEALTH CARE EDUCATION/TRAINING PROGRAM

## 2025-07-05 PROCEDURE — 85027 COMPLETE CBC AUTOMATED: CPT

## 2025-07-05 PROCEDURE — 6360000002 HC RX W HCPCS: Performed by: INTERNAL MEDICINE

## 2025-07-05 PROCEDURE — 6370000000 HC RX 637 (ALT 250 FOR IP): Performed by: HOSPITALIST

## 2025-07-05 RX ORDER — MIDODRINE HYDROCHLORIDE 5 MG/1
15 TABLET ORAL EVERY 8 HOURS
Status: DISCONTINUED | OUTPATIENT
Start: 2025-07-05 | End: 2025-07-08

## 2025-07-05 RX ADMIN — MIDODRINE HYDROCHLORIDE 10 MG: 5 TABLET ORAL at 00:13

## 2025-07-05 RX ADMIN — DAPTOMYCIN 500 MG: 500 INJECTION, POWDER, LYOPHILIZED, FOR SOLUTION INTRAVENOUS at 10:08

## 2025-07-05 RX ADMIN — SODIUM CHLORIDE, PRESERVATIVE FREE 10 ML: 5 INJECTION INTRAVENOUS at 09:13

## 2025-07-05 RX ADMIN — HYDROCORTISONE SODIUM SUCCINATE 100 MG: 100 INJECTION, POWDER, FOR SOLUTION INTRAMUSCULAR; INTRAVENOUS at 00:14

## 2025-07-05 RX ADMIN — Medication: at 19:45

## 2025-07-05 RX ADMIN — GUAIFENESIN 600 MG: 600 TABLET, MULTILAYER, EXTENDED RELEASE ORAL at 20:19

## 2025-07-05 RX ADMIN — GUAIFENESIN 600 MG: 600 TABLET, MULTILAYER, EXTENDED RELEASE ORAL at 09:11

## 2025-07-05 RX ADMIN — SODIUM CHLORIDE 25 ML: 0.9 INJECTION, SOLUTION INTRAVENOUS at 10:06

## 2025-07-05 RX ADMIN — MIRTAZAPINE 15 MG: 15 TABLET, FILM COATED ORAL at 20:19

## 2025-07-05 RX ADMIN — HYDROCORTISONE SODIUM SUCCINATE 100 MG: 100 INJECTION, POWDER, FOR SOLUTION INTRAMUSCULAR; INTRAVENOUS at 09:12

## 2025-07-05 RX ADMIN — SODIUM CHLORIDE, SODIUM LACTATE, POTASSIUM CHLORIDE, AND CALCIUM CHLORIDE: .6; .31; .03; .02 INJECTION, SOLUTION INTRAVENOUS at 09:26

## 2025-07-05 RX ADMIN — HYDROCORTISONE SODIUM SUCCINATE 100 MG: 100 INJECTION, POWDER, FOR SOLUTION INTRAMUSCULAR; INTRAVENOUS at 17:19

## 2025-07-05 RX ADMIN — SODIUM CHLORIDE, PRESERVATIVE FREE 10 ML: 5 INJECTION INTRAVENOUS at 19:46

## 2025-07-05 RX ADMIN — ENOXAPARIN SODIUM 40 MG: 100 INJECTION SUBCUTANEOUS at 09:12

## 2025-07-05 RX ADMIN — Medication: at 10:47

## 2025-07-05 RX ADMIN — TRIAMCINOLONE ACETONIDE: 1 CREAM TOPICAL at 10:48

## 2025-07-05 RX ADMIN — MIDODRINE HYDROCHLORIDE 15 MG: 5 TABLET ORAL at 09:12

## 2025-07-05 RX ADMIN — MIDODRINE HYDROCHLORIDE 15 MG: 5 TABLET ORAL at 17:18

## 2025-07-05 RX ADMIN — TRIAMCINOLONE ACETONIDE: 1 CREAM TOPICAL at 19:46

## 2025-07-05 RX ADMIN — FERRIC OXIDE RED: 8; 8 LOTION TOPICAL at 10:48

## 2025-07-05 RX ADMIN — FERRIC OXIDE RED: 8; 8 LOTION TOPICAL at 19:45

## 2025-07-05 ASSESSMENT — PAIN SCALES - GENERAL
PAINLEVEL_OUTOF10: 0

## 2025-07-06 LAB
ANION GAP SERPL CALCULATED.3IONS-SCNC: 4 MMOL/L (ref 3–16)
BACTERIA UR CULT: ABNORMAL
BACTERIA UR CULT: ABNORMAL
BUN SERPL-MCNC: 21 MG/DL (ref 7–20)
CALCIUM SERPL-MCNC: 7.4 MG/DL (ref 8.3–10.6)
CHLORIDE SERPL-SCNC: 117 MMOL/L (ref 99–110)
CO2 SERPL-SCNC: 25 MMOL/L (ref 21–32)
CREAT SERPL-MCNC: 2.5 MG/DL (ref 0.8–1.3)
GFR SERPLBLD CREATININE-BSD FMLA CKD-EPI: 26 ML/MIN/{1.73_M2}
GLUCOSE BLD-MCNC: 117 MG/DL (ref 70–99)
GLUCOSE BLD-MCNC: 126 MG/DL (ref 70–99)
GLUCOSE BLD-MCNC: 129 MG/DL (ref 70–99)
GLUCOSE BLD-MCNC: 96 MG/DL (ref 70–99)
GLUCOSE SERPL-MCNC: 95 MG/DL (ref 70–99)
ORGANISM: ABNORMAL
ORGANISM: ABNORMAL
PERFORMED ON: ABNORMAL
PERFORMED ON: NORMAL
POTASSIUM SERPL-SCNC: 4.4 MMOL/L (ref 3.5–5.1)
SODIUM SERPL-SCNC: 146 MMOL/L (ref 136–145)

## 2025-07-06 PROCEDURE — 6360000002 HC RX W HCPCS: Performed by: INTERNAL MEDICINE

## 2025-07-06 PROCEDURE — 6370000000 HC RX 637 (ALT 250 FOR IP): Performed by: STUDENT IN AN ORGANIZED HEALTH CARE EDUCATION/TRAINING PROGRAM

## 2025-07-06 PROCEDURE — 2060000000 HC ICU INTERMEDIATE R&B

## 2025-07-06 PROCEDURE — 6370000000 HC RX 637 (ALT 250 FOR IP): Performed by: HOSPITALIST

## 2025-07-06 PROCEDURE — 2500000003 HC RX 250 WO HCPCS: Performed by: INTERNAL MEDICINE

## 2025-07-06 PROCEDURE — 51702 INSERT TEMP BLADDER CATH: CPT

## 2025-07-06 PROCEDURE — 80048 BASIC METABOLIC PNL TOTAL CA: CPT

## 2025-07-06 PROCEDURE — 6370000000 HC RX 637 (ALT 250 FOR IP): Performed by: NURSE PRACTITIONER

## 2025-07-06 PROCEDURE — 51798 US URINE CAPACITY MEASURE: CPT

## 2025-07-06 RX ORDER — ROSUVASTATIN CALCIUM 10 MG/1
10 TABLET, COATED ORAL NIGHTLY
Status: DISCONTINUED | OUTPATIENT
Start: 2025-07-06 | End: 2025-07-14 | Stop reason: HOSPADM

## 2025-07-06 RX ORDER — ENOXAPARIN SODIUM 100 MG/ML
30 INJECTION SUBCUTANEOUS DAILY
Status: DISCONTINUED | OUTPATIENT
Start: 2025-07-06 | End: 2025-07-09

## 2025-07-06 RX ADMIN — HYDROCORTISONE SODIUM SUCCINATE 100 MG: 100 INJECTION, POWDER, FOR SOLUTION INTRAMUSCULAR; INTRAVENOUS at 08:45

## 2025-07-06 RX ADMIN — FERRIC OXIDE RED: 8; 8 LOTION TOPICAL at 12:41

## 2025-07-06 RX ADMIN — MIDODRINE HYDROCHLORIDE 15 MG: 5 TABLET ORAL at 08:45

## 2025-07-06 RX ADMIN — SODIUM CHLORIDE, PRESERVATIVE FREE 10 ML: 5 INJECTION INTRAVENOUS at 08:50

## 2025-07-06 RX ADMIN — MIDODRINE HYDROCHLORIDE 15 MG: 5 TABLET ORAL at 02:25

## 2025-07-06 RX ADMIN — Medication: at 08:46

## 2025-07-06 RX ADMIN — TRIAMCINOLONE ACETONIDE: 1 CREAM TOPICAL at 20:12

## 2025-07-06 RX ADMIN — GUAIFENESIN 600 MG: 600 TABLET, MULTILAYER, EXTENDED RELEASE ORAL at 20:12

## 2025-07-06 RX ADMIN — MIDODRINE HYDROCHLORIDE 15 MG: 5 TABLET ORAL at 17:11

## 2025-07-06 RX ADMIN — FERRIC OXIDE RED: 8; 8 LOTION TOPICAL at 20:12

## 2025-07-06 RX ADMIN — HYDROCORTISONE SODIUM SUCCINATE 100 MG: 100 INJECTION, POWDER, FOR SOLUTION INTRAMUSCULAR; INTRAVENOUS at 17:10

## 2025-07-06 RX ADMIN — MIRTAZAPINE 15 MG: 15 TABLET, FILM COATED ORAL at 20:12

## 2025-07-06 RX ADMIN — SODIUM CHLORIDE, PRESERVATIVE FREE 10 ML: 5 INJECTION INTRAVENOUS at 20:13

## 2025-07-06 RX ADMIN — TRIAMCINOLONE ACETONIDE: 1 CREAM TOPICAL at 08:46

## 2025-07-06 RX ADMIN — ENOXAPARIN SODIUM 30 MG: 100 INJECTION SUBCUTANEOUS at 09:45

## 2025-07-06 RX ADMIN — GUAIFENESIN 600 MG: 600 TABLET, MULTILAYER, EXTENDED RELEASE ORAL at 08:45

## 2025-07-06 RX ADMIN — HYDROCORTISONE SODIUM SUCCINATE 100 MG: 100 INJECTION, POWDER, FOR SOLUTION INTRAMUSCULAR; INTRAVENOUS at 02:25

## 2025-07-06 RX ADMIN — Medication: at 20:12

## 2025-07-06 RX ADMIN — FERRIC OXIDE RED: 8; 8 LOTION TOPICAL at 08:45

## 2025-07-06 ASSESSMENT — PAIN SCALES - GENERAL
PAINLEVEL_OUTOF10: 0

## 2025-07-07 ENCOUNTER — APPOINTMENT (OUTPATIENT)
Dept: ULTRASOUND IMAGING | Age: 80
DRG: 682 | End: 2025-07-07
Payer: MEDICARE

## 2025-07-07 LAB
ANION GAP SERPL CALCULATED.3IONS-SCNC: 8 MMOL/L (ref 3–16)
BUN SERPL-MCNC: 33 MG/DL (ref 7–20)
CALCIUM SERPL-MCNC: 10.7 MG/DL (ref 8.3–10.6)
CHLORIDE SERPL-SCNC: 102 MMOL/L (ref 99–110)
CO2 SERPL-SCNC: 27 MMOL/L (ref 21–32)
CREAT SERPL-MCNC: 4.1 MG/DL (ref 0.8–1.3)
GFR SERPLBLD CREATININE-BSD FMLA CKD-EPI: 14 ML/MIN/{1.73_M2}
GLUCOSE BLD-MCNC: 103 MG/DL (ref 70–99)
GLUCOSE BLD-MCNC: 124 MG/DL (ref 70–99)
GLUCOSE BLD-MCNC: 144 MG/DL (ref 70–99)
GLUCOSE BLD-MCNC: 155 MG/DL (ref 70–99)
GLUCOSE SERPL-MCNC: 121 MG/DL (ref 70–99)
PERFORMED ON: ABNORMAL
POTASSIUM SERPL-SCNC: 4.4 MMOL/L (ref 3.5–5.1)
SODIUM SERPL-SCNC: 137 MMOL/L (ref 136–145)

## 2025-07-07 PROCEDURE — 6360000002 HC RX W HCPCS: Performed by: INTERNAL MEDICINE

## 2025-07-07 PROCEDURE — 97535 SELF CARE MNGMENT TRAINING: CPT

## 2025-07-07 PROCEDURE — 76870 US EXAM SCROTUM: CPT

## 2025-07-07 PROCEDURE — 99232 SBSQ HOSP IP/OBS MODERATE 35: CPT | Performed by: INTERNAL MEDICINE

## 2025-07-07 PROCEDURE — 80048 BASIC METABOLIC PNL TOTAL CA: CPT

## 2025-07-07 PROCEDURE — 2060000000 HC ICU INTERMEDIATE R&B

## 2025-07-07 PROCEDURE — 6370000000 HC RX 637 (ALT 250 FOR IP): Performed by: STUDENT IN AN ORGANIZED HEALTH CARE EDUCATION/TRAINING PROGRAM

## 2025-07-07 PROCEDURE — 97530 THERAPEUTIC ACTIVITIES: CPT

## 2025-07-07 PROCEDURE — 6370000000 HC RX 637 (ALT 250 FOR IP): Performed by: INTERNAL MEDICINE

## 2025-07-07 PROCEDURE — 2580000003 HC RX 258: Performed by: INTERNAL MEDICINE

## 2025-07-07 PROCEDURE — 51798 US URINE CAPACITY MEASURE: CPT

## 2025-07-07 PROCEDURE — 6370000000 HC RX 637 (ALT 250 FOR IP): Performed by: HOSPITALIST

## 2025-07-07 PROCEDURE — 93975 VASCULAR STUDY: CPT

## 2025-07-07 PROCEDURE — 2500000003 HC RX 250 WO HCPCS: Performed by: INTERNAL MEDICINE

## 2025-07-07 PROCEDURE — 6370000000 HC RX 637 (ALT 250 FOR IP): Performed by: NURSE PRACTITIONER

## 2025-07-07 RX ADMIN — GUAIFENESIN 600 MG: 600 TABLET, MULTILAYER, EXTENDED RELEASE ORAL at 08:02

## 2025-07-07 RX ADMIN — FERRIC OXIDE RED: 8; 8 LOTION TOPICAL at 14:58

## 2025-07-07 RX ADMIN — MIDODRINE HYDROCHLORIDE 15 MG: 5 TABLET ORAL at 02:54

## 2025-07-07 RX ADMIN — MIDODRINE HYDROCHLORIDE 15 MG: 5 TABLET ORAL at 08:02

## 2025-07-07 RX ADMIN — PHENOL 1 SPRAY: 1.5 LIQUID ORAL at 08:20

## 2025-07-07 RX ADMIN — DAPTOMYCIN 650 MG: 500 INJECTION, POWDER, LYOPHILIZED, FOR SOLUTION INTRAVENOUS at 11:56

## 2025-07-07 RX ADMIN — FERRIC OXIDE RED: 8; 8 LOTION TOPICAL at 08:03

## 2025-07-07 RX ADMIN — HYDROCORTISONE SODIUM SUCCINATE 100 MG: 100 INJECTION, POWDER, FOR SOLUTION INTRAMUSCULAR; INTRAVENOUS at 17:07

## 2025-07-07 RX ADMIN — Medication: at 08:04

## 2025-07-07 RX ADMIN — OXYCODONE 5 MG: 5 TABLET ORAL at 02:55

## 2025-07-07 RX ADMIN — MIRTAZAPINE 15 MG: 15 TABLET, FILM COATED ORAL at 20:28

## 2025-07-07 RX ADMIN — Medication: at 20:26

## 2025-07-07 RX ADMIN — TRIAMCINOLONE ACETONIDE: 1 CREAM TOPICAL at 08:05

## 2025-07-07 RX ADMIN — HYDROCORTISONE SODIUM SUCCINATE 100 MG: 100 INJECTION, POWDER, FOR SOLUTION INTRAMUSCULAR; INTRAVENOUS at 02:57

## 2025-07-07 RX ADMIN — MIDODRINE HYDROCHLORIDE 15 MG: 5 TABLET ORAL at 17:07

## 2025-07-07 RX ADMIN — SODIUM CHLORIDE, PRESERVATIVE FREE 10 ML: 5 INJECTION INTRAVENOUS at 08:03

## 2025-07-07 RX ADMIN — SODIUM CHLORIDE, SODIUM LACTATE, POTASSIUM CHLORIDE, AND CALCIUM CHLORIDE: .6; .31; .03; .02 INJECTION, SOLUTION INTRAVENOUS at 14:57

## 2025-07-07 RX ADMIN — GUAIFENESIN 600 MG: 600 TABLET, MULTILAYER, EXTENDED RELEASE ORAL at 20:28

## 2025-07-07 RX ADMIN — ENOXAPARIN SODIUM 30 MG: 100 INJECTION SUBCUTANEOUS at 08:02

## 2025-07-07 RX ADMIN — HYDROCORTISONE SODIUM SUCCINATE 100 MG: 100 INJECTION, POWDER, FOR SOLUTION INTRAMUSCULAR; INTRAVENOUS at 08:02

## 2025-07-07 RX ADMIN — FERRIC OXIDE RED: 8; 8 LOTION TOPICAL at 20:27

## 2025-07-07 ASSESSMENT — PAIN - FUNCTIONAL ASSESSMENT: PAIN_FUNCTIONAL_ASSESSMENT: ACTIVITIES ARE NOT PREVENTED

## 2025-07-07 ASSESSMENT — PAIN SCALES - WONG BAKER: WONGBAKER_NUMERICALRESPONSE: HURTS A LITTLE BIT

## 2025-07-07 ASSESSMENT — PAIN DESCRIPTION - PAIN TYPE: TYPE: ACUTE PAIN

## 2025-07-07 ASSESSMENT — PAIN DESCRIPTION - DESCRIPTORS: DESCRIPTORS: DISCOMFORT

## 2025-07-07 ASSESSMENT — PAIN SCALES - GENERAL: PAINLEVEL_OUTOF10: 5

## 2025-07-07 ASSESSMENT — PAIN DESCRIPTION - FREQUENCY: FREQUENCY: CONTINUOUS

## 2025-07-07 ASSESSMENT — PAIN DESCRIPTION - LOCATION: LOCATION: PENIS

## 2025-07-07 ASSESSMENT — PAIN DESCRIPTION - ORIENTATION: ORIENTATION: MID

## 2025-07-07 ASSESSMENT — PAIN DESCRIPTION - ONSET: ONSET: ON-GOING

## 2025-07-07 NOTE — CONSULTS
Ph: [phone], Fax: [phone]                                     [email or web address]                                                   [address] [place], [place] [id number]           Reason for admission:                 Brief Summary:     Delano Daigle is being seen by nephrology for LAMBERTO    Interval History and Plan:   Patient seen at bedside  Comfortable  BP low 88/47  On room air  Urine output 200 ml + 2 x           IV ringer lactate bolus 1 liter as patient is hypotensive and the continue IVF  Increase Midodrine 10 mg TID  Follow BP  On stress dose steroids already  On antibiotics per ID.   Check CK, urine lytes, urine protein Cr ratio, albuminuria, UA  Please have strict I/O monitoring  Follow today's CT scan to see if there is bleed or obstruction  Avoid nephrotoxins if possible      D/W Dr Pires    Thank you for allowing us to participate in this patient's care  In case of any question please call us at our 24 hour answering service [phone] or from 7 AM to 5 PM via Perfect Serve, [employer], Epic chat or cell phone.   Dr Alber Denton MD      Assessment:     Acute Kidney Injury:     Baseline Cr: 1.2 (7/1/25)  1.5 7/2 > 1.7 > 1.9  Proteinuria.   CK:  UA:  Renal imaging:  Evidence of urinary tract obstruction:   Nephrotoxic exposures including NSAIDs use or contrast exposure:   Recent Antibiotics use: YES. Was on Vancomycin.   Hemodynamic insults: YES. BP low  ECHO:   Known Cirrhosis: No  Current infection or sepsis: Has renal abscess   TMA suspected: No Has chronic anemia and thrombocytopenia.     CT guided renal lesion/mass  biopsy was done and purulent pus was observed.       LAMBERTO appear to be due to hemodynamics + sepsis   AIN is always possibIilty  Check workup as above        Hypotension        Sepsis       HPI      Patient is a 79 y.o. male  with PMH significant for  type 2 diabetes, hypertension, hyperlipidemia, concern for adrenal insufficiency was admitted on 
                          Ph: [phone], Fax: [phone]                                     [employer]                                                   [address] [place], [place] [id number]           Reason for admission:                 Brief Summary:     Delano Daigle is being seen by nephrology for LAMBERTO    Interval History and Plan:   Patient seen at bedside  Comfortable  BP remain soft  On room air and denied SOB  Urine output not accurately charted. 275 ml + 1 x   Cr 1.9 > 2.1  Na 146 > 146  CT A/P did not show hydronephrosis or bleed  Wt stable but bed scale          Continue IV ringer lactate  Increase Midodrine 15 mg TID  Follow BP  On stress dose steroids already  On antibiotics per ID.   Please have strict I/O monitoring to accurately assess volume status. Patient has edema but comfortable on room air so continue IVF for now.   Have daily standing weight          Thank you for allowing us to participate in this patient's care  In case of any question please call us at our 24 hour answering service [phone] or from 7 AM to 5 PM via Perfect Serve, BitWallalte, Epic chat or cell phone.   Dr Alber Denton MD      Assessment:     Acute Kidney Injury:     Baseline Cr: 1.2 (7/1/25)  1.5 7/2 > 1.7 > 1.9  Proteinuria.   CK: 28  UA: 10- 20 rbc, neg blood, trace protein  Renal imaging:  Evidence of urinary tract obstruction:   Nephrotoxic exposures including NSAIDs use or contrast exposure:   Recent Antibiotics use: YES. Was on Vancomycin.   Hemodynamic insults: YES. BP low  ECHO:   Known Cirrhosis: No  Current infection or sepsis: Has renal abscess   TMA suspected: No Has chronic anemia and thrombocytopenia.     CT guided renal lesion/mass  biopsy was done and purulent pus was observed.       LAMBERTO appear to be due to hemodynamics + sepsis   AIN is always possibIilty  Check workup as above        Hypotension        Sepsis       HPI      Patient is a 79 y.o. male  with PMH significant for  type 2 
                          Ph: [phone], Fax: [phone]                                     [employer]                                                   [address] [place], [place] [address]           Reason for admission:                 Brief Summary:     Delano Daigle is being seen by nephrology for LAMBERTO    Interval History and Plan:   Patient seen at bedside with nurse  Comfortable  BP better 103/61  On room air and denied SOB  Per nurse patient is not making much urine.   Cr  worsening 1.9 > 2.1 >2.5  Na 146   K 4.6  Bicarbonate 25  CT A/P did not show hydronephrosis or bleed  Wt stable but bed scale          Place shukla to accurately monitor urine output  Decrease IVF rate. Patient has edema but on room air  If Cr continue to get worse might need kidney biopsy but appear ATN.   Continue IV ringer lactate  Continue  Midodrine 15 mg TID  Follow BP  On stress dose steroids already  On antibiotics per ID.   Please have strict I/O monitoring to accurately assess volume status.    D/W patient and nurse at bedside      Thank you for allowing us to participate in this patient's care  In case of any question please call us at our 24 hour answering service [phone] or from 7 AM to 5 PM via Perfect Serve, [employer], Epic chat or cell phone.   Dr Alber Denton MD      Assessment:     Acute Kidney Injury:     Baseline Cr: 1.2 (7/1/25)  1.5 7/2 > 1.7 > 1.9  Proteinuria.   CK: 28  UA: 10- 20 rbc, neg blood, trace protein  Renal imaging:  Evidence of urinary tract obstruction:   Nephrotoxic exposures including NSAIDs use or contrast exposure:   Recent Antibiotics use: YES. Was on Vancomycin.   Hemodynamic insults: YES. BP low  ECHO:   Known Cirrhosis: No  Current infection or sepsis: Has renal abscess   TMA suspected: No Has chronic anemia and thrombocytopenia.     CT guided renal lesion/mass  biopsy was done and purulent pus was observed.       LAMBERTO appear to be due to hemodynamics + sepsis   AIN is 
     Urology Attending Consult Note      Reason for Consultation: Complex renal lesion    History: 80yo M with no reported  history admitted currently for weakness, hypotension and diarrhea resulting in LMABERTO. A CT CAP was done to rule out source of potential infection. This showed a 3.5cm complex cystic lesion in the R kidney measuring 3.5cm of uncertain etiology. Cr now 1.1. We were consulted for recs. He has no voiding complaints or flank pain at this time.     Family History, Social History, Review of Systems:  Reviewed and agreed to as per chart    Vitals:  BP (!) 94/57   Pulse 71   Temp 97.2 °F (36.2 °C) (Oral)   Resp 18   Ht 1.829 m (6')   Wt 85.9 kg (189 lb 6 oz)   SpO2 99%   BMI 25.68 kg/m²   Temp  Av.7 °F (33.2 °C)  Min: 57 °F (13.9 °C)  Max: 97.3 °F (36.3 °C)    Intake/Output Summary (Last 24 hours) at 2025 0932  Last data filed at 2025 0656  Gross per 24 hour   Intake 2826.19 ml   Output 300 ml   Net 2526.19 ml         Physical:  Well developed, well nourished in no acute distress  Mood indicates no abnormalities. Pt doesn’t appear depressed  Orientated to time and place  Neck is supple, trachea is midline  Respiratory effort is normal        Labs:  WBC:    Lab Results   Component Value Date/Time    WBC 8.1 2025 06:18 AM     Hemoglobin/Hematocrit:    Lab Results   Component Value Date/Time    HGB 9.6 2025 06:18 AM    HCT 28.8 2025 06:18 AM     BMP:    Lab Results   Component Value Date/Time     2025 06:18 AM    K 3.5 2025 06:18 AM     2025 06:18 AM    CO2 24 2025 06:18 AM    BUN 11 2025 06:18 AM    CREATININE 1.1 2025 06:18 AM    CALCIUM 7.3 2025 06:18 AM    GFRAA >60 10/12/2021 11:48 AM    GFRAA >60 2012 12:10 PM    LABGLOM 68 2025 06:18 AM    LABGLOM >60 02/15/2024 01:38 PM     PT/INR:    Lab Results   Component Value Date/Time    PROTIME 19.2 2025 08:45 AM    INR 1.60 2025 08:45 AM 
    [facility] -  Clinical Pharmacy Note    Vancomycin - Management by Pharmacy    Consult Date(s): 06/26/25  Consulting Provider(s): Madhuri Acosta-Marker, CNP    Assessment / Plan  Bloodstream Infection - Vancomycin  Concurrent Antimicrobials:   N/A  Day of Vanc Therapy / Ordered Duration: Day 1 of unspecified  Current Dosing Method: Bayesian-Guided AUC Dosing  Therapeutic Goal: -600 mg/L*hr  Current Dose / Plan:   Patient RF baseline (sCr 1.2), elevated CRP (46.4), elevated lactic acid, sepsis (3.5)  Vancomycin 2000mg IV x 1 load dose followed by 1250mg IV Q24H  Estimated PGK35wje 417 mg/L*hr, AUCsteady state 500 mg/L*hr, & Ctrough 13.6 mg/L  Will continue to monitor clinical condition and make adjustments to regimen as appropriate.    Thank you for consulting pharmacy,    Gelacio Baker, [facility] ext [phone]        Interval update:  New start    Subjective/Objective:   Delano Daigle is a 79 y.o. male with a PMHx significant for COPD, hyperlipidemia, hypertension, and type 2 diabetes who is admitted with fatigue.     Pharmacy is consulted to dose vancomycin.    Ht Readings from Last 1 Encounters:   06/24/25 1.829 m (6')     Wt Readings from Last 1 Encounters:   06/24/25 86.4 kg (190 lb 7.6 oz)     Current & Prior Antimicrobial Regimen(s):  Ceftriaxone 1000mg x 1 dose  Vancomycin - current    Vancomycin Level(s) / Doses:    Date Time Dose Type of Level / Level Interpretation                 Note: Serum levels collected for AUC-based dosing may be high if collected in close proximity to the dose administered. This is not necessarily indicative of toxicity.    Cultures & Sensitivities:    Date Site Micro Susceptibility / Result                 Recent Labs     06/23/25 2003 06/24/25  0354 06/25/25  0506   CREATININE 1.4* 1.2 1.2   BUN 16 15 14   WBC 8.3 9.3 9.1       Estimated Creatinine Clearance: 56 mL/min (based on SCr of 1.2 mg/dL).    Additional Lab Values / Findings of Note:    Recent Labs     
Clinical Pharmacy Consult Note  Medication History     Admit Date: 6/23/2025    Pharmacy consulted to verify home medication list by Dr. Dodd.    List of current medications patient is taking is complete. Home Medication list in Epic updated to reflect changes noted below.    Source of information: fill history, chart review     Patient's home pharmacy:   [id number] - [place], [place] - [address] - P [phone] - F [phone]  [address]  [facility] [address]  Phone: [phone] Fax: [phone]    [facility] [place] - [address] [facility] -  [phone] - F [phone]  [address] [place] [address]  Phone: [phone] Fax: [phone]      Changes made to medication list:   Medications removed: (include reason, ex: therapy completed, patient no longer taking, etc.)  Fluticasone-vilanterol 100-25 mcg inhaler - not taking, last filled 05/2024   Sitagliptin 100 mg tablets - not taking, last filled 05/2024   Medications added:   N/A  Medication doses adjusted:   N/A  Other notes:   Eliquis and amlodipine recently discontinued at cardiology visit on 4/30/25    Current Outpatient Medications   Medication Instructions    amLODIPine (NORVASC) 5 mg, DAILY    ammonium lactate (LAC-HYDRIN) 1 g, PRN    apixaban (ELIQUIS) 5 mg, 2 TIMES DAILY    aspirin 81 mg, DAILY    ezetimibe (ZETIA) 10 MG tablet TAKE 1 TABLET BY MOUTH DAILY FOR HIGH CHOLESTEROL    Incontinence Supply Disposable (DEPEND ADJUSTABLE UNDERWEAR LG) MISC Wear daily and change as needed 2 to 3 times daily.    Lift Chair MISC Does not apply, 12 to 18 wide foot print    losartan (COZAAR) 25 mg, Oral, DAILY    metoprolol succinate (TOPROL XL) 25 mg, DAILY    Misc. Devices (BATH/SHOWER SEAT) MISC 1 Units, Does not apply, DAILY    Misc. Devices (RAISED TOILET SEAT) MISC 1 Units, Does not apply, DAILY    Misc. Devices (WALL GRAB BAR) MISC 1 Units, Does not apply, DAILY    mupirocin (BACTROBAN) 2 % ointment 1 
General Surgery   Resident Consult Note    Reason for Consult: Milky appearance of ascites fluid    History of Present Illness:   Delano Daigle is a 79 y.o. male with Hx of T2DM, HF (Echo 6/26/25, EF 50-55%, mild MR), COPD, a. Fib (was on home Eliquis and was stopped recently by cardiology), and chronic diarrhea, presented to [facility] with complaints of weakness, continued diarrhea and poor PO intake for several days. The patient has multiple prior admissions for his chronic diarrhea with his most recently admitted to the hospital for diarrhea with concerns for adrenal insufficiency. At the time, his cortisol was low and he was treated with stress dose steroids and referred to endocrinology as an outpatient, however, the patient never followed up. Since admission, patient's stools have been formed, denies diarrhea, denies blood in his stool. He denies any recent major weight loss or weight gain. Denies any abdominal pain, nausea, vomiting, and night sweats.     Last colonoscopy 11/2021 - with multiple polyps that showed tubular adenoma and one hyperplastic polyp, all removed in their entirety.     Of note, patient is known to our service for previous hidradenitis.     Past Medical History:        Diagnosis Date    Allergic rhinitis     Atrial fibrillation (HCC)     COPD (chronic obstructive pulmonary disease) (HCC)     Eczema     Hearing loss     Hyperlipidemia     Hypertension     Right-sided heart failure (HCC) 03/2025    mild RV dysfunction/cor pulmonale with associated low blood pressure    Type 2 diabetes mellitus without complication (HCC)        Past Surgical History:        Procedure Laterality Date    CARDIAC PROCEDURE N/A 3/5/2025    Right heart cath performed by Bonifacio Houston MD at [facility] CARDIAC CATH LAB    COLONOSCOPY      COLONOSCOPY N/A 11/10/2021    COLONOSCOPY POLYPECTOMY SNARE/COLD BIOPSY performed by Justine Haq MD at [facility] ENDOSCOPY    SKIN GRAFT         Allergies:  Patient has no known 
Infectious Diseases   Consult Note      Reason for Consult: MRSA bacteremia, Klebsiella UTI    Requesting Physician: Dr. Lala     Date of Admission: 6/23/2025  Subjective:   CHIEF COMPLAINT: weakness, diarrhea     HPI:  79-year-old -American male with history of DM 2, HTN, hidradenitis suppurativa with previous superimposed MRSA infections, eczema, A-fib, COPD, cardiomyopathy and HLD that presented on 6/23 with weakness and diarrhea for the past 2 weeks.  He was previously seen by us in infectious disease on 11/2024 for after a flare of hidradenitis, he was sent on a course of doxycycline as previous swabs grew MRSA.  He did also see us in clinic and was given instructions for chlorhexidine decolonization protocol.  He was also advised to follow-up with Derm given his severe eczema and dry skin which were likely portals of infection. He did not followup with Derm.  on this presentation, patient denied any fevers or chills.  Upon presentation, WBC was 8.3 and he was afebrile.  Blood cultures x 2 collected on 6/24 with 1 set positive for MRSA and urine culture on 6/24 also positive for Klebsiella pneumoniae greater than 100,000 CFU.  A CT of the chest abdomen and pelvis performed on 6/25 showed left greater than right pleural effusion, complex cystic lesion in the interpolar of the right kidney measuring 3.5 cm concerning for neoplasm, abscess or complex hemorrhagic cyst.  Urology has been consulted and recommend obtaining MRI of the abdomen with and without contrast to further evaluate lesion.  He currently has no  complaints.  Patient is currently on vancomycin and Rocephin.                     Current abx: vanco, ceftriaxone          Past Surgical History:       Diagnosis Date    Allergic rhinitis     Atrial fibrillation (HCC)     COPD (chronic obstructive pulmonary disease) (HCC)     Eczema     Hearing loss     Hyperlipidemia     Hypertension     Right-sided heart failure (HCC) 03/2025    mild RV 
[facility]  Cardiology Inpatient Consult Service                                                                                          Pt Name: Delano Daigle  Age: 79 y.o.  Sex: male  : 1945  Location: 4317/4317-01    Referring Physician: Lilliana Pires MD  Primary cardiologist : Dr Blakely    Reason for Consult:     Reason for Consultation/Chief Complaint: MRSA Bactremia    HPI:      Delano Daigle is a 79 y.o. male with a past medical history of hypertension, hyperlipidemia, diabetes, hidradenitis, COPD, recurrent MRSA Bactrim who presented to the hospital with MRSA bacteremia.  Cardiology consulted for evaluation for MICHAEL.  Patient has had anasarca.  Has had paracentesis.      Histories     Past Medical History:   has a past medical history of Allergic rhinitis, Atrial fibrillation (HCC), COPD (chronic obstructive pulmonary disease) (HCC), Eczema, Hearing loss, Hyperlipidemia, Hypertension, Right-sided heart failure (HCC), and Type 2 diabetes mellitus without complication (HCC).    Surgical History:   has a past surgical history that includes Skin graft; Colonoscopy; Colonoscopy (N/A, 11/10/2021); and Cardiac procedure (N/A, 3/5/2025).     Social History:   reports that he has been smoking cigarettes. He has a 59 pack-year smoking history. He has never used smokeless tobacco. He reports current alcohol use of about 2.0 standard drinks of alcohol per week. He reports that he does not use drugs.     Family History:  Noncontributory      Medications:       Home Medications  Were reviewed and are listed in nursing record. and/or listed below  Prior to Admission medications    Medication Sig Start Date End Date Taking? Authorizing Provider   ammonium lactate (LAC-HYDRIN) 12 % lotion Apply 1 g topically as needed for Dry Skin Apply topically as needed.   Yes Provider, MD Jonathan   SITagliptin (JANUVIA) 100 MG tablet Take 1 tablet by mouth daily   Yes Provider 
[facility]  Palliative Medicine Consultation Note      Date Of Admission:6/23/2025  Date of consult: 07/07/25  Seen by PC in the past:  No    Recommendations:        The pt is sleeping and I didn't awaken him. RN reports he has been less conversational and withdrawn this afternoon. I called his son Carlos and LVM.    1. Goals of Care/Advanced Care planning/Code status: Full Code. Will d/w pt/family when able.   2. Pain: no signs of pain at rest. RN reports no c/o pain today.  3. Weakness: p/w weakness and found with acute renal failure and Klebsiella UTI. He has been working with PT/OT and latest [facility] score 16. Plan has been to go to SNF when medically ready. He is from home alone.  4. Disposition: d/c to SNF when medically ready    Reason for Consult:         [x]  Goals of Care  []  Code Status Discussion/Advanced Care Planning   []  Psychosocial/Family Support  []  Symptom Management  []  Other (Specify)    Requesting Physician: Dr. Pires    CHIEF COMPLAINT:  Weakness    History Obtained From:  electronic medical record    History of Present Illness:         Delano Daigle is a 79 y.o. male with PMH of type 2 diabetes, hypertension and hyperlipidemia who presented with weakness, poor oral intake, and acute on chronic diarrhea. He was admitted with acute renal failure thought to be prerenal, adrenal insufficiency, and acute blood loss anemia with hemoglobin 8.5. GI was consulted. He underwent paracentesis. His lactic acid was elevated which resolved with IV fluids. Blood culture showed MRSA bacteremia, suspected urinary source. He underwent transesophageal echocardiogram showing no vegetation. Infectious Disease has been following. The patient was found to have a complex renal cyst underwent drain placement on 7/1 which was purulent. CT 7/4 showed resolution of abscess. Nephrology is also following and suspect ATN and monitoring for need for dialysis.     Subjective:         Past Medical 
Vancomycin has been discontinued. Pharmacy will sign off consult. If medication dosing is resumed, please re-consult pharmacy.    Please call with any questions.  Mary CuadraD, BCPS  Main pharmacy: [id number]  7/3/2025 9:07 AM      
CARDIAC CATH LAB    COLONOSCOPY      COLONOSCOPY N/A 11/10/2021    COLONOSCOPY POLYPECTOMY SNARE/COLD BIOPSY performed by Justine aHq MD at [facility] ENDOSCOPY    SKIN GRAFT          Historical Medications:  Prior to Visit Medications    Medication Sig Taking? Authorizing Provider   amLODIPine (NORVASC) 5 MG tablet Take 1 tablet by mouth daily Yes Jonathan Blue MD   ammonium lactate (LAC-HYDRIN) 12 % lotion Apply 1 g topically as needed for Dry Skin Apply topically as needed. Yes Jonathan Blue MD   apixaban (ELIQUIS) 5 MG TABS tablet Take 1 tablet by mouth 2 times daily Yes ProviderJonathan MD   fluticasone furoate-vilanterol (BREO ELLIPTA) 100-25 MCG/ACT inhaler Inhale 1 puff into the lungs daily Yes Jonathan Blue MD   mupirocin (BACTROBAN) 2 % ointment Apply 1 each topically 2 times daily Apply topically 3 times daily. Yes Jonathan Bule MD   SITagliptin (JANUVIA) 100 MG tablet Take 1 tablet by mouth daily Yes ProviderJonathan MD   losartan (COZAAR) 25 MG tablet Take 1 tablet by mouth daily Yes Jason Dawn MD   metoprolol succinate (TOPROL XL) 25 MG extended release tablet Take 1 tablet by mouth daily Yes Jonathan Blue MD   pioglitazone (ACTOS) 15 MG tablet TAKE 1 TABLET BY MOUTH EVERY DAY Yes Jason Dawn MD   ezetimibe (ZETIA) 10 MG tablet TAKE 1 TABLET BY MOUTH DAILY FOR HIGH CHOLESTEROL Yes Jason Dawn MD   rosuvastatin (CRESTOR) 40 MG tablet TAKE 1 TABLET BY MOUTH DAILY FOR HIGH CHOLESTEROL Yes Jason Dawn MD   Incontinence Supply Disposable (DEPEND ADJUSTABLE UNDERWEAR LG) MISC Wear daily and change as needed 2 to 3 times daily.  Jason Dawn MD   white petrolatum ointment Apply topically as needed.  Jason Dawn MD   nicotine (NICODERM CQ) 21 MG/24HR Place 1 patch onto the skin daily  Patient not taking: Reported on 6/24/2025  Bird Mccurdy MD   Lift Chair MISC by Does not apply route 12 to 18 wide foot print  Gina Morejon APRN   Misc.

## 2025-07-07 NOTE — CARE COORDINATION
Discharge planning update:    Pt will not be able to discharge today d/t increasing creat - message sent to Nayeli.  [place] precert good through today.

## 2025-07-08 PROBLEM — N15.1 RENAL ABSCESS: Status: ACTIVE | Noted: 2025-07-08

## 2025-07-08 PROBLEM — R53.1 GENERALIZED WEAKNESS: Status: ACTIVE | Noted: 2025-03-03

## 2025-07-08 LAB
ANION GAP SERPL CALCULATED.3IONS-SCNC: 5 MMOL/L (ref 3–16)
BUN SERPL-MCNC: 31 MG/DL (ref 7–20)
CALCIUM SERPL-MCNC: 7.4 MG/DL (ref 8.3–10.6)
CHLORIDE SERPL-SCNC: 115 MMOL/L (ref 99–110)
CO2 SERPL-SCNC: 24 MMOL/L (ref 21–32)
CREAT SERPL-MCNC: 2.9 MG/DL (ref 0.8–1.3)
GFR SERPLBLD CREATININE-BSD FMLA CKD-EPI: 21 ML/MIN/{1.73_M2}
GLUCOSE BLD-MCNC: 120 MG/DL (ref 70–99)
GLUCOSE BLD-MCNC: 131 MG/DL (ref 70–99)
GLUCOSE BLD-MCNC: 167 MG/DL (ref 70–99)
GLUCOSE BLD-MCNC: 246 MG/DL (ref 70–99)
GLUCOSE SERPL-MCNC: 132 MG/DL (ref 70–99)
PERFORMED ON: ABNORMAL
POTASSIUM SERPL-SCNC: 4 MMOL/L (ref 3.5–5.1)
SODIUM SERPL-SCNC: 144 MMOL/L (ref 136–145)

## 2025-07-08 PROCEDURE — 80048 BASIC METABOLIC PNL TOTAL CA: CPT

## 2025-07-08 PROCEDURE — 97116 GAIT TRAINING THERAPY: CPT

## 2025-07-08 PROCEDURE — 6370000000 HC RX 637 (ALT 250 FOR IP): Performed by: NURSE PRACTITIONER

## 2025-07-08 PROCEDURE — 97530 THERAPEUTIC ACTIVITIES: CPT

## 2025-07-08 PROCEDURE — 2500000003 HC RX 250 WO HCPCS: Performed by: INTERNAL MEDICINE

## 2025-07-08 PROCEDURE — 6370000000 HC RX 637 (ALT 250 FOR IP): Performed by: STUDENT IN AN ORGANIZED HEALTH CARE EDUCATION/TRAINING PROGRAM

## 2025-07-08 PROCEDURE — 6370000000 HC RX 637 (ALT 250 FOR IP): Performed by: INTERNAL MEDICINE

## 2025-07-08 PROCEDURE — 6360000002 HC RX W HCPCS: Performed by: INTERNAL MEDICINE

## 2025-07-08 PROCEDURE — 6370000000 HC RX 637 (ALT 250 FOR IP): Performed by: HOSPITALIST

## 2025-07-08 PROCEDURE — 2580000003 HC RX 258: Performed by: INTERNAL MEDICINE

## 2025-07-08 PROCEDURE — 2060000000 HC ICU INTERMEDIATE R&B

## 2025-07-08 PROCEDURE — 99233 SBSQ HOSP IP/OBS HIGH 50: CPT | Performed by: INTERNAL MEDICINE

## 2025-07-08 PROCEDURE — 51798 US URINE CAPACITY MEASURE: CPT

## 2025-07-08 PROCEDURE — 99497 ADVNCD CARE PLAN 30 MIN: CPT | Performed by: NURSE PRACTITIONER

## 2025-07-08 RX ORDER — MIDODRINE HYDROCHLORIDE 5 MG/1
10 TABLET ORAL EVERY 8 HOURS
Status: DISCONTINUED | OUTPATIENT
Start: 2025-07-08 | End: 2025-07-09

## 2025-07-08 RX ORDER — HYDROCORTISONE SODIUM SUCCINATE 100 MG/2ML
50 INJECTION INTRAMUSCULAR; INTRAVENOUS EVERY 8 HOURS
Status: DISCONTINUED | OUTPATIENT
Start: 2025-07-08 | End: 2025-07-09

## 2025-07-08 RX ADMIN — MIDODRINE HYDROCHLORIDE 10 MG: 5 TABLET ORAL at 18:20

## 2025-07-08 RX ADMIN — TRIAMCINOLONE ACETONIDE: 1 CREAM TOPICAL at 20:33

## 2025-07-08 RX ADMIN — INSULIN LISPRO 2 UNITS: 100 INJECTION, SOLUTION INTRAVENOUS; SUBCUTANEOUS at 20:20

## 2025-07-08 RX ADMIN — HYDROCORTISONE SODIUM SUCCINATE 100 MG: 100 INJECTION, POWDER, FOR SOLUTION INTRAMUSCULAR; INTRAVENOUS at 00:50

## 2025-07-08 RX ADMIN — HYDROCORTISONE SODIUM SUCCINATE 50 MG: 100 INJECTION, POWDER, FOR SOLUTION INTRAMUSCULAR; INTRAVENOUS at 18:22

## 2025-07-08 RX ADMIN — FERRIC OXIDE RED: 8; 8 LOTION TOPICAL at 10:00

## 2025-07-08 RX ADMIN — SODIUM CHLORIDE, PRESERVATIVE FREE 10 ML: 5 INJECTION INTRAVENOUS at 20:32

## 2025-07-08 RX ADMIN — MIDODRINE HYDROCHLORIDE 10 MG: 5 TABLET ORAL at 09:59

## 2025-07-08 RX ADMIN — MIRTAZAPINE 15 MG: 15 TABLET, FILM COATED ORAL at 20:29

## 2025-07-08 RX ADMIN — PHENOL 1 SPRAY: 1.5 LIQUID ORAL at 10:06

## 2025-07-08 RX ADMIN — SODIUM CHLORIDE, PRESERVATIVE FREE 10 ML: 5 INJECTION INTRAVENOUS at 10:06

## 2025-07-08 RX ADMIN — MIDODRINE HYDROCHLORIDE 15 MG: 5 TABLET ORAL at 00:50

## 2025-07-08 RX ADMIN — Medication: at 10:00

## 2025-07-08 RX ADMIN — SODIUM CHLORIDE, SODIUM LACTATE, POTASSIUM CHLORIDE, AND CALCIUM CHLORIDE: .6; .31; .03; .02 INJECTION, SOLUTION INTRAVENOUS at 04:54

## 2025-07-08 RX ADMIN — FERRIC OXIDE RED: 8; 8 LOTION TOPICAL at 20:33

## 2025-07-08 RX ADMIN — SODIUM CHLORIDE, PRESERVATIVE FREE 10 ML: 5 INJECTION INTRAVENOUS at 20:30

## 2025-07-08 RX ADMIN — HYDROCORTISONE SODIUM SUCCINATE 50 MG: 100 INJECTION, POWDER, FOR SOLUTION INTRAMUSCULAR; INTRAVENOUS at 10:01

## 2025-07-08 RX ADMIN — Medication: at 20:30

## 2025-07-08 RX ADMIN — GUAIFENESIN 600 MG: 600 TABLET, MULTILAYER, EXTENDED RELEASE ORAL at 20:29

## 2025-07-08 RX ADMIN — SODIUM CHLORIDE, PRESERVATIVE FREE 10 ML: 5 INJECTION INTRAVENOUS at 09:16

## 2025-07-08 ASSESSMENT — PAIN SCALES - GENERAL: PAINLEVEL_OUTOF10: 0

## 2025-07-08 NOTE — FLOWSHEET NOTE
07/07/25 2318   Vitals   Temp (!) 95.2 °F (35.1 °C)   Temp Source Rectal   Pulse 58   Heart Rate Source Monitor   Respirations 16   /70   MAP (Calculated) 85   MAP (mmHg) 85   BP Location Left upper arm   BP Upper/Lower Upper   BP Method Automatic   Patient Position Right side     Pts tempature is low at this time. There is an order already in from 6/27 to apply the warming blanket with a goal temp of 97 Degrees F.   Warming blanket applied at this time.   Electronically signed by Valentin Osorio RN on 7/7/2025 at 11:33 PM    Temp up to 97.2 this morning.     Bladder scan at 0515 is reading 253mL- he pt tried for awhile to urinate but was unable to. Will continue to prompt trying to uninate/ bladder scan.     No other acute events over night. All other VSS, NSR to SB on tele. All fall precautions in place.     Electronically signed by Valentin Osorio RN on 7/8/2025 at 6:04 AM

## 2025-07-08 NOTE — ACP (ADVANCE CARE PLANNING)
Advance Care Planning     Advance Care Planning Inpatient Note  [facility]    Today's Date: 7/8/2025  Unit: [facility] 4 PCU    Received request from HealthCare Provider.  Upon review of chart and communication with care team, patient's decision making abilities are not in question.. Patient was/were present in the room during visit.    Goals of ACP Conversation:  Discuss advance care planning documents    Health Care Decision Makers:       Primary Decision Maker: Carlos Daigle - Child - [phone]    Primary Decision Maker: Jacque Daigle - Child - [phone]  Summary:  No Decision Maker named by patient at this time    Advance Care Planning Documents (Patient Wishes):  Healthcare Power of /Advance Directive Appointment of Health Care Agent     Assessment:  Patient given HCPOA documents to review. Patient expressed having support in his life and not requiring paperwork at this time to facilitate future medical decision making. Patient shared his feelings about his condition and is focused on eating food that he likes and enjoying the comforts of home.     Interventions:  Discussed and provided education on state decision maker hierarchy  Encouraged ongoing ACP conversation with future decision makers and loved ones    Care Preferences Communicated:   No    Outcomes/Plan:  ACP Discussion: Refused    Electronically signed by ORQUIDEA Larson on 7/8/2025 at 1:12 PM

## 2025-07-08 NOTE — ACP (ADVANCE CARE PLANNING)
Advance Care Planning      Palliative Medicine Provider (MD/NP)  Advance Care Planning (ACP) Conversation      Date of Conversation: 07/08/25  The patient and/or authorized decision maker consented to a voluntary Advance Care Planning conversation.   Individuals present for the conversation:   Patient and Son Carlos    Legal Healthcare Agent(s):    Primary Decision Maker: Carlos Daigle - Child - [phone]    Primary Decision Maker: Jacque Daigle - Child - [phone]    ACP documents available in EMR prior to discussion:  -None    Primary Palliative Diagnosis(es):  MRSA  COPD    Conversation Summary:  Met with the pt and he was A&Ox4 but somewhat irritable regarding wanting a plate of spaghetti with meat sauce and grape soda. He reported having 2 children- Carlos and Jacque. Per his request, I called Carlos. Introduced palliative care and had a voluntary discussion about advance care planning. Carlos reports he has not completed advance directives and would appreciate if we could do that with him while he's here. I let him know that spiritual care and/or myself would f/u on that. Also discussed code status with Carlos including potential burdens/risks of CPR. Carlos said he will d/w his father. Carlos mentioned his father has some poor habits at home (smoking, poor diet, noncompliance with medications) which he sees as detrimental to his health and contributing to multiple admissions to the hospital. Carlos said his father won't listen to him or take his advice, so he just tries to be there for him. Offered emotional support.    Resuscitation Status:    Code Status: Full Code    Outcomes / Completed Documentation:  An explanation of advance directives and their importance was provided and the following forms completed:    -No new documents completed.    If new document completed, original was provided to patient and/or family member.    Copy was placed for scanning into the [facility] EMR.      I spent 25 minutes

## 2025-07-09 LAB
ANION GAP SERPL CALCULATED.3IONS-SCNC: 6 MMOL/L (ref 3–16)
BUN SERPL-MCNC: 33 MG/DL (ref 7–20)
CALCIUM SERPL-MCNC: 7.2 MG/DL (ref 8.3–10.6)
CHLORIDE SERPL-SCNC: 116 MMOL/L (ref 99–110)
CO2 SERPL-SCNC: 24 MMOL/L (ref 21–32)
CREAT SERPL-MCNC: 2.9 MG/DL (ref 0.8–1.3)
GFR SERPLBLD CREATININE-BSD FMLA CKD-EPI: 21 ML/MIN/{1.73_M2}
GLUCOSE BLD-MCNC: 125 MG/DL (ref 70–99)
GLUCOSE BLD-MCNC: 131 MG/DL (ref 70–99)
GLUCOSE BLD-MCNC: 176 MG/DL (ref 70–99)
GLUCOSE BLD-MCNC: 201 MG/DL (ref 70–99)
GLUCOSE SERPL-MCNC: 136 MG/DL (ref 70–99)
PERFORMED ON: ABNORMAL
POTASSIUM SERPL-SCNC: 4 MMOL/L (ref 3.5–5.1)
SODIUM SERPL-SCNC: 146 MMOL/L (ref 136–145)

## 2025-07-09 PROCEDURE — 80048 BASIC METABOLIC PNL TOTAL CA: CPT

## 2025-07-09 PROCEDURE — 6370000000 HC RX 637 (ALT 250 FOR IP): Performed by: INTERNAL MEDICINE

## 2025-07-09 PROCEDURE — 6360000002 HC RX W HCPCS: Performed by: INTERNAL MEDICINE

## 2025-07-09 PROCEDURE — 2060000000 HC ICU INTERMEDIATE R&B

## 2025-07-09 PROCEDURE — 2500000003 HC RX 250 WO HCPCS: Performed by: INTERNAL MEDICINE

## 2025-07-09 PROCEDURE — 6370000000 HC RX 637 (ALT 250 FOR IP): Performed by: NURSE PRACTITIONER

## 2025-07-09 PROCEDURE — 51798 US URINE CAPACITY MEASURE: CPT

## 2025-07-09 PROCEDURE — 2580000003 HC RX 258: Performed by: INTERNAL MEDICINE

## 2025-07-09 PROCEDURE — 99232 SBSQ HOSP IP/OBS MODERATE 35: CPT | Performed by: INTERNAL MEDICINE

## 2025-07-09 RX ORDER — MIDODRINE HYDROCHLORIDE 5 MG/1
5 TABLET ORAL EVERY 8 HOURS
Status: DISCONTINUED | OUTPATIENT
Start: 2025-07-09 | End: 2025-07-11

## 2025-07-09 RX ORDER — HYDROCORTISONE 5 MG/1
5 TABLET ORAL EVERY EVENING
Status: DISCONTINUED | OUTPATIENT
Start: 2025-07-09 | End: 2025-07-14 | Stop reason: HOSPADM

## 2025-07-09 RX ORDER — HYDROCORTISONE 20 MG/1
10 TABLET ORAL
Status: DISCONTINUED | OUTPATIENT
Start: 2025-07-09 | End: 2025-07-14 | Stop reason: HOSPADM

## 2025-07-09 RX ORDER — SODIUM CHLORIDE 450 MG/100ML
INJECTION, SOLUTION INTRAVENOUS CONTINUOUS
Status: DISCONTINUED | OUTPATIENT
Start: 2025-07-09 | End: 2025-07-12

## 2025-07-09 RX ORDER — HEPARIN SODIUM 5000 [USP'U]/ML
5000 INJECTION, SOLUTION INTRAVENOUS; SUBCUTANEOUS EVERY 8 HOURS SCHEDULED
Status: DISCONTINUED | OUTPATIENT
Start: 2025-07-09 | End: 2025-07-14 | Stop reason: HOSPADM

## 2025-07-09 RX ORDER — MIRTAZAPINE 15 MG/1
30 TABLET, FILM COATED ORAL NIGHTLY
Status: DISCONTINUED | OUTPATIENT
Start: 2025-07-09 | End: 2025-07-14 | Stop reason: HOSPADM

## 2025-07-09 RX ORDER — HYDROCORTISONE SODIUM SUCCINATE 100 MG/2ML
50 INJECTION INTRAMUSCULAR; INTRAVENOUS EVERY 12 HOURS
Status: DISCONTINUED | OUTPATIENT
Start: 2025-07-09 | End: 2025-07-09

## 2025-07-09 RX ADMIN — POLYETHYLENE GLYCOL 3350 17 G: 17 POWDER, FOR SOLUTION ORAL at 10:33

## 2025-07-09 RX ADMIN — TRIAMCINOLONE ACETONIDE: 1 CREAM TOPICAL at 23:19

## 2025-07-09 RX ADMIN — SODIUM CHLORIDE, PRESERVATIVE FREE 10 ML: 5 INJECTION INTRAVENOUS at 23:28

## 2025-07-09 RX ADMIN — Medication: at 10:37

## 2025-07-09 RX ADMIN — MIDODRINE HYDROCHLORIDE 5 MG: 5 TABLET ORAL at 10:32

## 2025-07-09 RX ADMIN — HYDROCORTISONE 10 MG: 20 TABLET ORAL at 10:32

## 2025-07-09 RX ADMIN — MIDODRINE HYDROCHLORIDE 10 MG: 5 TABLET ORAL at 00:26

## 2025-07-09 RX ADMIN — DAPTOMYCIN 650 MG: 500 INJECTION, POWDER, LYOPHILIZED, FOR SOLUTION INTRAVENOUS at 11:27

## 2025-07-09 RX ADMIN — SODIUM CHLORIDE: 4.5 INJECTION, SOLUTION INTRAVENOUS at 11:24

## 2025-07-09 RX ADMIN — GUAIFENESIN 600 MG: 600 TABLET, MULTILAYER, EXTENDED RELEASE ORAL at 23:17

## 2025-07-09 RX ADMIN — FERRIC OXIDE RED: 8; 8 LOTION TOPICAL at 15:29

## 2025-07-09 RX ADMIN — MIRTAZAPINE 30 MG: 15 TABLET, FILM COATED ORAL at 23:18

## 2025-07-09 RX ADMIN — HYDROCORTISONE SODIUM SUCCINATE 50 MG: 100 INJECTION, POWDER, FOR SOLUTION INTRAMUSCULAR; INTRAVENOUS at 00:26

## 2025-07-09 RX ADMIN — MIDODRINE HYDROCHLORIDE 5 MG: 5 TABLET ORAL at 18:12

## 2025-07-09 RX ADMIN — Medication: at 23:18

## 2025-07-09 RX ADMIN — FERRIC OXIDE RED: 8; 8 LOTION TOPICAL at 23:19

## 2025-07-09 RX ADMIN — SODIUM CHLORIDE, PRESERVATIVE FREE 10 ML: 5 INJECTION INTRAVENOUS at 10:39

## 2025-07-09 RX ADMIN — SODIUM CHLORIDE, PRESERVATIVE FREE 10 ML: 5 INJECTION INTRAVENOUS at 10:41

## 2025-07-09 RX ADMIN — FERRIC OXIDE RED: 8; 8 LOTION TOPICAL at 10:38

## 2025-07-09 RX ADMIN — HYDROCORTISONE 5 MG: 5 TABLET ORAL at 18:12

## 2025-07-09 ASSESSMENT — PAIN SCALES - GENERAL
PAINLEVEL_OUTOF10: 0

## 2025-07-09 NOTE — ACP (ADVANCE CARE PLANNING)
Advance Care Planning      Palliative Medicine Provider (MD/NP)  Advance Care Planning (ACP) Conversation      Date of Conversation: 07/09/25  The patient and/or authorized decision maker consented to a voluntary Advance Care Planning conversation.   Individuals present for the conversation:   Patient    Legal Healthcare Agent(s):    Primary Decision Maker: Carlos Daigle - Child - [phone]    Secondary Decision Maker: Bianka Daigle - Daughter-in-Law - [phone]    Supplemental (Other) Decision Maker: Jacque Daigle - Child - [phone]    ACP documents available in EMR prior to discussion:  -None    Primary Palliative Diagnosis(es):  MRSA  COPD    Conversation Summary:  Completed HCPOA with pt today. He named his son Carlos as his agent, daughter-in-law Bianka as alternate agent, and daughter Jacque as second alternate. Left a copy in pt's room for Carlos to take and LVM to inform him.    Resuscitation Status:    Code Status: Full Code    Outcomes / Completed Documentation:  An explanation of advance directives and their importance was provided and the following forms completed:    -Power of  for Healthcare    If new document completed, original was provided to patient and/or family member.    Copy was placed for scanning into the [facility] EMR.      I spent 10 minutes providing separately identifiable ACP services with the patient and/or surrogate decision maker in a voluntary, in-person conversation discussing the patient's wishes and goals as detailed in the above note.       Darya Ortega, KARIS - CNP

## 2025-07-09 NOTE — CARE COORDINATION
Discharge planning update:    Clinically, pt is not ready for discharge.  Per notes, hypotension hypoglycemia was concern for adrenal insufficiency and last admission patient had a diagnosis of possible adrenal insufficiency. A.m. cortisol did not respond appropriately to his clinical condition and patient was treated with stress dose steroids which has been switched to p.o. steroid.  Pt was also started on Remeron - and its stated that it patient's PO intake does not improve -may need peg tube.     [facility]- waiting for precert.

## 2025-07-10 LAB
ANION GAP SERPL CALCULATED.3IONS-SCNC: 3 MMOL/L (ref 3–16)
BUN SERPL-MCNC: 31 MG/DL (ref 7–20)
CALCIUM SERPL-MCNC: 6.9 MG/DL (ref 8.3–10.6)
CHLORIDE SERPL-SCNC: 116 MMOL/L (ref 99–110)
CO2 SERPL-SCNC: 25 MMOL/L (ref 21–32)
CREAT SERPL-MCNC: 2.7 MG/DL (ref 0.8–1.3)
GFR SERPLBLD CREATININE-BSD FMLA CKD-EPI: 23 ML/MIN/{1.73_M2}
GLUCOSE BLD-MCNC: 297 MG/DL (ref 70–99)
GLUCOSE BLD-MCNC: 73 MG/DL (ref 70–99)
GLUCOSE BLD-MCNC: 78 MG/DL (ref 70–99)
GLUCOSE BLD-MCNC: 83 MG/DL (ref 70–99)
GLUCOSE BLD-MCNC: 91 MG/DL (ref 70–99)
GLUCOSE SERPL-MCNC: 92 MG/DL (ref 70–99)
PERFORMED ON: ABNORMAL
PERFORMED ON: NORMAL
POTASSIUM SERPL-SCNC: 3.3 MMOL/L (ref 3.5–5.1)
SODIUM SERPL-SCNC: 144 MMOL/L (ref 136–145)

## 2025-07-10 PROCEDURE — 51701 INSERT BLADDER CATHETER: CPT

## 2025-07-10 PROCEDURE — 97535 SELF CARE MNGMENT TRAINING: CPT

## 2025-07-10 PROCEDURE — 6370000000 HC RX 637 (ALT 250 FOR IP): Performed by: NURSE PRACTITIONER

## 2025-07-10 PROCEDURE — 6360000002 HC RX W HCPCS: Performed by: INTERNAL MEDICINE

## 2025-07-10 PROCEDURE — 6370000000 HC RX 637 (ALT 250 FOR IP): Performed by: INTERNAL MEDICINE

## 2025-07-10 PROCEDURE — 80048 BASIC METABOLIC PNL TOTAL CA: CPT

## 2025-07-10 PROCEDURE — P9047 ALBUMIN (HUMAN), 25%, 50ML: HCPCS | Performed by: INTERNAL MEDICINE

## 2025-07-10 PROCEDURE — 2500000003 HC RX 250 WO HCPCS: Performed by: INTERNAL MEDICINE

## 2025-07-10 PROCEDURE — 2060000000 HC ICU INTERMEDIATE R&B

## 2025-07-10 PROCEDURE — 51798 US URINE CAPACITY MEASURE: CPT

## 2025-07-10 PROCEDURE — 99232 SBSQ HOSP IP/OBS MODERATE 35: CPT | Performed by: INTERNAL MEDICINE

## 2025-07-10 PROCEDURE — 97530 THERAPEUTIC ACTIVITIES: CPT

## 2025-07-10 PROCEDURE — 2580000003 HC RX 258: Performed by: INTERNAL MEDICINE

## 2025-07-10 RX ORDER — ALBUMIN (HUMAN) 12.5 G/50ML
50 SOLUTION INTRAVENOUS ONCE
Status: COMPLETED | OUTPATIENT
Start: 2025-07-10 | End: 2025-07-10

## 2025-07-10 RX ORDER — POTASSIUM CHLORIDE 1500 MG/1
40 TABLET, EXTENDED RELEASE ORAL ONCE
Status: COMPLETED | OUTPATIENT
Start: 2025-07-10 | End: 2025-07-10

## 2025-07-10 RX ADMIN — GUAIFENESIN 600 MG: 600 TABLET, MULTILAYER, EXTENDED RELEASE ORAL at 09:04

## 2025-07-10 RX ADMIN — MIDODRINE HYDROCHLORIDE 5 MG: 5 TABLET ORAL at 09:04

## 2025-07-10 RX ADMIN — MIDODRINE HYDROCHLORIDE 5 MG: 5 TABLET ORAL at 23:16

## 2025-07-10 RX ADMIN — FERRIC OXIDE RED: 8; 8 LOTION TOPICAL at 09:07

## 2025-07-10 RX ADMIN — SODIUM CHLORIDE: 4.5 INJECTION, SOLUTION INTRAVENOUS at 18:25

## 2025-07-10 RX ADMIN — TRIAMCINOLONE ACETONIDE: 1 CREAM TOPICAL at 09:05

## 2025-07-10 RX ADMIN — MIRTAZAPINE 30 MG: 15 TABLET, FILM COATED ORAL at 20:12

## 2025-07-10 RX ADMIN — GUAIFENESIN 600 MG: 600 TABLET, MULTILAYER, EXTENDED RELEASE ORAL at 20:12

## 2025-07-10 RX ADMIN — FERRIC OXIDE RED: 8; 8 LOTION TOPICAL at 15:25

## 2025-07-10 RX ADMIN — HEPARIN SODIUM 5000 UNITS: 5000 INJECTION INTRAVENOUS; SUBCUTANEOUS at 15:26

## 2025-07-10 RX ADMIN — TRIAMCINOLONE ACETONIDE: 1 CREAM TOPICAL at 20:13

## 2025-07-10 RX ADMIN — ALBUMIN (HUMAN) 50 G: 0.25 INJECTION, SOLUTION INTRAVENOUS at 09:16

## 2025-07-10 RX ADMIN — SODIUM CHLORIDE, PRESERVATIVE FREE 10 ML: 5 INJECTION INTRAVENOUS at 09:04

## 2025-07-10 RX ADMIN — Medication: at 20:12

## 2025-07-10 RX ADMIN — SODIUM CHLORIDE: 4.5 INJECTION, SOLUTION INTRAVENOUS at 07:46

## 2025-07-10 RX ADMIN — MIDODRINE HYDROCHLORIDE 5 MG: 5 TABLET ORAL at 01:05

## 2025-07-10 RX ADMIN — HYDROCORTISONE 10 MG: 20 TABLET ORAL at 09:03

## 2025-07-10 RX ADMIN — SODIUM CHLORIDE, PRESERVATIVE FREE 10 ML: 5 INJECTION INTRAVENOUS at 20:14

## 2025-07-10 RX ADMIN — MIDODRINE HYDROCHLORIDE 5 MG: 5 TABLET ORAL at 18:25

## 2025-07-10 RX ADMIN — HEPARIN SODIUM 5000 UNITS: 5000 INJECTION INTRAVENOUS; SUBCUTANEOUS at 22:58

## 2025-07-10 RX ADMIN — Medication: at 09:05

## 2025-07-10 RX ADMIN — SODIUM CHLORIDE, PRESERVATIVE FREE 10 ML: 5 INJECTION INTRAVENOUS at 09:05

## 2025-07-10 RX ADMIN — HYDROCORTISONE 5 MG: 5 TABLET ORAL at 18:26

## 2025-07-10 RX ADMIN — FERRIC OXIDE RED: 8; 8 LOTION TOPICAL at 20:13

## 2025-07-10 RX ADMIN — TRIAMCINOLONE ACETONIDE: 1 CREAM TOPICAL at 09:06

## 2025-07-10 RX ADMIN — POTASSIUM CHLORIDE 40 MEQ: 1500 TABLET, EXTENDED RELEASE ORAL at 09:03

## 2025-07-10 ASSESSMENT — PAIN SCALES - GENERAL: PAINLEVEL_OUTOF10: 0

## 2025-07-11 LAB
ALBUMIN SERPL-MCNC: 1.7 G/DL (ref 3.4–5)
ANION GAP SERPL CALCULATED.3IONS-SCNC: 6 MMOL/L (ref 3–16)
BUN SERPL-MCNC: 29 MG/DL (ref 7–20)
CALCIUM SERPL-MCNC: 6.8 MG/DL (ref 8.3–10.6)
CHLORIDE SERPL-SCNC: 116 MMOL/L (ref 99–110)
CK SERPL-CCNC: 29 U/L (ref 39–308)
CO2 SERPL-SCNC: 23 MMOL/L (ref 21–32)
CREAT SERPL-MCNC: 2.7 MG/DL (ref 0.8–1.3)
GFR SERPLBLD CREATININE-BSD FMLA CKD-EPI: 23 ML/MIN/{1.73_M2}
GLUCOSE BLD-MCNC: 106 MG/DL (ref 70–99)
GLUCOSE BLD-MCNC: 77 MG/DL (ref 70–99)
GLUCOSE BLD-MCNC: 79 MG/DL (ref 70–99)
GLUCOSE BLD-MCNC: 83 MG/DL (ref 70–99)
GLUCOSE SERPL-MCNC: 75 MG/DL (ref 70–99)
PERFORMED ON: ABNORMAL
PERFORMED ON: NORMAL
POTASSIUM SERPL-SCNC: 3.5 MMOL/L (ref 3.5–5.1)
SODIUM SERPL-SCNC: 145 MMOL/L (ref 136–145)

## 2025-07-11 PROCEDURE — 97530 THERAPEUTIC ACTIVITIES: CPT

## 2025-07-11 PROCEDURE — 80048 BASIC METABOLIC PNL TOTAL CA: CPT

## 2025-07-11 PROCEDURE — 2580000003 HC RX 258: Performed by: INTERNAL MEDICINE

## 2025-07-11 PROCEDURE — 36415 COLL VENOUS BLD VENIPUNCTURE: CPT

## 2025-07-11 PROCEDURE — 6360000002 HC RX W HCPCS: Performed by: INTERNAL MEDICINE

## 2025-07-11 PROCEDURE — 2500000003 HC RX 250 WO HCPCS: Performed by: INTERNAL MEDICINE

## 2025-07-11 PROCEDURE — 2060000000 HC ICU INTERMEDIATE R&B

## 2025-07-11 PROCEDURE — 99231 SBSQ HOSP IP/OBS SF/LOW 25: CPT | Performed by: INTERNAL MEDICINE

## 2025-07-11 PROCEDURE — 82040 ASSAY OF SERUM ALBUMIN: CPT

## 2025-07-11 PROCEDURE — 82550 ASSAY OF CK (CPK): CPT

## 2025-07-11 PROCEDURE — 97535 SELF CARE MNGMENT TRAINING: CPT

## 2025-07-11 PROCEDURE — 6370000000 HC RX 637 (ALT 250 FOR IP): Performed by: INTERNAL MEDICINE

## 2025-07-11 RX ORDER — MIDODRINE HYDROCHLORIDE 5 MG/1
10 TABLET ORAL EVERY 8 HOURS
Status: DISCONTINUED | OUTPATIENT
Start: 2025-07-11 | End: 2025-07-14 | Stop reason: HOSPADM

## 2025-07-11 RX ORDER — 0.9 % SODIUM CHLORIDE 0.9 %
1000 INTRAVENOUS SOLUTION INTRAVENOUS ONCE
Status: COMPLETED | OUTPATIENT
Start: 2025-07-11 | End: 2025-07-11

## 2025-07-11 RX ADMIN — SODIUM CHLORIDE, PRESERVATIVE FREE 10 ML: 5 INJECTION INTRAVENOUS at 08:24

## 2025-07-11 RX ADMIN — HEPARIN SODIUM 5000 UNITS: 5000 INJECTION INTRAVENOUS; SUBCUTANEOUS at 05:40

## 2025-07-11 RX ADMIN — FERRIC OXIDE RED: 8; 8 LOTION TOPICAL at 12:55

## 2025-07-11 RX ADMIN — SODIUM CHLORIDE 1000 ML: 0.9 INJECTION, SOLUTION INTRAVENOUS at 08:21

## 2025-07-11 RX ADMIN — DAPTOMYCIN 650 MG: 500 INJECTION, POWDER, LYOPHILIZED, FOR SOLUTION INTRAVENOUS at 12:55

## 2025-07-11 RX ADMIN — FERRIC OXIDE RED: 8; 8 LOTION TOPICAL at 08:24

## 2025-07-11 RX ADMIN — TRIAMCINOLONE ACETONIDE: 1 CREAM TOPICAL at 08:24

## 2025-07-11 RX ADMIN — HYDROCORTISONE 10 MG: 20 TABLET ORAL at 08:22

## 2025-07-11 RX ADMIN — MIDODRINE HYDROCHLORIDE 10 MG: 5 TABLET ORAL at 18:07

## 2025-07-11 RX ADMIN — HYDROCORTISONE 5 MG: 5 TABLET ORAL at 18:07

## 2025-07-11 RX ADMIN — MIRTAZAPINE 30 MG: 15 TABLET, FILM COATED ORAL at 21:14

## 2025-07-11 RX ADMIN — TRIAMCINOLONE ACETONIDE: 1 CREAM TOPICAL at 21:16

## 2025-07-11 RX ADMIN — SODIUM CHLORIDE, PRESERVATIVE FREE 10 ML: 5 INJECTION INTRAVENOUS at 08:22

## 2025-07-11 RX ADMIN — SODIUM CHLORIDE: 4.5 INJECTION, SOLUTION INTRAVENOUS at 04:28

## 2025-07-11 RX ADMIN — Medication: at 08:24

## 2025-07-11 RX ADMIN — HEPARIN SODIUM 5000 UNITS: 5000 INJECTION INTRAVENOUS; SUBCUTANEOUS at 15:08

## 2025-07-11 RX ADMIN — MIDODRINE HYDROCHLORIDE 10 MG: 5 TABLET ORAL at 08:22

## 2025-07-11 RX ADMIN — Medication: at 21:16

## 2025-07-11 RX ADMIN — HEPARIN SODIUM 5000 UNITS: 5000 INJECTION INTRAVENOUS; SUBCUTANEOUS at 21:36

## 2025-07-11 RX ADMIN — FERRIC OXIDE RED: 8; 8 LOTION TOPICAL at 21:16

## 2025-07-12 LAB
GLUCOSE BLD-MCNC: 149 MG/DL (ref 70–99)
GLUCOSE BLD-MCNC: 74 MG/DL (ref 70–99)
GLUCOSE BLD-MCNC: 93 MG/DL (ref 70–99)
GLUCOSE BLD-MCNC: 99 MG/DL (ref 70–99)
PERFORMED ON: ABNORMAL
PERFORMED ON: NORMAL

## 2025-07-12 PROCEDURE — 6370000000 HC RX 637 (ALT 250 FOR IP): Performed by: NURSE PRACTITIONER

## 2025-07-12 PROCEDURE — 6360000002 HC RX W HCPCS: Performed by: INTERNAL MEDICINE

## 2025-07-12 PROCEDURE — 6370000000 HC RX 637 (ALT 250 FOR IP): Performed by: INTERNAL MEDICINE

## 2025-07-12 PROCEDURE — 6370000000 HC RX 637 (ALT 250 FOR IP)

## 2025-07-12 PROCEDURE — 2060000000 HC ICU INTERMEDIATE R&B

## 2025-07-12 RX ORDER — SODIUM CHLORIDE 0.9 % (FLUSH) 0.9 %
5-40 SYRINGE (ML) INJECTION EVERY 12 HOURS SCHEDULED
Status: DISCONTINUED | OUTPATIENT
Start: 2025-07-12 | End: 2025-07-14 | Stop reason: HOSPADM

## 2025-07-12 RX ORDER — LIDOCAINE HYDROCHLORIDE 20 MG/ML
100 INJECTION, SOLUTION INFILTRATION; PERINEURAL ONCE
Status: DISCONTINUED | OUTPATIENT
Start: 2025-07-12 | End: 2025-07-14

## 2025-07-12 RX ORDER — SODIUM CHLORIDE 0.9 % (FLUSH) 0.9 %
5-40 SYRINGE (ML) INJECTION PRN
Status: DISCONTINUED | OUTPATIENT
Start: 2025-07-12 | End: 2025-07-14 | Stop reason: HOSPADM

## 2025-07-12 RX ORDER — SODIUM CHLORIDE 9 MG/ML
INJECTION, SOLUTION INTRAVENOUS PRN
Status: DISCONTINUED | OUTPATIENT
Start: 2025-07-12 | End: 2025-07-14 | Stop reason: HOSPADM

## 2025-07-12 RX ADMIN — FERRIC OXIDE RED: 8; 8 LOTION TOPICAL at 14:32

## 2025-07-12 RX ADMIN — FERRIC OXIDE RED: 8; 8 LOTION TOPICAL at 20:21

## 2025-07-12 RX ADMIN — TRIAMCINOLONE ACETONIDE: 1 CREAM TOPICAL at 08:03

## 2025-07-12 RX ADMIN — FERRIC OXIDE RED: 8; 8 LOTION TOPICAL at 08:01

## 2025-07-12 RX ADMIN — MIDODRINE HYDROCHLORIDE 10 MG: 5 TABLET ORAL at 16:43

## 2025-07-12 RX ADMIN — MIRTAZAPINE 30 MG: 15 TABLET, FILM COATED ORAL at 20:19

## 2025-07-12 RX ADMIN — Medication: at 20:21

## 2025-07-12 RX ADMIN — HYDROCORTISONE 5 MG: 5 TABLET ORAL at 16:43

## 2025-07-12 RX ADMIN — HEPARIN SODIUM 5000 UNITS: 5000 INJECTION INTRAVENOUS; SUBCUTANEOUS at 05:41

## 2025-07-12 RX ADMIN — LOPERAMIDE HYDROCHLORIDE 2 MG: 2 CAPSULE ORAL at 20:19

## 2025-07-12 RX ADMIN — TRIAMCINOLONE ACETONIDE: 1 CREAM TOPICAL at 20:20

## 2025-07-12 RX ADMIN — HYDROCORTISONE 10 MG: 20 TABLET ORAL at 08:05

## 2025-07-12 RX ADMIN — MIDODRINE HYDROCHLORIDE 10 MG: 5 TABLET ORAL at 02:16

## 2025-07-12 RX ADMIN — MIDODRINE HYDROCHLORIDE 10 MG: 5 TABLET ORAL at 08:05

## 2025-07-12 RX ADMIN — Medication: at 08:02

## 2025-07-12 RX ADMIN — GUAIFENESIN 600 MG: 600 TABLET, MULTILAYER, EXTENDED RELEASE ORAL at 20:19

## 2025-07-12 ASSESSMENT — PAIN SCALES - GENERAL
PAINLEVEL_OUTOF10: 0

## 2025-07-13 LAB
ANION GAP SERPL CALCULATED.3IONS-SCNC: 7 MMOL/L (ref 3–16)
BUN SERPL-MCNC: 25 MG/DL (ref 7–20)
CALCIUM SERPL-MCNC: 6.9 MG/DL (ref 8.3–10.6)
CHLORIDE SERPL-SCNC: 118 MMOL/L (ref 99–110)
CO2 SERPL-SCNC: 21 MMOL/L (ref 21–32)
CREAT SERPL-MCNC: 2.6 MG/DL (ref 0.8–1.3)
GFR SERPLBLD CREATININE-BSD FMLA CKD-EPI: 24 ML/MIN/{1.73_M2}
GLUCOSE BLD-MCNC: 115 MG/DL (ref 70–99)
GLUCOSE BLD-MCNC: 133 MG/DL (ref 70–99)
GLUCOSE BLD-MCNC: 95 MG/DL (ref 70–99)
GLUCOSE BLD-MCNC: 99 MG/DL (ref 70–99)
GLUCOSE SERPL-MCNC: 85 MG/DL (ref 70–99)
PERFORMED ON: ABNORMAL
PERFORMED ON: ABNORMAL
PERFORMED ON: NORMAL
PERFORMED ON: NORMAL
POTASSIUM SERPL-SCNC: 3.3 MMOL/L (ref 3.5–5.1)
SODIUM SERPL-SCNC: 146 MMOL/L (ref 136–145)

## 2025-07-13 PROCEDURE — 6360000002 HC RX W HCPCS: Performed by: INTERNAL MEDICINE

## 2025-07-13 PROCEDURE — 51798 US URINE CAPACITY MEASURE: CPT

## 2025-07-13 PROCEDURE — 2060000000 HC ICU INTERMEDIATE R&B

## 2025-07-13 PROCEDURE — 2500000003 HC RX 250 WO HCPCS: Performed by: INTERNAL MEDICINE

## 2025-07-13 PROCEDURE — 6370000000 HC RX 637 (ALT 250 FOR IP): Performed by: INTERNAL MEDICINE

## 2025-07-13 PROCEDURE — 6370000000 HC RX 637 (ALT 250 FOR IP): Performed by: NURSE PRACTITIONER

## 2025-07-13 PROCEDURE — 2580000003 HC RX 258: Performed by: INTERNAL MEDICINE

## 2025-07-13 PROCEDURE — 80048 BASIC METABOLIC PNL TOTAL CA: CPT

## 2025-07-13 PROCEDURE — 36415 COLL VENOUS BLD VENIPUNCTURE: CPT

## 2025-07-13 RX ORDER — DEXTROSE MONOHYDRATE, SODIUM CHLORIDE, AND POTASSIUM CHLORIDE 50; 1.49; 4.5 G/1000ML; G/1000ML; G/1000ML
INJECTION, SOLUTION INTRAVENOUS CONTINUOUS
Status: DISCONTINUED | OUTPATIENT
Start: 2025-07-13 | End: 2025-07-14 | Stop reason: HOSPADM

## 2025-07-13 RX ORDER — POTASSIUM CHLORIDE 7.45 MG/ML
10 INJECTION INTRAVENOUS
Status: ACTIVE | OUTPATIENT
Start: 2025-07-13 | End: 2025-07-13

## 2025-07-13 RX ORDER — POTASSIUM CHLORIDE 7.45 MG/ML
10 INJECTION INTRAVENOUS
Status: DISPENSED | OUTPATIENT
Start: 2025-07-13 | End: 2025-07-13

## 2025-07-13 RX ORDER — POTASSIUM CHLORIDE 7.45 MG/ML
10 INJECTION INTRAVENOUS ONCE
Status: COMPLETED | OUTPATIENT
Start: 2025-07-13 | End: 2025-07-13

## 2025-07-13 RX ADMIN — TRIAMCINOLONE ACETONIDE: 1 CREAM TOPICAL at 20:28

## 2025-07-13 RX ADMIN — MIDODRINE HYDROCHLORIDE 10 MG: 5 TABLET ORAL at 17:47

## 2025-07-13 RX ADMIN — GUAIFENESIN 600 MG: 600 TABLET, MULTILAYER, EXTENDED RELEASE ORAL at 10:05

## 2025-07-13 RX ADMIN — HYDROCORTISONE 10 MG: 20 TABLET ORAL at 10:05

## 2025-07-13 RX ADMIN — MIRTAZAPINE 30 MG: 15 TABLET, FILM COATED ORAL at 20:28

## 2025-07-13 RX ADMIN — POTASSIUM CHLORIDE 10 MEQ: 10 INJECTION, SOLUTION INTRAVENOUS at 10:25

## 2025-07-13 RX ADMIN — FERRIC OXIDE RED: 8; 8 LOTION TOPICAL at 10:23

## 2025-07-13 RX ADMIN — MIDODRINE HYDROCHLORIDE 10 MG: 5 TABLET ORAL at 10:05

## 2025-07-13 RX ADMIN — MIDODRINE HYDROCHLORIDE 10 MG: 5 TABLET ORAL at 00:38

## 2025-07-13 RX ADMIN — HYDROCORTISONE 5 MG: 5 TABLET ORAL at 17:47

## 2025-07-13 RX ADMIN — DEXTROSE, SODIUM CHLORIDE, AND POTASSIUM CHLORIDE: 5; .45; .15 INJECTION INTRAVENOUS at 10:14

## 2025-07-13 RX ADMIN — DAPTOMYCIN 650 MG: 500 INJECTION, POWDER, LYOPHILIZED, FOR SOLUTION INTRAVENOUS at 13:59

## 2025-07-13 RX ADMIN — POTASSIUM CHLORIDE 10 MEQ: 10 INJECTION, SOLUTION INTRAVENOUS at 15:51

## 2025-07-13 RX ADMIN — HEPARIN SODIUM 5000 UNITS: 5000 INJECTION INTRAVENOUS; SUBCUTANEOUS at 15:43

## 2025-07-13 RX ADMIN — Medication: at 20:29

## 2025-07-13 RX ADMIN — Medication: at 10:21

## 2025-07-13 RX ADMIN — FERRIC OXIDE RED: 8; 8 LOTION TOPICAL at 20:29

## 2025-07-13 RX ADMIN — TRIAMCINOLONE ACETONIDE: 1 CREAM TOPICAL at 10:20

## 2025-07-13 ASSESSMENT — PAIN SCALES - GENERAL
PAINLEVEL_OUTOF10: 0

## 2025-07-13 ASSESSMENT — PAIN - FUNCTIONAL ASSESSMENT: PAIN_FUNCTIONAL_ASSESSMENT: NONE - DENIES PAIN

## 2025-07-14 VITALS
SYSTOLIC BLOOD PRESSURE: 112 MMHG | OXYGEN SATURATION: 100 % | DIASTOLIC BLOOD PRESSURE: 69 MMHG | RESPIRATION RATE: 18 BRPM | BODY MASS INDEX: 27.32 KG/M2 | HEART RATE: 45 BPM | WEIGHT: 201.72 LBS | HEIGHT: 72 IN | TEMPERATURE: 97.2 F

## 2025-07-14 LAB
ALBUMIN SERPL-MCNC: 1.7 G/DL (ref 3.4–5)
ANION GAP SERPL CALCULATED.3IONS-SCNC: 5 MMOL/L (ref 3–16)
ANION GAP SERPL CALCULATED.3IONS-SCNC: 7 MMOL/L (ref 3–16)
BUN SERPL-MCNC: 22 MG/DL (ref 7–20)
BUN SERPL-MCNC: 23 MG/DL (ref 7–20)
CALCIUM SERPL-MCNC: 6.9 MG/DL (ref 8.3–10.6)
CALCIUM SERPL-MCNC: 6.9 MG/DL (ref 8.3–10.6)
CHLORIDE SERPL-SCNC: 117 MMOL/L (ref 99–110)
CHLORIDE SERPL-SCNC: 118 MMOL/L (ref 99–110)
CO2 SERPL-SCNC: 22 MMOL/L (ref 21–32)
CO2 SERPL-SCNC: 23 MMOL/L (ref 21–32)
CREAT SERPL-MCNC: 2.5 MG/DL (ref 0.8–1.3)
CREAT SERPL-MCNC: 2.5 MG/DL (ref 0.8–1.3)
GFR SERPLBLD CREATININE-BSD FMLA CKD-EPI: 26 ML/MIN/{1.73_M2}
GFR SERPLBLD CREATININE-BSD FMLA CKD-EPI: 26 ML/MIN/{1.73_M2}
GLUCOSE BLD-MCNC: 107 MG/DL (ref 70–99)
GLUCOSE BLD-MCNC: 124 MG/DL (ref 70–99)
GLUCOSE SERPL-MCNC: 107 MG/DL (ref 70–99)
GLUCOSE SERPL-MCNC: 109 MG/DL (ref 70–99)
PERFORMED ON: ABNORMAL
PERFORMED ON: ABNORMAL
PHOSPHATE SERPL-MCNC: 2.8 MG/DL (ref 2.5–4.9)
POTASSIUM SERPL-SCNC: 3.7 MMOL/L (ref 3.5–5.1)
POTASSIUM SERPL-SCNC: 3.8 MMOL/L (ref 3.5–5.1)
SODIUM SERPL-SCNC: 146 MMOL/L (ref 136–145)
SODIUM SERPL-SCNC: 146 MMOL/L (ref 136–145)

## 2025-07-14 PROCEDURE — 6370000000 HC RX 637 (ALT 250 FOR IP): Performed by: INTERNAL MEDICINE

## 2025-07-14 PROCEDURE — 6370000000 HC RX 637 (ALT 250 FOR IP): Performed by: NURSE PRACTITIONER

## 2025-07-14 PROCEDURE — 36415 COLL VENOUS BLD VENIPUNCTURE: CPT

## 2025-07-14 PROCEDURE — 05HC33Z INSERTION OF INFUSION DEVICE INTO LEFT BASILIC VEIN, PERCUTANEOUS APPROACH: ICD-10-PCS | Performed by: INTERNAL MEDICINE

## 2025-07-14 PROCEDURE — 99232 SBSQ HOSP IP/OBS MODERATE 35: CPT | Performed by: INTERNAL MEDICINE

## 2025-07-14 PROCEDURE — 80069 RENAL FUNCTION PANEL: CPT

## 2025-07-14 PROCEDURE — 2500000003 HC RX 250 WO HCPCS: Performed by: NURSE PRACTITIONER

## 2025-07-14 PROCEDURE — C1751 CATH, INF, PER/CENT/MIDLINE: HCPCS

## 2025-07-14 PROCEDURE — 36410 VNPNXR 3YR/> PHY/QHP DX/THER: CPT

## 2025-07-14 PROCEDURE — 6360000002 HC RX W HCPCS: Performed by: INTERNAL MEDICINE

## 2025-07-14 RX ORDER — SODIUM CHLORIDE 0.9 % (FLUSH) 0.9 %
5-40 SYRINGE (ML) INJECTION PRN
Status: DISCONTINUED | OUTPATIENT
Start: 2025-07-14 | End: 2025-07-14 | Stop reason: HOSPADM

## 2025-07-14 RX ORDER — MIRTAZAPINE 30 MG/1
30 TABLET, FILM COATED ORAL NIGHTLY
Qty: 30 TABLET | Refills: 3
Start: 2025-07-14

## 2025-07-14 RX ORDER — MIDODRINE HYDROCHLORIDE 10 MG/1
10 TABLET ORAL EVERY 8 HOURS
Qty: 90 TABLET | Refills: 3
Start: 2025-07-14

## 2025-07-14 RX ORDER — LOPERAMIDE HYDROCHLORIDE 2 MG/1
2 CAPSULE ORAL 4 TIMES DAILY PRN
Qty: 15 CAPSULE | Refills: 0
Start: 2025-07-14 | End: 2025-07-24

## 2025-07-14 RX ORDER — CALAMINE 8% AND ZINC OXIDE 8% 160 MG/ML
LOTION TOPICAL 3 TIMES DAILY
Qty: 1 EACH | Refills: 0
Start: 2025-07-14

## 2025-07-14 RX ORDER — SODIUM CHLORIDE 0.9 % (FLUSH) 0.9 %
5-40 SYRINGE (ML) INJECTION EVERY 12 HOURS SCHEDULED
Status: DISCONTINUED | OUTPATIENT
Start: 2025-07-14 | End: 2025-07-14 | Stop reason: HOSPADM

## 2025-07-14 RX ORDER — SODIUM CHLORIDE 9 MG/ML
INJECTION, SOLUTION INTRAVENOUS PRN
Status: DISCONTINUED | OUTPATIENT
Start: 2025-07-14 | End: 2025-07-14 | Stop reason: HOSPADM

## 2025-07-14 RX ORDER — HYDROCORTISONE 5 MG/1
TABLET ORAL
Qty: 90 TABLET | Refills: 0
Start: 2025-07-14 | End: 2025-08-13

## 2025-07-14 RX ORDER — INSULIN LISPRO 100 [IU]/ML
0-8 INJECTION, SOLUTION INTRAVENOUS; SUBCUTANEOUS
Qty: 1 EACH | Refills: 0
Start: 2025-07-14

## 2025-07-14 RX ORDER — HEPARIN SODIUM 5000 [USP'U]/ML
5000 INJECTION, SOLUTION INTRAVENOUS; SUBCUTANEOUS EVERY 8 HOURS SCHEDULED
Qty: 1 EACH | Refills: 0
Start: 2025-07-14

## 2025-07-14 RX ORDER — DIPHENHYDRAMINE HYDROCHLORIDE, ZINC ACETATE 2; .1 G/100G; G/100G
CREAM TOPICAL
Qty: 1 EACH | Refills: 0
Start: 2025-07-14

## 2025-07-14 RX ORDER — LIDOCAINE HYDROCHLORIDE 10 MG/ML
50 INJECTION, SOLUTION EPIDURAL; INFILTRATION; INTRACAUDAL; PERINEURAL ONCE
Status: COMPLETED | OUTPATIENT
Start: 2025-07-14 | End: 2025-07-14

## 2025-07-14 RX ADMIN — TRIAMCINOLONE ACETONIDE: 1 CREAM TOPICAL at 10:33

## 2025-07-14 RX ADMIN — Medication: at 10:33

## 2025-07-14 RX ADMIN — SODIUM CHLORIDE, PRESERVATIVE FREE 10 ML: 5 INJECTION INTRAVENOUS at 10:34

## 2025-07-14 RX ADMIN — LIDOCAINE HYDROCHLORIDE ANHYDROUS 50 MG: 10 INJECTION, SOLUTION INFILTRATION at 14:08

## 2025-07-14 RX ADMIN — MIDODRINE HYDROCHLORIDE 10 MG: 5 TABLET ORAL at 10:33

## 2025-07-14 RX ADMIN — HYDROCORTISONE 10 MG: 20 TABLET ORAL at 10:42

## 2025-07-14 RX ADMIN — FERRIC OXIDE RED: 8; 8 LOTION TOPICAL at 10:33

## 2025-07-14 RX ADMIN — MIDODRINE HYDROCHLORIDE 10 MG: 5 TABLET ORAL at 00:40

## 2025-07-14 ASSESSMENT — PAIN SCALES - GENERAL
PAINLEVEL_OUTOF10: 0
PAINLEVEL_OUTOF10: 0

## 2025-07-14 NOTE — DISCHARGE SUMMARY
Normal testicles without evidence of mass. 2. Small hydrocele on the right 3. Small epididymal cyst measuring 6 mm on the left 4. Bilateral diffuse scrotal wall thickening, dependent edema Electronically signed by Maximus Wakefield     DUP ABD PEL RETRO SCROT COMPLETE  Result Date: 7/7/2025  EXAM: US SCROTUM AND TESTICLES, US DUP ABD PEL RETRO SCROT COMPLETE INDICATION: scrotal swelling, Weakness /  COMPARISON: None FINDINGS: RIGHT TESTICLE: *  SIZE: 3.7 x 2.4 x 2.4 cm *  MASSES: None. *  FLOW: Normal blood flow present. RIGHT EPIDIDYMIS: *  Normal in size and echotexture, without focal lesion. *  Normal color Doppler flow pattern. OTHER RIGHT FINDINGS: *  Evidence of small hydrocele *  Scrotal wall skin thickening LEFT TESTICLE: *  SIZE: 3.6 x 2.4 x 2.6 cm *  MASSES: None. *  FLOW: Normal blood flow present. LEFT EPIDIDYMIS: *  Normal in size and echotexture, with simple epididymal cyst measuring 6.5 mm. *  Normal color Doppler flow pattern. OTHER LEFT FINDINGS: *  Scrotal wall skin thickening DUPLEX DOPPLER SCAN Duplex Doppler imaging: Grayscale and real-time imaging with spectral analysis and color flow. Normal color flow and patency Normal spectral waveform     1. Normal testicles without evidence of mass. 2. Small hydrocele on the right 3. Small epididymal cyst measuring 6 mm on the left 4. Bilateral diffuse scrotal wall thickening, dependent edema Electronically signed by Maximus Wakefield      CBC: No results for input(s): \"WBC\", \"HGB\", \"PLT\" in the last 72 hours.  BMP:    Recent Labs     07/13/25  0412 07/14/25  0845   * 146*  146*   K 3.3* 3.8  3.7   * 117*  118*   CO2 21 22  23   BUN 25* 23*  22*   CREATININE 2.6* 2.5*  2.5*   GLUCOSE 85 109*  107*     Hepatic: No results for input(s): \"AST\", \"ALT\", \"BILITOT\", \"ALKPHOS\" in the last 72 hours.    Invalid input(s): \"ALB\"  Lipids:   Lab Results   Component Value Date/Time    CHOL 93 03/04/2025 04:28 AM    HDL 40 03/04/2025 04:28 AM    HDL 39

## 2025-07-14 NOTE — CARE COORDINATION
Discharge Planning Update:    Left message with pt's son Carlos.  Spoke with pt and he is now agreeable to midline insertion and discharge to [facility] later today if discharge order place.

## 2025-07-14 NOTE — PROCEDURES
PROCEDURE NOTE  Date: 2025   Name: Delano Daigle  YOB: 1945    Procedures          POWER MIDLINE PROCEDURE NOTE  Chart reviewed for allergies, diagnosis, labs, known contraindications, reason for line placement and planned length of treatment.  Insertion procedure discussed with patient/family member.  Informed consent not required for Midline placement.  Three patient identifiers - Patient name,   and MRN -  completed &  confirmed verbally.   Hat, mask and eye shield donned.  Midline site scrubbed with Chloraprep  One-Step applicator  for 30 seconds x 1.   Hand Hygiene  performed with 3% Chlorhexidine surgical scrub x1 min prior to  Sterile gloves, sterile gown being donned.  Patient draped using maximal sterile barrier technique ( head to toe ).  Midline site scrubbed a 2nd time with Chloraprep One-Step applicator x 30 sec. Vein located by [employer] and site marked with sterile pen.  1% Lidocaine 5 ml injected intradermal pre-insertion for trimmed Midline.  Midline inserted.  Positive brisk blood return obtained.  Valve applied to lumen.  Midline flushed with 10 mls  0.9% Sterile Sodium Chloride. Midline flushes easily with no resistance.   Skin prep applied to site. Catheter secured with non-sutured locking device per hospital protocol.  Bio-patch/CHG impregnated sterile tegaderm dressing applied. Alcohol Swab Caps applied to valve.   Sterile field maintained during procedure. Positioning wire accounted for post procedure. Pt. Response to procedure, tolerated well. Appearance of site: Clean dry and intact without bleeding or edema. All edges of Tegaderm occlusive.   Site marked with date and initials of RN placing line.Top 2 side rails in up position, call button in reach, RN notified of all of the above.   A Power Midline 13 CM placed in the GIRISH basilic vein. 0 cm showing from insertion site.

## 2025-07-14 NOTE — PROGRESS NOTES
Ph: [phone], Fax: [phone]                                     [employer]                                                   [address] [id number]           Reason for admission:                 Brief Summary:     Delano Daigle is being seen by nephrology for LAMBERTO    Interval History and Plan:     Patient seen in room   BP is stable    Urine out put is 850 ml +  Cr  improving  now 4.1> 2.9> 2.6  CT A/P did not show hydronephrosis or bleed.  Labs reviewed       He is likely in ATN from vancomycin related injury although ischemic ATN is also possible from hypotension. ( Vancomycin stopped )  Continue Midodrine 10 mg TID  On oral steroids   Encourage oral intake .   On high dose Remeron   Replace KCL for hypokalemia  Continue IVF for hypernatremia    Continue bladder scan and straight cath as needed . Urology note reviewed       Thank you for allowing us to participate in this patient's care  In case of any question please call us at our 24 hour answering service [phone] or from 7 AM to 5 PM via Perfect Serve, Kliqed, Epic chat or cell phone.   Dr Alber Denton MD      Assessment:     Acute Kidney Injury:     Baseline Cr: 1.2 (7/1/25)  1.5 7/2 > 1.7 > 1.9  Proteinuria.   CK: 28  UA: 10- 20 rbc, neg blood, trace protein  Renal imaging:  Evidence of urinary tract obstruction:   Nephrotoxic exposures including NSAIDs use or contrast exposure:   Recent Antibiotics use: YES. Was on Vancomycin.   Hemodynamic insults: YES. BP low  ECHO:   Known Cirrhosis: No  Current infection or sepsis: Has renal abscess   TMA suspected: No Has chronic anemia and thrombocytopenia.     CT guided renal lesion/mass  biopsy was done and purulent pus was observed.       LAMBERTO appear to be due to hemodynamics + sepsis   AIN is always possibIilty  Check workup as above        Hypotension        Sepsis       HPI      Patient is a 79 y.o. male  with PMH significant for  type 2 
                          Ph: [phone], Fax: [phone]                                     [facility] [employer]                                                   [address] [place], [place] [id number]           Reason for admission:                 Brief Summary:     Delano Daigle is being seen by nephrology for LAMBERTO    Interval History and Plan:     Patient seen in room   BP is well controlled   Urine out put is low but > 100 ml as measured   Cr  improving  now 4.1> 2.9  CT A/P did not show hydronephrosis or bleed.      He is likely in ATN from vancomycin related injury although ischemic ATN is also possible from hypotension. ( Vancomycin stopped )  Resume IVF as he is hypernatremic   Decrease  Midodrine 5 mg TID  Follow BP  On stress dose steroids , decrease to q 12   On antibiotics per ID.   Continue bladder scan and straight cath as needed . Urology note reviewed       Thank you for allowing us to participate in this patient's care  In case of any question please call us at our 24 hour answering service [phone] or from 7 AM to 5 PM via Perfect Serve, [employer], Epic chat or cell phone.   Dr Alber Denton MD      Assessment:     Acute Kidney Injury:     Baseline Cr: 1.2 (7/1/25)  1.5 7/2 > 1.7 > 1.9  Proteinuria.   CK: 28  UA: 10- 20 rbc, neg blood, trace protein  Renal imaging:  Evidence of urinary tract obstruction:   Nephrotoxic exposures including NSAIDs use or contrast exposure:   Recent Antibiotics use: YES. Was on Vancomycin.   Hemodynamic insults: YES. BP low  ECHO:   Known Cirrhosis: No  Current infection or sepsis: Has renal abscess   TMA suspected: No Has chronic anemia and thrombocytopenia.     CT guided renal lesion/mass  biopsy was done and purulent pus was observed.       LAMBERTO appear to be due to hemodynamics + sepsis   AIN is always possibIilty  Check workup as above        Hypotension        Sepsis       HPI      Patient is a 79 y.o. male  with PMH significant for  type 2 diabetes, 
                          Ph: [phone], Fax: [phone]                                     [email or web address]                                                   [address] [place], [place] [id number]           Reason for admission:                 Brief Summary:     Delano Daigle is being seen by nephrology for LAMBERTO    Interval History and Plan:     Patient seen in room   BP is  relatively low    Urine out put is 150 ml +  Cr  improving  now 4.1> 2.9> 2.7  CT A/P did not show hydronephrosis or bleed.  Labs pending       He is likely in ATN from vancomycin related injury although ischemic ATN is also possible from hypotension. ( Vancomycin stopped )  continue Midodrine 10 mg TID  On oral steroids   Encourage oral intake .   On high dose Remeron   On antibiotics per ID.   Continue bladder scan and straight cath as needed . Urology note reviewed       Thank you for allowing us to participate in this patient's care  In case of any question please call us at our 24 hour answering service [phone] or from 7 AM to 5 PM via Perfect Serve, [employer]Katy, Epic chat or cell phone.   Dr Alber Denton MD      Assessment:     Acute Kidney Injury:     Baseline Cr: 1.2 (7/1/25)  1.5 7/2 > 1.7 > 1.9  Proteinuria.   CK: 28  UA: 10- 20 rbc, neg blood, trace protein  Renal imaging:  Evidence of urinary tract obstruction:   Nephrotoxic exposures including NSAIDs use or contrast exposure:   Recent Antibiotics use: YES. Was on Vancomycin.   Hemodynamic insults: YES. BP low  ECHO:   Known Cirrhosis: No  Current infection or sepsis: Has renal abscess   TMA suspected: No Has chronic anemia and thrombocytopenia.     CT guided renal lesion/mass  biopsy was done and purulent pus was observed.       LAMBERTO appear to be due to hemodynamics + sepsis   AIN is always possibIilty  Check workup as above        Hypotension        Sepsis       HPI      Patient is a 79 y.o. male  with PMH significant for  type 2 diabetes, hypertension, 
                          Ph: [phone], Fax: [phone]                                     [employer]                                                   [address] [place], [place] [id number]           Reason for admission:                 Brief Summary:     Delano Daigle is being seen by nephrology for LAMBERTO    Interval History and Plan:     Patient seen in room   BP is well controlled   Urine out put is low but > 100 ml as measured   Cr  improving  now 4.1> 2.9  CT A/P did not show hydronephrosis or bleed.      He is likely in ATN from vancomycin related injury although ischemic ATN is also possible from hypotension. ( Vancomycin stopped )  Stop IVF  Decrease  Midodrine 10 mg TID  Follow BP  On stress dose steroids   On antibiotics per ID.   Continue bladder scan and straight cath as needed . Urology note reviewed       Thank you for allowing us to participate in this patient's care  In case of any question please call us at our 24 hour answering service [phone] or from 7 AM to 5 PM via Perfect Serve, [email or web address], Epic chat or cell phone.   Dr Alber Denton MD      Assessment:     Acute Kidney Injury:     Baseline Cr: 1.2 (7/1/25)  1.5 7/2 > 1.7 > 1.9  Proteinuria.   CK: 28  UA: 10- 20 rbc, neg blood, trace protein  Renal imaging:  Evidence of urinary tract obstruction:   Nephrotoxic exposures including NSAIDs use or contrast exposure:   Recent Antibiotics use: YES. Was on Vancomycin.   Hemodynamic insults: YES. BP low  ECHO:   Known Cirrhosis: No  Current infection or sepsis: Has renal abscess   TMA suspected: No Has chronic anemia and thrombocytopenia.     CT guided renal lesion/mass  biopsy was done and purulent pus was observed.       LAMBERTO appear to be due to hemodynamics + sepsis   AIN is always possibIilty  Check workup as above        Hypotension        Sepsis       HPI      Patient is a 79 y.o. male  with PMH significant for  type 2 diabetes, hypertension, hyperlipidemia, concern for 
                          Ph: [phone], Fax: [phone]                                     [facility]                                                   [address] [place], [place] [id number]           Reason for admission:                 Brief Summary:     Delano Daigle is being seen by nephrology for LAMBERTO    Interval History and Plan:     Patient seen in room   BP is  relatively low    Urine out put is 1125 ml as measured   Cr  improving  now 4.1> 2.9> 2.7  CT A/P did not show hydronephrosis or bleed.      He is likely in ATN from vancomycin related injury although ischemic ATN is also possible from hypotension. ( Vancomycin stopped )  Fluid bolus X 1 liter   continue Midodrine 10 mg TID  On oral steroids   Encourage oral intake .   On high dose Remeron   On antibiotics per ID.   Continue bladder scan and straight cath as needed . Urology note reviewed       Thank you for allowing us to participate in this patient's care  In case of any question please call us at our 24 hour answering service [phone] or from 7 AM to 5 PM via Perfect Serve, POS on CLOUD, Epic chat or cell phone.   Dr lAber Denton MD      Assessment:     Acute Kidney Injury:     Baseline Cr: 1.2 (7/1/25)  1.5 7/2 > 1.7 > 1.9  Proteinuria.   CK: 28  UA: 10- 20 rbc, neg blood, trace protein  Renal imaging:  Evidence of urinary tract obstruction:   Nephrotoxic exposures including NSAIDs use or contrast exposure:   Recent Antibiotics use: YES. Was on Vancomycin.   Hemodynamic insults: YES. BP low  ECHO:   Known Cirrhosis: No  Current infection or sepsis: Has renal abscess   TMA suspected: No Has chronic anemia and thrombocytopenia.     CT guided renal lesion/mass  biopsy was done and purulent pus was observed.       LAMBERTO appear to be due to hemodynamics + sepsis   AIN is always possibIilty  Check workup as above        Hypotension        Sepsis       HPI      Patient is a 79 y.o. male  with PMH significant for  type 2 diabetes, 
                          Ph: [phone], Fax: [phone]                                     [facility]                                                   [address] [place], [place] [address]           Reason for admission:                 Brief Summary:     Delano Daigle is being seen by nephrology for LAMBERTO    Interval History and Plan:     Patient seen in room   BP is well controlled   Urine out put is low but > 400 ml  Cr  worsening 1.9 > 2.1 >2.5> 4.1  CT A/P did not show hydronephrosis or bleed.        Monitor for dialysis   He is likely in ATN from vancomycin related injury although ischemic ATN is also possible from hypotension. ( Vancomycin stopped )  Continue IV ringer lactate  Continue  Midodrine 15 mg TID  Follow BP  On stress dose steroids already  On antibiotics per ID.   Please have strict I/O monitoring to accurately assess volume status.      Thank you for allowing us to participate in this patient's care  In case of any question please call us at our 24 hour answering service [phone] or from 7 AM to 5 PM via Perfect Serve, Xero, Epic chat or cell phone.   Dr Alber Denton MD      Assessment:     Acute Kidney Injury:     Baseline Cr: 1.2 (7/1/25)  1.5 7/2 > 1.7 > 1.9  Proteinuria.   CK: 28  UA: 10- 20 rbc, neg blood, trace protein  Renal imaging:  Evidence of urinary tract obstruction:   Nephrotoxic exposures including NSAIDs use or contrast exposure:   Recent Antibiotics use: YES. Was on Vancomycin.   Hemodynamic insults: YES. BP low  ECHO:   Known Cirrhosis: No  Current infection or sepsis: Has renal abscess   TMA suspected: No Has chronic anemia and thrombocytopenia.     CT guided renal lesion/mass  biopsy was done and purulent pus was observed.       LAMBERTO appear to be due to hemodynamics + sepsis   AIN is always possibIilty  Check workup as above        Hypotension        Sepsis       HPI      Patient is a 79 y.o. male  with PMH significant for  type 2 diabetes, 
                          Ph: [phone], Fax: [phone]                                     [employer]                                                   [address] [place], [place] [id number]           Reason for admission:                 Brief Summary:     Delano Daigle is being seen by nephrology for LAMBERTO    Interval History and Plan:     Patient seen in room   BP is stable    Urine out put is 200 ml +  Cr  improving  now 4.1> 2.9> 2.6  CT A/P did not show hydronephrosis or bleed.  Labs are pending from today       He is likely in ATN from vancomycin related injury although ischemic ATN is also possible from hypotension. ( Vancomycin stopped )  Continue Midodrine 10 mg TID  On oral steroids   On IVF  Encourage oral intake but he is refusing to eat most of the food offered .   On high dose Remeron   Replace electrolytes as needed    Continue bladder scan and straight cath as needed . Urology note reviewed       Thank you for allowing us to participate in this patient's care  In case of any question please call us at our 24 hour answering service [phone] or from 7 AM to 5 PM via Perfect Serve, One[employer], Epic chat or cell phone.   Dr Alber Denton MD      Assessment:     Acute Kidney Injury:     Baseline Cr: 1.2 (7/1/25)  1.5 7/2 > 1.7 > 1.9  Proteinuria.   CK: 28  UA: 10- 20 rbc, neg blood, trace protein  Renal imaging:  Evidence of urinary tract obstruction:   Nephrotoxic exposures including NSAIDs use or contrast exposure:   Recent Antibiotics use: YES. Was on Vancomycin.   Hemodynamic insults: YES. BP low  ECHO:   Known Cirrhosis: No  Current infection or sepsis: Has renal abscess   TMA suspected: No Has chronic anemia and thrombocytopenia.     CT guided renal lesion/mass  biopsy was done and purulent pus was observed.       LAMBERTO appear to be due to hemodynamics + sepsis   AIN is always possibIilty  Check workup as above        Hypotension        Sepsis       HPI      Patient is a 79 y.o. 
      [facility] Medicine Progress Note  V 5.17      Date of Admission: 6/23/2025    Hospital Day: 7      Chief Admission Complaint:  Weakness    Subjective:  Patient seen at bedside. No acute concerns. Patient no fevers, chills, pain anywhere, nausea, vomiting, trouble urinating.    Presenting Admission History:       79 y.o. male known case of DMII , hypertension hidradenitis suppurativa (MRSA), Afib , COPD, cardiomyopathy ,and hyperlipidemia presented with weakness and diarrhea x 2 weeks     He was admitted in March 2025 for shock with concern for adrenal insufficiency. His cortisol and ACTH level were low. He was started on IV steroids with plan to discharge on oral dexamethasone but it doesnt look he was discharged on it. He was supposed to see endo as pt but that did not happen as well .     He stated that since his discharge he cont to have low energy and fatigue. Over the last 2 weeks he started to have non-bloody diarrhea and his weakness go much worse. No fever, nausea, vomiting or abd pain. He has some skin discoloration on his arms (some whit spots) for the last few months . He was not on chronic steroids in his life    Assessment/Plan:      MRSA bacteremia  Urinary tract infection  -His admission in March when he was in shock with low cortisol is very suggestive of adrenal insufficiency. I thought this is the case especially as he was having hypothermia and hypoglycemia , but cortisol level was normal and I think that's ruled out  CT scan with concerning findings of possible abscess in the kidney.  Urine cultures: Klebsiella pneumonia  BC: MRSA  MRI Abdomen 6/27/25: Complex exophytic lesion arising from the lateral right kidney measuring 4 cm with thick peripheral enhancing wall.. Intense diffusion restriction seen centrally. Primary differential consideration is abscess versus neoplasm. Moderate volume ascites.  Plan:  -Urology and infectious disease consulted, appreciate recommendations: Upcoming biopsy 
     Urology Attending Progress Note      Subjective: Denies pain only feels tired.    Vitals:  BP (!) 88/47   Pulse 83   Temp 97.8 °F (36.6 °C) (Rectal)   Resp 18   Ht 1.829 m (6')   Wt 81.4 kg (179 lb 7.3 oz)   SpO2 96%   BMI 24.34 kg/m²   Temp  Av.5 °F (35.8 °C)  Min: 94.2 °F (34.6 °C)  Max: 98.8 °F (37.1 °C)    Intake/Output Summary (Last 24 hours) at 2025 0915  Last data filed at 2025 0630  Gross per 24 hour   Intake 145 ml   Output 215 ml   Net -70 ml       Exam: No acute distress abdomen soft minimal output from drain    Labs:  WBC:    Lab Results   Component Value Date/Time    WBC 6.9 2025 04:30 AM     Hemoglobin/Hematocrit:    Lab Results   Component Value Date/Time    HGB 8.6 2025 04:30 AM    HCT 26.0 2025 04:30 AM     BMP:    Lab Results   Component Value Date/Time     2025 04:30 AM    K 3.6 2025 04:30 AM    K 3.5 2025 07:35 AM     2025 04:30 AM    CO2 25 2025 04:30 AM    BUN 12 2025 04:30 AM    CREATININE 1.9 2025 04:30 AM    CALCIUM 7.0 2025 04:30 AM    GFRAA >60 10/12/2021 11:48 AM    GFRAA >60 2012 12:10 PM    LABGLOM 35 2025 04:30 AM    LABGLOM >60 02/15/2024 01:38 PM     PT/INR:    Lab Results   Component Value Date/Time    PROTIME 16.9 2025 08:45 AM    INR 1.36 2025 08:45 AM     PTT:    Lab Results   Component Value Date/Time    APTT 29.4 2025 04:09 AM   [APTT        Imaging: CT scan pending      Impression/Plan: Pleasant 79-year-old gentleman with right renal abscess status post IR placement of 10 Malagasy percutaneous drain.  - His creatinine continues to rise and is now 1.9 from 1.7 yesterday.  - I have consulted nephrology and also ordered a noncontrast CT scan.    JASPER MONTEZ MD  
     Urology Progress Note      Subjective Delano Daigle denies  complaints     Vitals:  BP (!) 92/53   Pulse 70   Temp 97.2 °F (36.2 °C) (Oral)   Resp 16   Ht 1.829 m (6')   Wt 81.6 kg (179 lb 14.3 oz)   SpO2 98%   BMI 24.40 kg/m²   Temp  Av.6 °F (36.4 °C)  Min: 97.2 °F (36.2 °C)  Max: 98.1 °F (36.7 °C)    Intake/Output Summary (Last 24 hours) at 2025 1130  Last data filed at 2025 1051  Gross per 24 hour   Intake 630 ml   Output 275 ml   Net 355 ml       Exam: No acute distress abdomen soft minimal output from drain - drain removed during exam     Labs:  WBC:    Lab Results   Component Value Date/Time    WBC 7.8 2025 05:45 AM     Hemoglobin/Hematocrit:    Lab Results   Component Value Date/Time    HGB 8.7 2025 05:45 AM    HCT 26.4 2025 05:45 AM     BMP:    Lab Results   Component Value Date/Time     2025 05:45 AM    K 4.1 2025 05:45 AM    K 3.5 2025 07:35 AM     2025 05:45 AM    CO2 25 2025 05:45 AM    BUN 14 2025 05:45 AM    CREATININE 2.1 2025 05:45 AM    CALCIUM 7.1 2025 05:45 AM    GFRAA >60 10/12/2021 11:48 AM    GFRAA >60 2012 12:10 PM    LABGLOM 31 2025 05:45 AM    LABGLOM >60 02/15/2024 01:38 PM     PT/INR:    Lab Results   Component Value Date/Time    PROTIME 16.9 2025 08:45 AM    INR 1.36 2025 08:45 AM     PTT:    Lab Results   Component Value Date/Time    APTT 29.4 2025 04:09 AM   [APTT        Imaging:    CT ABDOMEN PELVIS WO CONTRAST Additional Contrast? None   Final Result   1. Small pigtail catheter in the right kidney along the subcapsular surface.   There is no evidence of hemorrhage, perinephric fluid or residual abscess.   2. No evidence of renal hydronephrosis.   3. Stable abdominal ascites.   4. Small bilateral pleural effusions      Symmetric renal size and cortex.   There is no significant residual collection in the right perinephric space. If   patient's 
     Urology Progress Note      Subjective: Main complaint today is that he's cold. No scrotal pain. Has not yet voided today.     Vitals:  /75   Pulse 52   Temp 97.2 °F (36.2 °C) (Axillary)   Resp 16   Ht 1.829 m (6')   Wt 88.3 kg (194 lb 10.7 oz)   SpO2 99%   BMI 26.40 kg/m²   Temp  Av °F (36.1 °C)  Min: 95.2 °F (35.1 °C)  Max: 97.8 °F (36.6 °C)    Intake/Output Summary (Last 24 hours) at 2025 0848  Last data filed at 2025 0514  Gross per 24 hour   Intake 300 ml   Output 100 ml   Net 200 ml       Exam: Improvement in scrotal swelling noted. No pain with palpation. No evidence of infection    Labs:  WBC:    Lab Results   Component Value Date/Time    WBC 7.8 2025 05:45 AM     Hemoglobin/Hematocrit:    Lab Results   Component Value Date/Time    HGB 8.7 2025 05:45 AM    HCT 26.4 2025 05:45 AM     BMP:    Lab Results   Component Value Date/Time     2025 05:05 AM    K 4.0 2025 05:05 AM    K 3.5 2025 07:35 AM     2025 05:05 AM    CO2 24 2025 05:05 AM    BUN 31 2025 05:05 AM    CREATININE 2.9 2025 05:05 AM    CALCIUM 7.4 2025 05:05 AM    GFRAA >60 10/12/2021 11:48 AM    GFRAA >60 2012 12:10 PM    LABGLOM 21 2025 05:05 AM    LABGLOM >60 02/15/2024 01:38 PM     PT/INR:    Lab Results   Component Value Date/Time    PROTIME 16.9 2025 08:45 AM    INR 1.36 2025 08:45 AM     PTT:    Lab Results   Component Value Date/Time    APTT 29.4 2025 04:09 AM   [APTT        Imaging:    CT ABDOMEN PELVIS WO CONTRAST Additional Contrast? None   Final Result   1. Small pigtail catheter in the right kidney along the subcapsular surface.   There is no evidence of hemorrhage, perinephric fluid or residual abscess.   2. No evidence of renal hydronephrosis.   3. Stable abdominal ascites.   4. Small bilateral pleural effusions      Symmetric renal size and cortex.   There is no significant residual collection in the 
     Urology Progress Note      Subjective: No  complaints    Vitals:  /62   Pulse 66   Temp 97 °F (36.1 °C) (Oral)   Resp 18   Ht 1.829 m (6')   Wt 84.9 kg (187 lb 2.7 oz)   SpO2 97%   BMI 25.38 kg/m²   Temp  Av.2 °F (36.2 °C)  Min: 97 °F (36.1 °C)  Max: 97.3 °F (36.3 °C)    Intake/Output Summary (Last 24 hours) at 2025 0805  Last data filed at 2025 0630  Gross per 24 hour   Intake 3 ml   Output 955 ml   Net -952 ml       Exam:   NAD  Abdomen soft and nontender  R flank drain with serosanguinous drainage    Labs:  WBC:    Lab Results   Component Value Date/Time    WBC 6.1 2025 01:03 PM     Hemoglobin/Hematocrit:    Lab Results   Component Value Date/Time    HGB 9.8 2025 01:03 PM    HCT 30.0 2025 01:03 PM     BMP:    Lab Results   Component Value Date/Time     2025 07:10 AM    K 3.6 2025 07:10 AM    K 3.5 2025 07:35 AM     2025 07:10 AM    CO2 24 2025 07:10 AM    BUN 10 2025 07:10 AM    CREATININE 1.2 2025 07:10 AM    CALCIUM 6.8 2025 07:10 AM    GFRAA >60 10/12/2021 11:48 AM    GFRAA >60 2012 12:10 PM    LABGLOM 62 2025 07:10 AM    LABGLOM >60 02/15/2024 01:38 PM     PT/INR:    Lab Results   Component Value Date/Time    PROTIME 16.9 2025 08:45 AM    INR 1.36 2025 08:45 AM     PTT:    Lab Results   Component Value Date/Time    APTT 29.4 2025 04:09 AM   [APTT        Impression/Plan: 80yo M admitted with weakness, hypotension and diarrhea x2 weeks. We were consulted for CT scan showing a 3.5cm complex renal lesion; IR drain placed for renal abscess 25. Patient also being treated for UTI, and MRSA bacteremia.    - MRI abdomen showed a complex exophytic lesion arising from lateral right kidney measuring 4 cm.  Differential includes abscess versus neoplasm.  - Renal Abscess s/p IR drainage 25, 55ml output since placement; culture pending  - IV antibiotics per ID  - Will trend 
     Urology Progress Note      Subjective: No  complaints    Vitals:  /70   Pulse 74   Temp 97 °F (36.1 °C) (Oral)   Resp 18   Ht 1.829 m (6')   Wt 86 kg (189 lb 9.5 oz)   SpO2 98%   BMI 25.71 kg/m²   Temp  Av.7 °F (35.9 °C)  Min: 93.7 °F (34.3 °C)  Max: 98.2 °F (36.8 °C)    Intake/Output Summary (Last 24 hours) at 2025 1303  Last data filed at 2025 0909  Gross per 24 hour   Intake 480 ml   Output 250 ml   Net 230 ml       Exam: Unchanged    Labs:  WBC:    Lab Results   Component Value Date/Time    WBC 7.1 2025 08:45 AM     Hemoglobin/Hematocrit:    Lab Results   Component Value Date/Time    HGB 10.6 2025 08:45 AM    HCT 31.9 2025 08:45 AM     BMP:    Lab Results   Component Value Date/Time     2025 08:44 AM    K 3.8 2025 08:44 AM    K 3.5 2025 07:35 AM     2025 08:44 AM    CO2 26 2025 08:44 AM    BUN 9 2025 08:44 AM    CREATININE 1.0 2025 08:44 AM    CALCIUM 6.9 2025 08:44 AM    GFRAA >60 10/12/2021 11:48 AM    GFRAA >60 2012 12:10 PM    LABGLOM 77 2025 08:44 AM    LABGLOM >60 02/15/2024 01:38 PM     PT/INR:    Lab Results   Component Value Date/Time    PROTIME 16.9 2025 08:45 AM    INR 1.36 2025 08:45 AM     PTT:    Lab Results   Component Value Date/Time    APTT 29.4 2025 04:09 AM   [APTT        Impression/Plan: 78yo M admitted with weakness, hypotension and diarrhea x2 weeks. We were consulted for CT scan showing a 3.5cm complex renal lesion.     - MRI abdomen showed a complex exophytic lesion arising from lateral right kidney measuring 4 cm.  Differential includes abscess versus neoplasm.  - This was reviewed further with Dr. Esparza as well as his previous abdominal imaging.  At the time of previous scans, there appears to be a smaller simple cyst in the same area and has not really changed since then.  - Will plan on likely getting aspiration versus biopsy of this fluid 
     Urology Progress Note      Subjective: No  complaints    Vitals:  /77   Pulse 55   Temp 97.1 °F (36.2 °C) (Oral)   Resp 16   Ht 1.829 m (6')   Wt 86 kg (189 lb 9.5 oz)   SpO2 99%   BMI 25.71 kg/m²   Temp  Av.3 °F (36.3 °C)  Min: 97 °F (36.1 °C)  Max: 98 °F (36.7 °C)    Intake/Output Summary (Last 24 hours) at 2025 1218  Last data filed at 2025 1011  Gross per 24 hour   Intake 1508.92 ml   Output 750 ml   Net 758.92 ml       Exam: Unchanged    Labs:  WBC:    Lab Results   Component Value Date/Time    WBC 7.1 2025 08:45 AM     Hemoglobin/Hematocrit:    Lab Results   Component Value Date/Time    HGB 10.6 2025 08:45 AM    HCT 31.9 2025 08:45 AM     BMP:    Lab Results   Component Value Date/Time     2025 08:44 AM    K 3.8 2025 08:44 AM    K 3.5 2025 07:35 AM     2025 08:44 AM    CO2 26 2025 08:44 AM    BUN 9 2025 08:44 AM    CREATININE 1.0 2025 08:44 AM    CALCIUM 6.9 2025 08:44 AM    GFRAA >60 10/12/2021 11:48 AM    GFRAA >60 2012 12:10 PM    LABGLOM 77 2025 08:44 AM    LABGLOM >60 02/15/2024 01:38 PM     PT/INR:    Lab Results   Component Value Date/Time    PROTIME 16.9 2025 08:45 AM    INR 1.36 2025 08:45 AM     PTT:    Lab Results   Component Value Date/Time    APTT 29.4 2025 04:09 AM   [APTT        Impression/Plan: 78yo M admitted with weakness, hypotension and diarrhea x2 weeks. We were consulted for CT scan showing a 3.5cm complex renal lesion.     - MRI abdomen showed a complex exophytic lesion arising from lateral right kidney measuring 4 cm.  Differential includes abscess versus neoplasm.  - This was reviewed further with Dr. Esparza as well as his previous abdominal imaging.  At the time of previous scans, there appears to be a smaller simple cyst in the same area and has not really changed since then.  - Orders placed for aspiration versus biopsy of this fluid 
     Urology Progress Note      Subjective: No  complaints    Vitals:  BP (!) 95/56   Pulse 61   Temp 97.2 °F (36.2 °C) (Oral)   Resp 18   Ht 1.829 m (6')   Wt 84 kg (185 lb 3 oz)   SpO2 99%   BMI 25.12 kg/m²   Temp  Av.5 °F (36.4 °C)  Min: 97.2 °F (36.2 °C)  Max: 98.7 °F (37.1 °C)    Intake/Output Summary (Last 24 hours) at 2025 1040  Last data filed at 2025  Gross per 24 hour   Intake 245 ml   Output 400 ml   Net -155 ml       Exam: Unchanged    Labs:  WBC:    Lab Results   Component Value Date/Time    WBC 6.3 2025 07:35 AM     Hemoglobin/Hematocrit:    Lab Results   Component Value Date/Time    HGB 8.9 2025 07:35 AM    HCT 26.6 2025 07:35 AM     BMP:    Lab Results   Component Value Date/Time     2025 07:35 AM    K 3.5 2025 07:35 AM     2025 07:35 AM    CO2 27 2025 07:35 AM    BUN 9 2025 07:35 AM    CREATININE 0.9 2025 07:35 AM    CALCIUM 6.7 2025 07:35 AM    GFRAA >60 10/12/2021 11:48 AM    GFRAA >60 2012 12:10 PM    LABGLOM 87 2025 07:35 AM    LABGLOM >60 02/15/2024 01:38 PM     PT/INR:    Lab Results   Component Value Date/Time    PROTIME 19.2 2025 08:45 AM    INR 1.60 2025 08:45 AM     PTT:    Lab Results   Component Value Date/Time    APTT 29.4 2025 04:09 AM   [APTT        Impression/Plan: 80yo M admitted with weakness, hypotension and diarrhea x2 weeks. We were consulted for CT scan showing a 3.5cm complex renal lesion.     - MRI abdomen yesterday showed a complex exophytic lesion arising from lateral right kidney measuring 4 cm.  Differential includes abscess versus neoplasm.  - This was reviewed further with Dr. Esparza as well as his previous abdominal imaging.  At the time of previous scans, there appears to be a smaller simple cyst in the same area and has not really changed since then.  - Will plan on likely getting aspiration versus biopsy of this fluid collection/mass 
    MICHAEL completed.  No vegetation seen.  No evidence of endocarditis.  1+ MR  Patient tolerated procedure well    Plan  N.p.o. for 2 hours and then resume diet if no further procedures are planned for the day    Doyle Blakely MD  
    Progress Note    Patient Delano Daigle  MRN: [id number]  YOB: 1945 Age: 79 y.o. Sex: male  Room: [address] [place] [address]       Admitting Physician: Shawn Taylor MD   Date of Admission: 6/23/2025  7:23 PM   Primary Care Physician: Jason Dawn MD     Subjective:  Delano Daigle was seen and examined. We are following for diarrhea .  -- patient reports some loose stools this morning.  He denies any abdominal pain.    ROS:  Constitutional: Denies fever, no change in appetite  Respiratory: Denies cough or shortness of breath  Cardiovascular: Denies chest pain or edema    Objective:  Vital Signs:   Vitals:    06/28/25 1328   BP:    Pulse:    Resp:    Temp: (!) 93.7 °F (34.3 °C)   SpO2:          Physical Exam:  Constitutional: Alert and oriented x 4. No acute distress.   HEENT: Sclera anicteric, mucosal membranes moist  Cardiovascular: Regular rate and rhythm.  No murmurs.  Respiratory: Respirations nonlabored, no crepitus  GI: Abdomen nondistended, soft, and nontender.  Normal active bowel sounds.  No masses palpable.   Rectal: Deferred  Musculoskeletal:  No pitting edema of the lower legs.  Neurological: Gross memory appears intact.       Intake/Output:    Intake/Output Summary (Last 24 hours) at 6/28/2025 1330  Last data filed at 6/28/2025 1223  Gross per 24 hour   Intake 125 ml   Output 400 ml   Net -275 ml        Current Medications:  Current Facility-Administered Medications   Medication Dose Route Frequency Provider Last Rate Last Admin    [START ON 6/29/2025] cefTRIAXone (ROCEPHIN) 1,000 mg in sterile water 10 mL IV syringe  1,000 mg IntraVENous Q24H Mirna Corea MD        [START ON 6/29/2025] vancomycin (VANCOCIN) 1,750 mg in sodium chloride 0.9 % 500 mL IVPB  1,750 mg IntraVENous Q24H Ami Strauss MD        lidocaine PF 1 % injection    PRN Daly Maxwell APRN - CNP   10 mL at 06/26/25 1054    guaiFENesin (MUCINEX) extended release tablet 600 mg  600 mg Oral BID Dave-Marker, 
    Progress Note    Patient Delano Daigle  MRN: [id number]  YOB: 1945 Age: 79 y.o. Sex: male  Room: [address] [place] [address]       Admitting Physician: Shawn Taylor MD   Date of Admission: 6/23/2025  7:23 PM   Primary Care Physician: Jason Dawn MD     Subjective:  Delano Daigle was seen and examined. We are following for diarrhea .  -- no overt bleeding/ small formed stool  noted in underwear    ROS:  Constitutional: Denies fever, no change in appetite  Respiratory: Denies cough or shortness of breath  Cardiovascular: Denies chest pain or edema    Objective:  Vital Signs:   Vitals:    06/25/25 0738   BP: (!) 97/57   Pulse: 82   Resp: 18   Temp: 97.7 °F (36.5 °C)   SpO2: 95%         Physical Exam:  Constitutional: Alert and oriented x 4. No acute distress.   HEENT: Sclera anicteric, mucosal membranes moist  Cardiovascular: Regular rate and rhythm.  No murmurs.  Respiratory: Respirations nonlabored, no crepitus  GI: Abdomen nondistended, soft, and nontender.  Normal active bowel sounds.  No masses palpable.   Rectal: Deferred  Musculoskeletal:  No pitting edema of the lower legs.  Neurological: Gross memory appears intact.       Intake/Output:    Intake/Output Summary (Last 24 hours) at 6/25/2025 0838  Last data filed at 6/25/2025 0430  Gross per 24 hour   Intake 1823.93 ml   Output 250 ml   Net 1573.93 ml        Current Medications:  Current Facility-Administered Medications   Medication Dose Route Frequency Provider Last Rate Last Admin    guaiFENesin (MUCINEX) extended release tablet 600 mg  600 mg Oral BID DaveMadhuri Sanchez APRN - CNP   600 mg at 06/25/25 0121    triamcinolone (KENALOG) 0.1 % cream   Topical BID Bakari Dodd MD        diphenhydrAMINE-zinc acetate 2-0.1 % cream   Topical TID PRN Bakari Dodd MD        calamine-zinc oxide 8-8 % lotion   Topical TID Bakari Dodd MD        iopamidol (ISOVUE-370) 76 % injection 75 mL  75 mL IntraVENous ONCE PRN Bakari Dodd MD        
    [facility] -  Clinical Pharmacy Note    Vancomycin - Management by Pharmacy    Consult Date(s): 06/26/25  Consulting Provider(s): Madhuri Acosta-Marker, CNP    Assessment / Plan  Bloodstream Infection - Vancomycin  Concurrent Antimicrobials:   Ceftriaxone - Klebsiella UTI (S) to ceftriaxone   Day of Vanc Therapy / Ordered Duration: 2  Current Dosing Method: Bayesian-Guided AUC Dosing  Therapeutic Goal: -600 mg/L*hr  Current Dose / Plan:   Renal function stable; SCr 1 today  On 1500mg IV q24h  Level today = 20.9 mg/L - drawn ~7h after prior dose  Calculated AUC = 498 / trough = 12.4  Continue current dose  Will continue to monitor clinical condition and make adjustments to regimen as appropriate.    Please call with questions--  Reyna Angel, PharmD, [facility]  Wireless: [phone]   6/27/2025 11:53 AM        Interval update:  Awaiting abd MRI. Blood Cx growing MRSA x 1 bottle; urine with Klebsiella.    Subjective/Objective:   Delano Daigle is a 79 y.o. male with a PMHx significant for COPD, hyperlipidemia, hypertension, and type 2 diabetes who is admitted with fatigue.     Pharmacy is consulted to dose vancomycin.    Ht Readings from Last 1 Encounters:   06/24/25 1.829 m (6')     Wt Readings from Last 1 Encounters:   06/27/25 86.4 kg (190 lb 7.6 oz)     Current & Prior Antimicrobial Regimen(s):  Ceftriaxone (6/26-current)  Vancomycin - Pharmacy to dose  2000mg IV x1 6/26  1500mg IV q24h (6/27-current)    Vancomycin Level(s) / Doses:    Date Time Dose Type of Level / Level Interpretation   6/27 1034 1500mg IV q24h Random = 20.9 mg/L Drawn ~7h after prior dose  AUC = 498 / trough = 12.4  Continue same dose          Note: Serum levels collected for AUC-based dosing may be high if collected in close proximity to the dose administered. This is not necessarily indicative of toxicity.    Cultures & Sensitivities:    Date Site Micro Susceptibility / Result   6/24/25 Blood x1 MRSA    6/24/25 Blood x2 NGTD  
    [facility] -  Clinical Pharmacy Note    Vancomycin - Management by Pharmacy    Consult Date(s): 06/26/25  Consulting Provider(s): Madhuri Acosta-Marker, CNP    Assessment / Plan  Bloodstream Infection - Vancomycin  Concurrent Antimicrobials:   Ceftriaxone - Klebsiella UTI (S) to ceftriaxone   Day of Vanc Therapy / Ordered Duration: Day 1 of unspecified  Current Dosing Method: Bayesian-Guided AUC Dosing  Therapeutic Goal: -600 mg/L*hr  Current Dose / Plan:   Scr 1.1 on repeat labs this morning, baseline ~1.2 previous admission   Currently on vancomycin 1250 mg IV q24h with estimated AUC of 459 and trough of 12  Will increase dose to 1500 mg IV q24h for higher AUC of 550 and trough of 14.5 given the indication of MRSA bacteremia  A vancomycin random level has been ordered for tomorrow, 6/27 at 1000 for follow up. Did not order level with AM labs d/t next dose being due at 0300 on 6/27   Will continue to monitor clinical condition and make adjustments to regimen as appropriate.    Thank you for consulting pharmacy,    Stephanie Narayanan, PharmD, BCPS  Clinical Pharmacist  [phone]      Interval update:  Being treated for MRSA bacteremia and UTI. ID has been consulted. Urology consulted for complex renal lesion.     Subjective/Objective:   Delano Daigle is a 79 y.o. male with a PMHx significant for COPD, hyperlipidemia, hypertension, and type 2 diabetes who is admitted with fatigue.     Pharmacy is consulted to dose vancomycin.    Ht Readings from Last 1 Encounters:   06/24/25 1.829 m (6')     Wt Readings from Last 1 Encounters:   06/26/25 85.9 kg (189 lb 6 oz)     Current & Prior Antimicrobial Regimen(s):  Ceftriaxone 1000mg daily (6/25 - current)  Vancomycin - 1500 mg IV q24h (6/26-current)    Vancomycin Level(s) / Doses:    Date Time Dose Type of Level / Level Interpretation   6/27 1000  Random ordered            Note: Serum levels collected for AUC-based dosing may be high if collected in close 
    V2.0    [facility] [place] Progress Note      Name:  Delano Daigle /Age/Sex: 1945  (79 y.o. male)   MRN & CSN:  [id number] & [id number] Encounter Date/Time: 2025 8:13 AM EDT   Location:  4317/4317-01 PCP: Jason Dawn MD     Attending:Lilliana Pires MD       Hospital Day: 12    HPI:    Assessment and Recommendations     Hospital course:    Delano Daigle is a 79 y.o. male with pmh of diabetes, hypertension, hyperlipidemia, eczema, hidradenitis with MRSA, A-fib, COPD who presents with weakness with poor p.o. intake with chronic diarrhea.  Was admitted with acute kidney injury.  ( Patient was discharged in March, at that time was found to have blood pressures in the 70s.  Cortisol was low normal hence there was some concern for possible adrenal insufficiency although Sim test was not performed.  Patient was discharged on stress dose steroids.  There was some concern for possible cardiogenic shock but right heart cath was unremarkable)  Patient was readmitted with nonbloody diarrhea weakness and was found to have LAMBERTO along with UTI and MRSA bacteremia.  Patient found to have diffuse anasarca with moderate volume ascites.  ID was consulted, patient was initially treated with IV Vanco and ceftriaxone to treat MRSA bacteremia and Klebsiella UTI.  Antibiotic was later switched to vancomycin and ceftaroline.  MICHAEL negative for vegetation. patient was found to have a complex renal cyst underwent drain placement on  which was purulent, culture positive for MRSA      Plan:       MRSA bacteremia  - On IV vancomycin > switched to daptomycin on 7/3 due to LAMBERTO  - Was on ceftaroline with vancomycin   - ID consulted, consult appreciated.    - TTE no vegetation,  for MICHAEL negative for vegetation  - Repeat blood culture from  no growth to date  - Patient has hidradenitis and severe eczema most likely source of MRSA bacteremia will need outpatient follow-up with    Klebsiella UTI  - Finish 5 days of 
    V2.0    [facility] [place] Progress Note      Name:  Delano Daigle /Age/Sex: 1945  (79 y.o. male)   MRN & CSN:  [id number] & [id number] Encounter Date/Time: 2025 11:06 AM EDT   Location:  6327/6327-01 PCP: Jason Dawn MD     Attending:Bakari Dodd MD       Hospital Day: 2    Assessment and Recommendations     Patient Active Problem List   Diagnosis    Hypertension associated with diabetes (HCC)    Hyperlipidemia associated with type 2 diabetes mellitus (HCC)    Eczema    Cigarette nicotine dependence with nicotine-induced disorder    Type 2 diabetes mellitus with other specified complication (HCC)    COPD (chronic obstructive pulmonary disease) (HCC)    Cellulitis of left arm    Severe sepsis (HCC)    Swelling of hand joint, left    Abscess of multiple sites    Rash and other nonspecific skin eruption    Fatigue, unspecified type    Acute congestive heart failure, unspecified heart failure type (HCC)    ARF (acute renal failure)     79 y.o. male known case of DMII , hypertension hidradenitis suppurativa (MRSA), Afib , COPD, cardiomyopathy ,and hyperlipidemia presented with weakness and diarrhea x 2 weeks    He was admitted in 2025 for shock with concern for adrenal insufficiency. His cortisol and ACTH level were low. He was started on IV steroids with plan to discharge on oral dexamethasone but it doesnt look he was discharged on it. He was supposed to see endo as pt but that did not happen as well .    He stated that since his discharge he cont to have low energy and fatigue. Over the last 2 weeks he started to have non-bloody diarrhea and his weakness go much worse. No fever, nausea, vomiting or abd pain. He has some skin discoloration on his arms (some whit spots) for the last few months . He was not on chronic steroids in his life    Probable adrenal insufficiency  -His admission in March when he was in shock with low cortisol is very suggestive of adrenal insufficiency  -His low ACTH was 
    V2.0    [facility] [place] Progress Note      Name:  Delano Daigle /Age/Sex: 1945  (79 y.o. male)   MRN & CSN:  [id number] & [id number] Encounter Date/Time: 2025 11:06 AM EDT   Location:  4317/4317-01 PCP: Jason Dawn MD     Attending:Bakari Dodd MD       Hospital Day: 2    Assessment and Recommendations     Patient Active Problem List   Diagnosis    Hypertension associated with diabetes (HCC)    Hyperlipidemia associated with type 2 diabetes mellitus (HCC)    Eczema    Cigarette nicotine dependence with nicotine-induced disorder    Type 2 diabetes mellitus with other specified complication (HCC)    COPD (chronic obstructive pulmonary disease) (HCC)    Cellulitis of left arm    Severe sepsis (HCC)    Swelling of hand joint, left    Abscess of multiple sites    Rash and other nonspecific skin eruption    Fatigue, unspecified type    Acute congestive heart failure, unspecified heart failure type (HCC)    ARF (acute renal failure)    Severe malnutrition     79 y.o. male known case of DMII , hypertension hidradenitis suppurativa (MRSA), Afib , COPD, cardiomyopathy ,and hyperlipidemia presented with weakness and diarrhea x 2 weeks    He was admitted in 2025 for shock with concern for adrenal insufficiency. His cortisol and ACTH level were low. He was started on IV steroids with plan to discharge on oral dexamethasone but it doesnt look he was discharged on it. He was supposed to see endo as pt but that did not happen as well .    He stated that since his discharge he cont to have low energy and fatigue. Over the last 2 weeks he started to have non-bloody diarrhea and his weakness go much worse. No fever, nausea, vomiting or abd pain. He has some skin discoloration on his arms (some whit spots) for the last few months . He was not on chronic steroids in his life    Weakness  Hypotension  Diarrhea   Elevated lactic acid - resolved  -His admission in March when he was in shock with low cortisol is very 
    V2.0    [facility] [place] Progress Note      Name:  Delano Daigle /Age/Sex: 1945  (79 y.o. male)   MRN & CSN:  [id number] & [id number] Encounter Date/Time: 2025 8:13 AM EDT   Location:  4317/4317-01 PCP: Jason Dawn MD     Attending:Lilliana Pires MD       Hospital Day: 13    HPI:    Assessment and Recommendations     Hospital course:    Delano Daigle is a 79 y.o. male with pmh of diabetes, hypertension, hyperlipidemia, eczema, hidradenitis with MRSA, A-fib, COPD who presents with weakness with poor p.o. intake with chronic diarrhea.  Was admitted with acute kidney injury.  ( Patient was discharged in March, at that time was found to have blood pressures in the 70s.  Cortisol was low normal hence there was some concern for possible adrenal insufficiency although Sim test was not performed.  Patient was discharged on stress dose steroids.  There was some concern for possible cardiogenic shock but right heart cath was unremarkable)  Patient was readmitted with nonbloody diarrhea weakness and was found to have LAMBERTO along with UTI and MRSA bacteremia.  Patient found to have diffuse anasarca with moderate volume ascites.  ID was consulted, patient was initially treated with IV Vanco and ceftriaxone to treat MRSA bacteremia and Klebsiella UTI.  Antibiotic was later switched to vancomycin and ceftaroline.  MICHAEL negative for vegetation. patient was found to have a complex renal cyst underwent drain placement on  which was purulent, culture positive for MRSA      Plan:       MRSA bacteremia-.  Continue daptomycin  - On IV vancomycin > switched to daptomycin on 7/3 due to LAMBERTO  - Was on ceftaroline with vancomycin   - ID consulted, consult appreciated.    - TTE no vegetation,  for MICHAEL negative for vegetation  - Repeat blood culture from  no growth to date  - Patient has hidradenitis and severe eczema most likely source of MRSA bacteremia will need outpatient follow-up with    Klebsiella UTI  - Finish  
    V2.0    [facility] [place] Progress Note      Name:  Delano Daigle /Age/Sex: 1945  (79 y.o. male)   MRN & CSN:  [id number] & [id number] Encounter Date/Time: 2025 8:13 AM EDT   Location:  4317/4317-01 PCP: Jason Dawn MD     Attending:Lilliana Pires MD       Hospital Day: 17    HPI:    Assessment and Recommendations     Hospital course:    Delano Daigle is a 79 y.o. male with pmh of diabetes, hypertension, hyperlipidemia, eczema, hidradenitis with MRSA, A-fib, COPD who presents with weakness with poor p.o. intake with chronic diarrhea.  Was admitted with acute kidney injury.  ( Patient was discharged in March, at that time was found to have blood pressures in the 70s.  Cortisol was low normal hence there was some concern for possible adrenal insufficiency although Sim test was not performed.  Patient was discharged on stress dose steroids.  There was some concern for possible cardiogenic shock but right heart cath was unremarkable)  Patient was readmitted with nonbloody diarrhea weakness and was found to have LAMBERTO along with UTI and MRSA bacteremia.  Patient found to have diffuse anasarca with moderate volume ascites.  ID was consulted, patient was initially treated with IV Vanco and ceftriaxone to treat MRSA bacteremia and Klebsiella UTI.  Antibiotic was later switched to vancomycin and ceftaroline.  MICHAEL negative for vegetation. patient was found to have a complex renal cyst underwent drain placement on  which was purulent, culture positive for MRSA.  Renal drain was removed on .  In the hospital patient went into LAMBERTO most likely ATN due to poor p.o. intake along with vancomycin and antibiotic was switched to daptomycin on 7/3.  With hypotension hypoglycemia there was concern for adrenal insufficiency and last admission patient had a diagnosis of possible adrenal insufficiency.  A.m. cortisol did not respond appropriately to his clinical condition and patient was treated with stress dose steroids 
    V2.0    [facility] [place]      Name:  Delano Daigle /Age/Sex: 1945  (79 y.o. male)   MRN & CSN:  [id number] & [id number] Encounter Date/Time: 2025 8:13 AM EDT   Location:  [facility]7/4317-01 PCP: Jason Dawn MD     Attending:Lilliana Pires MD       Hospital Day: 21    HPI:    Assessment and Recommendations     Hospital course:    Delano Daigle is a 79 y.o. male with pmh of diabetes, hypertension, hyperlipidemia, eczema, hidradenitis with MRSA, A-fib, COPD who presents with weakness with poor p.o. intake with chronic diarrhea.  Was admitted with acute kidney injury.  ( Patient was discharged in March, at that time was found to have blood pressures in the 70s.  Cortisol was low normal hence there was some concern for possible adrenal insufficiency although Sim test was not performed.  Patient was discharged on stress dose steroids.  There was some concern for possible cardiogenic shock but right heart cath was unremarkable)  Patient was readmitted with nonbloody diarrhea weakness and was found to have LAMBERTO along with UTI and MRSA bacteremia.  Patient found to have diffuse anasarca with moderate volume ascites.  ID was consulted, patient was initially treated with IV Vanco and ceftriaxone to treat MRSA bacteremia and Klebsiella UTI.  Antibiotic was later switched to vancomycin and ceftaroline.  MICHAEL negative for vegetation. patient was found to have a complex renal cyst underwent drain placement on  which was purulent, culture positive for MRSA.  Renal drain was removed on .  In the hospital patient went into LAMBERTO most likely ATN due to poor p.o. intake along with vancomycin and antibiotic was switched to daptomycin on 7/3, last day    With hypotension hypoglycemia there was concern for adrenal insufficiency and last admission patient had a diagnosis of possible adrenal insufficiency.  A.m. cortisol did not respond appropriately to his clinical condition and patient was treated with 
    V2.0    [facility] [place]      Name:  Delano Daigle /Age/Sex: 1945  (79 y.o. male)   MRN & CSN:  [id number] & [id number] Encounter Date/Time: 2025 11:06 AM EDT   Location:  4317/4317-01 PCP: Jason Dawn MD     Attending:David Lala,Jovon       Hospital Day: 4    Assessment and Recommendations     Patient Active Problem List   Diagnosis    Hypertension associated with diabetes (HCC)    Hyperlipidemia associated with type 2 diabetes mellitus (HCC)    Eczema    Cigarette nicotine dependence with nicotine-induced disorder    Type 2 diabetes mellitus with other specified complication (HCC)    COPD (chronic obstructive pulmonary disease) (HCC)    Cellulitis of left arm    Severe sepsis (HCC)    Swelling of hand joint, left    Abscess of multiple sites    Rash and other nonspecific skin eruption    Fatigue, unspecified type    Acute congestive heart failure, unspecified heart failure type (HCC)    ARF (acute renal failure)    Severe malnutrition     79 y.o. male known case of DMII , hypertension hidradenitis suppurativa (MRSA), Afib , COPD, cardiomyopathy ,and hyperlipidemia presented with weakness and diarrhea x 2 weeks    He was admitted in 2025 for shock with concern for adrenal insufficiency. His cortisol and ACTH level were low. He was started on IV steroids with plan to discharge on oral dexamethasone but it doesnt look he was discharged on it. He was supposed to see endo as pt but that did not happen as well .    He stated that since his discharge he cont to have low energy and fatigue. Over the last 2 weeks he started to have non-bloody diarrhea and his weakness go much worse. No fever, nausea, vomiting or abd pain. He has some skin discoloration on his arms (some whit spots) for the last few months . He was not on chronic steroids in his life    MRSA bacteremia  Urinary tract infection  -His admission in March when he was in shock with low cortisol is very suggestive of 
    V2.0    [facility] [place]      Name:  Delano Daigle /Age/Sex: 1945  (79 y.o. male)   MRN & CSN:  [id number] & [id number] Encounter Date/Time: 2025 11:06 AM EDT   Location:  4317/4317-01 PCP: Jason Dawn MD     Attending:David Lala,Jovon       Hospital Day: 5    Assessment and Recommendations     Patient Active Problem List   Diagnosis    Hypertension associated with diabetes (HCC)    Hyperlipidemia associated with type 2 diabetes mellitus (HCC)    Eczema    Cigarette nicotine dependence with nicotine-induced disorder    Type 2 diabetes mellitus with other specified complication (HCC)    COPD (chronic obstructive pulmonary disease) (HCC)    Cellulitis of left arm    Severe sepsis (HCC)    Swelling of hand joint, left    Abscess of multiple sites    Rash and other nonspecific skin eruption    Fatigue, unspecified type    Acute congestive heart failure, unspecified heart failure type (HCC)    ARF (acute renal failure)    Severe malnutrition     79 y.o. male known case of DMII , hypertension hidradenitis suppurativa (MRSA), Afib , COPD, cardiomyopathy ,and hyperlipidemia presented with weakness and diarrhea x 2 weeks    He was admitted in 2025 for shock with concern for adrenal insufficiency. His cortisol and ACTH level were low. He was started on IV steroids with plan to discharge on oral dexamethasone but it doesnt look he was discharged on it. He was supposed to see endo as pt but that did not happen as well .    He stated that since his discharge he cont to have low energy and fatigue. Over the last 2 weeks he started to have non-bloody diarrhea and his weakness go much worse. No fever, nausea, vomiting or abd pain. He has some skin discoloration on his arms (some whit spots) for the last few months . He was not on chronic steroids in his life    MRSA bacteremia  Urinary tract infection  -His admission in March when he was in shock with low cortisol is very suggestive of 
    V2.0    [facility] [place]      Name:  Delano Daigle /Age/Sex: 1945  (79 y.o. male)   MRN & CSN:  [id number] & [id number] Encounter Date/Time: 2025 8:13 AM EDT   Location:  4317/4317-01 PCP: Jason Dawn MD     Attending:Lilliana Pires MD       Hospital Day:     HPI:    Assessment and Recommendations     Hospital course:    Delano Daigle is a 79 y.o. male with pmh of diabetes, hypertension, hyperlipidemia, eczema, hidradenitis with MRSA, A-fib, COPD who presents with weakness with poor p.o. intake with chronic diarrhea.  Was admitted with acute kidney injury.  ( Patient was discharged in March, at that time was found to have blood pressures in the 70s.  Cortisol was low normal hence there was some concern for possible adrenal insufficiency although Sim test was not performed.  Patient was discharged on stress dose steroids.  There was some concern for possible cardiogenic shock but right heart cath was unremarkable)  Patient was readmitted with nonbloody diarrhea weakness and was found to have LAMBERTO along with UTI and MRSA bacteremia.  Patient found to have diffuse anasarca with moderate volume ascites.  ID was consulted, patient was initially treated with IV Vanco and ceftriaxone to treat MRSA bacteremia and Klebsiella UTI.  Antibiotic was later switched to vancomycin and ceftaroline.  MICHAEL negative for vegetation. patient was found to have a complex renal cyst underwent drain placement on  which was purulent, culture positive for MRSA.  Renal drain was removed on .  In the hospital patient went into LAMBERTO most likely ATN due to poor p.o. intake along with vancomycin and antibiotic was switched to daptomycin on 7/3, last day    With hypotension hypoglycemia there was concern for adrenal insufficiency and last admission patient had a diagnosis of possible adrenal insufficiency.  A.m. cortisol did not respond appropriately to his clinical condition and patient was treated with 
    V2.0    [facility] [place]      Name:  Delano Daigle /Age/Sex: 1945  (79 y.o. male)   MRN & CSN:  [id number] & [id number] Encounter Date/Time: 2025 8:13 AM EDT   Location:  4317/4317-01 PCP: Jason Dawn MD     Attending:Lilliana Pires MD       Hospital Day: 20    HPI:    Assessment and Recommendations     Hospital course:    Delano Daigle is a 79 y.o. male with pmh of diabetes, hypertension, hyperlipidemia, eczema, hidradenitis with MRSA, A-fib, COPD who presents with weakness with poor p.o. intake with chronic diarrhea.  Was admitted with acute kidney injury.  ( Patient was discharged in March, at that time was found to have blood pressures in the 70s.  Cortisol was low normal hence there was some concern for possible adrenal insufficiency although Sim test was not performed.  Patient was discharged on stress dose steroids.  There was some concern for possible cardiogenic shock but right heart cath was unremarkable)  Patient was readmitted with nonbloody diarrhea weakness and was found to have LAMBERTO along with UTI and MRSA bacteremia.  Patient found to have diffuse anasarca with moderate volume ascites.  ID was consulted, patient was initially treated with IV Vanco and ceftriaxone to treat MRSA bacteremia and Klebsiella UTI.  Antibiotic was later switched to vancomycin and ceftaroline.  MICHAEL negative for vegetation. patient was found to have a complex renal cyst underwent drain placement on  which was purulent, culture positive for MRSA.  Renal drain was removed on .  In the hospital patient went into LAMBERTO most likely ATN due to poor p.o. intake along with vancomycin and antibiotic was switched to daptomycin on 7/3, last day    With hypotension hypoglycemia there was concern for adrenal insufficiency and last admission patient had a diagnosis of possible adrenal insufficiency.  A.m. cortisol did not respond appropriately to his clinical condition and patient was treated with 
    V2.0    [facility] [place]      Name:  Delano Daigle /Age/Sex: 1945  (79 y.o. male)   MRN & CSN:  [id number] & [id number] Encounter Date/Time: 2025 8:13 AM EDT   Location:  4317/4317-01 PCP: Jason Dawn MD     Attending:Lilliana Pires MD       Hospital Day: 14    HPI:    Assessment and Recommendations     Hospital course:    Delano Daigle is a 79 y.o. male with pmh of diabetes, hypertension, hyperlipidemia, eczema, hidradenitis with MRSA, A-fib, COPD who presents with weakness with poor p.o. intake with chronic diarrhea.  Was admitted with acute kidney injury.  ( Patient was discharged in March, at that time was found to have blood pressures in the 70s.  Cortisol was low normal hence there was some concern for possible adrenal insufficiency although Sim test was not performed.  Patient was discharged on stress dose steroids.  There was some concern for possible cardiogenic shock but right heart cath was unremarkable)  Patient was readmitted with nonbloody diarrhea weakness and was found to have LAMBERTO along with UTI and MRSA bacteremia.  Patient found to have diffuse anasarca with moderate volume ascites.  ID was consulted, patient was initially treated with IV Vanco and ceftriaxone to treat MRSA bacteremia and Klebsiella UTI.  Antibiotic was later switched to vancomycin and ceftaroline.  MICHAEL negative for vegetation. patient was found to have a complex renal cyst underwent drain placement on  which was purulent, culture positive for MRSA      Plan:       MRSA bacteremia-.  Continue daptomycin  - On IV vancomycin > switched to daptomycin on 7/3 due to LAMBERTO  - Was on ceftaroline with vancomycin   - ID consulted, consult appreciated.    - TTE no vegetation,  for MICHAEL negative for vegetation  - Repeat blood culture from  no growth to date  - Patient has hidradenitis and severe eczema most likely source of MRSA bacteremia will need outpatient follow-up with    Klebsiella UTI-repeat UA still 
    V2.0    [facility] [place]      Name:  Delano Daigle /Age/Sex: 1945  (79 y.o. male)   MRN & CSN:  [id number] & [id number] Encounter Date/Time: 2025 8:13 AM EDT   Location:  4317/4317-01 PCP: Jason Dawn MD     Attending:Lilliana Pires MD       Hospital Day: 15    HPI:    Assessment and Recommendations     Hospital course:    Delano Daigle is a 79 y.o. male with pmh of diabetes, hypertension, hyperlipidemia, eczema, hidradenitis with MRSA, A-fib, COPD who presents with weakness with poor p.o. intake with chronic diarrhea.  Was admitted with acute kidney injury.  ( Patient was discharged in March, at that time was found to have blood pressures in the 70s.  Cortisol was low normal hence there was some concern for possible adrenal insufficiency although Sim test was not performed.  Patient was discharged on stress dose steroids.  There was some concern for possible cardiogenic shock but right heart cath was unremarkable)  Patient was readmitted with nonbloody diarrhea weakness and was found to have LAMBERTO along with UTI and MRSA bacteremia.  Patient found to have diffuse anasarca with moderate volume ascites.  ID was consulted, patient was initially treated with IV Vanco and ceftriaxone to treat MRSA bacteremia and Klebsiella UTI.  Antibiotic was later switched to vancomycin and ceftaroline.  MICHAEL negative for vegetation. patient was found to have a complex renal cyst underwent drain placement on  which was purulent, culture positive for MRSA      Plan:       MRSA bacteremia-.  Continue daptomycin  - On IV vancomycin > switched to daptomycin on 7/3 due to LAMBERTO  - Was on ceftaroline with vancomycin   - ID consulted, consult appreciated.    - TTE no vegetation,  for MICHAEL negative for vegetation  - Repeat blood culture from  no growth to date  - Patient has hidradenitis and severe eczema most likely source of MRSA bacteremia will need outpatient follow-up with    Klebsiella UTI-repeat UA still 
    V2.0    [facility] [place]      Name:  Delano Daigle /Age/Sex: 1945  (79 y.o. male)   MRN & CSN:  [id number] & [id number] Encounter Date/Time: 2025 11:06 AM EDT   Location:  4317/4317-01 PCP: Jason Dawn MD     Attending:David Lala,Jovon       Hospital Day: 6    Assessment and Recommendations     Patient Active Problem List   Diagnosis    Hypertension associated with diabetes (HCC)    Hyperlipidemia associated with type 2 diabetes mellitus (HCC)    Eczema    Cigarette nicotine dependence with nicotine-induced disorder    Type 2 diabetes mellitus with other specified complication (HCC)    COPD (chronic obstructive pulmonary disease) (HCC)    Cellulitis of left arm    Severe sepsis (HCC)    Swelling of hand joint, left    Abscess of multiple sites    Rash and other nonspecific skin eruption    Fatigue, unspecified type    Acute congestive heart failure, unspecified heart failure type (HCC)    ARF (acute renal failure)    Severe malnutrition     79 y.o. male known case of DMII , hypertension hidradenitis suppurativa (MRSA), Afib , COPD, cardiomyopathy ,and hyperlipidemia presented with weakness and diarrhea x 2 weeks    He was admitted in 2025 for shock with concern for adrenal insufficiency. His cortisol and ACTH level were low. He was started on IV steroids with plan to discharge on oral dexamethasone but it doesnt look he was discharged on it. He was supposed to see endo as pt but that did not happen as well .    He stated that since his discharge he cont to have low energy and fatigue. Over the last 2 weeks he started to have non-bloody diarrhea and his weakness go much worse. No fever, nausea, vomiting or abd pain. He has some skin discoloration on his arms (some whit spots) for the last few months . He was not on chronic steroids in his life    MRSA bacteremia  Urinary tract infection  -His admission in March when he was in shock with low cortisol is very suggestive of 
  Comprehensive Nutrition Assessment    RECOMMENDATIONS:  PO Diet: Continue Regular  Nutrition Supplement: Continue Ensure Clear  Nutrition Education: Education/Counseling not indicated     NUTRITION ASSESSMENT:   Nutritional summary & status: Follow up. Patient is s/p paracentesis yesterday, awaiting cytology for possible chylous ascites as fluid appeared milky per MD. PO intake is varied however fair and pt ate well at breakfast and lunch today and reports improvement in abdominal pain as well. Remeron was started 6/24 per RD request to aid with appetite, noticable effects may take up to two weeks. Bowels are moving and diarrhea has improved. RD will modify ONS per patient preference and monitor for continued improvement in po.   Admission // PMH: ARF//COPD, HTN/HLD, DM, CHF    MALNUTRITION ASSESSMENT  Context of Malnutrition: Chronic Illness   Malnutrition Status: Severe malnutrition  Findings of the 6 clinical characteristics of malnutrition (Minimum of 2 out of 6 clinical characteristics is required to make the diagnosis of moderate or severe Protein Calorie Malnutrition based on AND/ASPEN Guidelines):  Energy Intake:  75% or less estimated energy requirements for 1 month or longer  Weight Loss:  Greater than 10% over 6 months (13% in 6 month period)     Body Fat Loss:  Mild body fat loss Orbital, Buccal region   Muscle Mass Loss:  Mild muscle mass loss Temples (temporalis)  Fluid Accumulation:  No fluid accumulation      NUTRITION DIAGNOSIS   Severe malnutrition, in context of chronic illness related to psychological cause or life stress as evidenced by criteria as identified in malnutrition assessment    Nutrition Monitoring and Evaluation:   Food/Nutrient Intake Outcomes:  Diet Advancement/Tolerance, Food and Nutrient Intake, Supplement Intake  Physical Signs/Symptoms Outcomes:  Biochemical Data, Nutrition Focused Physical Findings, Weight     OBJECTIVE DATA: Significant to nutrition assessment  Nutrition 
  Comprehensive Nutrition Assessment    RECOMMENDATIONS:  PO Diet: Continue Regular, CCC-4  Nutrition Supplement: Will order Strawberry Milkshake once daily for dinner   Nutrition Education: Education/Counseling not indicated     NUTRITION ASSESSMENT:   Nutritional summary & status: Follow-up. Upon visit, pt reports decreased appetite r/t dislike of hospital foods. States the food is bland as he has not been receiving salt, pepper, or seasoning packets. Endorses eating <50% of meals on average. Documented PO per EMR shows variable intake w/ most at 26-50% and % consumed. Reports dislike of all Ensures and requests strawberry milkshake; will order once daily for dinner to supplement intake while variable. Will continue to monitor.   Admission // PMH: ARF//COPD, HTN/HLD, DM, CHF    MALNUTRITION ASSESSMENT  Context of Malnutrition: Chronic Illness   Malnutrition Status: Severe malnutrition  Findings of the 6 clinical characteristics of malnutrition (Minimum of 2 out of 6 clinical characteristics is required to make the diagnosis of moderate or severe Protein Calorie Malnutrition based on AND/ASPEN Guidelines):  Energy Intake:  75% or less estimated energy requirements for 1 month or longer  Weight Loss:  Greater than 10% over 6 months (13% in 6 month period)     Body Fat Loss:  Mild body fat loss Orbital, Buccal region   Muscle Mass Loss:  Mild muscle mass loss Temples (temporalis)    NUTRITION DIAGNOSIS   Inadequate oral intake related to decreased appetite as evidenced by variable po intake    Nutrition Monitoring and Evaluation:   Food/Nutrient Intake Outcomes:  Food and Nutrient Intake, Supplement Intake  Physical Signs/Symptoms Outcomes:  Biochemical Data, Fluid Status or Edema, Nutrition Focused Physical Findings, Skin, Weight     OBJECTIVE DATA: Significant to nutrition assessment  Nutrition Related Findings: LBM 7/2. Labs from 7/1 reviewed; lytes WNL. POCG . +1; pitting edema BLE.  Wounds: Pressure 
  Comprehensive Nutrition Assessment    RECOMMENDATIONS:  PO Diet: Continue clear liquid diet  Nutrition Supplement: Add Ensure clear TID  Nutrition Education: Education/Counseling not indicated     NUTRITION ASSESSMENT:   Nutritional summary & status: Positive screen for weight loss/poor appetite: Pt admitted w/ anemia and weakness. Currently on a clear liquid diet pending GI workup for possible EGD. Pt has lost 13% of his body weight since previous admission in March 2024 when he was admitted for sepsis. States that since this time he has been depressed and has no desire to eat. Previously had home health RN 4 days a week come cook for him, however this has been cut down to two by his insurance. Continues to receive Meal on Wheels however he dislikes the food d/t it being bland. Pt is worried about his weight loss, however it appears that his depression is playing a large role in his weight loss. Adding clear nutrition supplement TID which pt is agreeable to. Reaching out to MD for remeron for mood & appetite.   Admission // PMH: ARF//COPD, HTN/HLD, DM, CHF    MALNUTRITION ASSESSMENT  Context of Malnutrition: Chronic Illness   Malnutrition Status: Severe malnutrition  Findings of the 6 clinical characteristics of malnutrition (Minimum of 2 out of 6 clinical characteristics is required to make the diagnosis of moderate or severe Protein Calorie Malnutrition based on AND/ASPEN Guidelines):  Energy Intake:  75% or less estimated energy requirements for 1 month or longer  Weight Loss:  Greater than 10% over 6 months (13% in 6 month period)     Body Fat Loss:  Mild body fat loss Orbital, Buccal region   Muscle Mass Loss:  Mild muscle mass loss Temples (temporalis)  Fluid Accumulation:  No fluid accumulation      NUTRITION DIAGNOSIS   Severe malnutrition, in context of chronic illness related to psychological cause or life stress as evidenced by criteria as identified in malnutrition assessment    Nutrition Monitoring 
  Comprehensive Nutrition Assessment    RECOMMENDATIONS:  PO Diet: Regular 5CC  Nutrition Supplement: Strawberry Glucerna BID  Nutrition Education: Education/Counseling not indicated     NUTRITION ASSESSMENT:   Nutritional summary & status: Follow Up. Pt reports extreme hunger this morning and that his appetite has increased greatly however the hospital food provided is poor and lacks flavor so he refuses to eat or eats less than 50%. Pt enjoys strawberry glucerna and consumes % of them when provided. Pt reports son is bringing in strawberry milkshakes as well and he is drinking those too. Pt agreeable to attempt to eat the meals and if he does not like it to order something else rather than not eating. Pt agrees to ask family to bring in his favorite foods to help supplement intake. RD modify  diet to 5CC to liberalize and encourae intake as pts last A1c is 5% and modify nutrition supplements to BID as pt favors them to increase nutrition while intake is low. Will continue to monitor.   Admission // PMH: ARF//COPD, HTN/HLD, DM, CHF    MALNUTRITION ASSESSMENT  Context of Malnutrition: Chronic Illness   Malnutrition Status: Severe malnutrition  Findings of the 6 clinical characteristics of malnutrition (Minimum of 2 out of 6 clinical characteristics is required to make the diagnosis of moderate or severe Protein Calorie Malnutrition based on AND/ASPEN Guidelines):  Energy Intake:  75% or less estimated energy requirements for 1 month or longer  Weight Loss:  Greater than 10% over 6 months (13% in 6 month period)     Body Fat Loss:  Mild body fat loss Orbital, Buccal region   Muscle Mass Loss:  Mild muscle mass loss Temples (temporalis)  Fluid Accumulation:  No fluid accumulation      NUTRITION DIAGNOSIS   Inadequate oral intake related to decreased appetite as evidenced by variable po intake    Nutrition Monitoring and Evaluation:   Food/Nutrient Intake Outcomes:  Food and Nutrient Intake, Supplement 
  Physician Progress Note      PATIENT:               GIGI MENDEZ  CSN #:                  [id number]  :                       1945  ADMIT DATE:       2025 7:23 PM  DISCH DATE:  RESPONDING  PROVIDER #:        Lilliana Pires MD          QUERY TEXT:    Sepsis was documented in the medical record  nephrology note.  The   diagnosis was not noted in subsequent documentation.  Please clarify the   status of this condition.    The clinical indicators include:   nephrology notes - LAMBERTO appear to be due to hemodynamics + sepsis  AIN is always possibIilty  Check workup as above     progress note -  RN took pt axillary temp and it was 94.5, rectal 94, HR   and  HR 95    Antibiotics: vancomycin switched to daptomycin,  Peripheral iv, PICC in place,   monitor labs, monitor vitals, MICHAEL, MRSA bacteremia and Klebsiella UTI, ID   consult,  CT on  shows resolution of abscess.  Options provided:  -- Evolving sepsis present on admission confirmed after study  -- Sepsis  not present on admission confirmed after study  -- Sepsis ruled out after study  -- Other - I will add my own diagnosis  -- Disagree - Not applicable / Not valid  -- Disagree - Clinically unable to determine / Unknown  -- Refer to Clinical Documentation Reviewer    PROVIDER RESPONSE TEXT:    Sepsis not present on admission confirmed after study    Query created by: Kitty Samuels on 2025 10:52 AM      Electronically signed by:  Lilliana Pires MD 2025 8:36 AM          
  Physician Progress Note      PATIENT:               GIGI MENDEZ  CSN #:                  [id number]  :                       1945  ADMIT DATE:       2025 7:23 PM  DISCH DATE:  RESPONDING  PROVIDER #:        Lilliana Pires MD          QUERY TEXT:    Lactic acidosis is documented in the medical record H&P. Please provide   additional clinical indicators supportive of your documentation. Or please   document if the diagnosis of lactic acidosis has been ruled out after study.    The clinical indicators include:  H&P- Patient is hypotensive and has lactic acidosis  Repeat lactic acid in the morning  There was a concern of adrenal insufficiency in the past and that can be   addressed by the primary hospitalist in the morning    ED- lactic acid 3.5.  Options provided:  -- Lactic acidosis present as evidenced by, Please document evidence.  -- Lactic acidosis was ruled out after study  -- Other - I will add my own diagnosis  -- Disagree - Not applicable / Not valid  -- Disagree - Clinically unable to determine / Unknown  -- Refer to Clinical Documentation Reviewer    PROVIDER RESPONSE TEXT:    Lactic acidosis is present as evidenced by As evidenced by multiple elevated   lactic acid level although the BMP shows a normal bicarb    Query created by: Kitty Samuels on 2025 3:36 PM      Electronically signed by:  Lilliana Pires MD 2025 9:46 AM          
  [facility] - Clinical Pharmacy Note     Notification received from laboratory of positive blood culture results.    Organism(s) detected: staphyococcus aureus  Applicable Antimicrobial Resistance Genes: mecA/C and MREJ (MRSA)    Recommended change(s) to current antimicrobial regimen:      - D/C empiric Ceftriaxone     -  Vancomycin consult ordered.   -  Begin Vancomycin 2000mg x 1 dose followed by 1250mg Q24H    Recommendations reviewed with Madhuri Acosta-Jordyn, SANJANA Baker, [facility] ext [phone]    
 Night shift nurse stated patient had pulled out his picc line but not sure when. Patient currently has no picc and needs IV meds. This nurse is going to attempt a peripheral IV and if unsuccessful the charge nurse stated we may have to call the picc nurse on call. Dr. Denton with renal informed.   
1.7 liters removed during paracentesis  
4 Eyes Skin Assessment     NAME:  Delano Daigle  YOB: 1945  MEDICAL RECORD NUMBER:  [id number]    The patient is being assessed for  Admission    I agree that at least one RN has performed a thorough Head to Toe Skin Assessment on the patient. ALL assessment sites listed below have been assessed.      Areas assessed by both nurses:    Head, Face, Ears, Shoulders, Back, Chest, Arms, Elbows, Hands, Sacrum. Buttock, Coccyx, Ischium, Legs. Feet and Heels, Under Medical Devices , and Other scattered abrasions to BLE and FELIPE, skin dry cracking and swollen. Ecchymosis scattered.        Does the Patient have a Wound? Yes wound(s) were present on assessment. LDA wound assessment was Initiated and completed by RN       Shubham Prevention initiated by RN: Yes  Wound Care Orders initiated by RN: Yes    Pressure Injury (Stage 3,4, Unstageable, DTI, NWPT, and Complex wounds) if present, place Wound referral order by RN under : No    New Ostomies, if present place, Ostomy referral order under : No     Nurse 1 eSignature: Electronically signed by Brunilda eMtz RN on 6/24/25 at 4:46 PM EDT    **SHARE this note so that the co-signing nurse can place an eSignature**    Nurse 2 eSignature: Electronically signed by Clementina Russo RN on 6/24/25 at 5:22 PM EDT    
4 Eyes Skin Assessment     NAME:  Delano Daigle  YOB: 1945  MEDICAL RECORD NUMBER:  [id number]    The patient is being assessed for  Admission    I agree that at least one RN has performed a thorough Head to Toe Skin Assessment on the patient. ALL assessment sites listed below have been assessed.      Areas assessed by both nurses:    Head, Face, Ears, Shoulders, Back, Chest, Arms, Elbows, Hands, Sacrum. Buttock, Coccyx, Ischium, Legs. Feet and Heels, and Under Medical Devices         Does the Patient have a Wound? Yes wound(s) were present on assessment. LDA wound assessment was Initiated and completed by RN       Scattered abrasions to BLE with dry swollen skin  Scratches to right arm     Shubham Prevention initiated by RN: Yes  Wound Care Orders initiated by RN: No    Pressure Injury (Stage 3,4, Unstageable, DTI, NWPT, and Complex wounds) if present, place Wound referral order by RN under : No    New Ostomies, if present place, Ostomy referral order under : No     Nurse 1 eSignature: Electronically signed by Madhuri Bolanos RN on 6/24/25 at 2:20 AM EDT    **SHARE this note so that the co-signing nurse can place an eSignature**    Nurse 2 eSignature: Electronically signed by Garret Stevens RN on 6/24/25 at 5:19 AM EDT    
Attempted to call the dietician, no answer at this time. RN left voicemail.  
Bs recheck is 111  
CT LOOKS GREAT. NO RESIDUAL ABSCESS SEEN. NO HYDRO. LIKELY WILL REMOVE IN AM. HOPEFULLY, CR WILL COME DOWN  
GI Note:    Attempted to see patient but is off the floor. Will follow up paracentesis fluid study results.  Call with questions    Nan Mccurdy PA-C  
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 Pain is controlled. Denies abdominal pain, flatus or BM at this time. Tolerating diet without nausea/vomiting.     ROS:   A 14 point review of systems was conducted, significant findings as noted above. All other systems negative.    OBJECTIVE:    PHYSICAL EXAM:    Vitals:    06/28/25 0200 06/28/25 0316 06/28/25 0320 06/28/25 0600   BP:   112/64    Pulse: 60  66 65   Resp:   20    Temp:   97.3 °F (36.3 °C)    TempSrc:   Oral    SpO2:   97%    Weight:  84 kg (185 lb 3 oz)     Height:           General appearance: alert, no acute distress  Eyes: No scleral icterus  Neck: trachea midline  Chest/Lungs: Normal effort with no accessory muscle use on RA  Cardiovascular: RRR  Abdomen: Soft, non-tender, non-distended, no rebound, guarding, or rigidity  Skin: warm and dry, no rashes  Neuro: A&Ox3    LABS:   Recent Labs     06/26/25  0618 06/27/25  0600   WBC 8.1 7.0   HGB 9.6* 8.8*   HCT 28.8* 26.2*   MCV 95.6 95.5   * 108*        Recent Labs     06/26/25  0618 06/27/25  0600    140   K 3.5 3.6    111*   CO2 24 24   BUN 11 9   CREATININE 1.1 1.0        Recent Labs     06/27/25  0600   AST 19  19   ALT 11  9*   BILIDIR <0.1   BILITOT <0.2  <0.2   ALKPHOS 109  116      No results for input(s): \"LIPASE\", \"AMYLASE\" in the last 72 hours.     Recent Labs     06/26/25  0845   INR 1.60*      No results for input(s): \"CKTOTAL\", \"CKMB\", \"CKMBINDEX\", \"TROPONINI\" in the last 72 hours.      ASSESSMENT & PLAN:   This is a 79 y.o. male with a T2DM, HF (Echo 6/26/25, EF 50-55%, mild MR), COPD, a. Fib (was on home Eliquis and was stopped recently by cardiology), and chronic diarrhea, presented to [facility] with complaints of weakness, continued diarrhea and poor PO intake for several days. 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    - Follow-up paracentesis 
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 Pain is controlled. Underwent IR drain placement yesterday and is feeling well since. No acute complaints this AM.     OBJECTIVE:    PHYSICAL EXAM:    Vitals:    07/01/25 1927 07/01/25 2300 07/02/25 0410 07/02/25 0633   BP: 100/62 110/60 118/62    Pulse: 70 66 66    Resp: 18 18 18    Temp: 97.3 °F (36.3 °C) 97 °F (36.1 °C) 97 °F (36.1 °C)    TempSrc: Oral Oral Oral    SpO2: 96% 97% 97%    Weight:    84.9 kg (187 lb 2.7 oz)   Height:           General appearance: alert, no acute distress  Eyes: No scleral icterus  Neck: trachea midline  Chest/Lungs: Normal effort with no accessory muscle use on RA  Cardiovascular: RRR  Abdomen: Soft, non-tender, non-distended, no rebound, guarding, or rigidity, IR drain in place with sanguineous output this morning.   Skin: warm and dry, no rashes  Neuro: A&Ox3    LABS:   Recent Labs     06/29/25  0845 06/30/25  1303   WBC 7.1 6.1   HGB 10.6* 9.8*   HCT 31.9* 30.0*   MCV 96.5 96.5   * 115*        Recent Labs     06/30/25  1303 07/01/25  0710    145   K 3.8 3.6   * 117*   CO2 28 24   BUN 10 10   CREATININE 1.1 1.2        No results for input(s): \"AST\", \"ALT\", \"BILIDIR\", \"BILITOT\", \"ALKPHOS\" in the last 72 hours.    Invalid input(s): \"ALB\"     No results for input(s): \"LIPASE\", \"AMYLASE\" in the last 72 hours.     Recent Labs     06/29/25  0845   INR 1.36*      No results for input(s): \"CKTOTAL\", \"CKMB\", \"CKMBINDEX\", \"TROPONINI\" in the last 72 hours.      ASSESSMENT & PLAN:   This is a 79 y.o. male with a T2DM, HF (Echo 6/26/25, EF 50-55%, mild MR), COPD, a. Fib (was on home Eliquis and was stopped recently by cardiology), and chronic diarrhea, presented to [facility] with complaints of weakness, continued diarrhea and poor PO intake for several days. 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    - No abdominal pain this 
ID Follow-up NOTE    CC:   MRSA bacteremia, UTI, renal abscess  Antibiotics: Daptomycin    Admit Date: 6/23/2025  Hospital Day: 22    Subjective:     No new complaints.   Focused on when he can leave.     Objective:     Hypothermic again overnight, 95.3. Warming blanket in place.   Cr plateau at 2.7    Patient Vitals for the past 8 hrs:   BP Pulse Resp SpO2 Weight   07/14/25 0813 119/74 (!) 43 18 100 % --   07/14/25 0600 -- -- -- -- 91.5 kg (201 lb 11.5 oz)     I/O last 3 completed shifts:  In: 360 [P.O.:360]  Out: 1050 [Urine:1050]  No intake/output data recorded.    EXAM:  GENERAL: No apparent distress.    HEENT: Membranes moist, no oral lesion  NEURO: No focal neurologic findings  PSYCH: Orientation, sensorium, mood normal  LINES:  Peripheral iv, PICC in place        Data Review:  Lab Results   Component Value Date    WBC 7.8 07/05/2025    HGB 8.7 (L) 07/05/2025    HCT 26.4 (L) 07/05/2025    MCV 95.5 07/05/2025     (L) 07/05/2025     Lab Results   Component Value Date    CREATININE 2.5 (H) 07/14/2025    BUN 22 (H) 07/14/2025     (H) 07/14/2025    K 3.7 07/14/2025     (H) 07/14/2025    CO2 23 07/14/2025       Hepatic Function Panel:   Lab Results   Component Value Date/Time    ALKPHOS 101 06/28/2025 07:35 AM    ALT 10 06/28/2025 07:35 AM    AST 18 06/28/2025 07:35 AM    BILITOT <0.2 06/28/2025 07:35 AM    BILIDIR <0.1 06/27/2025 06:00 AM    IBILI see below 06/27/2025 06:00 AM       MICRO:  Urine culture 7/4: 50,000 CFU unable to identify gram-negative nate and 25,000 CFU Klebsiella pneumoniae  Gram-negative bacilli (1)  Antibiotic Interpretation CRISTHIAN    amikacin Sensitive <=16 mcg/mL   cefepime Intermediate 16 mcg/mL   cefTRIAXone Resistant >32 mcg/mL   ciprofloxacin Sensitive <=0.25 mcg/mL   gentamicin Sensitive <=2 mcg/mL   levofloxacin Sensitive <=0.5 mcg/mL   meropenem Sensitive <=1 mcg/mL   piperacillin-tazobactam Intermediate 64 mcg/mL   tetracycline Resistant >8 mcg/mL   tobramycin 
ID Follow-up NOTE    CC:   Weakness, diarrhea  Antibiotics: Vancomycin, ceftaroline    Admit Date: 6/23/2025  Hospital Day: 10    Subjective:     Patient was afebrile overnight, temp did not drop overnight.  Status post renal lesion biopsy yesterday with output of green to yellow purulent material suggestive of renal abscess. Son was at bedside today.    Objective:     Patient Vitals for the past 8 hrs:   BP Temp Temp src Pulse Resp SpO2 Weight   07/02/25 0839 (!) 113/54 97.7 °F (36.5 °C) Oral 83 18 97 % --   07/02/25 0633 -- -- -- -- -- -- 84.9 kg (187 lb 2.7 oz)   07/02/25 0410 118/62 97 °F (36.1 °C) Oral 66 18 97 % --     I/O last 3 completed shifts:  In: 528 [I.V.:125.1; Other:3; IV Piggyback:400]  Out: 955 [Urine:900; Drains:55]  I/O this shift:  In: 60 [P.O.:60]  Out: -     EXAM:  GENERAL: No apparent distress.    HEENT: Membranes moist, no oral lesion  NECK:  Supple, no lymphadenopathy  LUNGS: Clear b/l, no rales, no dullness, on room air   CARDIAC: RRR, no murmur appreciated  ABD:  + BS, soft / NT, no CVA or suprapubic tenderness. Right flank drain with purulent output  EXT:  Diffuse dry and cracked skin, no active HA lesion, healed lesion on bilateral axillary regions.   NEURO: No focal neurologic findings  PSYCH: Orientation, sensorium, mood normal  LINES:  Peripheral iv       Data Review:  Lab Results   Component Value Date    WBC 6.1 06/30/2025    HGB 9.8 (L) 06/30/2025    HCT 30.0 (L) 06/30/2025    MCV 96.5 06/30/2025     (L) 06/30/2025     Lab Results   Component Value Date    CREATININE 1.2 07/01/2025    BUN 10 07/01/2025     07/01/2025    K 3.6 07/01/2025     (H) 07/01/2025    CO2 24 07/01/2025       Hepatic Function Panel:   Lab Results   Component Value Date/Time    ALKPHOS 101 06/28/2025 07:35 AM    ALT 10 06/28/2025 07:35 AM    AST 18 06/28/2025 07:35 AM    BILITOT <0.2 06/28/2025 07:35 AM    BILIDIR <0.1 06/27/2025 06:00 AM    IBILI see below 06/27/2025 06:00 AM 
ID Follow-up NOTE    CC:   Weakness, diarrhea  Antibiotics: Vancomycin, ceftaroline    Admit Date: 6/23/2025  Hospital Day: 9    Subjective:     Patient was afebrile overnight, temp did drop overnight 94.6. MICHAEL was performed yesterday without evidence of IE. Planning to have IR guided kidney lesion biopsy today.      Objective:     Patient Vitals for the past 8 hrs:   BP Temp Temp src Pulse Resp SpO2 Weight   07/01/25 0945 97/64 -- -- 81 16 96 % --   07/01/25 0940 93/61 -- -- 86 16 96 % --   07/01/25 0935 99/63 -- -- 87 16 98 % --   07/01/25 0930 99/61 -- -- 85 16 100 % --   07/01/25 0925 108/66 -- -- 71 16 100 % --   07/01/25 0752 109/73 97.3 °F (36.3 °C) Oral 86 16 100 % --   07/01/25 0549 -- -- -- -- -- -- 89.2 kg (196 lb 10.4 oz)   07/01/25 0548 -- 97.2 °F (36.2 °C) Oral -- -- -- --   07/01/25 0309 100/65 (!) 96.4 °F (35.8 °C) Oral 66 16 97 % --     I/O last 3 completed shifts:  In: 1554 [I.V.:225.1; IV Piggyback:1328.9]  Out: 400 [Urine:400]  No intake/output data recorded.    EXAM:  GENERAL: No apparent distress.    HEENT: Membranes moist, no oral lesion  NECK:  Supple, no lymphadenopathy  LUNGS: Clear b/l, no rales, no dullness, on room air   CARDIAC: RRR, no murmur appreciated  ABD:  + BS, soft / NT, no CVA or suprapubic tenderness.  EXT:  Diffuse dry and cracked skin, no active HA lesion, healed lesion on bilateral axillary regions.   NEURO: No focal neurologic findings  PSYCH: Orientation, sensorium, mood normal  LINES:  Peripheral iv       Data Review:  Lab Results   Component Value Date    WBC 6.1 06/30/2025    HGB 9.8 (L) 06/30/2025    HCT 30.0 (L) 06/30/2025    MCV 96.5 06/30/2025     (L) 06/30/2025     Lab Results   Component Value Date    CREATININE 1.2 07/01/2025    BUN 10 07/01/2025     07/01/2025    K 3.6 07/01/2025     (H) 07/01/2025    CO2 24 07/01/2025       Hepatic Function Panel:   Lab Results   Component Value Date/Time    ALKPHOS 101 06/28/2025 07:35 AM    ALT 10 
ID Follow-up NOTE    CC:   Weakness, diarrhea  Antibiotics: Vancomycin, ceftriaxone    Admit Date: 6/23/2025  Hospital Day: 7    Subjective:     Patient was afebrile overnight, now normal temp, did not need warming blanket overnight. Denies any new complaints.      Objective:     Patient Vitals for the past 8 hrs:   BP Temp Temp src Pulse Resp SpO2 Weight   06/29/25 0849 97/77 98.2 °F (36.8 °C) Oral 84 17 99 % --   06/29/25 0400 -- -- -- 67 -- -- --   06/29/25 0350 101/65 97.9 °F (36.6 °C) Oral 65 18 100 % --   06/29/25 0349 -- -- -- -- -- -- 86 kg (189 lb 9.5 oz)   06/29/25 0200 -- -- -- 90 -- -- --     I/O last 3 completed shifts:  In: 125 [P.O.:125]  Out: 650 [Urine:650]  I/O this shift:  In: 480 [P.O.:480]  Out: -     EXAM:  GENERAL: No apparent distress.    HEENT: Membranes moist, no oral lesion  NECK:  Supple, no lymphadenopathy  LUNGS: Clear b/l, no rales, no dullness, on room air   CARDIAC: RRR, no murmur appreciated  ABD:  + BS, soft / NT, no CVA or suprapubic tenderness.  EXT:  Diffuse dry and cracked skin, no active HA lesion, healed lesion on bilateral axillary regions.   NEURO: No focal neurologic findings  PSYCH: Orientation, sensorium, mood normal  LINES:  Peripheral iv       Data Review:  Lab Results   Component Value Date    WBC 6.3 06/28/2025    HGB 8.9 (L) 06/28/2025    HCT 26.6 (L) 06/28/2025    MCV 96.0 06/28/2025     (L) 06/28/2025     Lab Results   Component Value Date    CREATININE 0.9 06/28/2025    BUN 9 06/28/2025     06/28/2025    K 3.5 06/28/2025     (H) 06/28/2025    CO2 27 06/28/2025       Hepatic Function Panel:   Lab Results   Component Value Date/Time    ALKPHOS 101 06/28/2025 07:35 AM    ALT 10 06/28/2025 07:35 AM    AST 18 06/28/2025 07:35 AM    BILITOT <0.2 06/28/2025 07:35 AM    BILIDIR <0.1 06/27/2025 06:00 AM    IBILI see below 06/27/2025 06:00 AM       MICRO:    Blood cultures x 2 6/26: Both sets positive for MRSA  Urine cultures 8/24: Greater than 100,000 
ID Follow-up NOTE  RESIDENT NOTE - reviewed / edited, Attending note at bottom    CC:   MRSA bacteremia, UTI, renal abscess  Antibiotics: Daptomycin    Admit Date: 6/23/2025  Hospital Day: 16    Subjective:     No complaints for me, focused on eating chili. Son at bedside.    Objective:     Hypothermic to 95.2 overnight, resolved.      Patient Vitals for the past 8 hrs:   BP Temp Temp src Pulse Resp SpO2   07/08/25 1459 105/73 97 °F (36.1 °C) Axillary 51 16 100 %   07/08/25 0958 122/79 97.2 °F (36.2 °C) Axillary 60 16 98 %     I/O last 3 completed shifts:  In: 600 [P.O.:600]  Out: 400 [Urine:400]  No intake/output data recorded.    EXAM:  GENERAL: No apparent distress.    HEENT: Membranes moist, no oral lesion  NECK:  Supple, no lymphadenopathy  LUNGS: Clear b/l, no rales, no dullness, on room air   CARDIAC: RRR, no murmur appreciated  ABD:  + BS, soft / NT, no CVA or suprapubic tenderness.   EXT:  Diffuse dry and cracked skin, no active HA lesion, healed lesion on bilateral axillary regions.   NEURO: No focal neurologic findings  PSYCH: Orientation, sensorium, mood normal  LINES:  Peripheral iv, PICC in place        Data Review:  Lab Results   Component Value Date    WBC 7.8 07/05/2025    HGB 8.7 (L) 07/05/2025    HCT 26.4 (L) 07/05/2025    MCV 95.5 07/05/2025     (L) 07/05/2025     Lab Results   Component Value Date    CREATININE 2.9 (H) 07/08/2025    BUN 31 (H) 07/08/2025     07/08/2025    K 4.0 07/08/2025     (H) 07/08/2025    CO2 24 07/08/2025       Hepatic Function Panel:   Lab Results   Component Value Date/Time    ALKPHOS 101 06/28/2025 07:35 AM    ALT 10 06/28/2025 07:35 AM    AST 18 06/28/2025 07:35 AM    BILITOT <0.2 06/28/2025 07:35 AM    BILIDIR <0.1 06/27/2025 06:00 AM    IBILI see below 06/27/2025 06:00 AM       MICRO:  Urine culture 7/4: 50,000 CFU unable to identify gram-negative nate and 25,000 CFU Klebsiella pneumoniae  Gram-negative bacilli (1)  Antibiotic Interpretation CRISTHIAN  
ID Follow-up NOTE  RESIDENT NOTE - reviewed / edited, Attending note at bottom    CC:   MRSA bacteremia, UTI, renal abscess  Antibiotics: Daptomycin    Admit Date: 6/23/2025  Hospital Day: 17    Subjective:     No complaints for me, sleeping on arrival but woke easily to voice.     Objective:     Hypothermic again overnight, 94.7. Resolved with warming blanket      Patient Vitals for the past 8 hrs:   BP Temp Temp src Pulse Resp SpO2 Weight   07/09/25 0542 -- 98.4 °F (36.9 °C) Rectal -- -- -- --   07/09/25 0540 -- -- -- 55 -- -- --   07/09/25 0401 124/73 97.4 °F (36.3 °C) Rectal 57 17 95 % --   07/09/25 0400 -- -- -- 55 -- -- --   07/09/25 0248 -- 97 °F (36.1 °C) Rectal -- -- -- --   07/09/25 0200 -- -- -- 54 -- -- --   07/09/25 0135 -- -- -- -- -- -- 90 kg (198 lb 6.6 oz)   07/09/25 0133 -- 96.8 °F (36 °C) Rectal -- -- -- --   07/09/25 0024 117/77 (!) 96.4 °F (35.8 °C) Rectal 51 18 94 % --   07/09/25 0000 -- -- -- 54 -- -- --     I/O last 3 completed shifts:  In: 470 [P.O.:470]  Out: 200 [Urine:200]  No intake/output data recorded.    EXAM:  GENERAL: No apparent distress.    HEENT: Membranes moist, no oral lesion  NECK:  Supple, no lymphadenopathy  LUNGS: Clear b/l, no rales, no dullness, on room air   CARDIAC: RRR, no murmur appreciated  ABD:  Soft / NT, no CVA or suprapubic tenderness.   EXT:  Diffuse dry and cracked skin, healed lesion on bilateral axillary regions.   NEURO: No focal neurologic findings  PSYCH: Orientation, sensorium, mood normal  LINES:  Peripheral iv, PICC in place        Data Review:  Lab Results   Component Value Date    WBC 7.8 07/05/2025    HGB 8.7 (L) 07/05/2025    HCT 26.4 (L) 07/05/2025    MCV 95.5 07/05/2025     (L) 07/05/2025     Lab Results   Component Value Date    CREATININE 2.9 (H) 07/09/2025    BUN 33 (H) 07/09/2025     (H) 07/09/2025    K 4.0 07/09/2025     (H) 07/09/2025    CO2 24 07/09/2025       Hepatic Function Panel:   Lab Results   Component Value 
ID Follow-up NOTE  RESIDENT NOTE - reviewed / edited, Attending note at bottom    CC:   MRSA bacteremia, UTI, renal abscess  Antibiotics: Daptomycin    Admit Date: 6/23/2025  Hospital Day: 19    Subjective:     No new complaints.   Focused on asking when he can leave.     Objective:     Hypothermic again overnight, 95.3. Warming blanket in place.   Cr plateau at 2.7    Patient Vitals for the past 8 hrs:   BP Temp Temp src Pulse Resp SpO2   07/11/25 1508 104/69 97.9 °F (36.6 °C) Oral 70 16 99 %   07/11/25 1203 (!) 96/58 -- -- -- -- --   07/11/25 1109 (!) 90/56 -- -- -- -- --   07/11/25 1108 (!) 88/55 97.3 °F (36.3 °C) Oral 75 15 96 %   07/11/25 0815 (!) 94/51 97.2 °F (36.2 °C) Oral 84 18 93 %     I/O last 3 completed shifts:  In: 4399.9 [P.O.:870; I.V.:3430.1; IV Piggyback:99.9]  Out: 1635 [Urine:1635]  I/O this shift:  In: 120 [P.O.:120]  Out: 0     EXAM:  GENERAL: No apparent distress.    HEENT: Membranes moist, no oral lesion  NEURO: No focal neurologic findings  PSYCH: Orientation, sensorium, mood normal  LINES:  Peripheral iv, PICC in place        Data Review:  Lab Results   Component Value Date    WBC 7.8 07/05/2025    HGB 8.7 (L) 07/05/2025    HCT 26.4 (L) 07/05/2025    MCV 95.5 07/05/2025     (L) 07/05/2025     Lab Results   Component Value Date    CREATININE 2.7 (H) 07/11/2025    BUN 29 (H) 07/11/2025     07/11/2025    K 3.5 07/11/2025     (H) 07/11/2025    CO2 23 07/11/2025       Hepatic Function Panel:   Lab Results   Component Value Date/Time    ALKPHOS 101 06/28/2025 07:35 AM    ALT 10 06/28/2025 07:35 AM    AST 18 06/28/2025 07:35 AM    BILITOT <0.2 06/28/2025 07:35 AM    BILIDIR <0.1 06/27/2025 06:00 AM    IBILI see below 06/27/2025 06:00 AM       MICRO:  Urine culture 7/4: 50,000 CFU unable to identify gram-negative nate and 25,000 CFU Klebsiella pneumoniae  Gram-negative bacilli (1)  Antibiotic Interpretation CRISTHIAN    amikacin Sensitive <=16 mcg/mL   cefepime Intermediate 16 mcg/mL 
Indwelling urinary catheter was placed, per orders, using sterile technique following hospital policy.Aida Hernandez RN, observed with procedure to ensure proper technique. This catheter was placed on PCU.   Electronically signed by Clementina Russo RN on 7/6/2025 at 11:38 AM   
MD notified of of current patient condition. BP low and temp low. Patient lethargic and not eating. Waiting on new orders.  Nichol Hanson, RN      0754: patient hypoglycemic for second morning in a row. Had another BM this morning with 7 reported last night from night shift nurse. MD updated    1815: patient urinated 75mL today. PVR bladder scan showed 238mL. MD notified and advised to keep giving IV fluid. Patient slept all day and did not eat. He had sips of pepsi when offered by me with his meds. Patients blood pressure slightly better. Most recent 91/61 with a MAP of 71. Patient remains alert and oriented and on room air. His temperature also seems to be getting better, with most recent 97.3 oral,  but he insists on keeping the bear hugger on, as he states he loves the heat.      
[facility]  Cardiology Inpatient Consult Service  Daily Progress Note        Admit Date:  6/23/2025    Referring Physician: Lilliana Pires MD      Assessment & Plan:     MRSA bacteremia-no vegetation seen on transesophageal echo.  Results discussed with patient.  Complex lesion in the kidneys-awaiting evaluation  No new cardiac recommendations  Cardiology will sign off.  Please call if there is any change in cardiac status.    Subjective:   Interval history:  No new cardiac complaints    Medications:   [START ON 7/2/2025] vancomycin  1,000 mg IntraVENous Q24H    ceftaroline fosamil (TEFLARO) IVPB  600 mg IntraVENous q8h    balsum peru-castor oil   Topical BID    guaiFENesin  600 mg Oral BID    triamcinolone   Topical BID    calamine-zinc oxide   Topical TID    midodrine  5 mg Oral TID WC    insulin lispro  0-8 Units SubCUTAneous 4x Daily AC & HS    rosuvastatin  40 mg Oral Nightly    mirtazapine  15 mg Oral Nightly    sodium chloride flush  5-40 mL IntraVENous 2 times per day       IV drips:   dextrose      sodium chloride         PRN:  calcium carbonate, lidocaine PF, diphenhydrAMINE-zinc acetate, phenol, glucose, dextrose bolus **OR** dextrose bolus, glucagon (rDNA), dextrose, sodium chloride flush, sodium chloride, ondansetron **OR** ondansetron, polyethylene glycol, acetaminophen **OR** acetaminophen, oxyCODONE **OR** oxyCODONE      Objective:     Vitals:    07/01/25 0309 07/01/25 0548 07/01/25 0549 07/01/25 0752   BP: 100/65   109/73   Pulse: 66   86   Resp: 16   16   Temp: (!) 96.4 °F (35.8 °C) 97.2 °F (36.2 °C)  97.3 °F (36.3 °C)   TempSrc: Oral Oral  Oral   SpO2: 97%   100%   Weight:   89.2 kg (196 lb 10.4 oz)    Height:           Intake/Output Summary (Last 24 hours) at 7/1/2025 0843  Last data filed at 7/1/2025 0547  Gross per 24 hour   Intake 625.04 ml   Output --   Net 625.04 ml     I/O last 3 completed shifts:  In: 1554 [I.V.:225.1; IV Piggyback:1328.9]  Out: 400 
No stools over night shift. Team aware.   
Notified MD via perfectserve of pt temp. Franco placed.   
Occupational /Physical Therapy    Attempted to see pt for OT/PT - pt off floor at cath lab. Will reattempt later time vs. Later date as appropriate.    Huma Aguirre, MOT-OTR/L [id number]  Magalis Griffin, PT, DPT      
Occupational Therapy  Facility/Department: [facility] PCU  Daily Treatment Note  NAME: Delano Daigle  : 1945  MRN: [id number]    Date of Service: 2025    Discharge Recommendations:  Subacute/Skilled Nursing Facility  OT Equipment Recommendations  Equipment Needed: No  Other: defer    Patient Diagnosis(es): The primary encounter diagnosis was Generalized weakness. Diagnoses of Dehydration, Diarrhea, unspecified type, Abscess of multiple sites, and Bacteremia were also pertinent to this visit.     Assessment   Assessment: Pt participating well in session - completing toileting and fxl mobility in room / sim w/ RW/GB CGA. Pt w/ one slight LOB in session, CGA to correct. Pt cont to function below baseline of IND w/ ADL and fxl mobility tasks, and may benefit from cont'd skilled OT while inpt, and after acute d/c prior to retn home, to maximize safety / IND / fxl act jakob needed for ADL and fxl mobility tasks. Primary barrier cont to be decreased endurance and strenght. Will cont jakob follow as inpt per POC  Activity Tolerance: Patient tolerated treatment well;Patient limited by endurance  Discharge Recommendations: Subacute/Skilled Nursing Facility  Equipment Needed: No  Other: defer     Plan  Occupational Therapy Plan  Times Per Week: 2-5  Current Treatment Recommendations: Strengthening;ROM;Balance training;Functional mobility training;Endurance training;Self-Care / ADL    Subjective  Subjective  Subjective: Pt sitting up in recliner on approach,a greed to tx.  Pain: no pain reported  Orientation  Overall Orientation Status: Within Functional Limits  Cognition  Overall Cognitive Status: WFL       Objective  ADL  Grooming: Contact guard assistance  Grooming Skilled Clinical Factors: appx 3min in stance at sink - washing hands, face  Toileting: Contact guard assistance  Toileting Skilled Clinical Factors: CGA    Functional Mobility: Contact guard assistance  Functional Mobility Skilled Clinical Factors: 
Occupational Therapy  Facility/Department: [facility] PCU  Daily Treatment Note  NAME: Delano Daigle  : 1945  MRN: [id number]    Date of Service: 2025    Discharge Recommendations:  Subacute/Skilled Nursing Facility  OT Equipment Recommendations  Equipment Needed: No  Other: defer      Patient Diagnosis(es): The primary encounter diagnosis was Generalized weakness. Diagnoses of Dehydration, Diarrhea, unspecified type, Abscess of multiple sites, and Bacteremia were also pertinent to this visit.     Assessment   Assessment: Pt did not tolerate OT tx well this date. Pt with increased drowsiness during session, max VCs to keep eyes open throughout mobility/ ADL completion. Pt CGA > min A for bed mobility and transfers this date. Pt also min A for UB bathing. Pt CGA to take steps toward HOB using RW; no additional mobility completed 2/2 increased lethargy, RN aware. Pt would benefit from additional OT to increase functional independence and safety with ADLs. Continue per POC  Activity Tolerance: Patient limited by fatigue  Discharge Recommendations: Subacute/Skilled Nursing Facility  Equipment Needed: No  Other: defer     Plan  Occupational Therapy Plan  Times Per Week: 2-5  Current Treatment Recommendations: Strengthening;ROM;Balance training;Functional mobility training;Endurance training;Self-Care / ADL    Restrictions  Position Activity Restriction  Other Position/Activity Restrictions: up with assist    Subjective  Subjective  Subjective: Pt sleeping upon entry, arousable with multiple VCs, agreeable to OOB therapy. Pain: Pt denied pain  Orientation  Overall Orientation Status: Within Functional Limits  Orientation Level: Oriented to situation;Oriented to person;Oriented to place  Cognition  Overall Cognitive Status: Exceptions  Arousal/Alertness: Delayed responses to stimuli  Following Commands: Follows one step commands with increased time;Follows one step commands with repetition  Attention Span: 
Occupational Therapy  Facility/Department: [facility] PCU  Daily Treatment Note  NAME: Delano Daigle  : 1945  MRN: [id number]    Date of Service: 2025    Discharge Recommendations:  Subacute/Skilled Nursing Facility  OT Equipment Recommendations  Equipment Needed: No      Patient Diagnosis(es): The primary encounter diagnosis was Generalized weakness. Diagnoses of Dehydration and Diarrhea, unspecified type were also pertinent to this visit.     Assessment   Assessment: Pt. pleasant and agreeable to participate in this OT session. Pt. disappointed he could not receive a shower, however participated in edge of bed sponge bathing and dressing tasks. Pt. required the most assistance with lower body bathing (MODerate Assistance) and [place] footwear (MAXimal Assistance). Once Pt. was bathed and dried this BEN/L lathered Pt. in Calamine lotion that was in a prescription bottle in his room. Lotion applied to Pt's dry skin areas to reduce itching and skin discomfort from dry skin. This Therapist expressed to Pt. that it was imperative that he try very hard to discontinue scratching to decrease skin integrity issues. Pt. acknowledged understanding. Continue to follow OT POC.  Activity Tolerance: Patient tolerated treatment well  Discharge Recommendations: Subacute/Skilled Nursing Facility  Equipment Needed: No     Plan  Occupational Therapy Plan  Times Per Week: 2-5  Current Treatment Recommendations: Strengthening;ROM;Balance training;Functional mobility training;Endurance training;Self-Care / ADL    Restrictions       Subjective  Subjective  Subjective: Upon entering the room Pt. supine in bed resting. Pt agreeable to participate in this OT session, requesting a shower due to, \"I've just been ithcing, and itching.\" The following note details Pt's task performance.  Pain: No pain, only itching  Orientation  Overall Orientation Status: Within Functional Limits  Pain: Pt reports no pain, only \"I've just 
Occupational Therapy  Facility/Department: [facility] PC[facility]  Daily Treatment Note  NAME: Delano Daigle  : 1945  MRN: [id number]    Date of Service: 7/3/2025    Discharge Recommendations:  Subacute/Skilled Nursing Facility  OT Equipment Recommendations  Equipment Needed: No  Other: defer    Patient Diagnosis(es): The primary encounter diagnosis was Generalized weakness. Diagnoses of Dehydration, Diarrhea, unspecified type, Abscess of multiple sites, and Bacteremia were also pertinent to this visit.     Assessment   Assessment: Pt cont to req CGA for fxl mobiliyt, Keron for LB ADLs - functioning below baseline, with primary barriers of decreased strength and fxl act jakob. Pt is  motivated to regain IND and retn to PLOF. Pt may benefit from cont'd skilled OT while inpt, and after acute d/c, before retn home, to maximize safety / IND / fxl act jakob needed for ADL and fxl mobility tasks. Will cont to follow as inpt per POC  Activity Tolerance: Patient tolerated treatment well;Patient limited by endurance  Discharge Recommendations: Subacute/Skilled Nursing Facility  Equipment Needed: No  Other: defer     Plan  Occupational Therapy Plan  Times Per Week: 2-5  Current Treatment Recommendations: Strengthening;ROM;Balance training;Functional mobility training;Endurance training;Self-Care / ADL    Subjective  Subjective  Subjective: Pt semi-supineon approach, agreed to txx.  Pain: pt reporting no pain in session  Orientation  Overall Orientation Status: Within Functional Limits  Cognition  Overall Cognitive Status: WFL       Objective  ADL  UE Bathing: Moderate assistance  UE Bathing Skilled Clinical Factors: w/ bathwipes at EOB  UE Dressing: Minimal assistance  UE Dressing Skilled Clinical Factors: changing hosp gown  LE Dressing: Minimal assistance  LE Dressing Skilled Clinical Factors: A to thread brief and pants over LLE, CGA in stance while pulling up / tying.  Toileting: Contact guard assistance  Toileting Skilled 
Occupational Therapy  Occupational Therapy  Daily Treatment Note  Patient Name: Delano Daigle  MRN: [id number]    Chart Reviewed: Yes       Other Position/Activity Restrictions: up with assist     Additional Pertinent Hx: 79 y.o. male with a history of COPD, hyperlipidemia, hypertension, and type 2 diabetes who is presented to ED 6/23/25 with complaints of generalized fatigue, diarrhea x4 days,  and weakness. Work up positive for LAMBERTO/ARF, acute blood loss anemia; GI consult pending.        Diagnosis: Dehydration  Treatment Diagnosis: Impaired ADL and functional mobility    Subjective: pt met supine in bed expressing frustration and dislike for turkey sausage. Pt yelling \"would someone pleased just get me a met! \" Pt cooperative with therapy session.    Pain:  No pain reported    Social/Functional History  Lives With: Alone  Type of Home: Apartment (3rd floor)  Home Layout: One level, Laundry in basement  Home Access: Level entry (3 flights w/rainlup to apt level)  Bathroom Shower/Tub: Tub/Shower unit  Bathroom Toilet: Standard (sink next to toilet)  Bathroom Equipment: None  Home Equipment: Cane, Lift chair, Walker - Rolling, Alert button  Has the patient had two or more falls in the past year or any fall with injury in the past year?: Yes (7-8)  Prior Level of Assist for ADLs: Independent  Prior Level of Assist for Homemaking: Needs assistance (assist w/housekeeping)  Prior Level of Assist for Ambulation: Independent household ambulator, with or without device (with cane)  Active : No  Mode of Transportation: Friends  Occupation: Retired  Type of Occupation: Construction  Prior Function  Prior Level of Assist for ADLs: Independent  Prior Level of Assist for Homemaking: Needs assistance (assist w/housekeeping)    Objective:    Cognition/Orientation:    Bed mobility   Supine to sit: Min A with increased time and effort  Sit to Supine: Mod A for LE   Scooting: Mod A scooting on EOB    Pt sitting EOB for 
Occupational Therapy  Occupational Therapy  Daily Treatment Note  Patient Name: Delano Daigle  MRN: [id number]    Chart Reviewed: Yes       Other Position/Activity Restrictions: up with assist     Additional Pertinent Hx: 79 y.o. male with a history of COPD, hyperlipidemia, hypertension, and type 2 diabetes who is presented to ED 6/23/25 with complaints of generalized fatigue, diarrhea x4 days,  and weakness. Work up positive for LAMBERTO/ARF, acute blood loss anemia; GI consult pending.        Diagnosis: Dehydration  Treatment Diagnosis: Impaired ADL and functional mobility    Subjective: Pt met supine in bed and agreeable to sitting up to EOB to eat then requiring encouragement for transfer to chair    Pain: Pt reporting pain in throat with swallowing. RN aware and pt positioned to comfort with cup of ice.     Social/Functional History  Lives With: Alone  Type of Home: Apartment (3rd floor)  Home Layout: One level, Laundry in basement  Home Access: Level entry (3 flights w/rainlup to apt level)  Bathroom Shower/Tub: Tub/Shower unit  Bathroom Toilet: Standard (sink next to toilet)  Bathroom Equipment: None  Home Equipment: Cane, Lift chair, Walker - Rolling, Alert button  Has the patient had two or more falls in the past year or any fall with injury in the past year?: Yes (7-8)  Prior Level of Assist for ADLs: Independent  Prior Level of Assist for Homemaking: Needs assistance (assist w/housekeeping)  Prior Level of Assist for Ambulation: Independent household ambulator, with or without device (with cane)  Active : No  Mode of Transportation: Friends  Occupation: Retired  Type of Occupation: Construction  Prior Function  Prior Level of Assist for ADLs: Independent  Prior Level of Assist for Homemaking: Needs assistance (assist w/housekeeping)    Objective:    Cognition/Orientation:  WFL    Bed mobility   Supine to sit:  SBA for bed mobility with increased time and use of bed rail  Scooting: SBA for scooting on 
Patient admitted to 6327 from ED.  A&Ox4.  RA, sat 95%.  Lungs clear.  IVF infusing per right hand PIV as ordered.   Pedal edema with dry skin and open abrasions on BLE.  Scratches to right arm noted.  No c/o pain or SOB.  C/o slight nausea, denies nausea meds at this time.  No needs at this time.  Patient resting comfortably in bed.  Call light in reach.  Bed alarm on.  Will continue to monitor.   Electronically signed by Madhuri Bolanos RN on 6/24/2025 at 2:19 AM    
Patient bs was 59 this morning.   Started D 10 as ordered   
Patient goes back and forth about leaving AMA. This nurse spoke to patient's daughter and explained to her that we can't force a patient to stay in the hospital if the patient is clear minded and oriented and answers questions appropriately. Explained to the daughter that this nurse called patient's son and left a voicemail but so far no answer. The daughter stated she is going to call patient's son. Patient still continues not to eat and only few sips of water. VSS.  
Patient had small formed bowel movement, GI at bedside agrees this specimen is not appropriate to send for C-Diff as it is formed and would be rejected. MD notified via perfect serve. C-Diff isolation and lab orders discontinued.     Electronically signed by Iris Hernandez RN on 6/25/2025 at 10:46 AM    
Patient has had 2 episodes, 1 last night, and 1 this AM around 10am, of smear of stool on brief, but no measurable stool output to collect at this time.  
Patient has had 200mL urine output for day shift as of this time, RN bladder scanned patient. 330mL in bladder. Notified MD.  
Patient has had low bs twice this evening. He has had 2 infusions of D10 and this happened last night also.   Contacted Lauren PAPPAS informing her of the above.  He is not eating or drinking even after urging d/t his low bs. He refused to eat.   Order given to hang D10   
Patient has had numerous bouts of diarrhea. He has been incontinent with all of them. Placed patient on bedpan and had bowel movements but as soon as he came off the bedpan he was incontinent.     
Patient is Aox4 and stated I am leaving AMA. This nurse explained to the patient that if he leaves AMA is taking the risk of becoming septic and even death. Patient verbalized his understanding and stated \" If I die then I die and yes I understand that I could die.\" Dr. Pires informed and voicemail left on patient's son's answering machine.   
Patient not tolerating K+ bolus replacement at 100cc/hr nor at 50cc/hr even with his continuous IV fluids infusing. This nurse decreased the K+ rate to 30cc/hr and patient stated so far his arm isn't hurting. Dr. Pires aware and stated it's ok to run the K+ at 30cc/hr. Will monitor. Dr. Pires also informed of Ca++ 6.9 and that patient having small amount of fluid leaking from his back.  
Patient oriented x4. Patient intermittently withdrawn and drowsy. Patient has declined to ambulate or sit in chair today thus far. Patient tolerating oral diet; encouraged intake.    Patient noted to have increased scrotal swelling from this AM. Patient reports no pain, only \"discomfort.\" Bladder scan: 193 mL. Internal Medicine MD made aware via [employer]. Urology PA paged.  
Patient remains Aox4 and stated he would consider having a feeding tube placed. Patient has not had any food intake this shift but is sipping on water.  informed via [employer]. Patient continues to call for a ride home but unsuccesful. No distress noted.  
Patient stated \" I want to go home.\" \" Please call my son and tell him to come get me.\" Dr. Pires informed. Waiting for response.  
Patient tried calling a neighbor and his nephew to come get him but no one has answered. Currently, patient is asleep without distress. Will monitor.  
Physical Therapy    Attempted therapy session. Spoke with RN who reports patient's temp dropped requiring bear hugger and they would like him to stay in bed for now. Will hold and follow up as treatment schedule allows.     Magalis Griffin, PT, DPT  
Physical Therapy  Facility/Department: [facility] PCU  Daily Treatment Note  NAME: Delano Daigle  : 1945  MRN: [id number]    Date of Service: 2025    Discharge Recommendations:  Subacute/Skilled Nursing Facility   PT Equipment Recommendations  Equipment Needed: No  Other: defer    Patient Diagnosis(es): The primary encounter diagnosis was Generalized weakness. Diagnoses of Dehydration, Diarrhea, unspecified type, Abscess of multiple sites, and Bacteremia were also pertinent to this visit.    Assessment  Assessment: Pt with improved transfers early in session (CGA) however requires increased assist with increased fatigue and dizziness during session. Only able to tolerate 3' amb with RW and min-mod A at this time due to dizziness. BP 95/58 seated in recliner post ambualtion with symptoms resolved - RN notified. Pt also experiences incontinene of loose stool during session. Pt would benefit from continued skilled IP PT at discharge in order to progress indep and safety with functional moblity prior to d/c home in which pt lives alone with 3 flights of steps to enter apt.  Activity Tolerance: Patient limited by fatigue;Treatment limited secondary to medical complications  Equipment Needed: No    Plan  Physical Therapy Plan  General Plan:  (2-5)  Current Treatment Recommendations: Functional mobility training;Transfer training;Gait training;Patient/Caregiver education & training;Endurance training;Balance training;Strengthening;Neuromuscular re-education;Stair training;Home exercise program;Safety education & training;Therapeutic activities    Restrictions  Restrictions/Precautions  Activity Level: Up as Tolerated  Position Activity Restriction  Other Position/Activity Restrictions: up with assist     Subjective   Subjective  Subjective: pt supine in bed and agreeable to PT.  Pain: denies pain however reports continued itchiness    Objective  Vitals     Bed Mobility Training  Bed Mobility Training: Yes  Supine 
Physical Therapy  Facility/Department: [facility] PCU  Daily Treatment Note  NAME: Delano Daigle  : 1945  MRN: [id number]    Date of Service: 2025    Discharge Recommendations:  Subacute/Skilled Nursing Facility   PT Equipment Recommendations  Equipment Needed: No    Patient Diagnosis(es): The primary encounter diagnosis was Generalized weakness. Diagnoses of Dehydration, Diarrhea, unspecified type, Abscess of multiple sites, and Bacteremia were also pertinent to this visit.    Assessment  Assessment: Pt demosntrates progression in functional mobility and tolerates session well however continues to be limited by decreased strength, andurance and balance. Fatigues quickly and requires safety cues during ambualtion. CGA-min required for amb and standing balance at RW. Pt remains below baseline and is a high fall risk. Continued IP PT recommended in order to progress safety and indp with fuctional mobility.  Activity Tolerance: Patient tolerated treatment well;Patient limited by endurance  Equipment Needed: No    Plan  Physical Therapy Plan  General Plan:  (2-5)  Current Treatment Recommendations: Functional mobility training;Transfer training;Gait training;Patient/Caregiver education & training;Endurance training;Balance training;Strengthening;Neuromuscular re-education;Stair training;Home exercise program;Safety education & training;Therapeutic activities    Restrictions  Restrictions/Precautions  Activity Level: Up as Tolerated  Position Activity Restriction  Other Position/Activity Restrictions: up with assist     Subjective   Subjective  Subjective: pt supine in bed and being assisted with bed bath by PCA upon approach - agreeable to PT  Pain: denies pain however reports continued itchiness    Objective  Vitals     Bed Mobility Training  Bed Mobility Training: Yes  Supine to Sit: Contact guard assistance  Scooting: Contact guard assistance  Balance  Sitting: High guard (SBA EOB including to participate in 
Physical Therapy  Facility/Department: [facility] PCU  Daily Treatment Note  NAME: Delano Daigle  : 1945  MRN: [id number]    Date of Service: 2025    Discharge Recommendations:  Subacute/Skilled Nursing Facility   PT Equipment Recommendations  Equipment Needed: No  Other: defer    Patient Diagnosis(es): The primary encounter diagnosis was Generalized weakness. Diagnoses of Dehydration, Diarrhea, unspecified type, Abscess of multiple sites, and Bacteremia were also pertinent to this visit.    Assessment  Assessment: Pt tolerating  ambualtion distance this date related to dizziness and decreased alertness with cues required to keep eyes open. Min A for all transfers and amb with RW. HR ranging 49-80 during session and BP decreased post standing mobility (82/59) however increases to 104/70 post seated rest break in chair. RN notified of vitals. Pt continues to demonstrate decreased strength, endurance and balance compared to baseline. High fall risk. Pt would benefit from continued skilled IP PT at discharge in order to progress indep and safety with functional moblity.  Activity Tolerance: Patient limited by fatigue;Treatment limited secondary to medical complications (decreased BP and HR- see)  Equipment Needed: No  Other: defer    Plan  Physical Therapy Plan  General Plan:  (2-5)  Current Treatment Recommendations: Functional mobility training;Transfer training;Gait training;Patient/Caregiver education & training;Endurance training;Balance training;Strengthening;Neuromuscular re-education;Stair training;Home exercise program;Safety education & training;Therapeutic activities    Restrictions  Restrictions/Precautions  Activity Level: Up as Tolerated  Position Activity Restriction  Other Position/Activity Restrictions: up with assist     Subjective   Subjective  Subjective: pt supine in bed and agreeable to PT> perseverating on wanting lucia - RN notified.  Pain: denies pain however reports 
Physical Therapy  Pt declined therapy services this afternoon; pt very agitated about food. PT will re attempt at a later time.     Kg Gill, PT   
Physical/Occupational Therapy    Pt off floor and unavailable. Will follow up per POC as treatment schedule allows.     Magalis Griffin, PT, DPT    
Pt agitated during rounds repeating \"I want to go home, I want to go home.\" When staff asks pt about why, he states \"It don't matter. I itch, I keep having diarrhea, this bed is uncomfortable, I want to go home.\" Staff offered immodium at this time, pt states he does not want anything from us and would have a ride here in ten minutes. Pt assisted with incontinence care. When asked what year it is, pt states \"I don't even know - it's time for me to go home.\" MD made aware.  
Pt began complaining of pain in yovana area. He had a failed shukla placement attempt on day-shift and eventually was successful with a coude. Upon assessment, scrotum and penis appeared slightly swollen and there was a small amount of urine noted on bed pad. Tubing, placement, and balloon volume was assessed and appeared appropriate. No blood or discharge was noted at insertion site. PRN pain medication was then administered. Less than an hour later pt called RN again c/o pain and discomfort. Penis and scrotum appeared to be increasing in swelling. Catheter was then removed and there was a small hole noted in the tubing. Due to the trauma at the site, catheter was not replaced and RN placed patient on an external catheter.     Electronically signed by Viky Dimas RN on 7/7/2025 at 5:38 AM    
Pt did not void overnight.  Bladder scanned this am 281 mL in bladder.  Pt voided 200 this am.  Post void scan showed 132 mL in bladder.  Shukla orders in place for accurate I&Os per nephrology.  Floor protocol discussed with nephrology and nephro stated that they want us to place shukla.  
Pt has not voided all shift. Poor PO intake, LR running continuous at 100mL/hr. Pt denies any urge to urinate and denies any pain or discomfort. Bladder scanned x2: first at 2000 read 198mL, second at 0230 read 210mL    Electronically signed by Viky Dimas RN on 7/6/2025 at 2:38 AM      
Pt having soft pressures overnight, 80/50s to 90/60s on continuous fluids. LR bolus was given with no improvement in BP, pt remains asymptomatic with low pressures. Restarted  continuous fluids and notified charge RN.   
Pt p/u by [facility], pt d/c with phone, , wallet and all remainder personal belongings. One last attempt to call report to facility, no answer. Report given to transport team.  Electronically signed by Renaldo Pate RN on 7/14/2025 at 4:58 PM    
Pt to d/c to [facility] with Mid[facility] in place for long term ABX. Pt agreeable to d/c. IV and tele removed. Attempted to call facility twice, no answer. D/c instructions reviewed with pt. Transport scheduled for 1630.  Electronically signed by Renaldo Pate RN on 7/14/2025 at 3:59 PM    
Pt's body temp of 94.7 (rectal). Madhuri Acosta made aware with new orders made. Reji contreras placed. Goal met with temp of 98.4(rectal).  
RISING CREATININE TO 1.9. NCCT ORDERED. NEPHROLOGY CONSULT PLACED  
Spiritual Health History and Assessment/Progress Note  [facility]    Spiritual/Emotional Needs,  ,  ,      Name: Delano Daigle MRN: [id number]    Age: 79 y.o.     Sex: male   Language: English   Episcopalian: Buddhist   ARF (acute renal failure)     Date: 7/1/2025            Total Time Calculated: 5 min              Spiritual Assessment began in [facility] 4 PCU        Referral/Consult From: Rounding   Encounter Overview/Reason: Spiritual/Emotional Needs  Service Provided For: Patient    Alysha, Belief, Meaning:   Patient has beliefs or practices that help with coping during difficult times  Family/Friends No family/friends present      Importance and Influence:  Patient has no beliefs influential to healthcare decision-making identified during this visit  Family/Friends No family/friends present    Community:  Patient feels well-supported. Support system includes: Children  Family/Friends No family/friends present    Assessment and Plan of Care:     Patient Interventions include: Facilitated expression of thoughts and feelings  Family/Friends Interventions include: No family/friends present    Patient Plan of Care: No spiritual needs identified for follow-up and Spiritual Care available upon further referral  Family/Friends Plan of Care: No family/friends present    Electronically signed by ORQUIDEA Larson on 7/1/2025 at 1:57 PM    
[facility] -  Clinical Pharmacy Note    Vancomycin - Management by Pharmacy    Consult Date(s): 06/26/25  Consulting Provider(s): Madhuri Acosta-Marker, CNP    Assessment / Plan  Bloodstream Infection - Vancomycin  Concurrent Antimicrobials:   Ceftriaxone - Klebsiella UTI (S) to ceftriaxone   Day of Vanc Therapy / Ordered Duration: 4  Current Dosing Method: Bayesian-Guided AUC Dosing  Therapeutic Goal: -600 mg/L*hr  Current Dose / Plan:   Renal function stable; SCr 0.9  On 1750mg IV q24h - dose adjusted 6/28  Regimen predicts AUC = 524 and trough = 12.4  Level ordered for tomorrow AM, Mon 6/30 to confirm kinetic estimates   Will continue to monitor clinical condition and make adjustments to regimen as appropriate.    Please call with questions--  Reyna Angel, PharmD, [facility]  Wireless: [phone]   6/29/2025 8:14 AM        Interval update: Repeat blood Cx 6/26 still growing MRSA.    Subjective/Objective:   Delano Daigle is a 79 y.o. male with a PMHx significant for COPD, hyperlipidemia, hypertension, and type 2 diabetes who is admitted MRSA bacteremia, UTI, LAMBERTO, ascites and anemia.    Pharmacy is consulted to dose vancomycin.    Ht Readings from Last 1 Encounters:   06/24/25 1.829 m (6')     Wt Readings from Last 1 Encounters:   06/29/25 86 kg (189 lb 9.5 oz)     Current & Prior Antimicrobial Regimen(s):  Ceftriaxone (6/26-current)  Vancomycin - Pharmacy to dose  2000mg IV x1 6/26  1500mg IV q24h (6/27-6/28)  1750mg IV q24h (6/29-current)    Vancomycin Level(s) / Doses:    Date Time Dose Type of Level / Level Interpretation   6/27 1034 1500mg IV q24h Random = 20.9 mg/L Drawn ~7h after prior dose  AUC = 498 / trough = 12.4  Continue same dose          Note: Serum levels collected for AUC-based dosing may be high if collected in close proximity to the dose administered. This is not necessarily indicative of toxicity.    Cultures & Sensitivities:    Date Site Micro Susceptibility / Result   6/24/25 Blood 
[facility] -  Clinical Pharmacy Note    Vancomycin - Management by Pharmacy    Consult Date(s): 06/26/25  Consulting Provider(s): Madhuri Acosta-Jordyn, CNP    Assessment / Plan  MRSA bacteremia - Vancomycin  Concurrent Antimicrobials: n/a   Day of Vanc Therapy / Ordered Duration: 8  Current Dosing Method: Bayesian-Guided AUC Dosing  Therapeutic Goal: -600 mg/L*hr  Current Dose / Plan:   Renal function: LAMBERTO; SCr up to 1.7 today.  UOP not quantified.   Changed to intermittent dosing on 7/2 d/t LAMBERTO  Received 750mg IV x1 last night  Level this AM = 25.2 mg/L - level drawn ~13h after prior dose  Will not re-dose today - anticipate level to remain > 15 through at least tomorrow AM given rising SCr.   Level ordered for tomorrow AM, Fri 7/4 to assess timing of next dose  Will continue to monitor clinical condition and make adjustments to regimen as appropriate.    Please call with questions--  Reyna Angel, PharmD, [facility]  Wireless: [phone]   7/3/2025 7:45 AM        Interval update: PICC line placed 7/2. Received test dose of Daptomycin yesterday. SCr continues to trend up, 1.5?1.7 overnight.    Subjective/Objective:   Delano Daigle is a 79 y.o. male with a PMHx significant for COPD, hyperlipidemia, hypertension, and type 2 diabetes who is admitted MRSA bacteremia, UTI, LAMBERTO, ascites and anemia.    Pharmacy is consulted to dose vancomycin.    Ht Readings from Last 1 Encounters:   06/24/25 1.829 m (6')     Wt Readings from Last 1 Encounters:   07/03/25 86.7 kg (191 lb 2.2 oz)     Current & Prior Antimicrobial Regimen(s):  Ceftriaxone (6/26-6/29)  Ceftaroline (6/29-7/2)  Vancomycin - Pharmacy to dose  2000mg IV x1 6/26  1500mg IV q24h (6/27-6/28)  1750mg IV q24h (6/29-6/30)  1250mg IV q24h (7/1)  Intermittent dosing (7/2-current)    Vancomycin Level(s) / Doses:    Date Time Dose Type of Level / Level Interpretation   6/27 1034 1500mg IV q24h Random = 20.9 mg/L Drawn ~7h after prior dose  AUC = 498 / trough = 
[facility] -  Clinical Pharmacy Note    Vancomycin - Management by Pharmacy    Consult Date(s): 06/26/25  Consulting Provider(s): Madhuri Acosta-Marker, CNP    Assessment / Plan  Bloodstream Infection - Vancomycin  Concurrent Antimicrobials: n/a   Day of Vanc Therapy / Ordered Duration: 7  Current Dosing Method: Bayesian-Guided AUC Dosing  Therapeutic Goal: -600 mg/L*hr  Current Dose / Plan:   Renal function: LAMBERTO; SCr up to 1.5 today.  UOP not quantified.   Level this AM = 25.7 mg/L - RN had already held AM dose today.  Given LAMBERTO, will change to intermittent dosing for now. Have d/c'd scheduled doses and entered placeholder onto MAR.  Will give vancomycin 750mg IV x1 today (dosed this evening - anticipate level to be < 20 by then).   Level ordered for tomorrow AM, Thurs 7/3 to assess timing of next dose   Will continue to monitor clinical condition and make adjustments to regimen as appropriate.    Please call with questions--  Reyna Angel, PharmD, [facility]  Wireless: [phone]   7/2/2025 12:50 PM        Interval update: SCr continues to trend up - SCr up to 1.5 today. S/p renal lesion biopsy yesterday with outpt of green purulent material suggestive of abscess. ID giving test dose of Daptomycin today. Ceftaroline d/c'd by ID today.    Subjective/Objective:   Delano Daigle is a 79 y.o. male with a PMHx significant for COPD, hyperlipidemia, hypertension, and type 2 diabetes who is admitted MRSA bacteremia, UTI, LAMBERTO, ascites and anemia.    Pharmacy is consulted to dose vancomycin.    Ht Readings from Last 1 Encounters:   06/24/25 1.829 m (6')     Wt Readings from Last 1 Encounters:   07/02/25 84.9 kg (187 lb 2.7 oz)     Current & Prior Antimicrobial Regimen(s):  Ceftriaxone (6/26-6/29)  Ceftaroline (6/29-7/2)  Vancomycin - Pharmacy to dose  2000mg IV x1 6/26  1500mg IV q24h (6/27-6/28)  1750mg IV q24h (6/29-6/30)  1250mg IV q24h (7/1)  Intermittent dosing (7/2-current)    Vancomycin Level(s) / 
[facility] -  Clinical Pharmacy Note    Vancomycin - Management by Pharmacy    Consult Date(s): 06/26/25  Consulting Provider(s): Madhuri Acosta-Jordyn, CNP    Assessment / Plan  Bloodstream Infection - Vancomycin  Concurrent Antimicrobials:   Ceftriaxone - Klebsiella UTI (S) to ceftriaxone   Day of Vanc Therapy / Ordered Duration: 5  Current Dosing Method: Bayesian-Guided AUC Dosing  Therapeutic Goal: -600 mg/L*hr  Current Dose / Plan:   Renal function has been stable, no BMP resulted today.   On 1750mg IV q24h - dose adjusted 6/28  Level today = 19.9 mg/L - drawn ~21.5h after prior dose  Calculated AUC = 728 / trough = 20.4  Will decrease dose to 1250mg IV q24h  Regimen predicts AUC = 524 and trough = 14.6  Repeat levels will be ordered as appropriate  Will continue to monitor clinical condition and make adjustments to regimen as appropriate.    Please call with questions--  Reyna Angel, PharmD, [facility]  Wireless: [phone]   6/30/2025 7:34 AM        Interval update: Ceftaroline added 6/29 per ID d/t persistent MRSA bacteremia. Abd MRI showed lesion from R kidney - urology planning for aspiration vs biopsy of fluid collection/mass today.    Subjective/Objective:   Delano Daigle is a 79 y.o. male with a PMHx significant for COPD, hyperlipidemia, hypertension, and type 2 diabetes who is admitted MRSA bacteremia, UTI, LAMBERTO, ascites and anemia.    Pharmacy is consulted to dose vancomycin.    Ht Readings from Last 1 Encounters:   06/24/25 1.829 m (6')     Wt Readings from Last 1 Encounters:   06/29/25 86 kg (189 lb 9.5 oz)     Current & Prior Antimicrobial Regimen(s):  Ceftriaxone (6/26-6/29)  Ceftaroline (6/29-current)  Vancomycin - Pharmacy to dose  2000mg IV x1 6/26  1500mg IV q24h (6/27-6/28)  1750mg IV q24h (6/29-6/30)  1250mg IV q24h (7/1-current)    Vancomycin Level(s) / Doses:    Date Time Dose Type of Level / Level Interpretation   6/27 1034 1500mg IV q24h Random = 20.9 mg/L Drawn ~7h after prior 
[facility] -  Clinical Pharmacy Note    Vancomycin - Management by Pharmacy    Consult Date(s): 06/26/25  Consulting Provider(s): Madhuri Acosta-Jordyn, CNP    Assessment / Plan  Bloodstream Infection - Vancomycin  Concurrent Antimicrobials:   Ceftaroline   Day of Vanc Therapy / Ordered Duration: 6  Current Dosing Method: Bayesian-Guided AUC Dosing  Therapeutic Goal: -600 mg/L*hr  Current Dose / Plan:   Renal function: SCr slowly trending up 1?1.1?1.2 over past several days.   UOP not quantified.   On 1250mg IV q24h - dose adjusted 6/30  Calculated AUC = 616 with bump in SCr  Will decrease vancomycin to 1000mg IV q24h  Regimen predicts AUC = 496 and trough = 14.9  Level ordered for tomorrow AM, Wed 7/2 to confirm kinetic estimates   Will continue to monitor clinical condition and make adjustments to regimen as appropriate.    Please call with questions--  Reyna Angel, PharmD, [facility]  Wireless: [phone]   7/1/2025 8:31 AM        Interval update: SCr slowly trending up, 1?1.1?1.2. UOP not quantified. Blood Cx from 6/29 with ngtd. Diarrhea overnight. Hypoglycemic this AM with BG 59. Hypothermic overnight.    Subjective/Objective:   Delano Daigle is a 79 y.o. male with a PMHx significant for COPD, hyperlipidemia, hypertension, and type 2 diabetes who is admitted MRSA bacteremia, UTI, LAMBERTO, ascites and anemia.    Pharmacy is consulted to dose vancomycin.    Ht Readings from Last 1 Encounters:   06/24/25 1.829 m (6')     Wt Readings from Last 1 Encounters:   07/01/25 89.2 kg (196 lb 10.4 oz)     Current & Prior Antimicrobial Regimen(s):  Ceftriaxone (6/26-6/29)  Ceftaroline (6/29-current)  Vancomycin - Pharmacy to dose  2000mg IV x1 6/26  1500mg IV q24h (6/27-6/28)  1750mg IV q24h (6/29-6/30)  1250mg IV q24h (7/1)  1000mg IV q24h (7/2-current)    Vancomycin Level(s) / Doses:    Date Time Dose Type of Level / Level Interpretation   6/27 1034 1500mg IV q24h Random = 20.9 mg/L Drawn ~7h after prior dose  AUC 
[facility] -  Clinical Pharmacy Note    Vancomycin - Management by Pharmacy    Consult Date(s): 06/26/25  Consulting Provider(s): Madhuri Acosta-Jordyn, CNP    Assessment / Plan  Bloodstream Infection - Vancomycin  Concurrent Antimicrobials:   Ceftriaxone - Klebsiella UTI (S) to ceftriaxone   Day of Vanc Therapy / Ordered Duration: 3  Current Dosing Method: Bayesian-Guided AUC Dosing  Therapeutic Goal: -600 mg/L*hr  Current Dose / Plan:   Renal function stable; SCr improving, 1?0.9 today  On 1500mg IV q24h  Calculated AUC = 449 / trough = 10.4; level last checked 6/27; low probability (<80%) of achieving kinetic goals  Increase dose to 1750mg IV q24h  Regimen predicts AUC = 522 and trough = 12.1  Will continue to monitor clinical condition and make adjustments to regimen as appropriate.    Please call with questions--  Reyna Angel, PharmD, BCPS  Wireless: [phone]   6/28/2025 9:06 AM        Interval update: S/p paracentesis 6/26, low concern for SBP per ID notes. Ascitic fluid cx with ngtd.    Subjective/Objective:   Delano Daigle is a 79 y.o. male with a PMHx significant for COPD, hyperlipidemia, hypertension, and type 2 diabetes who is admitted with fatigue.     Pharmacy is consulted to dose vancomycin.    Ht Readings from Last 1 Encounters:   06/24/25 1.829 m (6')     Wt Readings from Last 1 Encounters:   06/28/25 84 kg (185 lb 3 oz)     Current & Prior Antimicrobial Regimen(s):  Ceftriaxone (6/26-current)  Vancomycin - Pharmacy to dose  2000mg IV x1 6/26  1500mg IV q24h (6/27-6/28)  1750mg IV q24h (6/29-current)    Vancomycin Level(s) / Doses:    Date Time Dose Type of Level / Level Interpretation   6/27 1034 1500mg IV q24h Random = 20.9 mg/L Drawn ~7h after prior dose  AUC = 498 / trough = 12.4  Continue same dose          Note: Serum levels collected for AUC-based dosing may be high if collected in close proximity to the dose administered. This is not necessarily indicative of 
Unable to get oral/axillary temp. Rectal temp 94.4. Pt placed back on kenya hugger with goal temp of 97.0. Rectal temps q1 hr unitl temp goal.  
VAT here to place line that was pulled out during night.  Sherri RN states pt is wanting to go AMA.  Will hold off and f/u in am if pt noted to stay.  Dr Aponte notified.  Order completed for now.    
Vanc level was 25.7  Pharmacy stated to hold the 5am dose   Dose will not be given  held on MAR  
Differential includes abscess versus neoplasm.  - Renal Abscess s/p IR drainage 7/1/25, 40ml in last 24h, culture +MRSA  - IV antibiotics per ID  - Will trend drain output, ok for flush BID  - Plan for repeat imaging in a 1-2 days when output decreases    Mimi Irene PA-C  
outpatient follow-up with    Klebsiella UTI  - Finish 5 days of ceftriaxone    Complex renal cyst, most likely renal abscess-culture positive for MRSA, repeat CT abdomen tomorrow  -S/p drain placement on 7/1/2025.  Purulent greenish fluid obtained.  Unable to take biopsy as biopsy material was all necrotic  - Seen on CT scan.  Shows a complex cystic lesion in the interpolar right kidney 3.5 cm.  MRI shows complex exophytic lesion in the right kidney with 4 mm enhancing wall differential is abscess versus neoplasm    Diarrhea-discussed with ID will get stool for C. Difficile.  Having a lot of diarrhea not documented in the chart.  Patient states diarrhea is better  - Patient having 6-8 episodes of diarrhea, possibly antibiotic induced.        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  Ascitic fluid noted to be milky appearing.  Discussed with lab it appears fluid for triglycerides were not sent.   - nucleated cells 76        Anemia  - Chronic    Thrombocytopenia  - Repeat labs tomorrow    Concern for possible adrenal insufficiency per last discharge summary-a.m. cortisol normal.  Unlikely adrenally insufficient at present  -Not on any medication at present.  Was on steroids in the past  - Follow-up with endocrinology as an outpatient    Hypertension-blood pressure on the lower side hold all blood pressure medication  -On  losartan and metoprolol at home      Volume overload-most likely secondary to hypoalbuminemia  -Last admission had a normal echo and normal right heart cath      Diabetes type 2  - Sliding scale insulin  - Patient is on insulin monitor blood sugars for hypoglycemia  - A1c 5      History of prior prior cardiac arrhythmia concern for possible A-fib in the 
pain    Objective  Vitals     Bed Mobility Training  Bed Mobility Training: Yes  Interventions: Verbal cues  Supine to Sit: Stand by assistance  Scooting: Stand by assistance  Balance  Sitting: High guard (SBA EOB and supported in chair)  Standing: With support (CGA-min A at RW; assist with pants management)  Transfer Training  Transfer Training: Yes  Interventions: Verbal cues;Safety awareness training  Sit to Stand: Minimal assistance (at RW from EOB, rec x3)  Stand to Sit: Minimal assistance  Bed to Chair: Minimal assistance (at RW from EOB>rec)  Gait  Gait Training: Yes  Overall Level of Assistance: Minimal assistance;Contact guard assistance  Distance (ft): 15 Feet (+ 50' (rest break between))  Assistive Device: Gait belt;Walker, rolling  Interventions: Safety awareness training;Verbal cues  Base of Support: Narrowed  Speed/Dee Dee: Shuffled;Slow  Step Length: Right shortened;Left shortened  Gait Abnormalities: Decreased step clearance;Shuffling gait (flexed posture)        Other Specialty Interventions  Other Treatments/Modalities: pt assisted with changing gown, new brief, and pants  Safety Devices  Type of Devices: Call light within reach;Gait belt;Left in chair;Nurse notified;Chair alarm in place         Goals  Short Term Goals  Time Frame for Short Term Goals: discharge (all ongoing)   Short Term Goal 1: sit to/from supine supervision  Short Term Goal 2: sit to/from stand supervision  Short Term Goal 3: ambulate 100 ft with RW supervision  Patient Goals   Patient Goals : return home    Education  Patient Education  Education Given To: Patient  Education Provided: Role of Therapy  Education Method: Demonstration;Verbal  Barriers to Learning: Hearing  Education Outcome: Verbalized understanding;Continued education needed    AM-PAC - Mobility    AM-PAC Basic Mobility - Inpatient   How much help is needed turning from your back to your side while in a flat bed without using bedrails?: A Little  How much help 
positioning with heavy use of BR  Sit to Supine:  Max A for LE       Pt sitting EOB for 15 min with SBA to CGA with pt requiring cues for upright sitting tending to have anterior lean and closing eyes.     Functional Mobility   Sit to Stand: Mod A for stance from EOB then Min A for stance from EOB   Stand to Sit: Min A   Bed to Chair Transfer:  Min to Mod A for transfer to and from EOB   Commode Transfer: No  Other: Pt completing several steps forward and back at EOB then lateral stepping EOB with Min A and cues for understanding task. After completion pt asking \"did I do it\"    ADLs   Grooming: Pt washing face and hands seated EOB with increased time and cues for initiation of each step of task  Bathing: Max A for washing back seated EOB  UB dressing: Mod A for gown change  Toileting: Dependent. PCA finishing brief change and clean up supine in bed        Activity Tolerance:  Fair activity tolerance requiring frequent rest breaks      Patient Education:  Activity promotion and need for assist     Safety Devices in Place:  Pt left supine in bed with call light in reach and RN informed    Assessment:   Pt lethargic and requiring frequent cues/encouragement . Pt requiring Min to Mod A for stance from EOB and Min A for taking a few steps from EOB And laterally. ADLs completed seated EOB with heavy cues and increased time. Pt stating \"here I am talking about going home and I can't even stand up\". Pt would benefit from inpt OT. Continue OT per POC.       Discharge Recommendations:SNF   Equipment Needs:  defer  Ampac Score:15     Therapy Time:   Individual Concurrent Group Co-treatment   Time In  945         Time Out  1024         Minutes  39           Timed Code Treatment Minutes:  39    Total Treatment Minutes:     Goals:  Short Term Goals  Time Frame for Short Term Goals: Discharge  Short Term Goal 1: Transfer to/from toilet with CG - 6/30 met, new goal SUPV  Short Term Goal 2: Stance with CG x5 min while engaging in 
training, Self-Care / ADL    Restrictions  Restrictions/Precautions  Activity Level: Up as Tolerated  Position Activity Restriction  Other Position/Activity Restrictions: up with assist    Subjective  General  Additional Pertinent Hx: Pt admitted 6/23/25 with c/o generalized fatigue and weakness.  Pt reported he has been having diarrhea for about a week and his p.o. intake has been next to nothing for the last 4 days.  Referring Practitioner: Dr. Taylor  Diagnosis: Dehydration  Subjective  Subjective: Pt in bed upon entry. \"I have no appetite\"   Pain: 10/10     Social/Functional History  Social/Functional History  Lives With: Alone  Type of Home: Apartment (3rd floor)  Home Layout: One level, Laundry in basement  Home Access: Level entry (3 flights w/rainlup to apt level)  Bathroom Shower/Tub: Tub/Shower unit  Bathroom Toilet: Standard (sink next to toilet)  Bathroom Equipment: None  Home Equipment: Cane, Lift chair, Walker - Rolling, Alert button  Has the patient had two or more falls in the past year or any fall with injury in the past year?: Yes (7-8)  Prior Level of Assist for ADLs: Independent  Prior Level of Assist for Homemaking: Needs assistance (assist w/housekeeping)  Prior Level of Assist for Ambulation: Independent household ambulator, with or without device (with cane)  Active : No  Mode of Transportation: Friends  Occupation: Retired  Type of Occupation: Construction    Objective  Vision  Vision: Impaired  Vision Exceptions: Wears glasses for reading  Hearing  Hearing: Exceptions to WFL  Hearing Exceptions: Hard of hearing/hearing concerns (Has hearing aids, \"they don't work\")          Safety Devices  Type of Devices: Left in chair;Call light within reach;Chair alarm in place;Nurse notified     Toilet Transfers  Toilet - Technique: Ambulating  Equipment Used: Standard toilet (grab bar)  Toilet Transfer: Moderate assistance  AROM: Generally decreased, functional (bilaterally)  Strength: Generally 
y.o. male  with PMH significant for  type 2 diabetes, hypertension, hyperlipidemia, concern for adrenal insufficiency was admitted on 6/24 with diarrhea and poor oral intake. Patient was diagnosed with UTI, bacteremia and imaging showed complex renal cyst which was drained on 7/1. Cr is now rising and nephrology consulted to assist in care.       Patient seen at bedside and he is comfortable on room air and denied SOB, nausea, vomiting but reported ongoing diarrhea. Denied urinary symptoms, cough or any hemoptysis.           ROS:   Negative except as mentioned in HPI      Vitals:     Vitals:    07/10/25 0559   BP:    Pulse:    Resp:    Temp: (!) 95.9 °F (35.5 °C)   SpO2:          Physical Examination:     General appearance: NAD. Alert, oriented  Respiratory: No respiratory distress. Clear.   Cardiovascular: Edema ++  Abdomen: Soft. Non distended and non tender.   Other relevant findings:       Medications:       midodrine  5 mg Oral q8h    hydrocortisone  10 mg Oral Daily with breakfast    And    hydrocortisone  5 mg Oral QPM    mirtazapine  30 mg Oral Nightly    heparin (porcine)  5,000 Units SubCUTAneous 3 times per day    [Held by provider] rosuvastatin  10 mg Oral Nightly    DAPTOmycin (CUBICIN) 650 mg in sodium chloride 0.9 % 50 mL IVPB  8 mg/kg IntraVENous Q48H    sodium chloride flush  5-40 mL IntraVENous 2 times per day    balsum peru-castor oil   Topical BID    guaiFENesin  600 mg Oral BID    triamcinolone   Topical BID    calamine-zinc oxide   Topical TID    insulin lispro  0-8 Units SubCUTAneous 4x Daily AC & HS    sodium chloride flush  5-40 mL IntraVENous 2 times per day         Labs:     Lab Results   Component Value Date    CREATININE 2.7 (H) 07/10/2025    BUN 31 (H) 07/10/2025     07/10/2025    K 3.3 (L) 07/10/2025     (H) 07/10/2025    CO2 25 07/10/2025    CALCIUM 6.9 (L) 07/10/2025    PHOS 2.4 (L) 03/07/2025     Lab Results   Component Value Date    WBC 7.8 07/05/2025    HGB 8.7 (L) 
(degrees)  RLE AROM: WFL  RLE General AROM: B LE with min edema  AROM LLE (degrees)  LLE AROM : WFL    Strength RLE  Strength RLE:  (3+/5 grossly throughout)  Strength LLE  Strength LLE:  (3+/5 grossly throughout)           Bed mobility  Supine to Sit: Minimal assistance (slow and effortful)    Transfers  Sit to Stand: Partial/Moderate assistance (mod assist from toilet, min assist from bed with vc for hand placement)  Stand to Sit: Minimal Assistance (x2 trials with vc for hand placement)    Ambulation  Device: Rolling Walker  Assistance: Minimal assistance  Quality of Gait: forward posture with decreased step length/height; slow yaa; pt fatigued quickly  Distance: 15 ft x2          AM-PAC - Mobility    AM-PAC Basic Mobility - Inpatient   How much help is needed turning from your back to your side while in a flat bed without using bedrails?: A Little  How much help is needed moving from lying on your back to sitting on the side of a flat bed without using bedrails?: A Little  How much help is needed moving to and from a bed to a chair?: A Little  How much help is needed standing up from a chair using your arms?: A Lot  How much help is needed walking in hospital room?: A Little  How much help is needed climbing 3-5 steps with a railing?: Total  AM-PAC Inpatient Mobility Raw Score : 15  AM-PAC Inpatient T-Scale Score : 39.45  Mobility Inpatient CMS 0-100% Score: 57.7  Mobility Inpatient CMS G-Code Modifier : CK         Goals  Short Term Goals  Time Frame for Short Term Goals: discharge  Short Term Goal 1: sit to/from supine supervision  Short Term Goal 2: sit to/from stand supervision  Short Term Goal 3: ambulate 100 ft with RW supervision  Patient Goals   Patient Goals : return home       Education  Patient Education  Education Given To: Patient  Education Provided: Role of Therapy  Education Method: Demonstration;Verbal  Barriers to Learning: Hearing  Education Outcome: Verbalized understanding;Continued 
10/15/2023, etiology is not been determined. If   clinically indicated magnetic resonance imaging without and with contrast,   ProHance may help for further categorization, the finding may represent a   sclerosing/fibrotic hemangioma with calcification            Electronically signed by Maximus Wakefield   CT CHEST ABDOMEN PELVIS W CONTRAST Additional Contrast? Radiologist Recommendation   Final Result      CHEST:   1.  Small left greater than right pleural effusions suggestive of edema.   2.  Patulous esophagus containing debris placing patient at risk for aspiration.      ABDOMEN AND PELVIS:   1.  New small to moderate ascites.   2.  Complex cystic lesion in the interpolar right kidney measuring up to 3.5 cm.   Neoplasm, abscess, complex or hemorrhagic cyst are differential considerations.   Correlation with renal ultrasound and/or MRI recommended.   3.  Stable indeterminate lesion in the right hepatic lobe.      Electronically signed by Augustina Cho     Blood cx: MRSA  Renal abscess: MRSA      Impression/Plan:      79-year-old gentleman with right renal abscess status post IR placement of 10 Romansh percutaneous drain.    - repeat CT 7/4/25 showed no residual abscess, no hydronephrosis.  Drain removed 7/5  - cont antibiotics per ID/IM  - nephro was consulted for rising creat - PVR this morning was 132mL and nephro ordered a shukla for strict I/o.   Shukla management per nephro     Erin Fish PA-C  
Therapy  Education Method: Demonstration;Verbal  Barriers to Learning: Hearing  Education Outcome: Verbalized understanding;Continued education needed    AM-PAC - Mobility    AM-PAC Basic Mobility - Inpatient   How much help is needed turning from your back to your side while in a flat bed without using bedrails?: A Little  How much help is needed moving from lying on your back to sitting on the side of a flat bed without using bedrails?: A Little  How much help is needed moving to and from a bed to a chair?: A Little  How much help is needed standing up from a chair using your arms?: A Little  How much help is needed walking in hospital room?: A Little  How much help is needed climbing 3-5 steps with a railing?: Total  AM-PAC Inpatient Mobility Raw Score : 16  AM-PAC Inpatient T-Scale Score : 40.78  Mobility Inpatient CMS 0-100% Score: 54.16  Mobility Inpatient CMS G-Code Modifier : CK         Therapy Time   Individual Concurrent Group Co-treatment   Time In 1259         Time Out 1337         Minutes 38         Timed Code Treatment Minutes: 38 Minutes       Magalis Griffin PT           
and with contrast is recommended.   3. Liver lesion noted on prior CT is redemonstrated in the right lobe   corresponding to the hypoattenuating lesion with calcification on computed   tomography which dates back to 10/15/2023, etiology is not been determined. If   clinically indicated magnetic resonance imaging without and with contrast,   ProHance may help for further categorization, the finding may represent a   sclerosing/fibrotic hemangioma with calcification            Electronically signed by Maximus Wakefield   CT CHEST ABDOMEN PELVIS W CONTRAST Additional Contrast? Radiologist Recommendation   Final Result      CHEST:   1.  Small left greater than right pleural effusions suggestive of edema.   2.  Patulous esophagus containing debris placing patient at risk for aspiration.      ABDOMEN AND PELVIS:   1.  New small to moderate ascites.   2.  Complex cystic lesion in the interpolar right kidney measuring up to 3.5 cm.   Neoplasm, abscess, complex or hemorrhagic cyst are differential considerations.   Correlation with renal ultrasound and/or MRI recommended.   3.  Stable indeterminate lesion in the right hepatic lobe.      Electronically signed by Augustina Be cx: MRSA  Renal abscess: MRSA      Impression/Plan:      79-year-old gentleman with right renal abscess status post IR placement of 10 French percutaneous drain.    - repeat CT 7/4/25 showed no residual abscess, no hydronephrosis.  Drain removed 7/5  - cont antibiotics per ID/IM  - Nazario was removed this AM. Nephrology following for continued LAMBERTO  - On exam, there is no evidence of infection but there is dependent fluid edema within his scrotum/penis. This is not painful to him. Lasix could be beneficial or ambulation if he is able to tolerate  - Can check PVR as needed if there are signs of retention noted   - Will continue to follow, please call with any questions    MIAN Jeronimo  
mg IntraVENous Q6H PRN Shawn Taylor MD   4 mg at 06/25/25 1115    polyethylene glycol (GLYCOLAX) packet 17 g  17 g Oral Daily PRN Shawn Taylor MD        acetaminophen (TYLENOL) tablet 650 mg  650 mg Oral Q6H PRN Shawn Taylor MD   650 mg at 06/24/25 2144    Or    acetaminophen (TYLENOL) suppository 650 mg  650 mg Rectal Q6H PRN Shawn Taylor MD        oxyCODONE (ROXICODONE) immediate release tablet 5 mg  5 mg Oral Q4H PRN Shawn Taylor MD        Or    oxyCODONE (ROXICODONE) immediate release tablet 10 mg  10 mg Oral Q4H PRN Shawn Taylor MD             Recent Imaging:   CT HEAD WO CONTRAST  Narrative: CT HEAD WITHOUT CONTRAST     HISTORY: Altered mental status    PROCEDURE: Noncontrast CT the brain performed with 5 mm contiguous sections. Individualized dose optimization technique was used in order to meet ALARA standards for radiation dose reduction.  In addition to vendor specific dose reduction algorithms, the   dose reduction techniques vary based on the specific scanner utilized but frequently include automated exposure control, adjustment of the mA and/or kV according to patient size, and use of iterative reconstruction technique.    COMPARISON: None    FINDINGS:     There is no acute hemorrhage or mass effect. The ventricles are normal in size and position. The grey-white matter differentiation is preserved.  There is moderate to severe multifocal white matter disease.    No calvarial abnormality is noted.  The sinus are pneumatized. The globes and orbits are normal.  No additional abnormalities are seen in the structures of the skull base.   Impression: No acute intracranial hemorrhage or mass effect.    Electronically signed by Ran Paul, DO  CT CHEST ABDOMEN PELVIS W CONTRAST Additional Contrast? Radiologist Recommendation  Narrative: STUDY DATE:  6/25/2025 9:22 AM    EXAM: CT CHEST ABDOMEN PELVIS W CONTRAST    INDICATION: hypotension, hypothermia, abnormal CXR, r/o significnat 
Strip (Telemetry) personally reviewed and interpreted as documented above    [] Imaging personally reviewed and interpreted, includes:    [x] Discussion (any 1)  [x] Discussed the discharge plan in detail with case management including timing/barriers to discharge, need for support services and/or placement decision   [] Discussed management of the case with:     Subjective:     Chief Complaint: Weakness    Delano Daigle is a 79 y.o. male who presents with weakness, diarrhea and poor p.o. intake    Patient feels okay.  Denies abdominal pain.  Still having some diarrhea improving slowly      Review of Systems:      Pertinent positives and negatives discussed in HPI    Objective:     Intake/Output Summary (Last 24 hours) at 7/1/2025 0824  Last data filed at 7/1/2025 0547  Gross per 24 hour   Intake 625.04 ml   Output --   Net 625.04 ml      Vitals:   Vitals:    07/01/25 0309 07/01/25 0548 07/01/25 0549 07/01/25 0752   BP: 100/65   109/73   Pulse: 66   86   Resp: 16   16   Temp: (!) 96.4 °F (35.8 °C) 97.2 °F (36.2 °C)  97.3 °F (36.3 °C)   TempSrc: Oral Oral  Oral   SpO2: 97%   100%   Weight:   89.2 kg (196 lb 10.4 oz)    Height:             Physical Exam:      General: NAD  Eyes: EOMI  ENT: neck supple  Cardiovascular: Regular rate.  Respiratory: Clear to auscultation  Gastrointestinal: Soft, non tender  Genitourinary: no suprapubic tenderness  Musculoskeletal: trace edema  Skin: Scattered areas of eczema  Neuro: Alert.  Psych: Mood appropriate.         Medications:   Medications:    vancomycin  1,250 mg IntraVENous Q24H    ceftaroline fosamil (TEFLARO) IVPB  600 mg IntraVENous q8h    balsum peru-castor oil   Topical BID    guaiFENesin  600 mg Oral BID    triamcinolone   Topical BID    calamine-zinc oxide   Topical TID    midodrine  5 mg Oral TID WC    insulin lispro  0-8 Units SubCUTAneous 4x Daily AC & HS    rosuvastatin  40 mg Oral Nightly    mirtazapine  15 mg Oral Nightly    sodium chloride flush  5-40 mL 
per day    balsum peru-castor oil   Topical BID    guaiFENesin  600 mg Oral BID    triamcinolone   Topical BID    calamine-zinc oxide   Topical TID    midodrine  5 mg Oral TID WC    insulin lispro  0-8 Units SubCUTAneous 4x Daily AC & HS    [Held by provider] rosuvastatin  40 mg Oral Nightly    mirtazapine  15 mg Oral Nightly    sodium chloride flush  5-40 mL IntraVENous 2 times per day       Continuous Infusions:   sodium chloride      sodium chloride      dextrose      sodium chloride         PRN Meds:  sodium chloride flush, sodium chloride, calcium carbonate, diphenhydrAMINE-zinc acetate, phenol, glucose, dextrose bolus **OR** dextrose bolus, glucagon (rDNA), dextrose, sodium chloride flush, sodium chloride, ondansetron **OR** ondansetron, polyethylene glycol, acetaminophen **OR** acetaminophen, oxyCODONE **OR** oxyCODONE      Assessment:       Patient Active Problem List   Diagnosis    Hypertension associated with diabetes (HCC)    Hyperlipidemia associated with type 2 diabetes mellitus (HCC)    Eczema    Cigarette nicotine dependence with nicotine-induced disorder    Type 2 diabetes mellitus with other specified complication (HCC)    COPD (chronic obstructive pulmonary disease) (HCC)    Cellulitis of left arm    Severe sepsis (HCC)    Swelling of hand joint, left    Abscess of multiple sites    Rash and other nonspecific skin eruption    Fatigue, unspecified type    Acute congestive heart failure, unspecified heart failure type (HCC)    ARF (acute renal failure)    Severe malnutrition    MRSA bacteremia    Hidradenitis suppurativa    UTI due to Klebsiella species    Cyst of right kidney        Plan:   79-year-old -American male with history of DM 2, HTN, hidradenitis suppurativa with previous superimposed MRSA infections, eczema, A-fib, COPD, cardiomyopathy and HLD that presented on 6/23 with weakness and diarrhea for the past 2 weeks.      MRSA bacteremia:  - He was previously seen by us in infectious 
previous swabs grew MRSA.    - He did also see us in clinic and was given instructions for chlorhexidine decolonization protocol.    - He was also advised to follow-up with Derm given his severe eczema and dry skin which were likely portals of infection. He states he never did see a dermatologist.   - on this presentation, patient denied any fevers or chills. WBC was 8.3 and he was afebrile.  - Blood cultures x 2 collected on 6/24 with 1 set positive for MRSA and urine culture on 6/24 also positive for Klebsiella pneumoniae greater than 100,000 CFU.    - Currently on IV vancomycin and ceftriaxone.  Will continue this.  Close monitoring of vancomycin and adjustment per goal trough 15-20 to avoid toxicities  - CT of the chest abdomen and pelvis performed on 6/25 showed left greater than right pleural effusion, complex cystic lesion in the interpolar of the right kidney measuring 3.5 cm concerning for neoplasm, abscess or complex hemorrhagic cyst.    - Urology has been consulted and recommend obtaining MRI of the abdomen with and without contrast to further evaluate lesion.  He currently has no  complaints.    - TTE did not show any obvious vegetation.  Repeat sets of blood cultures from 6/26 remain negative to date.   - no readily apparent source, but could be from poor skin integrity. No active HA flare on exam today. Pt without indwelling line or hardware in place.   - pt with new ascites, pt is s/p paracentesis 6/26 and no growth to date, 76 nucleated cells, less concern for infection, though reported to be \"milky\" appearing. No abd pain, less concern for SBP. Gen surg to eval      Klebsiella UTI:  - Patient without any  symptoms at this time.  However, CT of the abdomen and pelvis is showing a complex cystic lesion on the interval of the right kidney measuring 3.5 cm concerning for neoplasm, abscess or complex hemorrhagic cyst.  - Urology has been consulted and recommend obtaining MRI of the abdomen with and 
chills. WBC was 8.3 and he was afebrile.  - Blood cultures x 2 collected on 6/24 with 1 set positive for MRSA and urine culture on 6/24 also positive for Klebsiella pneumoniae greater than 100,000 CFU.    - Currently on IV vancomycin and ceftriaxone.   Close monitoring of vancomycin and adjustment per goal trough 15-20 to avoid toxicities  - CT of the chest abdomen and pelvis performed on 6/25 showed left greater than right pleural effusion, complex cystic lesion in the interpolar of the right kidney measuring 3.5 cm concerning for neoplasm, abscess or complex hemorrhagic cyst.    - Urology has been consulted and recommend MRI of the abdomen with and without contrast which showed Complex exophytic lesion arising from the lateral right kidney measuring 4 cm with thick peripheral enhancing wall. Intense diffusion restriction seen centrally. Primary differential consideration is abscess versus neoplasm. He currently has no  complaints.  Possible needle biopsy today per urology.  If this is performed, would appreciate sending sample for path as well as cultures.  - TTE did not show any obvious vegetation.  Repeat sets of blood cultures from 6/26 with both sets positive, repeat sets pending 6/29.  - with persistent bacteremia, aded ceftaroline for synergy with vancomycin 6/29.  - MICHAEL performed today without evidence of endocarditis, has moderate MR  - no readily apparent source, but could be from poor skin integrity. No active HA flare on exam today. Pt without indwelling line or hardware in place.   - pt with new ascites, pt is s/p paracentesis 6/26 and no growth to date, 76 nucleated cells, less concern for infection, though reported to be \"milky\" appearing, pending fluid triglyceride level. No abd pain, less concern for SBP.  Gen surg following     Klebsiella UTI:  - Patient without any  symptoms at this time.  However, CT of the abdomen and pelvis is showing a complex cystic lesion on the interval of the right 
T2 hyperintense lesion arising from the lateral aspect of the right kidney measuring 4.0 x 3.6 cm on series 6 image 27. There is intense diffusion restriction in the lesion. On postcontrast images, there is enhancement of the wall. There are otherwise a few small subcentimeter scattered benign cysts in the bilateral kidneys. LYMPH NODES: No abnormally enlarged nodes. PERITONEUM/RETROPERITONEUM: Moderate volume ascites. VESSELS: Aorta is normal in caliber. Vasculature is patent. ABDOMINAL WALL: Diffuse anasarca. BONES: No suspicious marrow signal abnormality. OTHER FINDINGS: Small bilateral pleural effusions. Dependent atelectasis in the lower lobes..     1.  Complex exophytic lesion arising from the lateral right kidney measuring 4 cm with thick peripheral enhancing wall.. Intense diffusion restriction seen centrally. Primary differential consideration is abscess versus neoplasm. 2.  Moderate volume ascites. 3.  Small bilateral pleural effusions. 4.  Diffuse anasarca. 5.  Benign cysts in the liver. Stable nonspecific lesion/signal alteration in the medial right hepatic lobe compared with CT from 10/15/2023 which is considered a benign finding. Electronically signed by Byron Lima    Echo (TTE) limited (PRN contrast/bubble/strain/3D)  Result Date: 6/26/2025    Left Ventricle: Low normal left ventricular systolic function with a visually estimated EF of 50 - 55%. Left ventricle size is normal. Normal wall thickness. Normal wall motion.   No obvious vegetation seen.  Mitral and aortic valve sclerosis seen.  Mild MR.  No pericardial effusion   Image quality is adequate.     US GALLBLADDER RUQ  Result Date: 6/26/2025  EXAM: ULTRASOUND RIGHT UPPER QUADRANT INDICATION: new ascites , r/o cirrhosis COMPARISON: CT 3/3/2025 and 10/15/2023 FINDINGS: LIVER: *  Size: Normal. *  Echogenicity: Normal. *  Mass/Cyst: Liver lesion, heterogeneous hyperechoic suggesting a sclerotic hemangioma *  Vessels: Normal portal flow. *  Duplex: 
after discharge  EDGAR - copied from Dr Corea's note  INFUSION ORDERS:  - Drug: IV Daptomycin 500 mg daily  - Planned End date: 7/30/2025  - Diagnosis: MRSA bacteremia, right renal abscess  - Has received test dose in hospital  - Routine   - Check CBC w diff, CMP, ESR, CRP, CK every Mon or Tue - FAX result to [phone]  - Call with antibiotic / infusion issues, [phone]  - Call with any change in status, transfer in or out of a facility or to hospital; This EDGAR is only valid for current disposition - [phone].    - No f/u in outpatient ID office necessary     Medical Decision Making:  The following items were considered in medical decision making:  Discussion of patient care with other providers  Reviewed clinical lab tests  Reviewed radiology tests  Reviewed other diagnostic tests/interventions  Microbiology cultures and other micro tests reviewed      Luis Aponte MD  
abscess  - Has received test dose in hospital  - Routine   - Check CBC w diff, CMP, ESR, CRP, CK every Mon or Tue - FAX result to [phone]  - Call with antibiotic / infusion issues, [phone]  - Call with any change in status, transfer in or out of a facility or to hospital; This EDGAR is only valid for current disposition - [phone].    - No f/u in outpatient ID office necessary      Medical Decision Making:  The following items were considered in medical decision making:  Discussion of patient care with other providers  Reviewed clinical lab tests  Reviewed radiology tests  Reviewed other diagnostic tests/interventions  Independent review of radiologic images  Microbiology cultures and other micro tests reviewed      Please note that this chart was generated using Dragon dictation software. Although every effort was made to ensure the accuracy of this automated transcription, some errors in transcription may have occurred inadvertently.  Any pictures or media included in this note were obtained after taking informed verbal consent from the patient and with their approval to include those in the patient's medical record.     Discussed with patient, his RN at bedside, primary team  and APRIL Corea MD   
sclerosis seen.  Mild MR.  No pericardial effusion   Image quality is adequate.     US GALLBLADDER RUQ  Result Date: 6/26/2025  EXAM: ULTRASOUND RIGHT UPPER QUADRANT INDICATION: new ascites , r/o cirrhosis COMPARISON: CT 3/3/2025 and 10/15/2023 FINDINGS: LIVER: *  Size: Normal. *  Echogenicity: Normal. *  Mass/Cyst: Liver lesion, heterogeneous hyperechoic suggesting a sclerotic hemangioma *  Vessels: Normal portal flow. *  Duplex: No BILE DUCTS: *  Intrahepatic ducts: Normal. *  Common bile duct diameter: mm. GALLBLADDER: *  Gallstones/sludge: None. *  Gallbladder wall thickening: Wall thickening with areas of specular reflectors supporting adenomyomatosis. Wall thickening is also accentuated due to abdominal pelvic ascites *  Pericholecystic fluid: None. *  Sonographic Warren sign: None. PANCREAS: Not visualized due to bowel gas. RIGHT KIDNEY: 10.8 cm length. There is a mid pole lesion in the cortex measuring 3.6 x 2.4 cm in size with irregular borders. It does not satisfy the criteria for simple cyst. However, this measured 1.6 x 1.0 cm on prior CT. Whether this represents a hemorrhagic cyst or more significant lesion is uncertain. MRI is recommended. No evidence of hydronephrosis ABDOMINAL AORTA AND IVC: Visualized portions are normal. OTHER: Significant abdominal ascites     1. Abdominal pelvic ascites 2. Right renal lesion, indeterminate. Hemorrhagic cyst or neoplasm. Magnetic resonance imaging without and with contrast is recommended. 3. Liver lesion noted on prior CT is redemonstrated in the right lobe corresponding to the hypoattenuating lesion with calcification on computed tomography which dates back to 10/15/2023, etiology is not been determined. If clinically indicated magnetic resonance imaging without and with contrast, ProHance may help for further categorization, the finding may represent a sclerosing/fibrotic hemangioma with calcification Electronically signed by Maximus Wakefield    US GUIDED 
multiplanar reformatted images and axial maximum intensity projection images provided for review. Axial MIP images obtained of the chest. Coronal and sagittal reconstructions were obtained of the chest, abdomen, and pelvis.  Individualized dose optimization technique was used in order to meet ALARA standards for radiation dose reduction.  In addition to vendor specific dose reduction algorithms, the dose reduction techniques vary based on the specific scanner utilized but frequently include automated exposure control, adjustment of the mA and/or kV according to patient size, and use of iterative reconstruction technique. CONTRAST: 75 ml  Isovue 370 intravenous FINDINGS: : No additional findings. Medical devices: None CHEST: Airways, Lungs, and pleura: Moderate emphysema. Small left greater than right pleural effusions with overlying atelectasis. Fluid within the left major fissure. Nodules: Small number of sub-6 mm pulmonary nodules, not significantly changed from prior exam. Heart and great vessels: The heart is mildly enlarged. Aorta and pulmonary artery are normal in caliber. Moderate coronary artery calcifications.  Other mediastinal structures and lower neck: Multinodular thyroid, unchanged. Patulous esophagus, containing layering debris some fluid. Adenopathy: None. Chest wall and axilla: Bilateral gynecomastia. Body wall anasarca. Osseous structures: Multiple rib remote bilateral rib fractures ABDOMEN AND PELVIS: Liver: Mildly nodular in contour. There is a 2.1 x 2.0 cm hypodense lesion right hepatic lobe segment 7 (series 301, image 76, stable since 2023 smaller adjacent hypodense lesion also stable. Biliary system:The gallbladder is normal. No biliary ductal dilation. Pancreas: Normal. Spleen: Normal. Adrenal glands: Normal. Kidneys, ureters, bladder: 2.6 x 1.5 cm heterogeneous mass in the interpolar right kidney (series 301, image 98). Subcentimeter cyst in the inferior pole left kidney. No 
taking informed verbal consent from the patient and with their approval to include those in the patient's medical record.     Discussed with patient, his son at bedside, primary team  and APRIL Corea MD   
measuring up to 3.5 cm. Neoplasm, abscess, complex or hemorrhagic cyst are differential considerations. Correlation with renal ultrasound and/or MRI recommended. 3.  Stable indeterminate lesion in the right hepatic lobe. Electronically signed by Augustina Cho    XR CHEST PORTABLE  Result Date: 6/25/2025  PORTABLE CHEST INDICATION: Productive cough. COMPARISON: 6/23/2025 FINDINGS: Cardiomediastinal silhouette is stable. Increased consolidation left base suggesting atelectasis or pneumonia. Stable consolidation in the right lung and basal and mid lung regions suggesting atelectasis or pneumonia. No pneumothorax.     1.  Increased consolidation in the left base. 2.  Stable consolidation in the right lung. Electronically signed by Sunny Bhagat MD    XR CHEST PORTABLE  Result Date: 6/23/2025  DATE: 6/23/2025 EXAM: PORTABLE AP CHEST X-RAY, 9:26 PM INDICATION: weakness, short of breath COMPARISON: March 2025 FINDINGS: HEART / MEDIASTINUM: Heart is borderline in size LUNGS/PLEURA: Mild central and basilar hazy airspace opacity. BONES / SOFT TISSUES: No acute abnormality. OTHER: None.     1. Mild central and basilar hazy airspace disease. Electronically signed by Manuel Pickard MD      CBC:   No results for input(s): \"WBC\", \"HGB\", \"PLT\" in the last 72 hours.    BMP:    Recent Labs     07/09/25  0410 07/10/25  0415 07/11/25  0320   * 144 145   K 4.0 3.3* 3.5   * 116* 116*   CO2 24 25 23   BUN 33* 31* 29*   CREATININE 2.9* 2.7* 2.7*   GLUCOSE 136* 92 75     Hepatic:   No results for input(s): \"AST\", \"ALT\", \"BILITOT\", \"ALKPHOS\" in the last 72 hours.    Invalid input(s): \"ALB\"    Lipids:   Lab Results   Component Value Date/Time    CHOL 93 03/04/2025 04:28 AM    HDL 40 03/04/2025 04:28 AM    HDL 39 05/10/2012 09:43 AM    TRIG 52 06/27/2025 10:34 AM     Hemoglobin A1C:   Lab Results   Component Value Date/Time    LABA1C 5.0 04/08/2025 05:08 PM     TSH:   Lab Results   Component Value Date/Time    TSH 1.35 02/15/2024 
note this report has been produced using speech recognition software and may contain errors related to that system including errors in grammar, punctuation, and spelling, as well as words and phrases that may be inappropriate. If there are any questions or concerns, please feel free to contact the dictating provider for clarification.

## 2025-07-14 NOTE — CARE COORDINATION
Case Management Assessment            Discharge Note                    Date / Time of Note: 7/14/2025 1:59 PM                  Discharge Note Completed by: Mae Parr    Patient Name: Delano Daigle   YOB: 1945  Diagnosis: Dehydration [E86.0]  ARF (acute renal failure) [N17.9]  Generalized weakness [R53.1]  Diarrhea, unspecified type [R19.7]   Date / Time: 6/23/2025  7:23 PM    Current PCP: Jason Dawn MD  Clinic patient: No    Hospitalization in the last 30 days: No       Advance Directives:  Code Status: Full Code  [place] DNR form completed and on chart: No    Financial:  Payor: [employer] [place] MEDICARE / Plan: [employer] [place] MEDICARE / Product Type: *No Product type* /      Pharmacy:    [facility]/pharmacy #[id number] - [place], [place] - [address] - P [phone] - F [phone]  [address] [facility].  [facility] [address]  Phone: [phone] Fax: [phone]    [facility] [address] [facility] -  [phone] - F [phone]  [address] [place], [place] [address]  Phone: [phone] Fax: [phone]      Assistance purchasing medications?: Potential Assistance Purchasing Medications: No  Assistance provided by Case Management: None at this time    Does patient want to participate in local refill/ meds to beds program?: Yes    Meds To Beds General Rules:  1. Can ONLY be done Monday- Friday between 8:30am-5pm  2. Prescription(s) must be in pharmacy by 3pm to be filled same day  3.Copy of patient's insurance/ prescription drug card and patient face sheet must be sent along with the prescription(s)  4. Cost of Rx cannot be added to hospital bill. If financial assistance is needed, please contact unit  or ;  or  CANNOT provide pharmacy voucher for patients co-pays  5. Patients can then  the prescription on their way out of the hospital at discharge, or pharmacy can deliver to the bedside if

## 2025-07-14 NOTE — PLAN OF CARE
Problem: Chronic Conditions and Co-morbidities  Goal: Patient's chronic conditions and co-morbidity symptoms are monitored and maintained or improved  6/25/2025 0103 by Lisa Nichols RN  Outcome: Progressing  Flowsheets (Taken 6/24/2025 1934)  Care Plan - Patient's Chronic Conditions and Co-Morbidity Symptoms are Monitored and Maintained or Improved: Monitor and assess patient's chronic conditions and comorbid symptoms for stability, deterioration, or improvement  6/24/2025 1806 by Brunilda Metz RN  Outcome: Progressing  Flowsheets (Taken 6/24/2025 1806)  Care Plan - Patient's Chronic Conditions and Co-Morbidity Symptoms are Monitored and Maintained or Improved:   Monitor and assess patient's chronic conditions and comorbid symptoms for stability, deterioration, or improvement   Collaborate with multidisciplinary team to address chronic and comorbid conditions and prevent exacerbation or deterioration   Update acute care plan with appropriate goals if chronic or comorbid symptoms are exacerbated and prevent overall improvement and discharge     Problem: Discharge Planning  Goal: Discharge to home or other facility with appropriate resources  6/25/2025 0103 by Lisa Nichols RN  Outcome: Progressing  Flowsheets (Taken 6/24/2025 1934)  Discharge to home or other facility with appropriate resources: Identify barriers to discharge with patient and caregiver  6/24/2025 1806 by Brunilda Metz RN  Outcome: Progressing  Flowsheets (Taken 6/24/2025 1806)  Discharge to home or other facility with appropriate resources:   Identify barriers to discharge with patient and caregiver   Arrange for needed discharge resources and transportation as appropriate   Identify discharge learning needs (meds, wound care, etc)   Refer to discharge planning if patient needs post-hospital services based on physician order or complex needs related to functional status, cognitive ability or social support system     Problem: Pain  Goal: 
  Problem: Chronic Conditions and Co-morbidities  Goal: Patient's chronic conditions and co-morbidity symptoms are monitored and maintained or improved  6/25/2025 1147 by Clementina Russo RN  Outcome: Progressing  Note: Education provided to patient/family on co-morbidities as needed throughout shift.       Problem: Discharge Planning  Goal: Discharge to home or other facility with appropriate resources  6/25/2025 1147 by Clementina Russo RN  Outcome: Progressing  Note: Pt is not DC today. GI following with potential intervention.     Problem: Pain  Goal: Verbalizes/displays adequate comfort level or baseline comfort level  6/25/2025 1147 by Clementina Russo RN  Outcome: Progressing  Flowsheets (Taken 6/25/2025 0430 by Lisa Nichols RN)  Verbalizes/displays adequate comfort level or baseline comfort level: Encourage patient to monitor pain and request assistance  Note: Patient utilizing pharmacological and non-pharmacological interventions for pain management as needed. Will continue to monitor patient pain levels and apply interventions as requested. Patient satisfied at this time.      Problem: Safety - Adult  Goal: Free from fall injury  6/25/2025 1147 by Clementina Russo RN  Outcome: Progressing  Note: Pt will remain free from accidental injury this shift. Pt has fall risk measures (fall risk bracelet, fall risk sign, fall risk cloth, non-slip socks, dome light on) in place. Pt bed is in low position, bed alarm on, bed wheels locked, call light within reach, bedside table within reach, chair wheels locked, chair alarm on.      Problem: ABCDS Injury Assessment  Goal: Absence of physical injury  6/25/2025 1147 by Clementina Russo RN  Outcome: Progressing  Note: Call light within reach and fall precautions in place at this time.      
  Problem: Chronic Conditions and Co-morbidities  Goal: Patient's chronic conditions and co-morbidity symptoms are monitored and maintained or improved  6/26/2025 0704 by Lisa Nichols RN  Outcome: Progressing  6/26/2025 0214 by Lisa Nichols RN  Outcome: Progressing     Problem: Discharge Planning  Goal: Discharge to home or other facility with appropriate resources  6/26/2025 0704 by Lisa Nichols RN  Outcome: Progressing  6/26/2025 0214 by Lisa Nichols RN  Outcome: Progressing     Problem: Pain  Goal: Verbalizes/displays adequate comfort level or baseline comfort level  6/26/2025 0704 by Lisa Nichols RN  Outcome: Progressing  Flowsheets (Taken 6/26/2025 0345)  Verbalizes/displays adequate comfort level or baseline comfort level:   Encourage patient to monitor pain and request assistance   Assess pain using appropriate pain scale  6/26/2025 0214 by Lisa Nichols RN  Outcome: Progressing  Flowsheets (Taken 6/25/2025 2000)  Verbalizes/displays adequate comfort level or baseline comfort level:   Encourage patient to monitor pain and request assistance   Assess pain using appropriate pain scale     Problem: Safety - Adult  Goal: Free from fall injury  6/26/2025 0704 by Lisa Nichols RN  Outcome: Progressing  6/26/2025 0214 by Lisa Nichols RN  Outcome: Progressing     Problem: Nutrition Deficit:  Goal: Optimize nutritional status  6/26/2025 0704 by Lisa Nichols RN  Outcome: Progressing  6/26/2025 0214 by Lisa Nichols RN  Outcome: Progressing     Problem: ABCDS Injury Assessment  Goal: Absence of physical injury  6/26/2025 0704 by Lisa Nichols RN  Outcome: Progressing  6/26/2025 0214 by Lisa Nichols RN  Outcome: Progressing     
  Problem: Chronic Conditions and Co-morbidities  Goal: Patient's chronic conditions and co-morbidity symptoms are monitored and maintained or improved  6/27/2025 1034 by Christian Doss RN  Outcome: Progressing  Flowsheets (Taken 6/27/2025 0219 by Ariel Ferrell, RN)  Care Plan - Patient's Chronic Conditions and Co-Morbidity Symptoms are Monitored and Maintained or Improved:   Collaborate with multidisciplinary team to address chronic and comorbid conditions and prevent exacerbation or deterioration   Update acute care plan with appropriate goals if chronic or comorbid symptoms are exacerbated and prevent overall improvement and discharge   Monitor and assess patient's chronic conditions and comorbid symptoms for stability, deterioration, or improvement  6/27/2025 0219 by Ariel Ferrell RN  Outcome: Progressing  Flowsheets (Taken 6/27/2025 0219)  Care Plan - Patient's Chronic Conditions and Co-Morbidity Symptoms are Monitored and Maintained or Improved:   Collaborate with multidisciplinary team to address chronic and comorbid conditions and prevent exacerbation or deterioration   Update acute care plan with appropriate goals if chronic or comorbid symptoms are exacerbated and prevent overall improvement and discharge   Monitor and assess patient's chronic conditions and comorbid symptoms for stability, deterioration, or improvement     Problem: Discharge Planning  Goal: Discharge to home or other facility with appropriate resources  6/27/2025 1034 by Christian Doss RN  Outcome: Progressing  Flowsheets (Taken 6/27/2025 0219 by Ariel Ferrell, RN)  Discharge to home or other facility with appropriate resources:   Identify barriers to discharge with patient and caregiver   Identify discharge learning needs (meds, wound care, etc)   Refer to discharge planning if patient needs post-hospital services based on physician order or complex needs related to functional status, cognitive ability or social support 
  Problem: Chronic Conditions and Co-morbidities  Goal: Patient's chronic conditions and co-morbidity symptoms are monitored and maintained or improved  6/27/2025 2029 by Elo Cope RN  Outcome: Progressing  Flowsheets (Taken 6/27/2025 2029)  Care Plan - Patient's Chronic Conditions and Co-Morbidity Symptoms are Monitored and Maintained or Improved:   Monitor and assess patient's chronic conditions and comorbid symptoms for stability, deterioration, or improvement   Collaborate with multidisciplinary team to address chronic and comorbid conditions and prevent exacerbation or deterioration   Update acute care plan with appropriate goals if chronic or comorbid symptoms are exacerbated and prevent overall improvement and discharge     Problem: Discharge Planning  Goal: Discharge to home or other facility with appropriate resources  6/27/2025 2029 by Elo Cope RN  Outcome: Progressing  Flowsheets (Taken 6/27/2025 2029)  Discharge to home or other facility with appropriate resources:   Arrange for interpreters to assist at discharge as needed   Identify discharge learning needs (meds, wound care, etc)   Identify barriers to discharge with patient and caregiver     Problem: Pain  Goal: Verbalizes/displays adequate comfort level or baseline comfort level  6/27/2025 2029 by Elo Cope RN  Outcome: Progressing  Flowsheets (Taken 6/27/2025 2029)  Verbalizes/displays adequate comfort level or baseline comfort level:   Administer analgesics based on type and severity of pain and evaluate response   Consider cultural and social influences on pain and pain management   Notify Licensed Independent Practitioner if interventions unsuccessful or patient reports new pain     Problem: Safety - Adult  Goal: Free from fall injury  6/27/2025 2029 by Elo Cope RN  Outcome: Progressing  Flowsheets (Taken 6/27/2025 2029)  Free From Fall Injury:   Based on caregiver fall risk screen, instruct family/caregiver to ask 
  Problem: Chronic Conditions and Co-morbidities  Goal: Patient's chronic conditions and co-morbidity symptoms are monitored and maintained or improved  6/28/2025 2028 by Elo Cope RN  Outcome: Progressing  Flowsheets (Taken 6/28/2025 2028)  Care Plan - Patient's Chronic Conditions and Co-Morbidity Symptoms are Monitored and Maintained or Improved:   Update acute care plan with appropriate goals if chronic or comorbid symptoms are exacerbated and prevent overall improvement and discharge   Collaborate with multidisciplinary team to address chronic and comorbid conditions and prevent exacerbation or deterioration   Monitor and assess patient's chronic conditions and comorbid symptoms for stability, deterioration, or improvement     Problem: Discharge Planning  Goal: Discharge to home or other facility with appropriate resources  Outcome: Progressing  Flowsheets (Taken 6/28/2025 2028)  Discharge to home or other facility with appropriate resources:   Refer to discharge planning if patient needs post-hospital services based on physician order or complex needs related to functional status, cognitive ability or social support system   Arrange for interpreters to assist at discharge as needed   Identify discharge learning needs (meds, wound care, etc)   Arrange for needed discharge resources and transportation as appropriate     Problem: Pain  Goal: Verbalizes/displays adequate comfort level or baseline comfort level  6/28/2025 2028 by Elo Cope RN  Outcome: Progressing  Flowsheets (Taken 6/28/2025 2028)  Verbalizes/displays adequate comfort level or baseline comfort level:   Notify Licensed Independent Practitioner if interventions unsuccessful or patient reports new pain   Consider cultural and social influences on pain and pain management   Implement non-pharmacological measures as appropriate and evaluate response   Administer analgesics based on type and severity of pain and evaluate response   Assess 
  Problem: Chronic Conditions and Co-morbidities  Goal: Patient's chronic conditions and co-morbidity symptoms are monitored and maintained or improved  6/30/2025 0910 by Christian Doss RN  Outcome: Progressing  Flowsheets (Taken 6/29/2025 1303 by Hannah Tejeda RN)  Care Plan - Patient's Chronic Conditions and Co-Morbidity Symptoms are Monitored and Maintained or Improved: Monitor and assess patient's chronic conditions and comorbid symptoms for stability, deterioration, or improvement  6/30/2025 0515 by Addie Swenson RN  Outcome: Progressing     Problem: Discharge Planning  Goal: Discharge to home or other facility with appropriate resources  6/30/2025 0910 by Christian Doss RN  Outcome: Progressing  Flowsheets (Taken 6/28/2025 2028 by Elo Cope RN)  Discharge to home or other facility with appropriate resources:   Refer to discharge planning if patient needs post-hospital services based on physician order or complex needs related to functional status, cognitive ability or social support system   Arrange for interpreters to assist at discharge as needed   Identify discharge learning needs (meds, wound care, etc)   Arrange for needed discharge resources and transportation as appropriate  6/30/2025 0515 by Addie Swenson RN  Outcome: Progressing     Problem: Pain  Goal: Verbalizes/displays adequate comfort level or baseline comfort level  6/30/2025 0910 by Christian Doss RN  Outcome: Progressing  Flowsheets (Taken 6/30/2025 0910)  Verbalizes/displays adequate comfort level or baseline comfort level:   Encourage patient to monitor pain and request assistance   Administer analgesics based on type and severity of pain and evaluate response   Assess pain using appropriate pain scale   Implement non-pharmacological measures as appropriate and evaluate response  6/30/2025 0515 by Addie Swenson RN  Outcome: Progressing     Problem: Safety - Adult  Goal: Free from fall injury  6/30/2025 0910 by Romario 
  Problem: Chronic Conditions and Co-morbidities  Goal: Patient's chronic conditions and co-morbidity symptoms are monitored and maintained or improved  6/30/2025 1932 by Addie Swenson RN  Outcome: Progressing  6/30/2025 0910 by Christian Doss RN  Outcome: Progressing  Flowsheets (Taken 6/29/2025 1303 by Hannah Tejeda RN)  Care Plan - Patient's Chronic Conditions and Co-Morbidity Symptoms are Monitored and Maintained or Improved: Monitor and assess patient's chronic conditions and comorbid symptoms for stability, deterioration, or improvement     Problem: Discharge Planning  Goal: Discharge to home or other facility with appropriate resources  6/30/2025 1932 by Addie Swenson RN  Outcome: Progressing  6/30/2025 0910 by Christian Doss RN  Outcome: Progressing  Flowsheets (Taken 6/28/2025 2028 by Elo Cope RN)  Discharge to home or other facility with appropriate resources:   Refer to discharge planning if patient needs post-hospital services based on physician order or complex needs related to functional status, cognitive ability or social support system   Arrange for interpreters to assist at discharge as needed   Identify discharge learning needs (meds, wound care, etc)   Arrange for needed discharge resources and transportation as appropriate     Problem: Pain  Goal: Verbalizes/displays adequate comfort level or baseline comfort level  6/30/2025 1932 by Addie Swenson RN  Outcome: Progressing  6/30/2025 0910 by Christian Doss RN  Outcome: Progressing  Flowsheets (Taken 6/30/2025 0910)  Verbalizes/displays adequate comfort level or baseline comfort level:   Encourage patient to monitor pain and request assistance   Administer analgesics based on type and severity of pain and evaluate response   Assess pain using appropriate pain scale   Implement non-pharmacological measures as appropriate and evaluate response     Problem: Safety - Adult  Goal: Free from fall injury  6/30/2025 1932 by Leela 
  Problem: Chronic Conditions and Co-morbidities  Goal: Patient's chronic conditions and co-morbidity symptoms are monitored and maintained or improved  7/1/2025 1124 by Christian Doss, RN  Outcome: Progressing  Flowsheets (Taken 7/1/2025 1124)  Care Plan - Patient's Chronic Conditions and Co-Morbidity Symptoms are Monitored and Maintained or Improved:   Monitor and assess patient's chronic conditions and comorbid symptoms for stability, deterioration, or improvement   Collaborate with multidisciplinary team to address chronic and comorbid conditions and prevent exacerbation or deterioration   Update acute care plan with appropriate goals if chronic or comorbid symptoms are exacerbated and prevent overall improvement and discharge     Problem: Discharge Planning  Goal: Discharge to home or other facility with appropriate resources  7/1/2025 1124 by Christian Doss, RN  Outcome: Progressing  Flowsheets (Taken 7/1/2025 1124)  Discharge to home or other facility with appropriate resources:   Identify barriers to discharge with patient and caregiver   Identify discharge learning needs (meds, wound care, etc)   Arrange for needed discharge resources and transportation as appropriate   Arrange for interpreters to assist at discharge as needed     Problem: Pain  Goal: Verbalizes/displays adequate comfort level or baseline comfort level  7/1/2025 2137 by Addie Swenson, RN  Flowsheets (Taken 7/1/2025 2137)  Verbalizes/displays adequate comfort level or baseline comfort level: Assess pain using appropriate pain scale  7/1/2025 1124 by Christian Doss, RN  Outcome: Progressing  Flowsheets (Taken 7/1/2025 1124)  Verbalizes/displays adequate comfort level or baseline comfort level:   Encourage patient to monitor pain and request assistance   Administer analgesics based on type and severity of pain and evaluate response   Assess pain using appropriate pain scale   Implement non-pharmacological measures as appropriate and evaluate 
  Problem: Chronic Conditions and Co-morbidities  Goal: Patient's chronic conditions and co-morbidity symptoms are monitored and maintained or improved  7/10/2025 0307 by Suzy Diaz RN  Outcome: Progressing  Flowsheets (Taken 7/10/2025 0307)  Care Plan - Patient's Chronic Conditions and Co-Morbidity Symptoms are Monitored and Maintained or Improved:   Monitor and assess patient's chronic conditions and comorbid symptoms for stability, deterioration, or improvement   Collaborate with multidisciplinary team to address chronic and comorbid conditions and prevent exacerbation or deterioration   Update acute care plan with appropriate goals if chronic or comorbid symptoms are exacerbated and prevent overall improvement and discharge     Problem: Discharge Planning  Goal: Discharge to home or other facility with appropriate resources  7/10/2025 0307 by Suzy Diaz RN  Outcome: Progressing  Flowsheets (Taken 7/10/2025 0307)  Discharge to home or other facility with appropriate resources:   Identify barriers to discharge with patient and caregiver   Arrange for needed discharge resources and transportation as appropriate   Identify discharge learning needs (meds, wound care, etc)   Arrange for interpreters to assist at discharge as needed   Refer to discharge planning if patient needs post-hospital services based on physician order or complex needs related to functional status, cognitive ability or social support system     Problem: Pain  Goal: Verbalizes/displays adequate comfort level or baseline comfort level  7/10/2025 0307 by Suzy Diaz, RN  Outcome: Progressing  Flowsheets (Taken 7/10/2025 0307)  Verbalizes/displays adequate comfort level or baseline comfort level:   Encourage patient to monitor pain and request assistance   Assess pain using appropriate pain scale   Administer analgesics based on type and severity of pain and evaluate response   Implement non-pharmacological measures as 
  Problem: Chronic Conditions and Co-morbidities  Goal: Patient's chronic conditions and co-morbidity symptoms are monitored and maintained or improved  7/14/2025 1602 by Renaldo Pate RN  Outcome: Adequate for Discharge  7/14/2025 1602 by Renaldo Pate RN  Outcome: Progressing     Problem: Discharge Planning  Goal: Discharge to home or other facility with appropriate resources  7/14/2025 1602 by Renaldo Pate RN  Outcome: Adequate for Discharge  7/14/2025 1602 by Renaldo Pate RN  Outcome: Progressing     Problem: Pain  Goal: Verbalizes/displays adequate comfort level or baseline comfort level  7/14/2025 1602 by Renaldo Pate RN  Outcome: Adequate for Discharge  7/14/2025 1602 by Renaldo Pate RN  Outcome: Progressing     Problem: Safety - Adult  Goal: Free from fall injury  7/14/2025 1602 by Renaldo Pate RN  Outcome: Adequate for Discharge  7/14/2025 1602 by Renaldo Pate RN  Outcome: Progressing     Problem: Nutrition Deficit:  Goal: Optimize nutritional status  7/14/2025 1602 by Renaldo Pate RN  Outcome: Adequate for Discharge  7/14/2025 1602 by Renaldo Pate RN  Outcome: Progressing     Problem: ABCDS Injury Assessment  Goal: Absence of physical injury  7/14/2025 1602 by Renaldo Pate RN  Outcome: Adequate for Discharge  7/14/2025 1602 by Renaldo Pate RN  Outcome: Progressing     Problem: Respiratory - Adult  Goal: Achieves optimal ventilation and oxygenation  7/14/2025 1602 by Renaldo Pate RN  Outcome: Adequate for Discharge  7/14/2025 1602 by Renaldo Pate RN  Outcome: Progressing     Problem: Cardiovascular - Adult  Goal: Maintains optimal cardiac output and hemodynamic stability  7/14/2025 1602 by Renaldo Pate RN  Outcome: Adequate for Discharge  7/14/2025 1602 by Renaldo Pate RN  Outcome: Progressing  Goal: Absence of cardiac dysrhythmias or at baseline  7/14/2025 1602 by Renaldo Pate RN  Outcome: Adequate for 
  Problem: Chronic Conditions and Co-morbidities  Goal: Patient's chronic conditions and co-morbidity symptoms are monitored and maintained or improved  7/3/2025 0111 by Aida Morrison RN  Outcome: Progressing  Flowsheets (Taken 7/3/2025 0111)  Care Plan - Patient's Chronic Conditions and Co-Morbidity Symptoms are Monitored and Maintained or Improved:   Monitor and assess patient's chronic conditions and comorbid symptoms for stability, deterioration, or improvement   Collaborate with multidisciplinary team to address chronic and comorbid conditions and prevent exacerbation or deterioration     Problem: Discharge Planning  Goal: Discharge to home or other facility with appropriate resources  7/3/2025 0111 by Aida Morrison RN  Outcome: Progressing  Flowsheets (Taken 7/3/2025 0111)  Discharge to home or other facility with appropriate resources:   Identify barriers to discharge with patient and caregiver   Identify discharge learning needs (meds, wound care, etc)     Problem: Pain  Goal: Verbalizes/displays adequate comfort level or baseline comfort level  7/3/2025 0111 by Aida Morrison RN  Outcome: Progressing  Flowsheets (Taken 7/3/2025 0111)  Verbalizes/displays adequate comfort level or baseline comfort level:   Encourage patient to monitor pain and request assistance   Consider cultural and social influences on pain and pain management     Problem: Safety - Adult  Goal: Free from fall injury  7/3/2025 0111 by Aida Morrison RN  Outcome: Progressing  Flowsheets (Taken 7/3/2025 0111)  Free From Fall Injury:   Instruct family/caregiver on patient safety   Based on caregiver fall risk screen, instruct family/caregiver to ask for assistance with transferring infant if caregiver noted to have fall risk factors     Problem: Nutrition Deficit:  Goal: Optimize nutritional status  7/3/2025 0111 by Aida Morrison RN  Outcome: Progressing  Flowsheets (Taken 7/3/2025 0111)  Nutrient intake appropriate for 
  Problem: Chronic Conditions and Co-morbidities  Goal: Patient's chronic conditions and co-morbidity symptoms are monitored and maintained or improved  7/3/2025 1438 by Cayetano Gonsales RN  Outcome: Progressing  7/3/2025 0111 by Aida Morrison RN  Outcome: Progressing  Flowsheets (Taken 7/3/2025 0111)  Care Plan - Patient's Chronic Conditions and Co-Morbidity Symptoms are Monitored and Maintained or Improved:   Monitor and assess patient's chronic conditions and comorbid symptoms for stability, deterioration, or improvement   Collaborate with multidisciplinary team to address chronic and comorbid conditions and prevent exacerbation or deterioration     Problem: Discharge Planning  Goal: Discharge to home or other facility with appropriate resources  7/3/2025 1438 by Cayetano Gonsales RN  Outcome: Progressing  7/3/2025 0111 by Aida Morrison RN  Outcome: Progressing  Flowsheets (Taken 7/3/2025 0111)  Discharge to home or other facility with appropriate resources:   Identify barriers to discharge with patient and caregiver   Identify discharge learning needs (meds, wound care, etc)     Problem: Pain  Goal: Verbalizes/displays adequate comfort level or baseline comfort level  7/3/2025 1438 by Cayetano Gonsales RN  Outcome: Progressing  7/3/2025 0111 by Aida Morrison RN  Outcome: Progressing  Flowsheets (Taken 7/3/2025 0111)  Verbalizes/displays adequate comfort level or baseline comfort level:   Encourage patient to monitor pain and request assistance   Consider cultural and social influences on pain and pain management     Problem: Safety - Adult  Goal: Free from fall injury  7/3/2025 1438 by Cayetano Gonsales RN  Outcome: Progressing  7/3/2025 0111 by Aida Morrison RN  Outcome: Progressing  Flowsheets (Taken 7/3/2025 0111)  Free From Fall Injury:   Instruct family/caregiver on patient safety   Based on caregiver fall risk screen, instruct family/caregiver to ask for assistance with transferring 
  Problem: Chronic Conditions and Co-morbidities  Goal: Patient's chronic conditions and co-morbidity symptoms are monitored and maintained or improved  7/3/2025 1719 by Elva Easton, RN  Outcome: Progressing  Flowsheets  Taken 7/3/2025 1719  Care Plan - Patient's Chronic Conditions and Co-Morbidity Symptoms are Monitored and Maintained or Improved: Monitor and assess patient's chronic conditions and comorbid symptoms for stability, deterioration, or improvement  Taken 7/3/2025 1600  Care Plan - Patient's Chronic Conditions and Co-Morbidity Symptoms are Monitored and Maintained or Improved: Monitor and assess patient's chronic conditions and comorbid symptoms for stability, deterioration, or improvement     Problem: Discharge Planning  Goal: Discharge to home or other facility with appropriate resources  7/3/2025 1719 by Elva Easton, RN  Outcome: Progressing  Flowsheets (Taken 7/3/2025 1600)  Discharge to home or other facility with appropriate resources: Identify barriers to discharge with patient and caregiver     Problem: Pain  Goal: Verbalizes/displays adequate comfort level or baseline comfort level  7/3/2025 1719 by Elva Easton, RN  Outcome: Progressing  Flowsheets  Taken 7/3/2025 1719  Verbalizes/displays adequate comfort level or baseline comfort level: Encourage patient to monitor pain and request assistance  Taken 7/3/2025 1557  Verbalizes/displays adequate comfort level or baseline comfort level: Encourage patient to monitor pain and request assistance     Problem: Safety - Adult  Goal: Free from fall injury  7/3/2025 1719 by Elva Easton, RN  Outcome: Progressing  Flowsheets  Taken 7/3/2025 1719  Free From Fall Injury: Instruct family/caregiver on patient safety  Taken 7/3/2025 1600  Free From Fall Injury: Instruct family/caregiver on patient safety     
  Problem: Chronic Conditions and Co-morbidities  Goal: Patient's chronic conditions and co-morbidity symptoms are monitored and maintained or improved  7/9/2025 0353 by Suzy Diaz RN  Outcome: Progressing  Flowsheets (Taken 7/9/2025 0353)  Care Plan - Patient's Chronic Conditions and Co-Morbidity Symptoms are Monitored and Maintained or Improved:   Monitor and assess patient's chronic conditions and comorbid symptoms for stability, deterioration, or improvement   Collaborate with multidisciplinary team to address chronic and comorbid conditions and prevent exacerbation or deterioration   Update acute care plan with appropriate goals if chronic or comorbid symptoms are exacerbated and prevent overall improvement and discharge     Problem: Discharge Planning  Goal: Discharge to home or other facility with appropriate resources  7/9/2025 0353 by Suzy Diaz RN  Outcome: Progressing  Flowsheets (Taken 7/9/2025 0353)  Discharge to home or other facility with appropriate resources:   Identify barriers to discharge with patient and caregiver   Arrange for needed discharge resources and transportation as appropriate   Identify discharge learning needs (meds, wound care, etc)   Arrange for interpreters to assist at discharge as needed   Refer to discharge planning if patient needs post-hospital services based on physician order or complex needs related to functional status, cognitive ability or social support system     Problem: Pain  Goal: Verbalizes/displays adequate comfort level or baseline comfort level  7/9/2025 0353 by Suzy Diaz, RN  Outcome: Progressing  Flowsheets (Taken 7/9/2025 0353)  Verbalizes/displays adequate comfort level or baseline comfort level:   Encourage patient to monitor pain and request assistance   Assess pain using appropriate pain scale   Administer analgesics based on type and severity of pain and evaluate response   Consider cultural and social influences on pain and 
  Problem: Chronic Conditions and Co-morbidities  Goal: Patient's chronic conditions and co-morbidity symptoms are monitored and maintained or improved  7/9/2025 1628 by Tiarra Cowan RN  Outcome: Progressing     Problem: Discharge Planning  Goal: Discharge to home or other facility with appropriate resources  7/9/2025 1628 by Tiarra Cowan RN  Outcome: Progressing     Problem: Pain  Goal: Verbalizes/displays adequate comfort level or baseline comfort level  7/9/2025 1628 by Tiarra Cowan RN  Outcome: Progressing     Problem: Safety - Adult  Goal: Free from fall injury  7/9/2025 1628 by Tiarra Cowan RN  Outcome: Progressing     Problem: Nutrition Deficit:  Goal: Optimize nutritional status  Outcome: Progressing     Problem: ABCDS Injury Assessment  Goal: Absence of physical injury  7/9/2025 1628 by Tiarra Cowan RN  Outcome: Progressing     Problem: Respiratory - Adult  Goal: Achieves optimal ventilation and oxygenation  Outcome: Progressing     Problem: Cardiovascular - Adult  Goal: Maintains optimal cardiac output and hemodynamic stability  Outcome: Progressing     Problem: Metabolic/Fluid and Electrolytes - Adult  Goal: Electrolytes maintained within normal limits  Outcome: Progressing     Problem: Metabolic/Fluid and Electrolytes - Adult  Goal: Hemodynamic stability and optimal renal function maintained  Outcome: Progressing     Problem: Skin/Tissue Integrity  Goal: Skin integrity remains intact  Description: 1.  Monitor for areas of redness and/or skin breakdown  2.  Assess vascular access sites hourly  3.  Every 4-6 hours minimum:  Change oxygen saturation probe site  4.  Every 4-6 hours:  If on nasal continuous positive airway pressure, respiratory therapy assess nares and determine need for appliance change or resting period  7/9/2025 1628 by Tiarra Cowan RN  Outcome: Progressing     
  Problem: Chronic Conditions and Co-morbidities  Goal: Patient's chronic conditions and co-morbidity symptoms are monitored and maintained or improved  Outcome: Not Progressing  Flowsheets (Taken 7/13/2025 0830)  Care Plan - Patient's Chronic Conditions and Co-Morbidity Symptoms are Monitored and Maintained or Improved: Monitor and assess patient's chronic conditions and comorbid symptoms for stability, deterioration, or improvement     Problem: Safety - Adult  Goal: Free from fall injury  Outcome: Progressing  Flowsheets (Taken 7/13/2025 0830)  Free From Fall Injury: Instruct family/caregiver on patient safety     Problem: ABCDS Injury Assessment  Goal: Absence of physical injury  Outcome: Progressing     Problem: Respiratory - Adult  Goal: Achieves optimal ventilation and oxygenation  Outcome: Progressing  Flowsheets (Taken 7/13/2025 0830)  Achieves optimal ventilation and oxygenation:   Assess for changes in respiratory status   Assess for changes in mentation and behavior     Problem: Cardiovascular - Adult  Goal: Absence of cardiac dysrhythmias or at baseline  Outcome: Progressing  Flowsheets (Taken 7/13/2025 0830)  Absence of cardiac dysrhythmias or at baseline:   Monitor cardiac rate and rhythm   Administer antiarrhythmia medication and electrolyte replacement as ordered     Problem: Skin/Tissue Integrity  Goal: Skin integrity remains intact  Description: 1.  Monitor for areas of redness and/or skin breakdown  2.  Assess vascular access sites hourly  3.  Every 4-6 hours minimum:  Change oxygen saturation probe site  4.  Every 4-6 hours:  If on nasal continuous positive airway pressure, respiratory therapy assess nares and determine need for appliance change or resting period  Outcome: Progressing     Problem: Chronic Conditions and Co-morbidities  Goal: Patient's chronic conditions and co-morbidity symptoms are monitored and maintained or improved  Outcome: Not Progressing  Flowsheets (Taken 7/13/2025 
  Problem: Chronic Conditions and Co-morbidities  Goal: Patient's chronic conditions and co-morbidity symptoms are monitored and maintained or improved  Outcome: Progressing     Problem: Discharge Planning  Goal: Discharge to home or other facility with appropriate resources  Outcome: Progressing     Problem: Pain  Goal: Verbalizes/displays adequate comfort level or baseline comfort level  Outcome: Progressing     Problem: Safety - Adult  Goal: Free from fall injury  Outcome: Progressing     Problem: Nutrition Deficit:  Goal: Optimize nutritional status  Outcome: Progressing     Problem: ABCDS Injury Assessment  Goal: Absence of physical injury  Outcome: Progressing     Problem: Respiratory - Adult  Goal: Achieves optimal ventilation and oxygenation  Outcome: Progressing     Problem: Cardiovascular - Adult  Goal: Maintains optimal cardiac output and hemodynamic stability  Outcome: Progressing     Problem: Metabolic/Fluid and Electrolytes - Adult  Goal: Electrolytes maintained within normal limits  Outcome: Progressing     Problem: Skin/Tissue Integrity  Goal: Skin integrity remains intact  Description: 1.  Monitor for areas of redness and/or skin breakdown  2.  Assess vascular access sites hourly  3.  Every 4-6 hours minimum:  Change oxygen saturation probe site  4.  Every 4-6 hours:  If on nasal continuous positive airway pressure, respiratory therapy assess nares and determine need for appliance change or resting period  Outcome: Progressing     
  Problem: Chronic Conditions and Co-morbidities  Goal: Patient's chronic conditions and co-morbidity symptoms are monitored and maintained or improved  Outcome: Progressing     Problem: Discharge Planning  Goal: Discharge to home or other facility with appropriate resources  Outcome: Progressing     Problem: Pain  Goal: Verbalizes/displays adequate comfort level or baseline comfort level  Outcome: Progressing     Problem: Safety - Adult  Goal: Free from fall injury  Outcome: Progressing     Problem: Nutrition Deficit:  Goal: Optimize nutritional status  Outcome: Progressing     Problem: ABCDS Injury Assessment  Goal: Absence of physical injury  Outcome: Progressing     Problem: Respiratory - Adult  Goal: Achieves optimal ventilation and oxygenation  Outcome: Progressing     Problem: Cardiovascular - Adult  Goal: Maintains optimal cardiac output and hemodynamic stability  Outcome: Progressing  Goal: Absence of cardiac dysrhythmias or at baseline  Outcome: Progressing     Problem: Metabolic/Fluid and Electrolytes - Adult  Goal: Electrolytes maintained within normal limits  Outcome: Progressing  Goal: Hemodynamic stability and optimal renal function maintained  Outcome: Progressing     
  Problem: Chronic Conditions and Co-morbidities  Goal: Patient's chronic conditions and co-morbidity symptoms are monitored and maintained or improved  Outcome: Progressing     Problem: Discharge Planning  Goal: Discharge to home or other facility with appropriate resources  Outcome: Progressing     Problem: Pain  Goal: Verbalizes/displays adequate comfort level or baseline comfort level  Outcome: Progressing     Problem: Safety - Adult  Goal: Free from fall injury  Outcome: Progressing     Problem: Nutrition Deficit:  Goal: Optimize nutritional status  Outcome: Progressing  Flowsheets (Taken 7/8/2025 1006 by Madeleine Dorsey RD)  Nutrient intake appropriate for improving, restoring, or maintaining nutritional needs:   Monitor oral intake, labs, and treatment plans   Recommend appropriate diets, oral nutritional supplements, and vitamin/mineral supplements     Problem: ABCDS Injury Assessment  Goal: Absence of physical injury  Outcome: Progressing     Problem: Respiratory - Adult  Goal: Achieves optimal ventilation and oxygenation  Outcome: Progressing     Problem: Cardiovascular - Adult  Goal: Maintains optimal cardiac output and hemodynamic stability  Outcome: Progressing     Problem: Cardiovascular - Adult  Goal: Absence of cardiac dysrhythmias or at baseline  Outcome: Progressing     Problem: Metabolic/Fluid and Electrolytes - Adult  Goal: Electrolytes maintained within normal limits  Outcome: Progressing     Problem: Metabolic/Fluid and Electrolytes - Adult  Goal: Hemodynamic stability and optimal renal function maintained  Outcome: Progressing     Problem: Skin/Tissue Integrity  Goal: Skin integrity remains intact  Description: 1.  Monitor for areas of redness and/or skin breakdown  2.  Assess vascular access sites hourly  3.  Every 4-6 hours minimum:  Change oxygen saturation probe site  4.  Every 4-6 hours:  If on nasal continuous positive airway pressure, respiratory therapy assess nares and determine need 
  Problem: Chronic Conditions and Co-morbidities  Goal: Patient's chronic conditions and co-morbidity symptoms are monitored and maintained or improved  Outcome: Progressing  Flowsheets (Taken 6/24/2025 1806)  Care Plan - Patient's Chronic Conditions and Co-Morbidity Symptoms are Monitored and Maintained or Improved:   Monitor and assess patient's chronic conditions and comorbid symptoms for stability, deterioration, or improvement   Collaborate with multidisciplinary team to address chronic and comorbid conditions and prevent exacerbation or deterioration   Update acute care plan with appropriate goals if chronic or comorbid symptoms are exacerbated and prevent overall improvement and discharge     Problem: Discharge Planning  Goal: Discharge to home or other facility with appropriate resources  Outcome: Progressing  Flowsheets (Taken 6/24/2025 1806)  Discharge to home or other facility with appropriate resources:   Identify barriers to discharge with patient and caregiver   Arrange for needed discharge resources and transportation as appropriate   Identify discharge learning needs (meds, wound care, etc)   Refer to discharge planning if patient needs post-hospital services based on physician order or complex needs related to functional status, cognitive ability or social support system     Problem: Pain  Goal: Verbalizes/displays adequate comfort level or baseline comfort level  Outcome: Progressing  Flowsheets (Taken 6/24/2025 1806)  Verbalizes/displays adequate comfort level or baseline comfort level:   Encourage patient to monitor pain and request assistance   Assess pain using appropriate pain scale   Administer analgesics based on type and severity of pain and evaluate response   Implement non-pharmacological measures as appropriate and evaluate response   Consider cultural and social influences on pain and pain management     Problem: Safety - Adult  Goal: Free from fall injury  Outcome: 
  Problem: Chronic Conditions and Co-morbidities  Goal: Patient's chronic conditions and co-morbidity symptoms are monitored and maintained or improved  Outcome: Progressing  Flowsheets (Taken 6/27/2025 0219)  Care Plan - Patient's Chronic Conditions and Co-Morbidity Symptoms are Monitored and Maintained or Improved:   Collaborate with multidisciplinary team to address chronic and comorbid conditions and prevent exacerbation or deterioration   Update acute care plan with appropriate goals if chronic or comorbid symptoms are exacerbated and prevent overall improvement and discharge   Monitor and assess patient's chronic conditions and comorbid symptoms for stability, deterioration, or improvement     Problem: Discharge Planning  Goal: Discharge to home or other facility with appropriate resources  Outcome: Progressing  Flowsheets (Taken 6/27/2025 0219)  Discharge to home or other facility with appropriate resources:   Identify barriers to discharge with patient and caregiver   Identify discharge learning needs (meds, wound care, etc)   Refer to discharge planning if patient needs post-hospital services based on physician order or complex needs related to functional status, cognitive ability or social support system   Arrange for interpreters to assist at discharge as needed   Arrange for needed discharge resources and transportation as appropriate     Problem: Pain  Goal: Verbalizes/displays adequate comfort level or baseline comfort level  Outcome: Progressing  Flowsheets (Taken 6/27/2025 0219)  Verbalizes/displays adequate comfort level or baseline comfort level:   Encourage patient to monitor pain and request assistance   Assess pain using appropriate pain scale   Administer analgesics based on type and severity of pain and evaluate response   Implement non-pharmacological measures as appropriate and evaluate response   Consider cultural and social influences on pain and pain management   Notify Licensed 
  Problem: Chronic Conditions and Co-morbidities  Goal: Patient's chronic conditions and co-morbidity symptoms are monitored and maintained or improved  Outcome: Progressing  Flowsheets (Taken 6/28/2025 1524)  Care Plan - Patient's Chronic Conditions and Co-Morbidity Symptoms are Monitored and Maintained or Improved: Monitor and assess patient's chronic conditions and comorbid symptoms for stability, deterioration, or improvement     Problem: Pain  Goal: Verbalizes/displays adequate comfort level or baseline comfort level  Outcome: Progressing  Flowsheets (Taken 6/28/2025 1524)  Verbalizes/displays adequate comfort level or baseline comfort level:   Encourage patient to monitor pain and request assistance   Assess pain using appropriate pain scale   Administer analgesics based on type and severity of pain and evaluate response   Implement non-pharmacological measures as appropriate and evaluate response   Notify Licensed Independent Practitioner if interventions unsuccessful or patient reports new pain   Consider cultural and social influences on pain and pain management     Problem: Safety - Adult  Goal: Free from fall injury  Outcome: Progressing  Flowsheets (Taken 6/28/2025 1524)  Free From Fall Injury: Instruct family/caregiver on patient safety     
  Problem: Chronic Conditions and Co-morbidities  Goal: Patient's chronic conditions and co-morbidity symptoms are monitored and maintained or improved  Outcome: Progressing  Flowsheets (Taken 6/29/2025 1303)  Care Plan - Patient's Chronic Conditions and Co-Morbidity Symptoms are Monitored and Maintained or Improved: Monitor and assess patient's chronic conditions and comorbid symptoms for stability, deterioration, or improvement     Problem: Pain  Goal: Verbalizes/displays adequate comfort level or baseline comfort level  Outcome: Progressing  Flowsheets (Taken 6/29/2025 1303)  Verbalizes/displays adequate comfort level or baseline comfort level: Encourage patient to monitor pain and request assistance     Problem: Safety - Adult  Goal: Free from fall injury  Outcome: Progressing  Flowsheets (Taken 6/29/2025 1303)  Free From Fall Injury: Instruct family/caregiver on patient safety     
  Problem: Chronic Conditions and Co-morbidities  Goal: Patient's chronic conditions and co-morbidity symptoms are monitored and maintained or improved  Outcome: Progressing  Flowsheets (Taken 7/1/2025 1124)  Care Plan - Patient's Chronic Conditions and Co-Morbidity Symptoms are Monitored and Maintained or Improved:   Monitor and assess patient's chronic conditions and comorbid symptoms for stability, deterioration, or improvement   Collaborate with multidisciplinary team to address chronic and comorbid conditions and prevent exacerbation or deterioration   Update acute care plan with appropriate goals if chronic or comorbid symptoms are exacerbated and prevent overall improvement and discharge     Problem: Discharge Planning  Goal: Discharge to home or other facility with appropriate resources  Outcome: Progressing  Flowsheets (Taken 7/1/2025 1124)  Discharge to home or other facility with appropriate resources:   Identify barriers to discharge with patient and caregiver   Identify discharge learning needs (meds, wound care, etc)   Arrange for needed discharge resources and transportation as appropriate   Arrange for interpreters to assist at discharge as needed     Problem: Pain  Goal: Verbalizes/displays adequate comfort level or baseline comfort level  Outcome: Progressing  Flowsheets (Taken 7/1/2025 1124)  Verbalizes/displays adequate comfort level or baseline comfort level:   Encourage patient to monitor pain and request assistance   Administer analgesics based on type and severity of pain and evaluate response   Assess pain using appropriate pain scale   Implement non-pharmacological measures as appropriate and evaluate response     Problem: Safety - Adult  Goal: Free from fall injury  Outcome: Progressing  Flowsheets (Taken 7/1/2025 1124)  Free From Fall Injury:   Instruct family/caregiver on patient safety   Based on caregiver fall risk screen, instruct family/caregiver to ask for assistance with 
  Problem: Chronic Conditions and Co-morbidities  Goal: Patient's chronic conditions and co-morbidity symptoms are monitored and maintained or improved  Outcome: Progressing  Flowsheets (Taken 7/11/2025 0046)  Care Plan - Patient's Chronic Conditions and Co-Morbidity Symptoms are Monitored and Maintained or Improved:   Collaborate with multidisciplinary team to address chronic and comorbid conditions and prevent exacerbation or deterioration   Monitor and assess patient's chronic conditions and comorbid symptoms for stability, deterioration, or improvement   Update acute care plan with appropriate goals if chronic or comorbid symptoms are exacerbated and prevent overall improvement and discharge     Problem: Discharge Planning  Goal: Discharge to home or other facility with appropriate resources  Outcome: Progressing  Flowsheets (Taken 7/11/2025 0046)  Discharge to home or other facility with appropriate resources:   Identify discharge learning needs (meds, wound care, etc)   Identify barriers to discharge with patient and caregiver   Refer to discharge planning if patient needs post-hospital services based on physician order or complex needs related to functional status, cognitive ability or social support system   Arrange for needed discharge resources and transportation as appropriate     Problem: Pain  Goal: Verbalizes/displays adequate comfort level or baseline comfort level  Outcome: Progressing  Flowsheets (Taken 7/11/2025 0046)  Verbalizes/displays adequate comfort level or baseline comfort level:   Encourage patient to monitor pain and request assistance   Administer analgesics based on type and severity of pain and evaluate response   Assess pain using appropriate pain scale   Consider cultural and social influences on pain and pain management   Implement non-pharmacological measures as appropriate and evaluate response     Problem: Safety - Adult  Goal: Free from fall injury  Outcome: 
  Problem: Chronic Conditions and Co-morbidities  Goal: Patient's chronic conditions and co-morbidity symptoms are monitored and maintained or improved  Outcome: Progressing  Flowsheets (Taken 7/11/2025 1654 by Iris Hernandez, RN)  Care Plan - Patient's Chronic Conditions and Co-Morbidity Symptoms are Monitored and Maintained or Improved:   Monitor and assess patient's chronic conditions and comorbid symptoms for stability, deterioration, or improvement   Collaborate with multidisciplinary team to address chronic and comorbid conditions and prevent exacerbation or deterioration     Problem: Discharge Planning  Goal: Discharge to home or other facility with appropriate resources  Outcome: Progressing  Flowsheets (Taken 7/11/2025 1654 by Iris Hernandez, RN)  Discharge to home or other facility with appropriate resources:   Identify barriers to discharge with patient and caregiver   Arrange for needed discharge resources and transportation as appropriate   Identify discharge learning needs (meds, wound care, etc)     Problem: Pain  Goal: Verbalizes/displays adequate comfort level or baseline comfort level  Outcome: Progressing  Flowsheets (Taken 7/11/2025 0046 by Viviane Shi, RN)  Verbalizes/displays adequate comfort level or baseline comfort level:   Encourage patient to monitor pain and request assistance   Administer analgesics based on type and severity of pain and evaluate response   Assess pain using appropriate pain scale   Consider cultural and social influences on pain and pain management   Implement non-pharmacological measures as appropriate and evaluate response     Problem: Safety - Adult  Goal: Free from fall injury  Outcome: Progressing  Flowsheets (Taken 7/12/2025 1419)  Free From Fall Injury:   Based on caregiver fall risk screen, instruct family/caregiver to ask for assistance with transferring infant if caregiver noted to have fall risk factors   Instruct family/caregiver on patient safety     Problem: 
  Problem: Chronic Conditions and Co-morbidities  Goal: Patient's chronic conditions and co-morbidity symptoms are monitored and maintained or improved  Outcome: Progressing  Flowsheets (Taken 7/5/2025 1626)  Care Plan - Patient's Chronic Conditions and Co-Morbidity Symptoms are Monitored and Maintained or Improved:   Monitor and assess patient's chronic conditions and comorbid symptoms for stability, deterioration, or improvement   Collaborate with multidisciplinary team to address chronic and comorbid conditions and prevent exacerbation or deterioration   Update acute care plan with appropriate goals if chronic or comorbid symptoms are exacerbated and prevent overall improvement and discharge  Note: V/S stable.  BP soft but MAP>65.  SBP 90s.  LR running at 100 ml/hr.       Problem: Discharge Planning  Goal: Discharge to home or other facility with appropriate resources  Outcome: Progressing  Flowsheets (Taken 7/5/2025 1626)  Discharge to home or other facility with appropriate resources:   Identify barriers to discharge with patient and caregiver   Arrange for needed discharge resources and transportation as appropriate   Identify discharge learning needs (meds, wound care, etc)     Problem: Pain  Goal: Verbalizes/displays adequate comfort level or baseline comfort level  Outcome: Progressing  Flowsheets (Taken 7/5/2025 1626)  Verbalizes/displays adequate comfort level or baseline comfort level:   Encourage patient to monitor pain and request assistance   Assess pain using appropriate pain scale   Administer analgesics based on type and severity of pain and evaluate response   Implement non-pharmacological measures as appropriate and evaluate response     Problem: Safety - Adult  Goal: Free from fall injury  Outcome: Progressing  Flowsheets (Taken 7/5/2025 1626)  Free From Fall Injury: Instruct family/caregiver on patient safety  Note: Pt is a Fall Risk. See Campuzano Fall Risk Score. Pt bed in low position and side 
  Problem: Chronic Conditions and Co-morbidities  Goal: Patient's chronic conditions and co-morbidity symptoms are monitored and maintained or improved  Outcome: Progressing  Flowsheets (Taken 7/6/2025 1058)  Care Plan - Patient's Chronic Conditions and Co-Morbidity Symptoms are Monitored and Maintained or Improved:   Monitor and assess patient's chronic conditions and comorbid symptoms for stability, deterioration, or improvement   Collaborate with multidisciplinary team to address chronic and comorbid conditions and prevent exacerbation or deterioration   Update acute care plan with appropriate goals if chronic or comorbid symptoms are exacerbated and prevent overall improvement and discharge     Problem: Discharge Planning  Goal: Discharge to home or other facility with appropriate resources  Outcome: Progressing  Flowsheets (Taken 7/6/2025 1058)  Discharge to home or other facility with appropriate resources:   Identify barriers to discharge with patient and caregiver   Arrange for needed discharge resources and transportation as appropriate   Identify discharge learning needs (meds, wound care, etc)     Problem: Pain  Goal: Verbalizes/displays adequate comfort level or baseline comfort level  Outcome: Progressing  Flowsheets (Taken 7/6/2025 1058)  Verbalizes/displays adequate comfort level or baseline comfort level:   Encourage patient to monitor pain and request assistance   Assess pain using appropriate pain scale   Administer analgesics based on type and severity of pain and evaluate response   Implement non-pharmacological measures as appropriate and evaluate response     Problem: Safety - Adult  Goal: Free from fall injury  Outcome: Progressing  Flowsheets (Taken 7/6/2025 1058)  Free From Fall Injury: Instruct family/caregiver on patient safety  Note: Pt is a Fall Risk. See Campuzano Fall Risk Score. Pt bed in low position and side rails up. Call light and belongings in reach. Pt encouraged to call for 
  Problem: Discharge Planning  Goal: Discharge to home or other facility with appropriate resources  Outcome: Progressing  Note: Pending lab testing. Urology and nephrology following for elevated creatinine and urine output monitoring.     Problem: ABCDS Injury Assessment  Goal: Absence of physical injury  Outcome: Progressing  Note: Patient has remained free of physical injury this shift. Fall and safety precautions in place. Bed exit alarm activated, bed in lowest position with wheels locked, call light and belongings within reach.       
Brief Postoperative Note    Delano Daigle  YOB: 1945  [id number]    Pre-operative Diagnosis: INDICATIONS: Cystic lesion in the right kidney, complex, abscess versus neoplasm    Post-operative Diagnosis: Renal Abscess    Procedure:   8-gauge guiding core biopsy needle was introduced and placed in the solid component of the complex cystic lesion.  2 Core specimens were obtained with minimal fragments that look like process. The needle was advanced with purulent green thick fluid, having the appearance of pseudomonas abscess.  In formalin, with fragmented necrotic tissue was dissipated with no significant core specimens retained. The sample was discarded.    CT-guided renal complex cystic mass biopsy, core specimens however revealed necrotic material and were not submitted for pathology    Anesthesia:   CONSCIOUS SEDATION: Intravenous Versed and fentanyl with hemodynamic monitoring and pulse oximetry.  Independent nursing observer: Incremental administration  of Versed and fentanyl  Time started: 0912  Time completed: 0915  Post sedation exam: No complications, stable vital signs     Surgeons/Assistants: Dr Wakefield    Estimated Blood Loss: Minimal  < 1.0cc    Complications: none    Specimens: were obtained    Pus sent for lab analysis      Maximus Wakefield MD MD  7/1/2025  
Chart reviewed today. MRI orders placed yesterday, still not completed. We will chart check until results are in. Please call with any questions.   
[facility] brief [place]  Consult received.  Case reviewed with ER physician- [facility]    Full note to follow.    Jason Dawn MD    Thanks  JIM WOMACK MD  
Notified by the [facility] Erin Colvin that pts HR dip down to 29, this writer is on the bedside and pt was asymptomatic and HR is at 67 per portable heart monitor.Perfect served hospitalist Madhuri NICOLE and EKG order was placed, called RT Ndiaye for an EKG. Supervisor Mi came by the bedside and perform EKG which show SR HR at 67. Madhuri DYSON noted the result and advise to monitor the pt. Pt is stable with VS HR 60 ,/67 T 37.3 02 at 98%. Pt is attached to portable heart monitor for continuous rhythm monitor .Pt is alert and oriented.Denies chest pain, dizziness and sob at this very moment.  
Pain/discomfort being managed with PRN analgesics per MD orders. Pt able to express presence and absence of pain and rate pain appropriately using numerical scale.    Pt free from falls this shift. Fall precautions in place at all times. Call light always withinreach. Pt able and agreeable to contact for safety appropriately. Bed alarm on.    
Patient in IR procedure on rounds, will assess again tomorrow    Mimi Irene PA-C    
etc)   Refer to discharge planning if patient needs post-hospital services based on physician order or complex needs related to functional status, cognitive ability or social support system  Note: Patient currently doing calorie count, no plans for discharge at this time. Possible GI consult pending dietician evaluation     Problem: Safety - Adult  Goal: Free from fall injury  Outcome: Progressing  Flowsheets (Taken 7/11/2025 1654)  Free From Fall Injury: Instruct family/caregiver on patient safety  Note: Pt will remain free from accidental injury this shift. Pt has fall risk measures (fall risk bracelet, fall risk sign, fall risk cloth, non-slip socks, dome light on) in place. Pt bed is in low position, bed alarm on, bed wheels locked, call light within reach, bedside table within reach, chair wheels locked, chair alarm on.       Problem: Nutrition Deficit:  Goal: Optimize nutritional status  Outcome: Progressing  Flowsheets (Taken 7/11/2025 1654)  Nutrient intake appropriate for improving, restoring, or maintaining nutritional needs:   Assess nutritional status and recommend course of action   Monitor oral intake, labs, and treatment plans   Recommend appropriate diets, oral nutritional supplements, and vitamin/mineral supplements  Note: Encouraging patient to eat during meal times and assisting patient with food tray as needed.       Problem: Skin/Tissue Integrity  Goal: Skin integrity remains intact  Description: 1.  Monitor for areas of redness and/or skin breakdown  2.  Assess vascular access sites hourly  3.  Every 4-6 hours minimum:  Change oxygen saturation probe site  4.  Every 4-6 hours:  If on nasal continuous positive airway pressure, respiratory therapy assess nares and determine need for appliance change or resting period  Outcome: Progressing  Flowsheets  Taken 7/11/2025 1654 by Iris Hernandez RN  Skin Integrity Remains Intact:   Monitor for areas of redness and/or skin breakdown   Assess vascular 
RN  Electrolytes maintained within normal limits:   Monitor labs and assess patient for signs and symptoms of electrolyte imbalances   Administer electrolyte replacement as ordered  Taken 7/3/2025 1600 by Elva Easton RN  Electrolytes maintained within normal limits: Monitor labs and assess patient for signs and symptoms of electrolyte imbalances     Problem: Metabolic/Fluid and Electrolytes - Adult  Goal: Hemodynamic stability and optimal renal function maintained  7/3/2025 2248 by Aida Morrison RN  Outcome: Progressing  Flowsheets  Taken 7/3/2025 2248 by Aida Morrison RN  Hemodynamic stability and optimal renal function maintained:   Monitor labs and assess for signs and symptoms of volume excess or deficit   Monitor intake, output and patient weight   Monitor response to interventions for patient's volume status, including labs, urine output, blood pressure (other measures as available)   Encourage oral intake as appropriate  Taken 7/3/2025 1600 by Elva Easton RN  Hemodynamic stability and optimal renal function maintained: Monitor labs and assess for signs and symptoms of volume excess or deficit  Note: Pt on continuous fluids, needs encouragement to void. IR drain to R kidney in place, drainage amt decreasing.     Problem: Skin/Tissue Integrity  Goal: Skin integrity remains intact  Description: 1.  Monitor for areas of redness and/or skin breakdown  2.  Assess vascular access sites hourly  3.  Every 4-6 hours minimum:  Change oxygen saturation probe site  4.  Every 4-6 hours:  If on nasal continuous positive airway pressure, respiratory therapy assess nares and determine need for appliance change or resting period  7/3/2025 2248 by Aida Morrison RN  Outcome: Progressing  Flowsheets  Taken 7/3/2025 2248 by Aida Morrison RN  Skin Integrity Remains Intact:   Monitor for areas of redness and/or skin breakdown   Turn and reposition as indicated  Taken 7/3/2025 1600 by Elva Easton

## 2025-07-15 ENCOUNTER — CLINICAL DOCUMENTATION (OUTPATIENT)
Dept: INFECTIOUS DISEASES | Age: 80
End: 2025-07-15

## 2025-07-15 ENCOUNTER — ENROLLMENT (OUTPATIENT)
Dept: INFECTIOUS DISEASES | Age: 80
End: 2025-07-15

## 2025-07-15 ENCOUNTER — TELEPHONE (OUTPATIENT)
Dept: INTERNAL MEDICINE CLINIC | Age: 80
End: 2025-07-15

## 2025-07-15 ENCOUNTER — TELEPHONE (OUTPATIENT)
Dept: INFECTIOUS DISEASES | Age: 80
End: 2025-07-15

## 2025-07-15 DIAGNOSIS — N15.1 RENAL ABSCESS: ICD-10-CM

## 2025-07-15 DIAGNOSIS — R78.81 MRSA BACTEREMIA: Primary | ICD-10-CM

## 2025-07-15 DIAGNOSIS — B95.62 MRSA BACTEREMIA: Primary | ICD-10-CM

## 2025-07-15 NOTE — PROGRESS NOTES
He has placed a referral order for pharmacist to manage Outpatient Parental Antimicrobial Therapy (OPAT) pursuant the ID Collaborative Practice Agreement.     Pertinent PMH and HPI:  PMH htn, hld, T2DM, HF, eczema with extensive pruritis, hidradenitis suppurativa, Afib, COPD, some concern for adrenal insufficiency     Presents  with generalized fatigue and weakness + poor PO intake and diarrhea    Patient had significant hospital stay including LAMBERTO, anasarca with ascites, renal cyst s/p drain, hypotension, hypoglycemia, hypothermia, scrotal swelling, poor PO intake with refusal of PEG tube, goals of care discussion    The below information will focus on ID plan:      Pertinent Objective Data:    Wt Readings from Last 1 Encounters:   25 91.5 kg (201 lb 11.5 oz)      BMI Readings from Last 1 Encounters:   25 27.36 kg/m²      Serum creatinine: 2.5 mg/dL (H) 25 0845  Estimated creatinine clearance: 27 mL/min (A)  Baseline = 1.2  Had LAMBERTO in hospital from [facility] ischemia       Lab Results   Component Value Date    CKTOTAL 29 (L) 2025       Imagin/30 MICHAEL:      No vegetation seen on the heart valves.    Mitral Valve: Trivial to mild regurgitation.    Right Ventricle: Right ventricle size is normal. Normal systolic function.    Image quality is adequate.  Patient tolerated procedure well       Micro:    urine: >100,000 kleb pneumo (completed therapy inpatient)    blood: MRSA   blood: MRSA   ascitic fluid: NGTD   blood: NGTD   renal abscess: MRSA   CDiff negative   urine: 50,000 GNB, 25,000 kleb pneumo (no symptoms, no treatment)    OPAT Orders:  Diagnosis  High grade MRSA bacteremia likely from skin but no apparent source   +  R renal abscess s/p drain:  \"Repeat CT 25 showed no residual abscess, no hydronephrosis. Drain removed .\"   Antimicrobial Regimen and Projected Term Date IV dapto 700 mg daily-   ~8 mg/kg based on actual body wt  Will need

## 2025-07-15 NOTE — TELEPHONE ENCOUNTER
Care Transitions Initial Follow Up Call    Outreach made within 2 business days of discharge: Yes    Patient: Delano Daigle Patient : 1945   MRN: [id number]  Reason for Admission: unknown  Discharge Date: 25       Spoke with: no ANSWER    Discharge department/facility: UNKNOWN     TCM Interactive Patient Contact:  Was patient able to fill all prescriptions: No: NO ANSWER  Was patient instructed to bring all medications to the follow-up visit: No: NO ANSWER  Is patient taking all medications as directed in the discharge summary? PATIENT DID NOT ANSWER   Does patient understand their discharge instructions: No: NO ANSWER  Does patient have questions or concerns that need addressed prior to 7-14 day follow up office visit: no    Additional needs identified to be addressed with provider  NO ANSWER FROM PATIENT              Scheduled appointment with PCP within 7-14 days    Follow Up  Future Appointments   Date Time Provider Department Center   2025  3:45 PM Doyle Blakely MD [facility]       Paula Solis MA

## 2025-07-15 NOTE — TELEPHONE ENCOUNTER
Spoke with Chip PETTY at [facility] and verified OPAT orders:     IV daptomycin 500 mg daily  - Planned End date: 7/30/2025  CBC w diff, CMP, ESR, CRP, CK

## 2025-07-16 ENCOUNTER — TELEPHONE (OUTPATIENT)
Dept: INFECTIOUS DISEASES | Age: 80
End: 2025-07-16

## 2025-07-16 ENCOUNTER — TELEPHONE (OUTPATIENT)
Dept: INTERNAL MEDICINE CLINIC | Age: 80
End: 2025-07-16

## 2025-07-16 NOTE — TELEPHONE ENCOUNTER
----- Message from Tigist London RPH sent at 7/15/2025  4:58 PM EDT -----  Dose inc to 700 please; see if we can get labs this week; would like to see scr. If it doesn't improve, will need to change to q48hr

## 2025-07-16 NOTE — TELEPHONE ENCOUNTER
Spoke with Jacinta PETTY at [facility] and advised of pharmacist note to increase Daptomycin to 700mg daily and to obtain labs this week.  She reports she will order labs for tomorrow 7/17-CBC w diff, CMP, ESR, CRP, CK    Orders Readback and verified

## 2025-07-16 NOTE — TELEPHONE ENCOUNTER
Care Transitions Initial Follow Up Call    Outreach made within 2 business days of discharge: Yes    Patient: Delano Daigle Patient : 1945   MRN: [id number]  Reason for Admission: unknown  Discharge Date: 25       Spoke with: no one    Discharge department/facility: [facility]    Scheduled appointment with PCP within 7-14 days    Follow Up  Future Appointments   Date Time Provider Department Center   2025  3:45 PM [facility]       Alma Pineda MA

## 2025-07-16 NOTE — TELEPHONE ENCOUNTER
LM x 2 for RN at [facility] to return call to advised of pharmacist note to  Dose inc to 700 please and to see if we can get labs this week; Office contact information provided.

## 2025-07-17 LAB
ALBUMIN: 1.9 G/DL
ALP BLD-CCNC: 96 U/L
ALT SERPL-CCNC: 16 U/L
ANION GAP SERPL CALCULATED.3IONS-SCNC: ABNORMAL MMOL/L
AST SERPL-CCNC: 34 U/L
BASOPHILS ABSOLUTE: 0 /ΜL
BASOPHILS RELATIVE PERCENT: 0.2 %
BILIRUB SERPL-MCNC: ABNORMAL MG/DL
BUN BLDV-MCNC: 7 MG/DL
C-REACTIVE PROTEIN: 0.88
CALCIUM SERPL-MCNC: 7.4 MG/DL
CHLORIDE BLD-SCNC: 118 MMOL/L
CO2: 23 MMOL/L
CREAT SERPL-MCNC: 2.2 MG/DL
EOSINOPHILS ABSOLUTE: 0.1 /ΜL
EOSINOPHILS RELATIVE PERCENT: 1 %
GFR, ESTIMATED: ABNORMAL
GLUCOSE BLD-MCNC: 63 MG/DL
HCT VFR BLD CALC: 30.7 % (ref 41–53)
HEMOGLOBIN: 9.7 G/DL (ref 13.5–17.5)
LYMPHOCYTES ABSOLUTE: 1.1 /ΜL
LYMPHOCYTES RELATIVE PERCENT: 21.7 %
MCH RBC QN AUTO: 30.6 PG
MCHC RBC AUTO-ENTMCNC: 31.6 G/DL
MCV RBC AUTO: 96.8 FL
MONOCYTES ABSOLUTE: 0.5 /ΜL
MONOCYTES RELATIVE PERCENT: 9.4 %
NEUTROPHILS ABSOLUTE: 3.5 /ΜL
NEUTROPHILS RELATIVE PERCENT: 67.5 %
PDW BLD-RTO: 15.2 %
PLATELET # BLD: 118 K/ΜL
PMV BLD AUTO: 12.3 FL
POTASSIUM SERPL-SCNC: 3.9 MMOL/L
RBC # BLD: 3.17 10^6/ΜL
SODIUM BLD-SCNC: 150 MMOL/L
TOTAL PROTEIN: 6.1 G/DL (ref 6.4–8.2)
WBC # BLD: 5.1 10^3/ML

## 2025-07-18 DIAGNOSIS — R78.81 MRSA BACTEREMIA: ICD-10-CM

## 2025-07-18 DIAGNOSIS — N15.1 RENAL ABSCESS: ICD-10-CM

## 2025-07-18 DIAGNOSIS — B95.62 MRSA BACTEREMIA: ICD-10-CM

## 2025-07-18 NOTE — TELEPHONE ENCOUNTER
Inc free water for high Na.  Would have him f/u with neph.       Estimated Creatinine Clearance: 30 mL/min (A) (based on SCr of 2.2 mg/dL (H)).    Dapto to stay at q24hr.

## 2025-07-21 ENCOUNTER — TELEPHONE (OUTPATIENT)
Dept: ENDOCRINOLOGY | Age: 80
End: 2025-07-21

## 2025-07-21 ENCOUNTER — TELEPHONE (OUTPATIENT)
Dept: INFECTIOUS DISEASES | Age: 80
End: 2025-07-21

## 2025-07-21 LAB
ALBUMIN: 1.7 G/DL
ALP BLD-CCNC: 91 U/L
ALT SERPL-CCNC: 15 U/L
ANION GAP SERPL CALCULATED.3IONS-SCNC: ABNORMAL MMOL/L
AST SERPL-CCNC: 33 U/L
BASOPHILS ABSOLUTE: 0 /ΜL
BASOPHILS RELATIVE PERCENT: 0.2 %
BILIRUB SERPL-MCNC: 0.3 MG/DL (ref 0.1–1.4)
BUN BLDV-MCNC: 14 MG/DL
C-REACTIVE PROTEIN: 1.6
CALCIUM SERPL-MCNC: 7.1 MG/DL
CHLORIDE BLD-SCNC: 120 MMOL/L
CO2: 21 MMOL/L
CREAT SERPL-MCNC: 2.3 MG/DL
EOSINOPHILS ABSOLUTE: 0.1 /ΜL
EOSINOPHILS RELATIVE PERCENT: 1.3 %
GFR, ESTIMATED: ABNORMAL
GLUCOSE BLD-MCNC: 48 MG/DL
HCT VFR BLD CALC: 29 % (ref 41–53)
HEMOGLOBIN: 9.2 G/DL (ref 13.5–17.5)
LYMPHOCYTES ABSOLUTE: 0.8 /ΜL
LYMPHOCYTES RELATIVE PERCENT: 17.4 %
MCH RBC QN AUTO: 31.2 PG
MCHC RBC AUTO-ENTMCNC: 31.7 G/DL
MCV RBC AUTO: 98.3 FL
MONOCYTES ABSOLUTE: 0.4 /ΜL
MONOCYTES RELATIVE PERCENT: 7.4 %
NEUTROPHILS ABSOLUTE: 3.5 /ΜL
NEUTROPHILS RELATIVE PERCENT: 73.5 %
PDW BLD-RTO: 15.7 %
PLATELET # BLD: 77 K/ΜL
PMV BLD AUTO: 11.8 FL
POTASSIUM SERPL-SCNC: 3.9 MMOL/L
RBC # BLD: 2.95 10^6/ΜL
SED RATE, AUTOMATED: 11
SODIUM BLD-SCNC: 150 MMOL/L
TOTAL PROTEIN: 4.5 G/DL (ref 6.4–8.2)
WBC # BLD: 4.7 10^3/ML

## 2025-07-21 NOTE — TELEPHONE ENCOUNTER
Doris from [facility] where patient is living called to schedule an appointment with Dr. Singh for Abnormal Cortisol levels. Patient is already a patient with [facility] for Adrenal Insufficiency. Is it ok to schedule? Rosibel can be reached at [phone]

## 2025-07-21 NOTE — TELEPHONE ENCOUNTER
Returned call and notified them of our no show policy for new patients as patient no showed his new patient appt in March. Will allow one reschedule. Appt scheduled.  Rosibel verbalized understanding.

## 2025-07-21 NOTE — TELEPHONE ENCOUNTER
Discussed with Tigist, pharmacist, and it was decided to maintain Dapto at q24 hours.    /Contacted [facility] and gave VO to nurse caring for pt to infuse Dapto q 24 hours through   Nurse RBVO correctly

## 2025-07-21 NOTE — TELEPHONE ENCOUNTER
Spoke with the DON at [facility].  I requested the labs collected on 7/17 to be faxed to our office.  While I had DON on phone I asked if crt had improved from 2.2, crt actually increased to 2.3  I gave VO to administer Daptomycin Q 48 hours through 7/30  DON RBVO correctly

## 2025-07-22 NOTE — TELEPHONE ENCOUNTER
We had already had labs from 7/17.    Estimated Creatinine Clearance: 30 mL/min (A) (based on SCr of 2.2 mg/dL (H)).;    Dose should be q24hr.

## 2025-07-24 DIAGNOSIS — B95.62 MRSA BACTEREMIA: ICD-10-CM

## 2025-07-24 DIAGNOSIS — N15.1 RENAL ABSCESS: ICD-10-CM

## 2025-07-24 DIAGNOSIS — R78.81 MRSA BACTEREMIA: ICD-10-CM

## 2025-07-24 NOTE — TELEPHONE ENCOUNTER
Received labs. Reviewed.     Now patient admitted at [facility] ICU for fall:    \"Known Injuries:  - Acute fractures of the left 6-8h ribs   - ? Small liver lac    Rib fractures L 6-8  - Multimodal pain control   - Volume expansion protocol, IS  - SSRF consult   - IS 2000  - Reports pain from ribs is 0/10 this AM    ?Liver laceration  - Trend CBC q6h - if stable can decrease frequency   - Monitor abdominal exams (currently non-tender)     Other findings:  Right upper lobe PE  Hypothermia  Hypotension  Starvation ketosis  Recurrent renal abscess   Concern for recurrent bacteremia   Bilateral pleural effusions, ascites, anasarca\"    Defer to their management.  Will follow along for dispo.

## (undated) DEVICE — PAD, DEFIB, ADULT, RADIOTRANS, PHYSIO: Brand: MEDLINE

## (undated) DEVICE — TRAP SPEC RETRV CLR PLAS POLYP IN LN SUCT QUIK CTCH

## (undated) DEVICE — CATHETER THRMDIL 7FR L110CM STD PULM ART 4 INFUS LUMN SWN

## (undated) DEVICE — SNARE COLD DIAMOND 10MM THIN

## (undated) DEVICE — FORCEPS BX L240CM JAW DIA2.8MM L CAP W/ NDL MIC MESH TOOTH

## (undated) DEVICE — CATH LAB PACK: Brand: MEDLINE INDUSTRIES, INC.

## (undated) DEVICE — GLIDESHEATH SLENDER STAINLESS STEEL KIT: Brand: GLIDESHEATH SLENDER

## (undated) DEVICE — CANNULA SAMP CO2 AD GRN 7FT CO2 AND 7FT O2 TBNG UNIV CONN